# Patient Record
Sex: FEMALE | Race: WHITE | NOT HISPANIC OR LATINO | Employment: OTHER | URBAN - METROPOLITAN AREA
[De-identification: names, ages, dates, MRNs, and addresses within clinical notes are randomized per-mention and may not be internally consistent; named-entity substitution may affect disease eponyms.]

---

## 2017-03-20 ENCOUNTER — TELEPHONE (OUTPATIENT)
Dept: RHEUMATOLOGY | Facility: CLINIC | Age: 72
End: 2017-03-20

## 2017-03-20 NOTE — TELEPHONE ENCOUNTER
Spoke with Mrs. Doss and rescheduled her Rheumatology and lab appointments for 04/18/2017. Reminder letter mailed.

## 2017-03-24 ENCOUNTER — TELEPHONE (OUTPATIENT)
Dept: HEMATOLOGY/ONCOLOGY | Facility: CLINIC | Age: 72
End: 2017-03-24

## 2017-03-24 DIAGNOSIS — D50.0 IRON DEFICIENCY ANEMIA DUE TO CHRONIC BLOOD LOSS: Primary | ICD-10-CM

## 2017-03-24 DIAGNOSIS — D50.9 IRON DEFICIENCY ANEMIA, UNSPECIFIED: Primary | ICD-10-CM

## 2017-03-24 RX ORDER — SODIUM CHLORIDE 0.9 % (FLUSH) 0.9 %
10 SYRINGE (ML) INJECTION
Status: CANCELLED | OUTPATIENT
Start: 2017-05-10

## 2017-03-24 RX ORDER — HEPARIN 100 UNIT/ML
500 SYRINGE INTRAVENOUS
Status: CANCELLED | OUTPATIENT
Start: 2017-05-03

## 2017-03-24 RX ORDER — METHYLPREDNISOLONE SOD SUCC 125 MG
125 VIAL (EA) INJECTION EVERY 6 HOURS
Status: CANCELLED
Start: 2017-05-03

## 2017-03-24 RX ORDER — HEPARIN 100 UNIT/ML
500 SYRINGE INTRAVENOUS
Status: CANCELLED | OUTPATIENT
Start: 2017-05-10

## 2017-03-24 RX ORDER — METHYLPREDNISOLONE SOD SUCC 125 MG
125 VIAL (EA) INJECTION EVERY 6 HOURS
Status: CANCELLED
Start: 2017-05-10

## 2017-03-24 RX ORDER — SODIUM CHLORIDE 0.9 % (FLUSH) 0.9 %
10 SYRINGE (ML) INJECTION
Status: CANCELLED | OUTPATIENT
Start: 2017-05-03

## 2017-03-24 NOTE — TELEPHONE ENCOUNTER
6 days hospital for gastric bleed due to [place]. She thinks she may be anemic at this time and has no energy. She would like a call from Dr Neri about how she is feeling.

## 2017-03-24 NOTE — TELEPHONE ENCOUNTER
----- Message from Adonay Neri MD sent at 3/24/2017  9:28 AM CDT -----  Patient looks like she had a bleed at our [facility] upper orders in for CBC CMP iron status LDH and reticular count can have that done and I can see her immediately afterwards see whether or not she'll need an intravenous iron or if she wishes to have the labs done a day before and I'll have all the results

## 2017-03-24 NOTE — TELEPHONE ENCOUNTER
----- Message from Josefa Avendaño sent at 3/24/2017  8:39 AM CDT -----  Contact: self [phone]  States that she would like to speak to nurse regarding a problem that she is having. Please call back at [phone]//thank you acc

## 2017-03-27 ENCOUNTER — LAB VISIT (OUTPATIENT)
Dept: LAB | Facility: HOSPITAL | Age: 72
End: 2017-03-27
Attending: INTERNAL MEDICINE
Payer: MEDICARE

## 2017-03-27 DIAGNOSIS — D50.9 IRON DEFICIENCY ANEMIA, UNSPECIFIED: ICD-10-CM

## 2017-03-27 LAB
ALBUMIN SERPL BCP-MCNC: 3.9 G/DL
ALP SERPL-CCNC: 72 U/L
ALT SERPL W/O P-5'-P-CCNC: 11 U/L
ANION GAP SERPL CALC-SCNC: 10 MMOL/L
AST SERPL-CCNC: 18 U/L
BASOPHILS # BLD AUTO: 0.03 K/UL
BASOPHILS NFR BLD: 0.5 %
BILIRUB SERPL-MCNC: 0.5 MG/DL
BUN SERPL-MCNC: 19 MG/DL
CALCIUM SERPL-MCNC: 9.7 MG/DL
CHLORIDE SERPL-SCNC: 107 MMOL/L
CO2 SERPL-SCNC: 24 MMOL/L
CREAT SERPL-MCNC: 0.8 MG/DL
DIFFERENTIAL METHOD: ABNORMAL
EOSINOPHIL # BLD AUTO: 0.1 K/UL
EOSINOPHIL NFR BLD: 1.8 %
ERYTHROCYTE [DISTWIDTH] IN BLOOD BY AUTOMATED COUNT: 19.7 %
EST. GFR  (AFRICAN AMERICAN): >60 ML/MIN/1.73 M^2
EST. GFR  (NON AFRICAN AMERICAN): >60 ML/MIN/1.73 M^2
FERRITIN SERPL-MCNC: 35 NG/ML
GLUCOSE SERPL-MCNC: 93 MG/DL
HCT VFR BLD AUTO: 36.4 %
HGB BLD-MCNC: 11.7 G/DL
IRON SERPL-MCNC: 103 UG/DL
LYMPHOCYTES # BLD AUTO: 0.9 K/UL
LYMPHOCYTES NFR BLD: 16 %
MCH RBC QN AUTO: 28.5 PG
MCHC RBC AUTO-ENTMCNC: 32.1 %
MCV RBC AUTO: 89 FL
MONOCYTES # BLD AUTO: 0.4 K/UL
MONOCYTES NFR BLD: 7.1 %
NEUTROPHILS # BLD AUTO: 4.2 K/UL
NEUTROPHILS NFR BLD: 74.6 %
PLATELET # BLD AUTO: 255 K/UL
PMV BLD AUTO: 9.5 FL
POTASSIUM SERPL-SCNC: 4.4 MMOL/L
PROT SERPL-MCNC: 6.3 G/DL
RBC # BLD AUTO: 4.11 M/UL
RETICS/RBC NFR AUTO: 1.3 %
SATURATED IRON: 34 %
SODIUM SERPL-SCNC: 141 MMOL/L
TOTAL IRON BINDING CAPACITY: 303 UG/DL
TRANSFERRIN SERPL-MCNC: 205 MG/DL
WBC # BLD AUTO: 5.64 K/UL

## 2017-03-27 PROCEDURE — 83540 ASSAY OF IRON: CPT

## 2017-03-27 PROCEDURE — 84466 ASSAY OF TRANSFERRIN: CPT

## 2017-03-27 PROCEDURE — 82728 ASSAY OF FERRITIN: CPT

## 2017-03-27 PROCEDURE — 85025 COMPLETE CBC W/AUTO DIFF WBC: CPT | Mod: PO

## 2017-03-27 PROCEDURE — 36415 COLL VENOUS BLD VENIPUNCTURE: CPT | Mod: PO

## 2017-03-27 PROCEDURE — 80053 COMPREHEN METABOLIC PANEL: CPT | Mod: PO

## 2017-03-27 PROCEDURE — 85045 AUTOMATED RETICULOCYTE COUNT: CPT

## 2017-03-28 ENCOUNTER — OFFICE VISIT (OUTPATIENT)
Dept: HEMATOLOGY/ONCOLOGY | Facility: CLINIC | Age: 72
End: 2017-03-28
Payer: MEDICARE

## 2017-03-28 VITALS
TEMPERATURE: 98 F | DIASTOLIC BLOOD PRESSURE: 78 MMHG | BODY MASS INDEX: 24.54 KG/M2 | SYSTOLIC BLOOD PRESSURE: 126 MMHG | HEIGHT: 62 IN | HEART RATE: 78 BPM | OXYGEN SATURATION: 98 % | WEIGHT: 133.38 LBS

## 2017-03-28 DIAGNOSIS — D50.0 IRON DEFICIENCY ANEMIA DUE TO CHRONIC BLOOD LOSS: Primary | ICD-10-CM

## 2017-03-28 DIAGNOSIS — K55.20 ANGIODYSPLASIA OF CECUM: ICD-10-CM

## 2017-03-28 PROCEDURE — 99213 OFFICE O/P EST LOW 20 MIN: CPT | Mod: PBBFAC | Performed by: INTERNAL MEDICINE

## 2017-03-28 PROCEDURE — 99999 PR PBB SHADOW E&M-EST. PATIENT-LVL III: CPT | Mod: PBBFAC,,, | Performed by: INTERNAL MEDICINE

## 2017-03-28 PROCEDURE — 99202 OFFICE O/P NEW SF 15 MIN: CPT | Mod: S$PBB,,, | Performed by: INTERNAL MEDICINE

## 2017-03-28 NOTE — PROGRESS NOTES
Subjective:       Patient ID: Selam Botello is a 71 y.o. female.    Chief Complaint: Anemia and Results    HPI 71-year-old female history of cecal hemorrhage else secondary to meloxicam documentation from note of March 2017 an epic care everywhere at [facility] in [place]    Past Medical History:   Diagnosis Date    Acid reflux     Allergy     Arthritis     Cataract     Deep vein thrombosis     Degenerative disc disease     Osteoporosis     Thyroid disease      Family History   Problem Relation Age of Onset    COPD Mother     COPD Father     Cancer Sister      Social History     Social History    Marital status:      Spouse name: N/A    Number of children: N/A    Years of education: N/A     Occupational History    Not on file.     Social History Main Topics    Smoking status: Never Smoker    Smokeless tobacco: Not on file    Alcohol use Yes      Comment: social    Drug use: No    Sexual activity: Not on file     Other Topics Concern    Not on file     Social History Narrative     Past Surgical History:   Procedure Laterality Date    ADENOIDECTOMY      BREAST SURGERY      EYE SURGERY      JOINT REPLACEMENT      left hip replacement      TONSILLECTOMY         Labs:  Lab Results   Component Value Date    WBC 5.64 03/27/2017    HGB 11.7 (L) 03/27/2017    HCT 36.4 (L) 03/27/2017    MCV 89 03/27/2017     03/27/2017     BMP  Lab Results   Component Value Date     03/27/2017    K 4.4 03/27/2017     03/27/2017    CO2 24 03/27/2017    BUN 19 03/27/2017    CREATININE 0.8 03/27/2017    CALCIUM 9.7 03/27/2017    ANIONGAP 10 03/27/2017    ESTGFRAFRICA >60 03/27/2017    EGFRNONAA >60 03/27/2017     Lab Results   Component Value Date    ALT 11 03/27/2017    AST 18 03/27/2017    ALKPHOS 72 03/27/2017    BILITOT 0.5 03/27/2017       Lab Results   Component Value Date    IRON 103 03/27/2017    TIBC 303 03/27/2017    FERRITIN 35 03/27/2017     No results  found for: [id number]  No results found for: FOLATE  No results found for: TSH      Review of Systems   Constitutional: Positive for fatigue. Negative for activity change, appetite change, chills, diaphoresis, fever and unexpected weight change.   HENT: Negative for congestion, dental problem, drooling, ear discharge, ear pain, facial swelling, hearing loss, mouth sores, nosebleeds, postnasal drip, rhinorrhea, sinus pressure, sneezing, sore throat, tinnitus, trouble swallowing and voice change.    Eyes: Negative for photophobia, pain, discharge, redness, itching and visual disturbance.   Respiratory: Negative for cough, choking, chest tightness, shortness of breath, wheezing and stridor.    Cardiovascular: Negative for chest pain, palpitations and leg swelling.   Gastrointestinal: Negative for abdominal distention, abdominal pain, anal bleeding, blood in stool, constipation, diarrhea, nausea, rectal pain and vomiting.   Endocrine: Negative for cold intolerance, heat intolerance, polydipsia, polyphagia and polyuria.   Genitourinary: Negative for decreased urine volume, difficulty urinating, dyspareunia, dysuria, enuresis, flank pain, frequency, genital sores, hematuria, menstrual problem, pelvic pain, urgency, vaginal bleeding, vaginal discharge and vaginal pain.   Musculoskeletal: Negative for arthralgias, back pain, gait problem, joint swelling, myalgias, neck pain and neck stiffness.   Skin: Negative for color change, pallor and rash.   Allergic/Immunologic: Negative for environmental allergies, food allergies and immunocompromised state.   Neurological: Positive for weakness. Negative for dizziness, tremors, seizures, syncope, facial asymmetry, speech difficulty, light-headedness, numbness and headaches.   Hematological: Negative for adenopathy. Does not bruise/bleed easily.   Psychiatric/Behavioral: Positive for dysphoric mood. Negative for agitation, behavioral problems, confusion, decreased concentration,  hallucinations, self-injury, sleep disturbance and suicidal ideas. The patient is nervous/anxious. The patient is not hyperactive.        Objective:      Physical Exam   Constitutional: She is oriented to person, place, and time. She appears well-developed and well-nourished. No distress.   HENT:   Head: Normocephalic and atraumatic.   Right Ear: External ear normal.   Left Ear: External ear normal.   Nose: Nose normal. Right sinus exhibits no maxillary sinus tenderness and no frontal sinus tenderness. Left sinus exhibits no maxillary sinus tenderness and no frontal sinus tenderness.   Mouth/Throat: Oropharynx is clear and moist. No oropharyngeal exudate.   Eyes: Conjunctivae, EOM and lids are normal. Pupils are equal, round, and reactive to light. Right eye exhibits no discharge. Left eye exhibits no discharge. Right conjunctiva is not injected. Right conjunctiva has no hemorrhage. Left conjunctiva is not injected. Left conjunctiva has no hemorrhage. No scleral icterus.   Neck: Normal range of motion. Neck supple. No JVD present. No tracheal deviation present. No thyromegaly present.   Cardiovascular: Normal rate and regular rhythm.    Pulmonary/Chest: Effort normal. No stridor. No respiratory distress. She exhibits no tenderness.   Abdominal: Soft. She exhibits no distension and no mass. There is no splenomegaly or hepatomegaly. There is no tenderness. There is no rebound.   Musculoskeletal: Normal range of motion. She exhibits no edema or tenderness.   Lymphadenopathy:     She has no cervical adenopathy.     She has no axillary adenopathy.        Right: No supraclavicular adenopathy present.        Left: No supraclavicular adenopathy present.   Neurological: She is alert and oriented to person, place, and time. No cranial nerve deficit. Coordination normal.   Skin: Skin is dry. No rash noted. She is not diaphoretic. No erythema.   Psychiatric: She has a normal mood and affect. Her behavior is normal. Judgment and  thought content normal.   Vitals reviewed.          Assessment:       1. Iron deficiency anemia due to chronic blood loss    2. Angiodysplasia of cecum            Plan:     reviewed results presented with hematocrit of 19.7 at this point hemoglobin is 11.7 iron status repleted will check CBC on a weekly basis communicates through patient electronic portal return in one month with CBC and iron status

## 2017-03-29 ENCOUNTER — TELEPHONE (OUTPATIENT)
Dept: HEMATOLOGY/ONCOLOGY | Facility: CLINIC | Age: 72
End: 2017-03-29

## 2017-03-29 NOTE — TELEPHONE ENCOUNTER
Pt will have monthly CBC at her convinience. The orders are in. Scheduled for Dr Neri in 4 wks/prior lab.

## 2017-04-04 ENCOUNTER — LAB VISIT (OUTPATIENT)
Dept: LAB | Facility: HOSPITAL | Age: 72
End: 2017-04-04
Attending: INTERNAL MEDICINE
Payer: MEDICARE

## 2017-04-04 DIAGNOSIS — D50.0 IRON DEFICIENCY ANEMIA DUE TO CHRONIC BLOOD LOSS: ICD-10-CM

## 2017-04-04 LAB
BASOPHILS # BLD AUTO: 0.03 K/UL
BASOPHILS NFR BLD: 0.6 %
DIFFERENTIAL METHOD: ABNORMAL
EOSINOPHIL # BLD AUTO: 0.1 K/UL
EOSINOPHIL NFR BLD: 2.7 %
ERYTHROCYTE [DISTWIDTH] IN BLOOD BY AUTOMATED COUNT: 19.8 %
HCT VFR BLD AUTO: 38.3 %
HGB BLD-MCNC: 12.2 G/DL
LYMPHOCYTES # BLD AUTO: 1.2 K/UL
LYMPHOCYTES NFR BLD: 24.3 %
MCH RBC QN AUTO: 28.2 PG
MCHC RBC AUTO-ENTMCNC: 31.9 %
MCV RBC AUTO: 89 FL
MONOCYTES # BLD AUTO: 0.4 K/UL
MONOCYTES NFR BLD: 9.2 %
NEUTROPHILS # BLD AUTO: 3 K/UL
NEUTROPHILS NFR BLD: 63.2 %
PLATELET # BLD AUTO: 310 K/UL
PMV BLD AUTO: 9.4 FL
RBC # BLD AUTO: 4.32 M/UL
WBC # BLD AUTO: 4.78 K/UL

## 2017-04-04 PROCEDURE — 85025 COMPLETE CBC W/AUTO DIFF WBC: CPT | Mod: PO

## 2017-04-04 PROCEDURE — 36415 COLL VENOUS BLD VENIPUNCTURE: CPT | Mod: PO

## 2017-04-11 ENCOUNTER — LAB VISIT (OUTPATIENT)
Dept: LAB | Facility: HOSPITAL | Age: 72
End: 2017-04-11
Attending: INTERNAL MEDICINE
Payer: MEDICARE

## 2017-04-11 DIAGNOSIS — D50.0 IRON DEFICIENCY ANEMIA DUE TO CHRONIC BLOOD LOSS: ICD-10-CM

## 2017-04-11 LAB
BASOPHILS # BLD AUTO: 0.02 K/UL
BASOPHILS NFR BLD: 0.4 %
DIFFERENTIAL METHOD: ABNORMAL
EOSINOPHIL # BLD AUTO: 0.1 K/UL
EOSINOPHIL NFR BLD: 2.2 %
ERYTHROCYTE [DISTWIDTH] IN BLOOD BY AUTOMATED COUNT: 19.3 %
HCT VFR BLD AUTO: 37.1 %
HGB BLD-MCNC: 12 G/DL
LYMPHOCYTES # BLD AUTO: 1 K/UL
LYMPHOCYTES NFR BLD: 21.3 %
MCH RBC QN AUTO: 28.3 PG
MCHC RBC AUTO-ENTMCNC: 32.3 %
MCV RBC AUTO: 88 FL
MONOCYTES # BLD AUTO: 0.3 K/UL
MONOCYTES NFR BLD: 7.2 %
NEUTROPHILS # BLD AUTO: 3.2 K/UL
NEUTROPHILS NFR BLD: 68.9 %
PLATELET # BLD AUTO: 289 K/UL
PMV BLD AUTO: 10 FL
RBC # BLD AUTO: 4.24 M/UL
WBC # BLD AUTO: 4.6 K/UL

## 2017-04-11 PROCEDURE — 85025 COMPLETE CBC W/AUTO DIFF WBC: CPT | Mod: PO

## 2017-04-11 PROCEDURE — 36415 COLL VENOUS BLD VENIPUNCTURE: CPT | Mod: PO

## 2017-04-12 ENCOUNTER — PATIENT MESSAGE (OUTPATIENT)
Dept: HEMATOLOGY/ONCOLOGY | Facility: CLINIC | Age: 72
End: 2017-04-12

## 2017-04-18 ENCOUNTER — LAB VISIT (OUTPATIENT)
Dept: LAB | Facility: HOSPITAL | Age: 72
End: 2017-04-18
Attending: INTERNAL MEDICINE
Payer: MEDICARE

## 2017-04-18 ENCOUNTER — OFFICE VISIT (OUTPATIENT)
Dept: RHEUMATOLOGY | Facility: CLINIC | Age: 72
End: 2017-04-18
Payer: MEDICARE

## 2017-04-18 VITALS
HEART RATE: 65 BPM | DIASTOLIC BLOOD PRESSURE: 66 MMHG | SYSTOLIC BLOOD PRESSURE: 110 MMHG | BODY MASS INDEX: 24.27 KG/M2 | WEIGHT: 132.69 LBS

## 2017-04-18 DIAGNOSIS — M15.9 PRIMARY OSTEOARTHRITIS INVOLVING MULTIPLE JOINTS: Primary | ICD-10-CM

## 2017-04-18 DIAGNOSIS — M81.0 AGE-RELATED OSTEOPOROSIS WITHOUT CURRENT PATHOLOGICAL FRACTURE: ICD-10-CM

## 2017-04-18 DIAGNOSIS — E55.9 HYPOVITAMINOSIS D: Chronic | ICD-10-CM

## 2017-04-18 DIAGNOSIS — D50.0 IRON DEFICIENCY ANEMIA DUE TO CHRONIC BLOOD LOSS: ICD-10-CM

## 2017-04-18 DIAGNOSIS — M15.9 PRIMARY OSTEOARTHRITIS INVOLVING MULTIPLE JOINTS: ICD-10-CM

## 2017-04-18 DIAGNOSIS — Z51.81 MEDICATION MONITORING ENCOUNTER: Chronic | ICD-10-CM

## 2017-04-18 LAB
25(OH)D3+25(OH)D2 SERPL-MCNC: 42 NG/ML
ANION GAP SERPL CALC-SCNC: 9 MMOL/L
BASOPHILS # BLD AUTO: 0.02 K/UL
BASOPHILS NFR BLD: 0.4 %
BUN SERPL-MCNC: 23 MG/DL
CALCIUM SERPL-MCNC: 9.6 MG/DL
CHLORIDE SERPL-SCNC: 108 MMOL/L
CO2 SERPL-SCNC: 26 MMOL/L
CREAT SERPL-MCNC: 0.8 MG/DL
DIFFERENTIAL METHOD: ABNORMAL
EOSINOPHIL # BLD AUTO: 0.1 K/UL
EOSINOPHIL NFR BLD: 2 %
ERYTHROCYTE [DISTWIDTH] IN BLOOD BY AUTOMATED COUNT: 19.7 %
EST. GFR  (AFRICAN AMERICAN): >60 ML/MIN/1.73 M^2
EST. GFR  (NON AFRICAN AMERICAN): >60 ML/MIN/1.73 M^2
FERRITIN SERPL-MCNC: 17 NG/ML
GLUCOSE SERPL-MCNC: 94 MG/DL
HCT VFR BLD AUTO: 38.4 %
HGB BLD-MCNC: 12.3 G/DL
IRON SERPL-MCNC: 55 UG/DL
LYMPHOCYTES # BLD AUTO: 0.8 K/UL
LYMPHOCYTES NFR BLD: 15.7 %
MCH RBC QN AUTO: 28.3 PG
MCHC RBC AUTO-ENTMCNC: 32 %
MCV RBC AUTO: 88 FL
MONOCYTES # BLD AUTO: 0.4 K/UL
MONOCYTES NFR BLD: 8.5 %
NEUTROPHILS # BLD AUTO: 3.7 K/UL
NEUTROPHILS NFR BLD: 73.4 %
PLATELET # BLD AUTO: 242 K/UL
PMV BLD AUTO: 9.2 FL
POTASSIUM SERPL-SCNC: 4 MMOL/L
RBC # BLD AUTO: 4.35 M/UL
SATURATED IRON: 17 %
SODIUM SERPL-SCNC: 143 MMOL/L
TOTAL IRON BINDING CAPACITY: 318 UG/DL
TRANSFERRIN SERPL-MCNC: 215 MG/DL
WBC # BLD AUTO: 5.03 K/UL

## 2017-04-18 PROCEDURE — 99214 OFFICE O/P EST MOD 30 MIN: CPT | Mod: S$PBB,,, | Performed by: PHYSICIAN ASSISTANT

## 2017-04-18 PROCEDURE — 99214 OFFICE O/P EST MOD 30 MIN: CPT | Mod: PBBFAC,PO,25 | Performed by: PHYSICIAN ASSISTANT

## 2017-04-18 PROCEDURE — 99999 PR PBB SHADOW E&M-EST. PATIENT-LVL IV: CPT | Mod: PBBFAC,,, | Performed by: PHYSICIAN ASSISTANT

## 2017-04-18 NOTE — PROGRESS NOTES
Subjective:       Patient ID: Selam Botello is a 71 y.o. female.    Chief Complaint: Osteopenia    HPI Comments: Mrs. Botello is here for follow up for osteoporosis/osteopenia and osteoarthritis.  In the past she had been on fosamax and boniva for osteoporosis but had no improvement in her bmd.  She was moved to [facility] and improved to osteopenia and given a prolia holiday. She had a repeat dexa today showing osteoporosis at the radius.  No falls or fractures.  She has a history of vitamin D deficiency and is taking 50,000units twice weekly.  Previous vit D levels have been in the normal range, today's level is pending.     For her osteoarthritis, her pain is mainly in her hands, she has bony enlargement to her pip and dip joints and her rita thumb mcps.  She has had a left TKA and right BROCK.  Her left knee has limited range of motion due to complications after her surgery, she is in physical therapy for this.  She has taken mobic for a long time and this helped, however earlier this year she was admitted to the hospital for severe anemia and found to have a GI bleed.  Mobic was stopped.  She takes tylenol prn but still hurts in her hands.  She uses lidoderm topical but this doesn't really help.      Review of Systems   Constitutional: Negative for chills, fatigue and fever.   HENT: Negative for mouth sores, rhinorrhea and sore throat.    Eyes: Negative for pain and redness.   Respiratory: Negative for cough and shortness of breath.    Cardiovascular: Positive for leg swelling (improved with vasculera). Negative for chest pain.   Gastrointestinal: Negative for abdominal pain, constipation, diarrhea, nausea and vomiting.        Recent GI bleed   Genitourinary: Negative for dysuria and hematuria.   Musculoskeletal: Positive for arthralgias. Negative for joint swelling and myalgias.        Right TKA, left BROCK   Skin: Negative for rash.   Neurological: Negative for weakness, numbness and headaches.    Psychiatric/Behavioral: The patient is not nervous/anxious.          Objective:   /66  Pulse 65  Wt 60.2 kg (132 lb 11.5 oz)  BMI 24.27 kg/m2     Physical Exam   Constitutional: She is oriented to person, place, and time and well-developed, well-nourished, and in no distress.   HENT:   Head: Normocephalic and atraumatic.   Eyes: Pupils are equal, round, and reactive to light. Right eye exhibits no discharge.   Neck: Normal range of motion.   Cardiovascular: Normal rate, regular rhythm and normal heart sounds.  Exam reveals no friction rub.    Pulmonary/Chest: Effort normal and breath sounds normal. No respiratory distress.   Abdominal: Soft. She exhibits no distension. There is no tenderness.   Lymphadenopathy:     She has no cervical adenopathy.   Neurological: She is alert and oriented to person, place, and time.   Skin: No rash noted. No erythema.     Psychiatric: Mood normal.   Musculoskeletal: Normal range of motion. She exhibits no edema or deformity.   rita hands with oa changes to pip and dips, rita 1 mcps enlarged    Right TKA with decreased rom, flexion about 50-60 degress    Left steve    No swollen or tender joints           Recent Results (from the past 168 hour(s))   CBC auto differential    Collection Time: 04/11/17 10:30 AM   Result Value Ref Range    WBC 4.60 3.90 - 12.70 K/uL    RBC 4.24 4.00 - 5.40 M/uL    Hemoglobin 12.0 12.0 - 16.0 g/dL    Hematocrit 37.1 37.0 - 48.5 %    MCV 88 82 - 98 fL    MCH 28.3 27.0 - 31.0 pg    MCHC 32.3 32.0 - 36.0 %    RDW 19.3 (H) 11.5 - 14.5 %    Platelets 289 150 - 350 K/uL    MPV 10.0 9.2 - 12.9 fL    Gran # 3.2 1.8 - 7.7 K/uL    Lymph # 1.0 1.0 - 4.8 K/uL    Mono # 0.3 0.3 - 1.0 K/uL    Eos # 0.1 0.0 - 0.5 K/uL    Baso # 0.02 0.00 - 0.20 K/uL    Gran% 68.9 38.0 - 73.0 %    Lymph% 21.3 18.0 - 48.0 %    Mono% 7.2 4.0 - 15.0 %    Eosinophil% 2.2 0.0 - 8.0 %    Basophil% 0.4 0.0 - 1.9 %    Differential Method Automated    Basic metabolic panel    Collection  Time: 04/18/17  8:19 AM   Result Value Ref Range    Sodium 143 136 - 145 mmol/L    Potassium 4.0 3.5 - 5.1 mmol/L    Chloride 108 95 - 110 mmol/L    CO2 26 23 - 29 mmol/L    Glucose 94 70 - 110 mg/dL    BUN, Bld 23 8 - 23 mg/dL    Creatinine 0.8 0.5 - 1.4 mg/dL    Calcium 9.6 8.7 - 10.5 mg/dL    Anion Gap 9 8 - 16 mmol/L    eGFR if African American >60 >60 mL/min/1.73 m^2    eGFR if non African American >60 >60 mL/min/1.73 m^2   CBC auto differential    Collection Time: 04/18/17  8:19 AM   Result Value Ref Range    WBC 5.03 3.90 - 12.70 K/uL    RBC 4.35 4.00 - 5.40 M/uL    Hemoglobin 12.3 12.0 - 16.0 g/dL    Hematocrit 38.4 37.0 - 48.5 %    MCV 88 82 - 98 fL    MCH 28.3 27.0 - 31.0 pg    MCHC 32.0 32.0 - 36.0 %    RDW 19.7 (H) 11.5 - 14.5 %    Platelets 242 150 - 350 K/uL    MPV 9.2 9.2 - 12.9 fL    Gran # 3.7 1.8 - 7.7 K/uL    Lymph # 0.8 (L) 1.0 - 4.8 K/uL    Mono # 0.4 0.3 - 1.0 K/uL    Eos # 0.1 0.0 - 0.5 K/uL    Baso # 0.02 0.00 - 0.20 K/uL    Gran% 73.4 (H) 38.0 - 73.0 %    Lymph% 15.7 (L) 18.0 - 48.0 %    Mono% 8.5 4.0 - 15.0 %    Eosinophil% 2.0 0.0 - 8.0 %    Basophil% 0.4 0.0 - 1.9 %    Differential Method Automated      Dexa 4/18/17 spine -1.4, radius UD -2.7, radius 33% -2.1, bmd increasing at Radius 33%, decreasing at spine  Assessment:       1. Primary osteoarthritis involving multiple joints    2. Age-related osteoporosis without current pathological fracture    3. Medication monitoring encounter        1. OA bilateral hands: mobic in the past but stopped recently due to serious GI bleed this year, tylenol occasionally   2. Osteoporosis: previously on fosamax and boniva without improvement, prolia improved to osteopoenia, on holiday now; repeat dexa 4/18/17 spine -1.4, radius UD -2.7, radius 33% -2.1,   3.  Med monitoring: ok for prolia  4. H/o vit D deficiency: taking vit D 50.000Units twice weekly, level today is pending  Plan:       Will start Limbrel twice daily for OA pain, [facility]   No nsaids   Will resume prolia, get auth for this and schedule for when approved, use today's labs   Cont vit D twice weekly for now, will check today's level, ca in diet  Continue physical therapy       Return when approved for prolia and then q 6 mos with bmp

## 2017-04-18 NOTE — PATIENT INSTRUCTIONS
Limbrel one pill twice a day, [facility], can take a few weeks to start working     prolia will resume every 6 months      Cont vit D twice weekly

## 2017-04-18 NOTE — MR AVS SNAPSHOT
[facility]  [address]  [place] [address]  Phone: [phone]  Fax: [phone]                  Selam Botello   2017 9:00 AM   Office Visit    Description:  Female : 1945   Provider:  Cassandra Mejia PA-C   Department:  [facility] - Rhe[facility]ology           Reason for Visit     Osteopenia           Diagnoses this Visit        Comments    Primary osteoarthritis involving multiple joints    -  Primary     Age-related osteoporosis without current pathological fracture         Medication monitoring encounter                To Do List           Future Appointments        Provider Department Dept Phone    2017 2:20 PM LABORATORY, [facility] [phone]    2017 3:00 PM Adonay Neri MD [facility]ot[facility] Oncology [phone]    2017 9:00 AM RHEUMATOLOGY NURSE, [facility] [phone]      Goals (5 Years of Data)     None      Follow-Up and Disposition     Return in about 6 months (around 10/18/2017) for prolia when approved, 6 mos after prolia with aquilino maria.       These Medications        Disp Refills Start End    baicalin-catechin 500 mg Cap 180 each 1 2017     Take 500 mg by mouth 2 (two) times daily. - Oral    Pharmacy: [facility]/pharmacy #[id number] - [place], [place] - [address].  #: [phone]         [facility]     [facility] Nurse Care Line -  Assistance  Unless otherwise directed by your provider, please contact [facility], our nurse care line that is available for  assistance.     Registered nurses in the [facility] provide: appointment scheduling, clinical advisement, health education, and other advisory services.  Call: [phone] (toll free)               Medications           Message regarding Medications     Verify the changes and/or additions to your medication regime listed below are the same as discussed with your clinician today.  If any of these changes  or additions are incorrect, please notify your healthcare provider.        START taking these NEW medications        Refills    baicalin-catechin 500 mg Cap 1    Sig: Take 500 mg by mouth 2 (two) times daily.    Class: No Print    Route: Oral      These medications were administered today        Dose Freq    denosumab (PROLIA) injection 60 mg 60 mg Clinic/HOD 1 time    Sig: Inject 1 mL (60 mg total) into the skin one time.    Class: Normal    Route: Subcutaneous           Verify that the below list of medications is an accurate representation of the medications you are currently taking.  If none reported, the list may be blank. If incorrect, please contact your healthcare provider. Carry this list with you in case of emergency.           Current Medications     b complex vitamins tablet Take 1 tablet by mouth once daily.    baicalin-catechin 500 mg Cap Take 500 mg by mouth 2 (two) times daily.    cetirizine (ZYRTEC) 10 MG tablet Take 1 tablet by mouth Use as needed.    CRANBERRY FRUIT EXTRACT (CRANBERRY CONCENTRATE ORAL) Take 650 mg by mouth.    diosmin complex no.1 (VASCULERA) 630 mg Tab Take by mouth.    ergocalciferol (VITAMIN D2) 50,000 unit Cap Take 1 capsule (50,000 Units total) by mouth every 7 days.    levothyroxine (SYNTHROID) 100 MCG tablet Take 1 tablet by mouth Daily.    lidocaine-prilocaine (EMLA) cream Apply topically as needed.    LIDODERM 5 % PLACE 1 PATCH ONTO THE SKIN ONCE DAILY.    mometasone (NASONEX) 50 mcg/actuation nasal spray 1 spray by Nasal route PRN.    omeprazole (PRILOSEC OTC) 20 MG tablet Take 20 mg by mouth once daily.    PLAVIX 75 mg tablet Take 75 mg by mouth. 4 times a week    pyridoxine (VITAMIN B-6) 100 MG Tab Take 50 mg by mouth 2 (two) times daily.    VITAMIN B COMPLEX NO.12-NIACIN ORAL Take 1,000 Int'l Units by mouth.    vitamin E 400 UNIT capsule Take 400 Units by mouth once daily.           Clinical Reference Information           Your Vitals Were     BP Pulse Weight BMI        110/66 65 60.2 kg (132 lb 11.5 oz) 24.27 kg/m2       Blood Pressure          Most Recent Value    BP  110/66      Allergies as of 4/18/2017     Epinephrine    Latex    Levofloxacin    Propofol Analogues    Sulfa (Sulfonamide Antibiotics)    Tramadol      Immunizations Administered on Date of Encounter - 4/18/2017     None      Orders Placed During Today's Visit      Normal Orders This Visit    Prior Authorization Order     Recurring Lab Work Interval Expires    Basic metabolic panel   6/17/2018      Instructions    Limbrel one pill twice a day, [facility], can take a few weeks to start working     prolia will resume every 6 months      Cont vit D twice weekly        Language Assistance Services     ATTENTION: Language assistance services are available, free of charge. Please call [phone].      ATENCIÓN: Si emelia blackwood, tiene a garcia disposición servicios gratuitos de asistencia lingüística. Llame al [phone].     CHÚ Ý: N?u b?n nói Ti?ng Vi?t, có các d?ch v? h? tr? ngôn ng? mi?n phí dành cho b?n. G?i s? [phone].         [facility] complies with applicable Federal civil rights laws and does not discriminate on the basis of race, color, national origin, age, disability, or sex.

## 2017-04-19 ENCOUNTER — TELEPHONE (OUTPATIENT)
Dept: RHEUMATOLOGY | Facility: CLINIC | Age: 72
End: 2017-04-19

## 2017-04-24 ENCOUNTER — TELEPHONE (OUTPATIENT)
Dept: HEMATOLOGY/ONCOLOGY | Facility: CLINIC | Age: 72
End: 2017-04-24

## 2017-04-24 NOTE — TELEPHONE ENCOUNTER
Returning your call to reschedule appt that you requested, if you can't reach me you can reschedule with appt desk at [phone], thank you

## 2017-04-24 NOTE — TELEPHONE ENCOUNTER
----- Message from Gisell eZpeda sent at 2017  9:35 AM CDT -----  Contact: pt  Pt is calling Nurse staff regarding pt appt need to be reschedule due to a . Pt need the appt sooner than appt allows. Pt call back [phone] thanks

## 2017-04-25 ENCOUNTER — LAB VISIT (OUTPATIENT)
Dept: LAB | Facility: HOSPITAL | Age: 72
End: 2017-04-25
Attending: INTERNAL MEDICINE
Payer: MEDICARE

## 2017-04-25 DIAGNOSIS — D50.0 IRON DEFICIENCY ANEMIA DUE TO CHRONIC BLOOD LOSS: ICD-10-CM

## 2017-04-25 LAB
BASOPHILS # BLD AUTO: 0.02 K/UL
BASOPHILS NFR BLD: 0.4 %
DIFFERENTIAL METHOD: ABNORMAL
EOSINOPHIL # BLD AUTO: 0.1 K/UL
EOSINOPHIL NFR BLD: 1.5 %
ERYTHROCYTE [DISTWIDTH] IN BLOOD BY AUTOMATED COUNT: 19.3 %
HCT VFR BLD AUTO: 38.7 %
HGB BLD-MCNC: 12.5 G/DL
LYMPHOCYTES # BLD AUTO: 1.2 K/UL
LYMPHOCYTES NFR BLD: 22.7 %
MCH RBC QN AUTO: 28.2 PG
MCHC RBC AUTO-ENTMCNC: 32.3 %
MCV RBC AUTO: 87 FL
MONOCYTES # BLD AUTO: 0.4 K/UL
MONOCYTES NFR BLD: 7.3 %
NEUTROPHILS # BLD AUTO: 3.7 K/UL
NEUTROPHILS NFR BLD: 68.1 %
PLATELET # BLD AUTO: 267 K/UL
PMV BLD AUTO: 9.3 FL
RBC # BLD AUTO: 4.43 M/UL
WBC # BLD AUTO: 5.46 K/UL

## 2017-04-25 PROCEDURE — 85025 COMPLETE CBC W/AUTO DIFF WBC: CPT | Mod: PO

## 2017-04-25 PROCEDURE — 36415 COLL VENOUS BLD VENIPUNCTURE: CPT | Mod: PO

## 2017-05-02 ENCOUNTER — CLINICAL SUPPORT (OUTPATIENT)
Dept: RHEUMATOLOGY | Facility: CLINIC | Age: 72
End: 2017-05-02
Payer: MEDICARE

## 2017-05-02 PROCEDURE — 96372 THER/PROPH/DIAG INJ SC/IM: CPT | Mod: PBBFAC,PO

## 2017-05-02 PROCEDURE — 99999 PR PBB SHADOW E&M-EST. PATIENT-LVL II: CPT | Mod: PBBFAC,,,

## 2017-05-02 PROCEDURE — 99212 OFFICE O/P EST SF 10 MIN: CPT | Mod: PBBFAC,PO,25

## 2017-05-02 RX ADMIN — DENOSUMAB 60 MG: 60 INJECTION SUBCUTANEOUS at 09:05

## 2017-05-02 NOTE — PROGRESS NOTES
Prolia 60mg/cc to left lower abdomen. Labs checked: Calcium 9.6, Creatinine 0.8. Pt tolerated well. No acute reaction noted to site. Pt instructed on signs and symptoms of reaction to report. Pt verbalized understanding.     Lot:  [id number]  Exp:  6/2019

## 2017-05-03 ENCOUNTER — OFFICE VISIT (OUTPATIENT)
Dept: HEMATOLOGY/ONCOLOGY | Facility: CLINIC | Age: 72
End: 2017-05-03
Payer: MEDICARE

## 2017-05-03 VITALS
WEIGHT: 133.81 LBS | SYSTOLIC BLOOD PRESSURE: 120 MMHG | HEART RATE: 60 BPM | BODY MASS INDEX: 24.63 KG/M2 | TEMPERATURE: 97 F | OXYGEN SATURATION: 99 % | HEIGHT: 62 IN | DIASTOLIC BLOOD PRESSURE: 62 MMHG

## 2017-05-03 DIAGNOSIS — K55.20 ANGIODYSPLASIA OF CECUM: ICD-10-CM

## 2017-05-03 DIAGNOSIS — D50.0 IRON DEFICIENCY ANEMIA DUE TO CHRONIC BLOOD LOSS: Primary | ICD-10-CM

## 2017-05-03 PROCEDURE — 99999 PR PBB SHADOW E&M-EST. PATIENT-LVL III: CPT | Mod: PBBFAC,,, | Performed by: INTERNAL MEDICINE

## 2017-05-03 PROCEDURE — 99214 OFFICE O/P EST MOD 30 MIN: CPT | Mod: S$PBB,,, | Performed by: INTERNAL MEDICINE

## 2017-05-03 PROCEDURE — 99213 OFFICE O/P EST LOW 20 MIN: CPT | Mod: PBBFAC | Performed by: INTERNAL MEDICINE

## 2017-05-03 NOTE — PROGRESS NOTES
Subjective:       Patient ID: Selam Botello is a 71 y.o. female.    Chief Complaint: Results and Anemia    HPI 71-year-old female recently discharged later like hospital with gastrointestinal bleeding secondary to angiodysplasias of her cecum patient returns continues to be iron deficient despite oral iron supplementation.    Past Medical History:   Diagnosis Date    Acid reflux     Allergy     Arthritis     Cataract     Deep vein thrombosis     Degenerative disc disease     Osteoporosis     Thyroid disease      Family History   Problem Relation Age of Onset    COPD Mother     COPD Father     Cancer Sister      Social History     Social History    Marital status:      Spouse name: N/A    Number of children: N/A    Years of education: N/A     Occupational History    Not on file.     Social History Main Topics    Smoking status: Never Smoker    Smokeless tobacco: Not on file    Alcohol use Yes      Comment: social    Drug use: No    Sexual activity: Not on file     Other Topics Concern    Not on file     Social History Narrative     Past Surgical History:   Procedure Laterality Date    ADENOIDECTOMY      BREAST SURGERY      EYE SURGERY      JOINT REPLACEMENT      left hip replacement      TONSILLECTOMY         Labs:  Lab Results   Component Value Date    WBC 5.46 04/25/2017    HGB 12.5 04/25/2017    HCT 38.7 04/25/2017    MCV 87 04/25/2017     04/25/2017     BMP  Lab Results   Component Value Date     04/18/2017    K 4.0 04/18/2017     04/18/2017    CO2 26 04/18/2017    BUN 23 04/18/2017    CREATININE 0.8 04/18/2017    CALCIUM 9.6 04/18/2017    ANIONGAP 9 04/18/2017    ESTGFRAFRICA >60 04/18/2017    EGFRNONAA >60 04/18/2017     Lab Results   Component Value Date    ALT 11 03/27/2017    AST 18 03/27/2017    ALKPHOS 72 03/27/2017    BILITOT 0.5 03/27/2017       Lab Results   Component Value Date    IRON 55 04/18/2017    TIBC 318 04/18/2017    FERRITIN 17 (L)  04/18/2017     No results found for: CTMMFQMP89  No results found for: FOLATE  No results found for: TSH      Review of Systems   Constitutional: Negative for activity change, appetite change, chills, diaphoresis, fatigue, fever and unexpected weight change.   HENT: Negative for congestion, dental problem, drooling, ear discharge, ear pain, facial swelling, hearing loss, mouth sores, nosebleeds, postnasal drip, rhinorrhea, sinus pressure, sneezing, sore throat, tinnitus, trouble swallowing and voice change.    Eyes: Negative for photophobia, pain, discharge, redness, itching and visual disturbance.   Respiratory: Negative for cough, choking, chest tightness, shortness of breath, wheezing and stridor.    Cardiovascular: Negative for chest pain, palpitations and leg swelling.   Gastrointestinal: Negative for abdominal distention, abdominal pain, anal bleeding, blood in stool, constipation, diarrhea, nausea, rectal pain and vomiting.   Endocrine: Negative for cold intolerance, heat intolerance, polydipsia, polyphagia and polyuria.   Genitourinary: Negative for decreased urine volume, difficulty urinating, dyspareunia, dysuria, enuresis, flank pain, frequency, genital sores, hematuria, menstrual problem, pelvic pain, urgency, vaginal bleeding, vaginal discharge and vaginal pain.   Musculoskeletal: Positive for back pain. Negative for arthralgias, gait problem, joint swelling, myalgias, neck pain and neck stiffness.   Skin: Negative for color change, pallor and rash.   Allergic/Immunologic: Negative for environmental allergies, food allergies and immunocompromised state.   Neurological: Negative for dizziness, tremors, seizures, syncope, facial asymmetry, speech difficulty, weakness, light-headedness, numbness and headaches.   Hematological: Negative for adenopathy. Does not bruise/bleed easily.   Psychiatric/Behavioral: Negative for agitation, behavioral problems, confusion, decreased concentration, dysphoric mood,  hallucinations, self-injury, sleep disturbance and suicidal ideas. The patient is nervous/anxious. The patient is not hyperactive.        Objective:      Physical Exam   Constitutional: She is oriented to person, place, and time. She appears well-developed. She appears distressed.   HENT:   Head: Normocephalic and atraumatic.   Right Ear: External ear normal.   Left Ear: External ear normal.   Nose: Nose normal. Right sinus exhibits no maxillary sinus tenderness and no frontal sinus tenderness. Left sinus exhibits no maxillary sinus tenderness and no frontal sinus tenderness.   Mouth/Throat: Oropharynx is clear and moist. No oropharyngeal exudate.   Eyes: Conjunctivae, EOM and lids are normal. Pupils are equal, round, and reactive to light. Right eye exhibits no discharge. Left eye exhibits no discharge. Right conjunctiva is not injected. Right conjunctiva has no hemorrhage. Left conjunctiva is not injected. Left conjunctiva has no hemorrhage. No scleral icterus.   Neck: Normal range of motion. Neck supple. No JVD present. No tracheal deviation present. No thyromegaly present.   Cardiovascular: Normal rate and regular rhythm.    Pulmonary/Chest: Effort normal. No stridor. No respiratory distress. She exhibits no tenderness.   Abdominal: Soft. She exhibits no distension and no mass. There is no splenomegaly or hepatomegaly. There is no tenderness. There is no rebound.   Musculoskeletal: Normal range of motion. She exhibits no edema or tenderness.   Lymphadenopathy:     She has no cervical adenopathy.     She has no axillary adenopathy.        Right: No supraclavicular adenopathy present.        Left: No supraclavicular adenopathy present.   Neurological: She is alert and oriented to person, place, and time. No cranial nerve deficit. Coordination normal.   Skin: Skin is dry. No rash noted. She is not diaphoretic. No erythema.   Psychiatric: Her behavior is normal. Judgment and thought content normal. Her mood appears  anxious.   Vitals reviewed.          Assessment:       1. Iron deficiency anemia due to chronic blood loss    2. Angiodysplasia of cecum            Plan:         documented iron deficiency anemia at this point patient retreated with intravenous iron was palpitations explained to obtain records from her [facility] of check the care everywhere section of Epic no evidence of gastrointestinal evaluation with upper lower endoscopies she did have a new medicine study that did did demonstrate some gastrointestinal bleeding.  Obtain discharge summaries and upper and lower endoscopy reports proceed with intravenous iron supplementation as detailed above

## 2017-05-04 ENCOUNTER — TELEPHONE (OUTPATIENT)
Dept: HEMATOLOGY/ONCOLOGY | Facility: CLINIC | Age: 72
End: 2017-05-04

## 2017-05-04 ENCOUNTER — PATIENT MESSAGE (OUTPATIENT)
Dept: HEMATOLOGY/ONCOLOGY | Facility: CLINIC | Age: 72
End: 2017-05-04

## 2017-05-04 NOTE — TELEPHONE ENCOUNTER
----- Message from Marcie Barrera sent at 5/4/2017 12:18 PM CDT -----  Please call patient in regards to where her infusion is, [phone]

## 2017-05-05 ENCOUNTER — TELEPHONE (OUTPATIENT)
Dept: HEMATOLOGY/ONCOLOGY | Facility: CLINIC | Age: 72
End: 2017-05-05

## 2017-05-05 DIAGNOSIS — D50.0 IRON DEFICIENCY ANEMIA DUE TO CHRONIC BLOOD LOSS: Primary | ICD-10-CM

## 2017-05-05 NOTE — TELEPHONE ENCOUNTER
----- Message from Adonay Neri MD sent at 5/5/2017  4:29 PM CDT -----  Please have her get a CBC and iron studies done the day prior to see me in about a month and I will also need to get the colonoscopy reports

## 2017-05-05 NOTE — TELEPHONE ENCOUNTER
Patient is scheduled for additional labs. Patient states she already signed a release of information and Jeanine requested records.

## 2017-05-06 DIAGNOSIS — D50.0 IRON DEFICIENCY ANEMIA DUE TO CHRONIC BLOOD LOSS: Primary | ICD-10-CM

## 2017-05-08 ENCOUNTER — TELEPHONE (OUTPATIENT)
Dept: HEMATOLOGY/ONCOLOGY | Facility: CLINIC | Age: 72
End: 2017-05-08

## 2017-05-08 ENCOUNTER — LAB VISIT (OUTPATIENT)
Dept: LAB | Facility: HOSPITAL | Age: 72
End: 2017-05-08
Attending: INTERNAL MEDICINE
Payer: MEDICARE

## 2017-05-08 DIAGNOSIS — K55.20 ANGIODYSPLASIA OF CECUM: ICD-10-CM

## 2017-05-08 DIAGNOSIS — D50.0 IRON DEFICIENCY ANEMIA DUE TO CHRONIC BLOOD LOSS: ICD-10-CM

## 2017-05-08 LAB
BASOPHILS # BLD AUTO: 0.02 K/UL
BASOPHILS NFR BLD: 0.3 %
DIFFERENTIAL METHOD: ABNORMAL
EOSINOPHIL # BLD AUTO: 0 K/UL
EOSINOPHIL NFR BLD: 0.7 %
ERYTHROCYTE [DISTWIDTH] IN BLOOD BY AUTOMATED COUNT: 20 %
FERRITIN SERPL-MCNC: 22 NG/ML
HCT VFR BLD AUTO: 37.8 %
HGB BLD-MCNC: 12.3 G/DL
IRON SERPL-MCNC: 96 UG/DL
LYMPHOCYTES # BLD AUTO: 1.2 K/UL
LYMPHOCYTES NFR BLD: 21.2 %
MCH RBC QN AUTO: 28.7 PG
MCHC RBC AUTO-ENTMCNC: 32.5 %
MCV RBC AUTO: 88 FL
MONOCYTES # BLD AUTO: 0.4 K/UL
MONOCYTES NFR BLD: 6.6 %
NEUTROPHILS # BLD AUTO: 4.1 K/UL
NEUTROPHILS NFR BLD: 71.2 %
PLATELET # BLD AUTO: 250 K/UL
PMV BLD AUTO: 9.4 FL
RBC # BLD AUTO: 4.29 M/UL
SATURATED IRON: 31 %
TOTAL IRON BINDING CAPACITY: 306 UG/DL
TRANSFERRIN SERPL-MCNC: 207 MG/DL
WBC # BLD AUTO: 5.79 K/UL

## 2017-05-08 PROCEDURE — 82728 ASSAY OF FERRITIN: CPT

## 2017-05-08 PROCEDURE — 36415 COLL VENOUS BLD VENIPUNCTURE: CPT | Mod: PO

## 2017-05-08 PROCEDURE — 83540 ASSAY OF IRON: CPT

## 2017-05-08 PROCEDURE — 85025 COMPLETE CBC W/AUTO DIFF WBC: CPT | Mod: PO

## 2017-05-08 NOTE — TELEPHONE ENCOUNTER
Labs scheduled/I faxed second request for upper/lower to [facility] this morning and she is going by there to . She would also like infusion nurses to call her to discuss Feraheme and jose maria.

## 2017-05-09 ENCOUNTER — INFUSION (OUTPATIENT)
Dept: INFUSION THERAPY | Facility: HOSPITAL | Age: 72
End: 2017-05-09
Attending: INTERNAL MEDICINE
Payer: MEDICARE

## 2017-05-09 VITALS
TEMPERATURE: 97 F | HEART RATE: 66 BPM | DIASTOLIC BLOOD PRESSURE: 77 MMHG | RESPIRATION RATE: 18 BRPM | SYSTOLIC BLOOD PRESSURE: 124 MMHG

## 2017-05-09 DIAGNOSIS — D50.0 IRON DEFICIENCY ANEMIA DUE TO CHRONIC BLOOD LOSS: Primary | ICD-10-CM

## 2017-05-09 PROCEDURE — 96375 TX/PRO/DX INJ NEW DRUG ADDON: CPT | Mod: PO

## 2017-05-09 PROCEDURE — 96365 THER/PROPH/DIAG IV INF INIT: CPT | Mod: PO

## 2017-05-09 RX ORDER — METHYLPREDNISOLONE SOD SUCC 125 MG
125 VIAL (EA) INJECTION EVERY 6 HOURS
Status: DISCONTINUED | OUTPATIENT
Start: 2017-05-09 | End: 2017-05-09 | Stop reason: HOSPADM

## 2017-05-09 NOTE — NURSING
After reviewing pt's labs from 5/8/17, I noticed that iron/ferritin levels were within normal range. Notified Dr. Baker who looked over pt's labs as well as chart and instructed that I hold off on iron infusion today and for next week as well. Pt will keep lab appts as scheduled to monitor blood levels as well as keep appt with Dr. Neri for fu. Will send info to Dr. Neri as well for him to be notified.

## 2017-05-16 ENCOUNTER — LAB VISIT (OUTPATIENT)
Dept: LAB | Facility: HOSPITAL | Age: 72
End: 2017-05-16
Attending: INTERNAL MEDICINE
Payer: MEDICARE

## 2017-05-16 ENCOUNTER — TELEPHONE (OUTPATIENT)
Dept: HEMATOLOGY/ONCOLOGY | Facility: CLINIC | Age: 72
End: 2017-05-16

## 2017-05-16 ENCOUNTER — PATIENT MESSAGE (OUTPATIENT)
Dept: HEMATOLOGY/ONCOLOGY | Facility: CLINIC | Age: 72
End: 2017-05-16

## 2017-05-16 DIAGNOSIS — K55.20 ANGIODYSPLASIA OF CECUM: ICD-10-CM

## 2017-05-16 DIAGNOSIS — D50.0 IRON DEFICIENCY ANEMIA DUE TO CHRONIC BLOOD LOSS: ICD-10-CM

## 2017-05-16 LAB
BASOPHILS # BLD AUTO: 0.04 K/UL
BASOPHILS NFR BLD: 0.8 %
DIFFERENTIAL METHOD: ABNORMAL
EOSINOPHIL # BLD AUTO: 0.2 K/UL
EOSINOPHIL NFR BLD: 4.1 %
ERYTHROCYTE [DISTWIDTH] IN BLOOD BY AUTOMATED COUNT: 19.6 %
FERRITIN SERPL-MCNC: 20 NG/ML
HCT VFR BLD AUTO: 37.3 %
HGB BLD-MCNC: 11.9 G/DL
IRON SERPL-MCNC: 28 UG/DL
LYMPHOCYTES # BLD AUTO: 1.2 K/UL
LYMPHOCYTES NFR BLD: 23.5 %
MCH RBC QN AUTO: 28.2 PG
MCHC RBC AUTO-ENTMCNC: 31.9 %
MCV RBC AUTO: 88 FL
MONOCYTES # BLD AUTO: 0.4 K/UL
MONOCYTES NFR BLD: 7.8 %
NEUTROPHILS # BLD AUTO: 3.3 K/UL
NEUTROPHILS NFR BLD: 63.8 %
PLATELET # BLD AUTO: 275 K/UL
PMV BLD AUTO: 9.9 FL
RBC # BLD AUTO: 4.22 M/UL
SATURATED IRON: 9 %
TOTAL IRON BINDING CAPACITY: 302 UG/DL
TRANSFERRIN SERPL-MCNC: 204 MG/DL
WBC # BLD AUTO: 5.14 K/UL

## 2017-05-16 PROCEDURE — 83540 ASSAY OF IRON: CPT

## 2017-05-16 PROCEDURE — 82728 ASSAY OF FERRITIN: CPT

## 2017-05-16 PROCEDURE — 85025 COMPLETE CBC W/AUTO DIFF WBC: CPT | Mod: PO

## 2017-05-16 PROCEDURE — 36415 COLL VENOUS BLD VENIPUNCTURE: CPT | Mod: PO

## 2017-05-16 NOTE — TELEPHONE ENCOUNTER
Pt is concerned that her labs did not show iron/they are still in process/pt voiced understainding. She wanted Dr Neri to know she did not have her infusion because her counts were normal.

## 2017-05-16 NOTE — TELEPHONE ENCOUNTER
----- Message from Shilpi Tony sent at 5/16/2017  1:42 PM CDT -----  Contact: Pt  States she's calling rg having some questions and can be reached at [phone] or [phone]//[email or web address]

## 2017-05-17 ENCOUNTER — TELEPHONE (OUTPATIENT)
Dept: HEMATOLOGY/ONCOLOGY | Facility: CLINIC | Age: 72
End: 2017-05-17

## 2017-05-17 ENCOUNTER — INFUSION (OUTPATIENT)
Dept: INFUSION THERAPY | Facility: HOSPITAL | Age: 72
End: 2017-05-17
Attending: INTERNAL MEDICINE
Payer: MEDICARE

## 2017-05-17 ENCOUNTER — PATIENT MESSAGE (OUTPATIENT)
Dept: HEMATOLOGY/ONCOLOGY | Facility: CLINIC | Age: 72
End: 2017-05-17

## 2017-05-17 VITALS
HEART RATE: 75 BPM | SYSTOLIC BLOOD PRESSURE: 142 MMHG | RESPIRATION RATE: 18 BRPM | DIASTOLIC BLOOD PRESSURE: 85 MMHG | TEMPERATURE: 99 F

## 2017-05-17 DIAGNOSIS — D50.0 IRON DEFICIENCY ANEMIA DUE TO CHRONIC BLOOD LOSS: Primary | ICD-10-CM

## 2017-05-17 PROCEDURE — 96365 THER/PROPH/DIAG IV INF INIT: CPT | Mod: PO

## 2017-05-17 PROCEDURE — 63600175 PHARM REV CODE 636 W HCPCS: Mod: PO | Performed by: INTERNAL MEDICINE

## 2017-05-17 PROCEDURE — 25000003 PHARM REV CODE 250: Mod: PO | Performed by: INTERNAL MEDICINE

## 2017-05-17 PROCEDURE — 96375 TX/PRO/DX INJ NEW DRUG ADDON: CPT | Mod: PO

## 2017-05-17 RX ORDER — METHYLPREDNISOLONE SOD SUCC 125 MG
125 VIAL (EA) INJECTION EVERY 6 HOURS
Status: DISCONTINUED | OUTPATIENT
Start: 2017-05-17 | End: 2017-05-17 | Stop reason: HOSPADM

## 2017-05-17 RX ADMIN — METHYLPREDNISOLONE SODIUM SUCCINATE 125 MG: 125 INJECTION, POWDER, FOR SOLUTION INTRAMUSCULAR; INTRAVENOUS at 11:05

## 2017-05-17 RX ADMIN — FERUMOXYTOL 510 MG: 510 INJECTION INTRAVENOUS at 12:05

## 2017-05-17 NOTE — PLAN OF CARE
Problem: Patient Care Overview  Goal: Plan of Care Review  Outcome: Ongoing (interventions implemented as appropriate)  i just don't know what's causing this , how long does this take, how will we know if it is working

## 2017-05-17 NOTE — TELEPHONE ENCOUNTER
----- Message from Adonay Neri MD sent at 5/17/2017  7:14 AM CDT -----  Patient needs to get started on intravenous iron I believe she is going out of town tomorrow so she really would like to have the first dose given today

## 2017-05-17 NOTE — PATIENT INSTRUCTIONS
[facility]  [address]  [address]  [phone] phone     [phone] fax  Hours of Operation: Monday- Friday 8:00am- 5:00pm  After hours phone  [phone]  Hematology / Oncology Physicians on call      Dr. Daron Baker    Please call with any concerns regarding your appointment today.  Ferumoxytol injection  What is this medicine?  FERUMOXYTOL is an iron complex. Iron is used to make healthy red blood cells, which carry oxygen and nutrients throughout the body. This medicine is used to treat iron deficiency anemia in people with chronic kidney disease.  How should I use this medicine?  This medicine is for injection into a vein. It is given by a health care professional in a hospital or clinic setting.  Talk to your pediatrician regarding the use of this medicine in children. Special care may be needed.  What side effects may I notice from receiving this medicine?  Side effects that you should report to your doctor or health care professional as soon as possible:  · allergic reactions like skin rash, itching or hives, swelling of the face, lips, or tongue  · breathing problems  · changes in blood pressure  · feeling faint or lightheaded, falls  · fever or chills  · flushing, sweating, or hot feelings  · swelling of the ankles or feet  Side effects that usually do not require medical attention (Report these to your doctor or health care professional if they continue or are bothersome.):  · diarrhea  · headache  · nausea, vomiting  · stomach pain  What may interact with this medicine?  This medicine may interact with the following medications:  · other iron products  What if I miss a dose?  It is important not to miss your dose. Call your doctor or health care professional if you are unable to keep an appointment.  Where should I keep my medicine?  This drug is given in a hospital or clinic and will not be stored at  home.  What should I tell my health care provider before I take this medicine?  They need to know if you have any of these conditions:  · anemia not caused by low iron levels  · high levels of iron in the blood  · magnetic resonance imaging (MRI) test scheduled  · an unusual or allergic reaction to iron, other medicines, foods, dyes, or preservatives  · pregnant or trying to get pregnant  · breast-feeding  What should I watch for while using this medicine?  Visit your doctor or healthcare professional regularly. Tell your doctor or healthcare professional if your symptoms do not start to get better or if they get worse. You may need blood work done while you are taking this medicine.  You may need to follow a special diet. Talk to your doctor. Foods that contain iron include: whole grains/cereals, dried fruits, beans, or peas, leafy green vegetables, and organ meats (liver, kidney).  Date Last Reviewed:   NOTE:This sheet is a summary. It may not cover all possible information. If you have questions about this medicine, talk to your doctor, pharmacist, or health care provider. Copyright© 2016 [employer]

## 2017-05-17 NOTE — MR AVS SNAPSHOT
Patient Information     Patient Name Sex Selam Suarez Female 1945      Visit Information        Provider Department Dept Phone Center    2017 11:00 AM [facility] [phone] [facility]      Patient Instructions      [facility]  [address] [facility] [address]  [address] [address]  [phone] phone     [phone] fax  Hours of Operation: Monday- Friday 8:00am- 5:00pm  After hours phone  [phone]  Hematology / Oncology Physicians on call      Dr. Daron Baker    Please call with any concerns regarding your appointment today.  Ferumoxytol injection  What is this medicine?  FERUMOXYTOL is an iron complex. Iron is used to make healthy red blood cells, which carry oxygen and nutrients throughout the body. This medicine is used to treat iron deficiency anemia in people with chronic kidney disease.  How should I use this medicine?  This medicine is for injection into a vein. It is given by a health care professional in a hospital or clinic setting.  Talk to your pediatrician regarding the use of this medicine in children. Special care may be needed.  What side effects may I notice from receiving this medicine?  Side effects that you should report to your doctor or health care professional as soon as possible:  · allergic reactions like skin rash, itching or hives, swelling of the face, lips, or tongue  · breathing problems  · changes in blood pressure  · feeling faint or lightheaded, falls  · fever or chills  · flushing, sweating, or hot feelings  · swelling of the ankles or feet  Side effects that usually do not require medical attention (Report these to your doctor or health care professional if they continue or are bothersome.):  · diarrhea  · headache  · nausea, vomiting  · stomach pain  What may interact with this medicine?  This medicine may interact with the following medications:  · other iron  products  What if I miss a dose?  It is important not to miss your dose. Call your doctor or health care professional if you are unable to keep an appointment.  Where should I keep my medicine?  This drug is given in a hospital or clinic and will not be stored at home.  What should I tell my health care provider before I take this medicine?  They need to know if you have any of these conditions:  · anemia not caused by low iron levels  · high levels of iron in the blood  · magnetic resonance imaging (MRI) test scheduled  · an unusual or allergic reaction to iron, other medicines, foods, dyes, or preservatives  · pregnant or trying to get pregnant  · breast-feeding  What should I watch for while using this medicine?  Visit your doctor or healthcare professional regularly. Tell your doctor or healthcare professional if your symptoms do not start to get better or if they get worse. You may need blood work done while you are taking this medicine.  You may need to follow a special diet. Talk to your doctor. Foods that contain iron include: whole grains/cereals, dried fruits, beans, or peas, leafy green vegetables, and organ meats (liver, kidney).  Date Last Reviewed:   NOTE:This sheet is a summary. It may not cover all possible information. If you have questions about this medicine, talk to your doctor, pharmacist, or health care provider. Copyright© 2016 [employer]             Your Current Medications Are     b complex vitamins tablet    baicalin-catechin (LIMBREL) 500 mg Cap    CRANBERRY FRUIT EXTRACT (CRANBERRY CONCENTRATE ORAL)    diosmin complex no.1 (VASCULERA) 630 mg Tab    ergocalciferol (VITAMIN D2) 50,000 unit Cap    levothyroxine (SYNTHROID) 100 MCG tablet    lidocaine-prilocaine (EMLA) cream    LIDODERM 5 %    mometasone (NASONEX) 50 mcg/actuation nasal spray    omeprazole (PRILOSEC OTC) 20 MG tablet    pyridoxine (VITAMIN B-6) 100 MG Tab      Facility-Administered Medications     ferumoxytol (FERAHEME)  510 mg in sodium chloride 0.9% 100 mL IVPB    methylPREDNISolone sodium succinate injection 125 mg      Appointments for Next Year     5/24/2017  1:00 PM INFUSION 060 MIN (60 min.) [facility]    Arrive at check-in approximately 15 minutes before your scheduled appointment time. Bring all outside medical records and imaging, along with a list of your current medications and insurance card.    5/30/2017 10:25 AM NON FASTING LAB (5 min.) [facility]    Arrive at check-in approximately 15 minutes before your scheduled appointment time. Bring all outside medical records and imaging, along with a list of your current medications and insurance card.    (off [address]) 2nd floor    7/3/2017 10:20 AM NON FASTING LAB (5 min.) [facility]    Arrive at check-in approximately 15 minutes before your scheduled appointment time. Bring all outside medical records and imaging, along with a list of your current medications and insurance card.    (off [address]) 2nd floor    7/6/2017 11:00 AM ESTABLISHED PATIENT (20 min.) [facility] Hemotology Oncology Adonay Neri MD    Arrive at check-in approximately 15 minutes before your scheduled appointment time. Bring all outside medical records and imaging, along with a list of your current medications and insurance card.    (off [address]) 3rd Floor         Default Flowsheet Data (last 24 hours)      Amb Complex Vitals Nick        05/17/17 1253 05/17/17 1112 05/17/17 1107          Measurements    BP (!)  142/85 134/70       Temp  98.6 °F (37 °C)       Pulse 75 73       Resp  18       Pain Assessment    Pain Score   Zero              Allergies     Diphenhydramine-acetaminophen Nausea Only    Epinephrine     Other reaction(s): Unknown    Latex     Other reaction(s): mouth ulcers    Latex, Natural Rubber     Levofloxacin     Other reaction(s): Joint pain    Propofol  Analogues Other (See Comments)    memory    Sulfa (Sulfonamide Antibiotics)     Other reaction(s): Unknown    Tramadol Hives, Itching      Medications You Received from 05/16/2017 1254 to 05/17/2017 1254        Date/Time Order Dose Route Action     05/17/2017 1202 ferumoxytol (FERAHEME) 510 mg in sodium chloride 0.9% 100 mL IVPB 510 mg Intravenous New Bag     05/17/2017 1130 methylPREDNISolone sodium succinate injection 125 mg 125 mg Intravenous Given      Current Discharge Medication List     Cannot display discharge medications since this is not an admission.

## 2017-05-19 DIAGNOSIS — D50.0 IRON DEFICIENCY ANEMIA DUE TO CHRONIC BLOOD LOSS: Primary | ICD-10-CM

## 2017-05-19 RX ORDER — SODIUM CHLORIDE 0.9 % (FLUSH) 0.9 %
10 SYRINGE (ML) INJECTION
Status: CANCELLED | OUTPATIENT
Start: 2017-05-19

## 2017-05-19 RX ORDER — METHYLPREDNISOLONE SOD SUCC 125 MG
125 VIAL (EA) INJECTION EVERY 6 HOURS
Status: CANCELLED
Start: 2017-05-19

## 2017-05-19 RX ORDER — METHYLPREDNISOLONE SOD SUCC 125 MG
125 VIAL (EA) INJECTION EVERY 6 HOURS
Status: CANCELLED
Start: 2017-05-25

## 2017-05-19 RX ORDER — HEPARIN 100 UNIT/ML
500 SYRINGE INTRAVENOUS
Status: CANCELLED | OUTPATIENT
Start: 2017-05-19

## 2017-05-19 RX ORDER — SODIUM CHLORIDE 0.9 % (FLUSH) 0.9 %
10 SYRINGE (ML) INJECTION
Status: CANCELLED | OUTPATIENT
Start: 2017-05-25

## 2017-05-19 RX ORDER — HEPARIN 100 UNIT/ML
500 SYRINGE INTRAVENOUS
Status: CANCELLED | OUTPATIENT
Start: 2017-05-25

## 2017-05-23 ENCOUNTER — INFUSION (OUTPATIENT)
Dept: INFUSION THERAPY | Facility: HOSPITAL | Age: 72
End: 2017-05-23
Attending: INTERNAL MEDICINE
Payer: MEDICARE

## 2017-05-23 VITALS — DIASTOLIC BLOOD PRESSURE: 78 MMHG | HEART RATE: 73 BPM | SYSTOLIC BLOOD PRESSURE: 128 MMHG

## 2017-05-23 DIAGNOSIS — D50.0 IRON DEFICIENCY ANEMIA DUE TO CHRONIC BLOOD LOSS: Primary | ICD-10-CM

## 2017-05-23 PROCEDURE — 96375 TX/PRO/DX INJ NEW DRUG ADDON: CPT | Mod: PO

## 2017-05-23 PROCEDURE — 96365 THER/PROPH/DIAG IV INF INIT: CPT | Mod: PO

## 2017-05-23 PROCEDURE — 63600175 PHARM REV CODE 636 W HCPCS: Mod: PO | Performed by: INTERNAL MEDICINE

## 2017-05-23 PROCEDURE — 25000003 PHARM REV CODE 250: Mod: PO | Performed by: INTERNAL MEDICINE

## 2017-05-23 RX ORDER — SODIUM CHLORIDE 0.9 % (FLUSH) 0.9 %
10 SYRINGE (ML) INJECTION
Status: DISCONTINUED | OUTPATIENT
Start: 2017-05-23 | End: 2017-05-23 | Stop reason: HOSPADM

## 2017-05-23 RX ORDER — METHYLPREDNISOLONE SOD SUCC 125 MG
125 VIAL (EA) INJECTION EVERY 6 HOURS
Status: DISCONTINUED | OUTPATIENT
Start: 2017-05-23 | End: 2017-05-23 | Stop reason: HOSPADM

## 2017-05-23 RX ADMIN — FERUMOXYTOL 510 MG: 510 INJECTION INTRAVENOUS at 09:05

## 2017-05-23 RX ADMIN — METHYLPREDNISOLONE SODIUM SUCCINATE 125 MG: 125 INJECTION, POWDER, FOR SOLUTION INTRAMUSCULAR; INTRAVENOUS at 08:05

## 2017-05-23 NOTE — PLAN OF CARE
Problem: Patient Care Overview  Goal: Plan of Care Review  Outcome: Ongoing (interventions implemented as appropriate)  Pt states she is much better today.

## 2017-05-29 ENCOUNTER — PATIENT MESSAGE (OUTPATIENT)
Dept: HEMATOLOGY/ONCOLOGY | Facility: CLINIC | Age: 72
End: 2017-05-29

## 2017-05-30 ENCOUNTER — LAB VISIT (OUTPATIENT)
Dept: LAB | Facility: HOSPITAL | Age: 72
End: 2017-05-30
Attending: INTERNAL MEDICINE
Payer: MEDICARE

## 2017-05-30 DIAGNOSIS — D50.0 IRON DEFICIENCY ANEMIA DUE TO CHRONIC BLOOD LOSS: ICD-10-CM

## 2017-05-30 LAB
BASOPHILS # BLD AUTO: 0.02 K/UL
BASOPHILS NFR BLD: 0.4 %
DIFFERENTIAL METHOD: ABNORMAL
EOSINOPHIL # BLD AUTO: 0.1 K/UL
EOSINOPHIL NFR BLD: 2.1 %
ERYTHROCYTE [DISTWIDTH] IN BLOOD BY AUTOMATED COUNT: 18.5 %
FERRITIN SERPL-MCNC: 477 NG/ML
HCT VFR BLD AUTO: 40.6 %
HGB BLD-MCNC: 13.3 G/DL
IRON SERPL-MCNC: 214 UG/DL
LYMPHOCYTES # BLD AUTO: 1.1 K/UL
LYMPHOCYTES NFR BLD: 23.6 %
MCH RBC QN AUTO: 29.4 PG
MCHC RBC AUTO-ENTMCNC: 32.8 %
MCV RBC AUTO: 90 FL
MONOCYTES # BLD AUTO: 0.4 K/UL
MONOCYTES NFR BLD: 8.3 %
NEUTROPHILS # BLD AUTO: 3.1 K/UL
NEUTROPHILS NFR BLD: 65.6 %
PLATELET # BLD AUTO: 259 K/UL
PMV BLD AUTO: 9.7 FL
RBC # BLD AUTO: 4.52 M/UL
SATURATED IRON: 96 %
TOTAL IRON BINDING CAPACITY: 222 UG/DL
TRANSFERRIN SERPL-MCNC: 150 MG/DL
WBC # BLD AUTO: 4.71 K/UL

## 2017-05-30 PROCEDURE — 85025 COMPLETE CBC W/AUTO DIFF WBC: CPT | Mod: PO

## 2017-05-30 PROCEDURE — 36415 COLL VENOUS BLD VENIPUNCTURE: CPT | Mod: PO

## 2017-05-30 PROCEDURE — 82728 ASSAY OF FERRITIN: CPT

## 2017-05-30 PROCEDURE — 83540 ASSAY OF IRON: CPT

## 2017-06-12 ENCOUNTER — PATIENT MESSAGE (OUTPATIENT)
Dept: HEMATOLOGY/ONCOLOGY | Facility: CLINIC | Age: 72
End: 2017-06-12

## 2017-06-13 ENCOUNTER — LAB VISIT (OUTPATIENT)
Dept: LAB | Facility: HOSPITAL | Age: 72
End: 2017-06-13
Attending: INTERNAL MEDICINE
Payer: MEDICARE

## 2017-06-13 ENCOUNTER — PATIENT MESSAGE (OUTPATIENT)
Dept: HEMATOLOGY/ONCOLOGY | Facility: CLINIC | Age: 72
End: 2017-06-13

## 2017-06-13 DIAGNOSIS — D50.0 IRON DEFICIENCY ANEMIA DUE TO CHRONIC BLOOD LOSS: ICD-10-CM

## 2017-06-13 DIAGNOSIS — E55.9 HYPOVITAMINOSIS D: Chronic | ICD-10-CM

## 2017-06-13 DIAGNOSIS — M85.80 OSTEOPENIA: Chronic | ICD-10-CM

## 2017-06-13 LAB
BASOPHILS # BLD AUTO: 0.02 K/UL
BASOPHILS NFR BLD: 0.5 %
DIFFERENTIAL METHOD: ABNORMAL
EOSINOPHIL # BLD AUTO: 0.1 K/UL
EOSINOPHIL NFR BLD: 3.2 %
ERYTHROCYTE [DISTWIDTH] IN BLOOD BY AUTOMATED COUNT: 17.3 %
FERRITIN SERPL-MCNC: 226 NG/ML
HCT VFR BLD AUTO: 38.9 %
HGB BLD-MCNC: 12.4 G/DL
IRON SERPL-MCNC: 183 UG/DL
LYMPHOCYTES # BLD AUTO: 1.1 K/UL
LYMPHOCYTES NFR BLD: 25.7 %
MCH RBC QN AUTO: 29.2 PG
MCHC RBC AUTO-ENTMCNC: 31.9 %
MCV RBC AUTO: 92 FL
MONOCYTES # BLD AUTO: 0.3 K/UL
MONOCYTES NFR BLD: 7.3 %
NEUTROPHILS # BLD AUTO: 2.6 K/UL
NEUTROPHILS NFR BLD: 63.3 %
PLATELET # BLD AUTO: 244 K/UL
PMV BLD AUTO: 9.7 FL
RBC # BLD AUTO: 4.24 M/UL
SATURATED IRON: 81 %
TOTAL IRON BINDING CAPACITY: 226 UG/DL
TRANSFERRIN SERPL-MCNC: 153 MG/DL
WBC # BLD AUTO: 4.09 K/UL

## 2017-06-13 PROCEDURE — 85025 COMPLETE CBC W/AUTO DIFF WBC: CPT | Mod: PO

## 2017-06-13 PROCEDURE — 83540 ASSAY OF IRON: CPT

## 2017-06-13 PROCEDURE — 82728 ASSAY OF FERRITIN: CPT

## 2017-06-13 PROCEDURE — 36415 COLL VENOUS BLD VENIPUNCTURE: CPT | Mod: PO

## 2017-06-13 RX ORDER — ERGOCALCIFEROL 1.25 MG/1
CAPSULE ORAL
Qty: 8 CAPSULE | Refills: 5 | Status: SHIPPED | OUTPATIENT
Start: 2017-06-13 | End: 2017-11-09 | Stop reason: SDUPTHER

## 2017-06-30 ENCOUNTER — PATIENT MESSAGE (OUTPATIENT)
Dept: HEMATOLOGY/ONCOLOGY | Facility: CLINIC | Age: 72
End: 2017-06-30

## 2017-07-12 ENCOUNTER — OFFICE VISIT (OUTPATIENT)
Dept: HEMATOLOGY/ONCOLOGY | Facility: CLINIC | Age: 72
End: 2017-07-12
Payer: MEDICARE

## 2017-07-12 VITALS
HEART RATE: 62 BPM | RESPIRATION RATE: 18 BRPM | SYSTOLIC BLOOD PRESSURE: 140 MMHG | DIASTOLIC BLOOD PRESSURE: 80 MMHG | OXYGEN SATURATION: 98 % | HEIGHT: 62 IN | WEIGHT: 133.63 LBS | BODY MASS INDEX: 24.59 KG/M2 | TEMPERATURE: 98 F

## 2017-07-12 DIAGNOSIS — D50.0 IRON DEFICIENCY ANEMIA DUE TO CHRONIC BLOOD LOSS: Primary | ICD-10-CM

## 2017-07-12 PROCEDURE — 99214 OFFICE O/P EST MOD 30 MIN: CPT | Mod: S$PBB,,, | Performed by: INTERNAL MEDICINE

## 2017-07-12 PROCEDURE — 1159F MED LIST DOCD IN RCRD: CPT | Mod: ,,, | Performed by: INTERNAL MEDICINE

## 2017-07-12 PROCEDURE — 1126F AMNT PAIN NOTED NONE PRSNT: CPT | Mod: ,,, | Performed by: INTERNAL MEDICINE

## 2017-07-12 PROCEDURE — 99999 PR PBB SHADOW E&M-EST. PATIENT-LVL III: CPT | Mod: PBBFAC,,, | Performed by: INTERNAL MEDICINE

## 2017-07-12 PROCEDURE — 99213 OFFICE O/P EST LOW 20 MIN: CPT | Mod: PBBFAC,PO | Performed by: INTERNAL MEDICINE

## 2017-07-12 RX ORDER — LANOLIN ALCOHOL/MO/W.PET/CERES
1000 CREAM (GRAM) TOPICAL DAILY
COMMUNITY
End: 2018-05-09

## 2017-07-12 NOTE — PROGRESS NOTES
Subjective:       Patient ID: Selam Botello is a 71 y.o. female.    Chief Complaint: Follow-up; Results; and Anemia    HPI 71-year-old female history of recurrent iron deficiency anemia evaluation through [facility] failed to reveal etiology of recurrent iron deficiency anemia patient appeared to have a AVM in her cecal area during hospital stay at our [facility].    Past Medical History:   Diagnosis Date    Acid reflux     Allergy     Arthritis     Cataract     Deep vein thrombosis     Degenerative disc disease     Osteoporosis     Thyroid disease      Family History   Problem Relation Age of Onset    COPD Mother     COPD Father     Cancer Sister      Social History     Social History    Marital status:      Spouse name: N/A    Number of children: N/A    Years of education: N/A     Occupational History    Not on file.     Social History Main Topics    Smoking status: Never Smoker    Smokeless tobacco: Never Used    Alcohol use Yes      Comment: social    Drug use: No    Sexual activity: Not on file     Other Topics Concern    Not on file     Social History Narrative    No narrative on file     Past Surgical History:   Procedure Laterality Date    ADENOIDECTOMY      BREAST SURGERY      EYE SURGERY      JOINT REPLACEMENT      left hip replacement      TONSILLECTOMY         Labs:  Lab Results   Component Value Date    WBC 4.47 06/30/2017    HGB 13.9 06/30/2017    HCT 41.3 06/30/2017    MCV 92 06/30/2017     06/30/2017     BMP  Lab Results   Component Value Date     04/18/2017    K 4.0 04/18/2017     04/18/2017    CO2 26 04/18/2017    BUN 23 04/18/2017    CREATININE 0.8 04/18/2017    CALCIUM 9.6 04/18/2017    ANIONGAP 9 04/18/2017    ESTGFRAFRICA >60 04/18/2017    EGFRNONAA >60 04/18/2017     Lab Results   Component Value Date    ALT 11 03/27/2017    AST 18 03/27/2017    ALKPHOS 72 03/27/2017    BILITOT 0.5 03/27/2017       Lab Results   Component Value  Date    IRON 163 (H) 06/30/2017    TIBC 252 06/30/2017    FERRITIN 126 06/30/2017     No results found for: [id number]  No results found for: FOLATE  No results found for: TSH      Review of Systems   Constitutional: Positive for activity change and fatigue. Negative for appetite change, chills, diaphoresis, fever and unexpected weight change.   HENT: Negative for congestion, dental problem, drooling, ear discharge, ear pain, facial swelling, hearing loss, mouth sores, nosebleeds, postnasal drip, rhinorrhea, sinus pressure, sneezing, sore throat, tinnitus, trouble swallowing and voice change.    Eyes: Negative for photophobia, pain, discharge, redness, itching and visual disturbance.   Respiratory: Negative for cough, choking, chest tightness, shortness of breath, wheezing and stridor.    Cardiovascular: Negative for chest pain, palpitations and leg swelling.   Gastrointestinal: Negative for abdominal distention, abdominal pain, anal bleeding, blood in stool, constipation, diarrhea, nausea, rectal pain and vomiting.   Endocrine: Negative for cold intolerance, heat intolerance, polydipsia, polyphagia and polyuria.   Genitourinary: Negative for decreased urine volume, difficulty urinating, dyspareunia, dysuria, enuresis, flank pain, frequency, genital sores, hematuria, menstrual problem, pelvic pain, urgency, vaginal bleeding, vaginal discharge and vaginal pain.   Musculoskeletal: Negative for arthralgias, back pain, gait problem, joint swelling, myalgias, neck pain and neck stiffness.   Skin: Negative for color change, pallor and rash.   Allergic/Immunologic: Negative for environmental allergies, food allergies and immunocompromised state.   Neurological: Negative for dizziness, tremors, seizures, syncope, facial asymmetry, speech difficulty, weakness, light-headedness, numbness and headaches.   Hematological: Negative for adenopathy. Does not bruise/bleed easily.   Psychiatric/Behavioral: Negative for agitation,  behavioral problems, confusion, decreased concentration, dysphoric mood, hallucinations, self-injury, sleep disturbance and suicidal ideas. The patient is nervous/anxious. The patient is not hyperactive.        Objective:      Physical Exam   Constitutional: She is oriented to person, place, and time. She appears well-developed and well-nourished. She appears distressed.   HENT:   Head: Normocephalic and atraumatic.   Right Ear: External ear normal.   Left Ear: External ear normal.   Nose: Nose normal. Right sinus exhibits no maxillary sinus tenderness and no frontal sinus tenderness. Left sinus exhibits no maxillary sinus tenderness and no frontal sinus tenderness.   Mouth/Throat: Oropharynx is clear and moist. No oropharyngeal exudate.   Eyes: Conjunctivae, EOM and lids are normal. Pupils are equal, round, and reactive to light. Right eye exhibits no discharge. Left eye exhibits no discharge. Right conjunctiva is not injected. Right conjunctiva has no hemorrhage. Left conjunctiva is not injected. Left conjunctiva has no hemorrhage. No scleral icterus.   Neck: Normal range of motion. Neck supple. No JVD present. No tracheal deviation present. No thyromegaly present.   Cardiovascular: Normal rate and regular rhythm.    Pulmonary/Chest: Effort normal. No stridor. No respiratory distress. She exhibits no tenderness.   Abdominal: Soft. She exhibits no distension and no mass. There is no splenomegaly or hepatomegaly. There is no tenderness. There is no rebound.   Musculoskeletal: Normal range of motion. She exhibits no edema or tenderness.   Lymphadenopathy:     She has no cervical adenopathy.     She has no axillary adenopathy.        Right: No supraclavicular adenopathy present.        Left: No supraclavicular adenopathy present.   Neurological: She is alert and oriented to person, place, and time. No cranial nerve deficit. Coordination normal.   Skin: Skin is dry. No rash noted. She is not diaphoretic. No erythema.    Psychiatric: Her behavior is normal. Judgment and thought content normal. Her mood appears anxious.   Vitals reviewed.          Assessment:      1. Iron deficiency anemia due to chronic blood loss           Plan:   Patient is concerned about recurrent iron deficiency anemia will be happy to check CBC and iron status every 2-3 weeks I will communicate results through patient portal and see back in 4 months with repeat CBC and iron studies

## 2017-07-14 ENCOUNTER — LAB VISIT (OUTPATIENT)
Dept: LAB | Facility: HOSPITAL | Age: 72
End: 2017-07-14
Attending: INTERNAL MEDICINE
Payer: MEDICARE

## 2017-07-14 DIAGNOSIS — D50.0 IRON DEFICIENCY ANEMIA DUE TO CHRONIC BLOOD LOSS: ICD-10-CM

## 2017-07-14 LAB
BASOPHILS # BLD AUTO: 0.03 K/UL
BASOPHILS # BLD AUTO: 0.03 K/UL
BASOPHILS NFR BLD: 0.4 %
BASOPHILS NFR BLD: 0.4 %
DIFFERENTIAL METHOD: ABNORMAL
DIFFERENTIAL METHOD: ABNORMAL
EOSINOPHIL # BLD AUTO: 0.2 K/UL
EOSINOPHIL # BLD AUTO: 0.2 K/UL
EOSINOPHIL NFR BLD: 3.1 %
EOSINOPHIL NFR BLD: 3.1 %
ERYTHROCYTE [DISTWIDTH] IN BLOOD BY AUTOMATED COUNT: 16.1 %
ERYTHROCYTE [DISTWIDTH] IN BLOOD BY AUTOMATED COUNT: 16.1 %
FERRITIN SERPL-MCNC: 101 NG/ML
FERRITIN SERPL-MCNC: 101 NG/ML
HCT VFR BLD AUTO: 40.5 %
HCT VFR BLD AUTO: 40.5 %
HGB BLD-MCNC: 13.4 G/DL
HGB BLD-MCNC: 13.4 G/DL
IRON SERPL-MCNC: 112 UG/DL
IRON SERPL-MCNC: 112 UG/DL
LYMPHOCYTES # BLD AUTO: 1.6 K/UL
LYMPHOCYTES # BLD AUTO: 1.6 K/UL
LYMPHOCYTES NFR BLD: 22.8 %
LYMPHOCYTES NFR BLD: 22.8 %
MCH RBC QN AUTO: 30.7 PG
MCH RBC QN AUTO: 30.7 PG
MCHC RBC AUTO-ENTMCNC: 33.1 %
MCHC RBC AUTO-ENTMCNC: 33.1 %
MCV RBC AUTO: 93 FL
MCV RBC AUTO: 93 FL
MONOCYTES # BLD AUTO: 0.5 K/UL
MONOCYTES # BLD AUTO: 0.5 K/UL
MONOCYTES NFR BLD: 6.7 %
MONOCYTES NFR BLD: 6.7 %
NEUTROPHILS # BLD AUTO: 4.6 K/UL
NEUTROPHILS # BLD AUTO: 4.6 K/UL
NEUTROPHILS NFR BLD: 67 %
NEUTROPHILS NFR BLD: 67 %
PLATELET # BLD AUTO: 256 K/UL
PLATELET # BLD AUTO: 256 K/UL
PMV BLD AUTO: 9.7 FL
PMV BLD AUTO: 9.7 FL
RBC # BLD AUTO: 4.36 M/UL
RBC # BLD AUTO: 4.36 M/UL
SATURATED IRON: 47 %
SATURATED IRON: 47 %
TOTAL IRON BINDING CAPACITY: 238 UG/DL
TOTAL IRON BINDING CAPACITY: 238 UG/DL
TRANSFERRIN SERPL-MCNC: 161 MG/DL
TRANSFERRIN SERPL-MCNC: 161 MG/DL
WBC # BLD AUTO: 6.88 K/UL
WBC # BLD AUTO: 6.88 K/UL

## 2017-07-14 PROCEDURE — 36415 COLL VENOUS BLD VENIPUNCTURE: CPT | Mod: PO

## 2017-07-14 PROCEDURE — 85025 COMPLETE CBC W/AUTO DIFF WBC: CPT | Mod: PO

## 2017-07-14 PROCEDURE — 83540 ASSAY OF IRON: CPT

## 2017-07-14 PROCEDURE — 82728 ASSAY OF FERRITIN: CPT

## 2017-07-15 ENCOUNTER — PATIENT MESSAGE (OUTPATIENT)
Dept: HEMATOLOGY/ONCOLOGY | Facility: CLINIC | Age: 72
End: 2017-07-15

## 2017-07-19 ENCOUNTER — PATIENT MESSAGE (OUTPATIENT)
Dept: RHEUMATOLOGY | Facility: CLINIC | Age: 72
End: 2017-07-19

## 2017-07-20 ENCOUNTER — PATIENT MESSAGE (OUTPATIENT)
Dept: RHEUMATOLOGY | Facility: CLINIC | Age: 72
End: 2017-07-20

## 2017-07-27 ENCOUNTER — PATIENT MESSAGE (OUTPATIENT)
Dept: HEMATOLOGY/ONCOLOGY | Facility: CLINIC | Age: 72
End: 2017-07-27

## 2017-08-08 ENCOUNTER — PATIENT MESSAGE (OUTPATIENT)
Dept: HEMATOLOGY/ONCOLOGY | Facility: CLINIC | Age: 72
End: 2017-08-08

## 2017-08-10 ENCOUNTER — LAB VISIT (OUTPATIENT)
Dept: LAB | Facility: HOSPITAL | Age: 72
End: 2017-08-10
Attending: INTERNAL MEDICINE
Payer: MEDICARE

## 2017-08-10 ENCOUNTER — PATIENT MESSAGE (OUTPATIENT)
Dept: HEMATOLOGY/ONCOLOGY | Facility: CLINIC | Age: 72
End: 2017-08-10

## 2017-08-10 DIAGNOSIS — M15.9 PRIMARY OSTEOARTHRITIS INVOLVING MULTIPLE JOINTS: ICD-10-CM

## 2017-08-10 DIAGNOSIS — M19.041 PRIMARY OSTEOARTHRITIS OF BOTH HANDS: Chronic | ICD-10-CM

## 2017-08-10 DIAGNOSIS — D50.0 IRON DEFICIENCY ANEMIA DUE TO CHRONIC BLOOD LOSS: ICD-10-CM

## 2017-08-10 DIAGNOSIS — M19.042 PRIMARY OSTEOARTHRITIS OF BOTH HANDS: Chronic | ICD-10-CM

## 2017-08-10 LAB
ANION GAP SERPL CALC-SCNC: 7 MMOL/L
BUN SERPL-MCNC: 19 MG/DL
CALCIUM SERPL-MCNC: 9.7 MG/DL
CHLORIDE SERPL-SCNC: 106 MMOL/L
CO2 SERPL-SCNC: 26 MMOL/L
CREAT SERPL-MCNC: 0.7 MG/DL
EST. GFR  (AFRICAN AMERICAN): >60 ML/MIN/1.73 M^2
EST. GFR  (NON AFRICAN AMERICAN): >60 ML/MIN/1.73 M^2
GLUCOSE SERPL-MCNC: 92 MG/DL
POTASSIUM SERPL-SCNC: 4.1 MMOL/L
SODIUM SERPL-SCNC: 139 MMOL/L

## 2017-08-10 PROCEDURE — 36415 COLL VENOUS BLD VENIPUNCTURE: CPT | Mod: PO

## 2017-08-10 PROCEDURE — 80048 BASIC METABOLIC PNL TOTAL CA: CPT | Mod: PO

## 2017-08-10 RX ORDER — LIDOCAINE AND PRILOCAINE 25; 25 MG/G; MG/G
CREAM TOPICAL
Qty: 30 G | Refills: 1 | Status: ON HOLD | OUTPATIENT
Start: 2017-08-10 | End: 2017-10-16 | Stop reason: HOSPADM

## 2017-09-06 ENCOUNTER — TELEPHONE (OUTPATIENT)
Dept: HEMATOLOGY/ONCOLOGY | Facility: CLINIC | Age: 72
End: 2017-09-06

## 2017-09-06 NOTE — TELEPHONE ENCOUNTER
----- Message from Gisell Zepeda sent at 9/6/2017  3:53 PM CDT -----  Contact: Pt   Pt is calling Nurse staff regarding a requesting orders for lab to be done tomorrow if possible. Pt call back [phone] to be advised thanks

## 2017-09-07 ENCOUNTER — LAB VISIT (OUTPATIENT)
Dept: LAB | Facility: HOSPITAL | Age: 72
End: 2017-09-07
Attending: INTERNAL MEDICINE
Payer: MEDICARE

## 2017-09-07 DIAGNOSIS — D50.0 IRON DEFICIENCY ANEMIA DUE TO CHRONIC BLOOD LOSS: ICD-10-CM

## 2017-09-07 LAB
BASOPHILS # BLD AUTO: 0.03 K/UL
BASOPHILS NFR BLD: 0.4 %
DIFFERENTIAL METHOD: ABNORMAL
EOSINOPHIL # BLD AUTO: 0.2 K/UL
EOSINOPHIL NFR BLD: 2.4 %
ERYTHROCYTE [DISTWIDTH] IN BLOOD BY AUTOMATED COUNT: 13.8 %
FERRITIN SERPL-MCNC: 76 NG/ML
HCT VFR BLD AUTO: 41.8 %
HGB BLD-MCNC: 14.3 G/DL
IRON SERPL-MCNC: 115 UG/DL
LYMPHOCYTES # BLD AUTO: 1.6 K/UL
LYMPHOCYTES NFR BLD: 20.2 %
MCH RBC QN AUTO: 32.8 PG
MCHC RBC AUTO-ENTMCNC: 34.2 G/DL
MCV RBC AUTO: 96 FL
MONOCYTES # BLD AUTO: 0.5 K/UL
MONOCYTES NFR BLD: 5.8 %
NEUTROPHILS # BLD AUTO: 5.6 K/UL
NEUTROPHILS NFR BLD: 71.2 %
PLATELET # BLD AUTO: 243 K/UL
PMV BLD AUTO: 9.5 FL
RBC # BLD AUTO: 4.36 M/UL
SATURATED IRON: 46 %
TOTAL IRON BINDING CAPACITY: 252 UG/DL
TRANSFERRIN SERPL-MCNC: 170 MG/DL
WBC # BLD AUTO: 7.79 K/UL

## 2017-09-07 PROCEDURE — 36415 COLL VENOUS BLD VENIPUNCTURE: CPT | Mod: PO

## 2017-09-07 PROCEDURE — 85025 COMPLETE CBC W/AUTO DIFF WBC: CPT | Mod: PO

## 2017-09-07 PROCEDURE — 82728 ASSAY OF FERRITIN: CPT

## 2017-09-07 PROCEDURE — 83540 ASSAY OF IRON: CPT

## 2017-09-08 ENCOUNTER — PATIENT MESSAGE (OUTPATIENT)
Dept: HEMATOLOGY/ONCOLOGY | Facility: CLINIC | Age: 72
End: 2017-09-08

## 2017-09-19 ENCOUNTER — PATIENT MESSAGE (OUTPATIENT)
Dept: RHEUMATOLOGY | Facility: CLINIC | Age: 72
End: 2017-09-19

## 2017-09-20 ENCOUNTER — PATIENT MESSAGE (OUTPATIENT)
Dept: HEMATOLOGY/ONCOLOGY | Facility: CLINIC | Age: 72
End: 2017-09-20

## 2017-09-20 RX ORDER — DIOSMIN COMPLEX NO.1 630 MG
630 TABLET ORAL DAILY
Qty: 90 TABLET | Refills: 4 | Status: SHIPPED | OUTPATIENT
Start: 2017-09-20 | End: 2017-11-09 | Stop reason: ALTCHOICE

## 2017-09-22 ENCOUNTER — TELEPHONE (OUTPATIENT)
Dept: HEMATOLOGY/ONCOLOGY | Facility: CLINIC | Age: 72
End: 2017-09-22

## 2017-09-22 NOTE — TELEPHONE ENCOUNTER
----- Message from Fay Fonseca sent at 9/22/2017  8:07 AM CDT -----  Contact: patient  Calling concerning the report from the urologist. Please call patient ASAP @ [phone]. Thanks, cory

## 2017-09-26 ENCOUNTER — PATIENT MESSAGE (OUTPATIENT)
Dept: HEMATOLOGY/ONCOLOGY | Facility: CLINIC | Age: 72
End: 2017-09-26

## 2017-09-28 ENCOUNTER — PATIENT MESSAGE (OUTPATIENT)
Dept: HEMATOLOGY/ONCOLOGY | Facility: CLINIC | Age: 72
End: 2017-09-28

## 2017-09-28 ENCOUNTER — TELEPHONE (OUTPATIENT)
Dept: UROLOGY | Facility: CLINIC | Age: 72
End: 2017-09-28

## 2017-09-28 NOTE — TELEPHONE ENCOUNTER
----- Message from Jus Marroquin MD sent at 9/28/2017  6:27 AM CDT -----  Elton    I would be glad to see her. I will ask Nichol to call and set up an appointment .    Mitul Weavere  ----- Message -----  From: Adonay Neri MD  Sent: 9/27/2017   3:50 PM  To: Jus Marroquin MD    Anish barry is a long-standing patient of mine I took care of her sister almost 20 years ago with breast cancer she was recently diagnosed with multifocal bladder cancer by  aPdilla Chavis on cystoscopy biopsy with be scheduled next week she wishes to have another physician take over her care I would like to see if you would be willing to see her meg VIK Neri  ----- Message -----  From: Jeanine Bose LPN  Sent: 9/27/2017   9:06 AM  To: Adonay Neri MD    Would like a call to discuss a medical condition.

## 2017-10-03 ENCOUNTER — OFFICE VISIT (OUTPATIENT)
Dept: UROLOGY | Facility: CLINIC | Age: 72
End: 2017-10-03
Payer: MEDICARE

## 2017-10-03 ENCOUNTER — TELEPHONE (OUTPATIENT)
Dept: UROLOGY | Facility: CLINIC | Age: 72
End: 2017-10-03

## 2017-10-03 VITALS
SYSTOLIC BLOOD PRESSURE: 140 MMHG | HEART RATE: 66 BPM | WEIGHT: 127.88 LBS | RESPIRATION RATE: 16 BRPM | BODY MASS INDEX: 23.53 KG/M2 | HEIGHT: 62 IN | DIASTOLIC BLOOD PRESSURE: 65 MMHG

## 2017-10-03 DIAGNOSIS — C67.2 MALIGNANT NEOPLASM OF LATERAL WALL OF URINARY BLADDER: Primary | ICD-10-CM

## 2017-10-03 DIAGNOSIS — C67.2 MALIGNANT NEOPLASM OF LATERAL WALL OF URINARY BLADDER: ICD-10-CM

## 2017-10-03 PROCEDURE — 99213 OFFICE O/P EST LOW 20 MIN: CPT | Mod: PBBFAC | Performed by: UROLOGY

## 2017-10-03 PROCEDURE — 99999 PR PBB SHADOW E&M-EST. PATIENT-LVL III: CPT | Mod: PBBFAC,,, | Performed by: UROLOGY

## 2017-10-03 PROCEDURE — 99204 OFFICE O/P NEW MOD 45 MIN: CPT | Mod: S$PBB,,, | Performed by: UROLOGY

## 2017-10-03 RX ORDER — IRON,FM,PS/FOLIC/B,C18/L.CASEI 130-1.25MG
CAPSULE ORAL
Refills: 3 | COMMUNITY
Start: 2017-08-09 | End: 2017-11-09

## 2017-10-03 RX ORDER — TOBRAMYCIN AND DEXAMETHASONE 3; 1 MG/ML; MG/ML
SUSPENSION/ DROPS OPHTHALMIC
Status: ON HOLD | COMMUNITY
Start: 2017-06-27 | End: 2017-10-16 | Stop reason: HOSPADM

## 2017-10-03 RX ORDER — LIDOCAINE 50 MG/G
1 PATCH TOPICAL DAILY
COMMUNITY
End: 2018-11-27

## 2017-10-03 RX ORDER — NITROFURANTOIN 25; 75 MG/1; MG/1
100 CAPSULE ORAL 2 TIMES DAILY WITH MEALS
Refills: 0 | Status: ON HOLD | COMMUNITY
Start: 2017-09-13 | End: 2017-10-16 | Stop reason: HOSPADM

## 2017-10-03 NOTE — LETTER
October 3, 2017      DEEDEE Neri MD  [address] [place] [address] Rd  [address]  [facility]  [place] [address]           [facility] [place] [id number]  [facility] [address]  Phone: [phone]          Patient: Selam Botello   MR Number: [id number]   YOB: 1945   Date of Visit: 10/3/2017       Dear Dr. DEEDEE Neri:    Thank you for referring Selam Botello to me for evaluation. Attached you will find relevant portions of my assessment and plan of care.    If you have questions, please do not hesitate to call me. I look forward to following Selam Botello along with you.    Sincerely,    Jus Marroquin MD    Enclosure  CC:  No Recipients    If you would like to receive this communication electronically, please contact [email or web address] or [phone] to request more information on [facility] access.    For providers and/or their staff who would like to refer a patient to [facility], please contact us through our one-stop-shop provider referral line, [facility], at [phone].    If you feel you have received this communication in error or would no longer like to receive these types of communications, please e-mail [email or web address]

## 2017-10-03 NOTE — PROGRESS NOTES
Clinic Note  10/3/2017      Subjective:         Chief Complaint:   BARBER Botello is a 72 y.o. female who developed gross hematuria in September. Cystoscopy by Dr. Chavis on 9/21/2017 revealed multiple superficial appearing bladder tumors.  Consult from Dr. Neri. Friends of Elton and Roxann Neri. 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 Here with her  Constantino. Retired .  Had normal cytology. Reviewed CT scan, normal upper tracts. Could not clear distal 2 inches of each ureter due to artifact from bilateral hip replacements.    Past Medical History:   Diagnosis Date    Acid reflux     Allergy     Arthritis     Cataract     Deep vein thrombosis     Degenerative disc disease     Osteoporosis     Thyroid disease      Family History   Problem Relation Age of Onset    COPD Mother     COPD Father     Cancer Sister      Social History     Social History    Marital status:      Spouse name: N/A    Number of children: N/A    Years of education: N/A     Occupational History    Not on file.     Social History Main Topics    Smoking status: Never Smoker    Smokeless tobacco: Never Used    Alcohol use Yes      Comment: social    Drug use: No    Sexual activity: Not on file     Other Topics Concern    Not on file     Social History Narrative    No narrative on file     Past Surgical History:   Procedure Laterality Date    ADENOIDECTOMY      BREAST SURGERY      EYE SURGERY      JOINT REPLACEMENT      left hip replacement      TONSILLECTOMY       Patient Active Problem List   Diagnosis    OA (osteoarthritis)    Age-related osteoporosis without current pathological fracture    Unspecified vitamin D deficiency    Primary localized osteoarthrosis, lower leg    Osteoarthritis of hip    Osteopenia    Osteoarthritis of hand    Hypovitaminosis D    Iron deficiency anemia due to chronic blood loss    Angiodysplasia of cecum    Medication monitoring encounter     "Malignant neoplasm of lateral wall of urinary bladder     Review of Systems   Constitutional: Negative for activity change, appetite change, fever and unexpected weight change.   HENT: Negative for nosebleeds.    Eyes: Negative.    Respiratory: Negative for shortness of breath and wheezing.    Cardiovascular: Negative for chest pain, palpitations and leg swelling.   Gastrointestinal: Negative for abdominal distention, abdominal pain, constipation, diarrhea, nausea and vomiting.   Genitourinary: Positive for hematuria. Negative for dysuria.   Musculoskeletal: Positive for arthralgias.        Right knee pain   Neurological: Negative for dizziness, seizures and syncope.   Hematological: Negative for adenopathy.   Psychiatric/Behavioral: Negative for dysphoric mood.         Objective:      There were no vitals taken for this visit.  Estimated body mass index is 24.44 kg/m² as calculated from the following:    Height as of 7/12/17: 5' 2" (1.575 m).    Weight as of 7/12/17: 60.6 kg (133 lb 9.6 oz).  Physical Exam   Constitutional: She is oriented to person, place, and time. She appears well-developed and well-nourished. No distress.   HENT:   Head: Atraumatic.   Neck: No tracheal deviation present.   Cardiovascular: Normal rate.    Pulmonary/Chest: Effort normal. No respiratory distress. She has no wheezes.   Abdominal: Soft. Bowel sounds are normal. She exhibits no distension and no mass. There is no tenderness. There is no rebound and no guarding.   Neurological: She is alert and oriented to person, place, and time.   Skin: Skin is warm and dry. She is not diaphoretic.     Psychiatric: She has a normal mood and affect. Her behavior is normal. Judgment and thought content normal.         Assessment and Plan:           Problem List Items Addressed This Visit     Malignant neoplasm of lateral wall of urinary bladder      Other Visit Diagnoses    None.         Follow up:   Discussed work up of bladder cancer. Will need " cystoscopy, rita RPGs, transurethral resection of bladder tumor. Will have Anahy meet with her today to schedule.  Letter to Dr. Neri.    Jus Marroquin

## 2017-10-05 ENCOUNTER — PATIENT MESSAGE (OUTPATIENT)
Dept: HEMATOLOGY/ONCOLOGY | Facility: CLINIC | Age: 72
End: 2017-10-05

## 2017-10-05 ENCOUNTER — ANESTHESIA EVENT (OUTPATIENT)
Dept: SURGERY | Facility: HOSPITAL | Age: 72
End: 2017-10-05
Payer: MEDICARE

## 2017-10-05 RX ORDER — MULTIVITAMIN
1 TABLET ORAL DAILY
COMMUNITY
End: 2018-05-09

## 2017-10-05 RX ORDER — CETIRIZINE HYDROCHLORIDE 5 MG/1
5 TABLET ORAL DAILY PRN
COMMUNITY
End: 2018-04-17

## 2017-10-05 NOTE — ANESTHESIA PREPROCEDURE EVALUATION
Anesthesia Assessment: Preoperative EQUATION     Planned Procedure: Procedure(s) (LRB):  CYSTOSCOPY WITH RETROGRADE PYELOGRAM (Bilateral)  EXCISION-BLADDER TUMOR-TRANSURETHRAL (TURBT) (N/A)  Requested Anesthesia Type:General  Surgeon: Jus Marroquin MD  Service: Urology  Known or anticipated Date of Surgery:10/16/2017     Surgeon notes: reviewed     Electronic QUestionnaire Assessment completed via nurse interview with patient.      NO AQ     Triage considerations:      The patient has no apparent active cardiac condition (No unstable coronary Syndrome such as severe unstable angina or recent [<1 month] myocardial infarction, decompensated CHF, severe valvular   disease or significant arrhythmia)     Previous anesthesia records:Not available     Last PCP note: outside [facility]   Subspecialty notes: Hematology/Oncology, Rheumatology     Other important co-morbidities: PONV, Hypothyroidism, IBD,GERD, Osteoporosis, DDD, HX DVT-Patient denied. Stated she has stent behind R knee due to a puncture wound. S/P Colon Bleed-2/2017     Tests already available:  Available tests,  within 3 months , within [facility] .  9/2017 Iron & TIBC, Ferritin, CBC       8/2017 BMP                               Instructions given. (See in Nurse's note)     Optimization:  Anesthesia Preop Clinic Assessment  Indicated-Not required with this surgery                Plan:    Testing:  EKG                           Patient  has previously scheduled Medical Appointment:  none     Navigation: Tests Scheduled. EKG  (from outside clinic)                         Results will be tracked by Preop Clinic.  10/13 Outside cardiology medical records including EKG, 2-D Echo, Stress test received and scanned into Media on 10/13    MARY Hogue                        Electronically signed by Lennie Ruiz RN at 10/5/2017 10:36 AM                                                                                                                    10/05/2017  Selam Botello is a 72 y.o., female.    Anesthesia Evaluation    I have reviewed the Patient Summary Reports.     I have reviewed the Medications.     Review of Systems  Anesthesia Hx:  Hx of Anesthetic complications Pt states she has trouble with memory and sedation for weeks after receiving propofol.   History of prior surgery of interest to airway management or planning:  Denies Personal Hx of Anesthesia complications.   Social:  Non-Smoker    Cardiovascular:   Exercise tolerance: good    Pulmonary:  Pulmonary Normal    Hepatic/GI:   GERD Liver Disease,  Esophageal / Stomach Disorders Gerd  Bowel Conditions:  Inflammatory Bowel Disease    Musculoskeletal:  Bone Disorders: Osteoporosis  Spine Disorders: Degenerative Joint Disease    Neurological:  Neurology Normal    Endocrine:  Endocrine Normal  Thyroid Disease Hypothyroidism        Physical Exam  General:  Well nourished    Airway/Jaw/Neck:  Airway Findings: Mouth Opening: Normal Tongue: Normal  General Airway Assessment: Adult  Mallampati: II  TM Distance: Normal, at least 6 cm       Chest/Lungs:  Chest/Lungs Clear    Heart/Vascular:  Heart Findings: Normal            Anesthesia Plan  Type of Anesthesia, risks & benefits discussed:  Anesthesia Type:  general  Patient's Preference:   Intra-op Monitoring Plan: standard ASA monitors  Intra-op Monitoring Plan Comments:   Post Op Pain Control Plan: multimodal analgesia  Post Op Pain Control Plan Comments:   Induction:   IV  Beta Blocker:  Patient is not currently on a Beta-Blocker (No further documentation required).       Informed Consent: Patient understands risks and agrees with Anesthesia plan.  Questions answered. Anesthesia consent signed with patient.  ASA Score: 2     Day of Surgery Review of History & Physical:    H&P update referred to the surgeon.         Ready For Surgery From Anesthesia Perspective.

## 2017-10-05 NOTE — PRE ADMISSION SCREENING
Anesthesia Assessment: Preoperative EQUATION    Planned Procedure: Procedure(s) (LRB):  CYSTOSCOPY WITH RETROGRADE PYELOGRAM (Bilateral)  EXCISION-BLADDER TUMOR-TRANSURETHRAL (TURBT) (N/A)  Requested Anesthesia Type:General  Surgeon: Jus Marroquin MD  Service: Urology  Known or anticipated Date of Surgery:10/16/2017    Surgeon notes: reviewed    Electronic QUestionnaire Assessment completed via nurse interview with patient.      NO AQ    Triage considerations:     The patient has no apparent active cardiac condition (No unstable coronary Syndrome such as severe unstable angina or recent [<1 month] myocardial infarction, decompensated CHF, severe valvular   disease or significant arrhythmia)    Previous anesthesia records:Not available    Last PCP note: outside [facility]   Subspecialty notes: Hematology/Oncology, Rheumatology    Other important co-morbidities: PONV, Hypothyroidism, IBD,GERD, Osteoporosis, DDD, HX DVT-Patient denied. Stated she has stent behind R knee due to a puncture wound. S/P Colon Bleed-2/2017     Tests already available:  Available tests,  within 3 months , within [facility] .  9/2017 Iron & TIBC, Ferritin, CBC       8/2017 BMP               Instructions given. (See in Nurse's note)    Optimization:  Anesthesia Preop Clinic Assessment  Indicated-Not required with this surgery     Plan:    Testing:  EKG     Patient  has previously scheduled Medical Appointment:  none    Navigation: Tests Scheduled. EKG  (from outside clinic)              Results will be tracked by Preop Clinic  .10/13 Outside cardiology medical records including EKG, 2-D Echo, Stress test received and scanned into Media on 10/13    MARY Hogue

## 2017-10-15 ENCOUNTER — PATIENT MESSAGE (OUTPATIENT)
Dept: HEMATOLOGY/ONCOLOGY | Facility: CLINIC | Age: 72
End: 2017-10-15

## 2017-10-16 ENCOUNTER — HOSPITAL ENCOUNTER (OUTPATIENT)
Facility: HOSPITAL | Age: 72
Discharge: HOME | End: 2017-10-16
Attending: UROLOGY | Admitting: UROLOGY
Payer: MEDICARE

## 2017-10-16 ENCOUNTER — PATIENT MESSAGE (OUTPATIENT)
Dept: HEMATOLOGY/ONCOLOGY | Facility: CLINIC | Age: 72
End: 2017-10-16

## 2017-10-16 ENCOUNTER — SURGERY (OUTPATIENT)
Age: 72
End: 2017-10-16

## 2017-10-16 ENCOUNTER — ANESTHESIA (OUTPATIENT)
Dept: SURGERY | Facility: HOSPITAL | Age: 72
End: 2017-10-16
Payer: MEDICARE

## 2017-10-16 VITALS
HEIGHT: 62 IN | DIASTOLIC BLOOD PRESSURE: 84 MMHG | SYSTOLIC BLOOD PRESSURE: 131 MMHG | HEART RATE: 72 BPM | RESPIRATION RATE: 18 BRPM | TEMPERATURE: 98 F | BODY MASS INDEX: 23.55 KG/M2 | WEIGHT: 128 LBS | OXYGEN SATURATION: 100 %

## 2017-10-16 DIAGNOSIS — C67.2 MALIGNANT NEOPLASM OF LATERAL WALL OF URINARY BLADDER: Primary | ICD-10-CM

## 2017-10-16 DIAGNOSIS — D49.4 BLADDER TUMOR: Primary | ICD-10-CM

## 2017-10-16 PROCEDURE — 36000706: Performed by: UROLOGY

## 2017-10-16 PROCEDURE — D9220A PRA ANESTHESIA: Mod: ANES,,, | Performed by: ANESTHESIOLOGY

## 2017-10-16 PROCEDURE — 63600175 PHARM REV CODE 636 W HCPCS: Performed by: NURSE ANESTHETIST, CERTIFIED REGISTERED

## 2017-10-16 PROCEDURE — 25000003 PHARM REV CODE 250: Performed by: NURSE ANESTHETIST, CERTIFIED REGISTERED

## 2017-10-16 PROCEDURE — 71000044 HC DOSC ROUTINE RECOVERY FIRST HOUR: Performed by: UROLOGY

## 2017-10-16 PROCEDURE — 36000707: Performed by: UROLOGY

## 2017-10-16 PROCEDURE — 37000008 HC ANESTHESIA 1ST 15 MINUTES: Performed by: UROLOGY

## 2017-10-16 PROCEDURE — 25000003 PHARM REV CODE 250: Performed by: STUDENT IN AN ORGANIZED HEALTH CARE EDUCATION/TRAINING PROGRAM

## 2017-10-16 PROCEDURE — 88305 TISSUE EXAM BY PATHOLOGIST: CPT | Performed by: PATHOLOGY

## 2017-10-16 PROCEDURE — 71000016 HC POSTOP RECOV ADDL HR: Performed by: UROLOGY

## 2017-10-16 PROCEDURE — 52235 CYSTOSCOPY AND TREATMENT: CPT | Mod: GC,,, | Performed by: UROLOGY

## 2017-10-16 PROCEDURE — 25500020 PHARM REV CODE 255: Performed by: UROLOGY

## 2017-10-16 PROCEDURE — 71000015 HC POSTOP RECOV 1ST HR: Performed by: UROLOGY

## 2017-10-16 PROCEDURE — 25000003 PHARM REV CODE 250

## 2017-10-16 PROCEDURE — 74420 UROGRAPHY RTRGR +-KUB: CPT | Mod: 26,,, | Performed by: UROLOGY

## 2017-10-16 PROCEDURE — 88305 TISSUE EXAM BY PATHOLOGIST: CPT | Mod: 26,,, | Performed by: PATHOLOGY

## 2017-10-16 PROCEDURE — 52005 CYSTO W/URTRL CATHJ: CPT | Mod: 59,GC,, | Performed by: UROLOGY

## 2017-10-16 PROCEDURE — 25000003 PHARM REV CODE 250: Performed by: UROLOGY

## 2017-10-16 PROCEDURE — 71000045 HC DOSC ROUTINE RECOVERY EA ADD'L HR: Performed by: UROLOGY

## 2017-10-16 PROCEDURE — 63600175 PHARM REV CODE 636 W HCPCS: Performed by: STUDENT IN AN ORGANIZED HEALTH CARE EDUCATION/TRAINING PROGRAM

## 2017-10-16 PROCEDURE — 37000009 HC ANESTHESIA EA ADD 15 MINS: Performed by: UROLOGY

## 2017-10-16 PROCEDURE — D9220A PRA ANESTHESIA: Mod: CRNA,,, | Performed by: NURSE ANESTHETIST, CERTIFIED REGISTERED

## 2017-10-16 PROCEDURE — 27201423 OPTIME MED/SURG SUP & DEVICES STERILE SUPPLY: Performed by: UROLOGY

## 2017-10-16 PROCEDURE — C1758 CATHETER, URETERAL: HCPCS | Performed by: UROLOGY

## 2017-10-16 RX ORDER — SODIUM CHLORIDE 9 MG/ML
INJECTION, SOLUTION INTRAVENOUS CONTINUOUS
Status: DISCONTINUED | OUTPATIENT
Start: 2017-10-16 | End: 2017-10-16 | Stop reason: HOSPADM

## 2017-10-16 RX ORDER — ROCURONIUM BROMIDE 10 MG/ML
INJECTION, SOLUTION INTRAVENOUS
Status: DISCONTINUED | OUTPATIENT
Start: 2017-10-16 | End: 2017-10-16

## 2017-10-16 RX ORDER — HYOSCYAMINE SULFATE 0.12 MG/1
0.12 TABLET SUBLINGUAL EVERY 4 HOURS PRN
Status: DISCONTINUED | OUTPATIENT
Start: 2017-10-16 | End: 2017-10-16 | Stop reason: HOSPADM

## 2017-10-16 RX ORDER — PHENAZOPYRIDINE HYDROCHLORIDE 100 MG/1
100 TABLET, FILM COATED ORAL 3 TIMES DAILY PRN
Qty: 21 TABLET | Refills: 0 | Status: SHIPPED | OUTPATIENT
Start: 2017-10-16 | End: 2017-10-26

## 2017-10-16 RX ORDER — ONDANSETRON 2 MG/ML
INJECTION INTRAMUSCULAR; INTRAVENOUS
Status: DISCONTINUED | OUTPATIENT
Start: 2017-10-16 | End: 2017-10-16

## 2017-10-16 RX ORDER — CEFAZOLIN SODIUM 2 G/50ML
2 SOLUTION INTRAVENOUS
Status: COMPLETED | OUTPATIENT
Start: 2017-10-16 | End: 2017-10-16

## 2017-10-16 RX ORDER — HYDROCODONE BITARTRATE AND ACETAMINOPHEN 5; 325 MG/1; MG/1
1-2 TABLET ORAL
Qty: 15 TABLET | Refills: 0 | Status: SHIPPED | OUTPATIENT
Start: 2017-10-16 | End: 2017-10-26

## 2017-10-16 RX ORDER — MIDAZOLAM HYDROCHLORIDE 1 MG/ML
INJECTION, SOLUTION INTRAMUSCULAR; INTRAVENOUS
Status: DISCONTINUED | OUTPATIENT
Start: 2017-10-16 | End: 2017-10-16

## 2017-10-16 RX ORDER — LIDOCAINE HCL/PF 100 MG/5ML
SYRINGE (ML) INTRAVENOUS
Status: DISCONTINUED | OUTPATIENT
Start: 2017-10-16 | End: 2017-10-16

## 2017-10-16 RX ORDER — PHENYLEPHRINE HYDROCHLORIDE 10 MG/ML
INJECTION INTRAVENOUS
Status: DISCONTINUED | OUTPATIENT
Start: 2017-10-16 | End: 2017-10-16

## 2017-10-16 RX ORDER — GLYCOPYRROLATE 0.2 MG/ML
INJECTION INTRAMUSCULAR; INTRAVENOUS
Status: DISCONTINUED | OUTPATIENT
Start: 2017-10-16 | End: 2017-10-16

## 2017-10-16 RX ORDER — DEXAMETHASONE SODIUM PHOSPHATE 4 MG/ML
INJECTION, SOLUTION INTRA-ARTICULAR; INTRALESIONAL; INTRAMUSCULAR; INTRAVENOUS; SOFT TISSUE
Status: DISCONTINUED | OUTPATIENT
Start: 2017-10-16 | End: 2017-10-16

## 2017-10-16 RX ORDER — LIDOCAINE HYDROCHLORIDE 10 MG/ML
1 INJECTION, SOLUTION EPIDURAL; INFILTRATION; INTRACAUDAL; PERINEURAL ONCE
Status: COMPLETED | OUTPATIENT
Start: 2017-10-16 | End: 2017-10-16

## 2017-10-16 RX ORDER — PHENAZOPYRIDINE HYDROCHLORIDE 200 MG/1
TABLET, FILM COATED ORAL
Status: DISCONTINUED
Start: 2017-10-16 | End: 2017-10-16 | Stop reason: HOSPADM

## 2017-10-16 RX ORDER — NEOSTIGMINE METHYLSULFATE 1 MG/ML
INJECTION, SOLUTION INTRAVENOUS
Status: DISCONTINUED | OUTPATIENT
Start: 2017-10-16 | End: 2017-10-16

## 2017-10-16 RX ORDER — ETOMIDATE 2 MG/ML
INJECTION INTRAVENOUS
Status: DISCONTINUED | OUTPATIENT
Start: 2017-10-16 | End: 2017-10-16

## 2017-10-16 RX ORDER — HYDROCODONE BITARTRATE AND ACETAMINOPHEN 5; 325 MG/1; MG/1
1 TABLET ORAL EVERY 4 HOURS PRN
Status: DISCONTINUED | OUTPATIENT
Start: 2017-10-16 | End: 2017-10-16 | Stop reason: HOSPADM

## 2017-10-16 RX ORDER — PHENAZOPYRIDINE HYDROCHLORIDE 100 MG/1
100 TABLET, FILM COATED ORAL
Status: DISCONTINUED | OUTPATIENT
Start: 2017-10-16 | End: 2017-10-16 | Stop reason: HOSPADM

## 2017-10-16 RX ORDER — ACETAMINOPHEN 325 MG/1
650 TABLET ORAL EVERY 6 HOURS PRN
Status: DISCONTINUED | OUTPATIENT
Start: 2017-10-16 | End: 2017-10-16

## 2017-10-16 RX ORDER — ACETAMINOPHEN 500 MG
1000 TABLET ORAL EVERY 6 HOURS PRN
Status: DISCONTINUED | OUTPATIENT
Start: 2017-10-16 | End: 2017-10-16 | Stop reason: HOSPADM

## 2017-10-16 RX ORDER — POLYETHYLENE GLYCOL 3350 17 G/17G
17 POWDER, FOR SOLUTION ORAL DAILY
Qty: 30 PACKET | Refills: 0 | Status: SHIPPED | OUTPATIENT
Start: 2017-10-16 | End: 2017-11-09 | Stop reason: ALTCHOICE

## 2017-10-16 RX ORDER — FENTANYL CITRATE 50 UG/ML
INJECTION, SOLUTION INTRAMUSCULAR; INTRAVENOUS
Status: DISCONTINUED | OUTPATIENT
Start: 2017-10-16 | End: 2017-10-16

## 2017-10-16 RX ORDER — HYOSCYAMINE SULFATE 0.12 MG/1
0.12 TABLET SUBLINGUAL EVERY 4 HOURS PRN
Qty: 20 TABLET | Refills: 1 | Status: SHIPPED | OUTPATIENT
Start: 2017-10-16 | End: 2017-10-19 | Stop reason: SDUPTHER

## 2017-10-16 RX ORDER — ACETAMINOPHEN AND CODEINE PHOSPHATE 300; 30 MG/1; MG/1
1 TABLET ORAL EVERY 4 HOURS PRN
Status: DISCONTINUED | OUTPATIENT
Start: 2017-10-16 | End: 2017-10-16

## 2017-10-16 RX ADMIN — SODIUM CHLORIDE: 0.9 INJECTION, SOLUTION INTRAVENOUS at 11:10

## 2017-10-16 RX ADMIN — IOHEXOL 50 ML: 300 INJECTION, SOLUTION INTRAVENOUS at 12:10

## 2017-10-16 RX ADMIN — NEOSTIGMINE METHYLSULFATE 4 MG: 1 INJECTION INTRAVENOUS at 12:10

## 2017-10-16 RX ADMIN — ONDANSETRON 4 MG: 2 INJECTION INTRAMUSCULAR; INTRAVENOUS at 12:10

## 2017-10-16 RX ADMIN — HYOSCYAMINE SULFATE 0.12 MG: 0.12 TABLET ORAL; SUBLINGUAL at 03:10

## 2017-10-16 RX ADMIN — MIDAZOLAM HYDROCHLORIDE 1 MG: 1 INJECTION, SOLUTION INTRAMUSCULAR; INTRAVENOUS at 11:10

## 2017-10-16 RX ADMIN — GLYCOPYRROLATE 0.4 MG: 0.2 INJECTION, SOLUTION INTRAMUSCULAR; INTRAVENOUS at 12:10

## 2017-10-16 RX ADMIN — PHENYLEPHRINE HYDROCHLORIDE 100 MCG: 10 INJECTION INTRAVENOUS at 12:10

## 2017-10-16 RX ADMIN — FENTANYL CITRATE 100 MCG: 50 INJECTION, SOLUTION INTRAMUSCULAR; INTRAVENOUS at 11:10

## 2017-10-16 RX ADMIN — CEFAZOLIN SODIUM 2 G: 2 SOLUTION INTRAVENOUS at 11:10

## 2017-10-16 RX ADMIN — LIDOCAINE HYDROCHLORIDE 1 MG: 10 INJECTION, SOLUTION EPIDURAL; INFILTRATION; INTRACAUDAL; PERINEURAL at 11:10

## 2017-10-16 RX ADMIN — HYOSCYAMINE SULFATE 0.12 MG: 0.12 TABLET ORAL; SUBLINGUAL at 01:10

## 2017-10-16 RX ADMIN — ETOMIDATE 78 MG: 2 INJECTION, SOLUTION INTRAVENOUS at 11:10

## 2017-10-16 RX ADMIN — ACETAMINOPHEN 1000 MG: 500 TABLET ORAL at 01:10

## 2017-10-16 RX ADMIN — ROCURONIUM BROMIDE 30 MG: 10 INJECTION, SOLUTION INTRAVENOUS at 11:10

## 2017-10-16 RX ADMIN — LIDOCAINE HYDROCHLORIDE 100 MG: 20 INJECTION, SOLUTION INTRAVENOUS at 11:10

## 2017-10-16 RX ADMIN — DEXAMETHASONE SODIUM PHOSPHATE 8 MG: 4 INJECTION, SOLUTION INTRAMUSCULAR; INTRAVENOUS at 12:10

## 2017-10-16 NOTE — PROGRESS NOTES
Plan of care reviewed pt & spouse, both verbalized understanding, pt progressing with plan of care, denies nausea, pain well tolerable, tolerating PO, reviewed all DC instructions, home meds, scripts, when to call MD, when to follow-up, answered questions.

## 2017-10-16 NOTE — TRANSFER OF CARE
"Anesthesia Transfer of Care Note    Patient: Selam Botello    Procedure(s) Performed: Procedure(s) (LRB):  CYSTOSCOPY WITH RETROGRADE PYELOGRAM (Bilateral)  EXCISION-BLADDER TUMOR-TRANSURETHRAL (TURBT) (N/A)    Patient location: [facility]    Anesthesia Type: general    Transport from OR: Transported from OR on 6-10 L/min O2 by face mask with adequate spontaneous ventilation    Post pain: adequate analgesia    Post assessment: no apparent anesthetic complications and tolerated procedure well    Post vital signs: stable    Level of consciousness: awake, alert and oriented    Nausea/Vomiting: no nausea/vomiting    Complications: none    Transfer of care protocol was followed      Last vitals:   Visit Vitals  /70 (BP Location: Left arm, Patient Position: Lying)   Pulse 68   Temp 36.7 °C (98.1 °F) (Temporal)   Resp 18   Ht 5' 2" (1.575 m)   Wt 58.1 kg (128 lb)   SpO2 98%   Breastfeeding? No   BMI 23.41 kg/m²     "

## 2017-10-16 NOTE — OP NOTE
[facility]  Operative Note    Date: 10/16/2017    Pre-Op Diagnosis: bladder tumor    Patient Active Problem List    Diagnosis Date Noted    Bladder tumor 10/16/2017    Malignant neoplasm of lateral wall of urinary bladder 10/03/2017    Medication monitoring encounter 04/18/2017    Angiodysplasia of cecum 03/28/2017    Iron deficiency anemia due to chronic blood loss 03/24/2017    Osteopenia 03/09/2016    Osteoarthritis of hand 03/09/2016    Hypovitaminosis D 03/09/2016    Primary localized osteoarthrosis, lower leg 02/18/2014    Osteoarthritis of hip 02/18/2014    OA (osteoarthritis) 08/19/2013    Age-related osteoporosis without current pathological fracture 08/19/2013    Unspecified vitamin D deficiency 08/19/2013         Post-Op Diagnosis: same    Procedure(s) Performed:   1.  TURBT of medium sized bladder tumor  2.  Bilateral retrograde pyelograms  3.  EUA  4.  Fluoro < 1 hour     Specimen(s): bladder tumor     Staff Surgeon: Jus Marroquin MD    Assistant Surgeon: Darshan Guerrero MD; Kofi Milton MD     Anesthesia: General endotracheal anesthesia    Indications: Selam Botello is a 72 y.o. female with a bladder tumor.  She presents today for surgical resection.      Findings:   - Two bladder tumors  - larger of the two tumors was involving the left UO, requiring resection of the left UO  - smaller bladder tumor on right posterior wall/ dome, resected with one swipe, edges fulgurated  - Bimanual examination after resection revealed no invasion of vagina, no fixed mass    Estimated Blood Loss: min    Drains: none     Procedure in detail:  After the risks, benefits and possible complications of the procedure were explained, consents were obtained. The patient was taken to the operating room and placed under anesthesia. Pre-operative antibiotics were administered 30 minutes prior to expected start time. The patient was placed in the dorsal lithotomy position and prepped and  draped in the normal and sterile fashion. Time out was performed.      A rigid cystoscope in a 22 Fr sheath was introduced into the bladder per urethra. This passed easily.  The entire urethra was visualized which showed no masses or strictures.  The right and left ureteral orifices were identified in the normal anatomic position and were seen effluxing clear urine.  Formal cystoscopy was performed which revealed no bladder stones and no bladder diverticuli.  There were mild trabeculations.  Two bladder tumors were noted. The larger of these was approximately 2-3 cm and involved the left ureteral orifice.  The smaller tumor was on the posterior wall near the dome on the right side.      The right UO was identified and cannulated with a straight tip ureteral catheter.  A RGP was performed which showed no filling defects and a delicate collecting system with an extrarenal pelvis.  This was then repeated on the left side, revealing delicate collecting system with no filling defects. The distal ureters were inspected during drainage, and were both noted to be absent of filling defects.      The resectoscope was then assembled with the visual obturator. This was placed into the bladder via the urethra and the visual obturator was exchanged for the resecting mechanism.  The tumor was then resected, superficially until the base was identified.  Specimens were then removed and passed off the field for pathologic analysis.  The left UO was resected during this process.  After fulgurating the resection bed surrounding this area and taking care to avoid electrocautery to the left UO, there was clear yellow efflux noted from the left UO.  The right sided bladder tumor was then resected in a single swipe, and the edges and base were fulgurated.      The bladder was drained and hemostasis was achieved.  The resectoscope was removed.    Post-resection bimanual exam revealed no fixation, no masses invading the vagina.      The  patient tolerated the procedure well and was transferred to recovery in stable condition.    Disposition:  The patient will follow up with Dr. Marroquin in 2 weeks.  Prescriptions were given for norco, miralax, and pyridium.      Darshan Guerrero MD

## 2017-10-16 NOTE — DISCHARGE INSTRUCTIONS
Discharge Instructions for Transurethral Resection of Bladder Tumor (TURBT)  You had a procedure called a resection of bladder tumor (surgery to remove a bladder tumor). During the surgery, a surgeon inserted a thin, lighted tube (cystoscope) into the bladder through the urethra (the part of your body that carries urine from the bladder to the outside of the body). The surgeon used a tool to either remove the cancer or burn it away with high-energy electricity.  Home care  · Dont be alarmed by brownish or reddish blood or clots in your urine. This is a result of the procedure. However, call your doctor if the blood does not start to go away within 72 hours after you go home.  · Drink plenty of fluids during the day (enough to keep your urine very light colored). This will help keep a healthy flow of urine.  · Dont drive until the doctor says its OK.   · Dont return to work until the doctor says its OK.  · Dont do any heavy lifting for 3 weeks after the procedure.  ¨ Dont lift anything heavier than 8 pounds.  ¨ Dont lift weights.  ¨ Dont  infants or children.  · Dont mow the lawn or use a vacuum .  · Avoid constipation.  ¨ Use a laxative or stool softener as directed by your doctor.  ¨ Eat more high-fiber foods.  · Be sure to finish the antibiotics that your doctor prescribed.  · Once your catheter is removed, expect some blood in your urine and some burning when you urinate.    Follow-up care  Make a follow-up appointment, or as directed by your doctor.    When to call your healthcare provider  Call your healthcare provider right away if you have any of the following:  · Heavy bleeding or large blood clots in the urine  · Blood in the urine after 3 days  · Catheter falls out or stops draining  · Fever above 100.4°F (38°C) or shaking chills  · Trouble urinating  · Pain or cramping in the abdomen that wont go away     Date Last Reviewed: 1/1/2017  © 2565-0958 [employer] [address]  [place] [address]. All rights reserved. This information is not intended as a substitute for professional medical care. Always follow your healthcare professional's instructions.

## 2017-10-16 NOTE — INTERVAL H&P NOTE
The patient has been examined and the H&P has been reviewed:    I concur with the findings and no changes have occurred since H&P was written.    Anesthesia/Surgery risks, benefits and alternative options discussed and understood by patient/family.          Active Hospital Problems    Diagnosis  POA    Bladder tumor [D49.4]  Yes      Resolved Hospital Problems    Diagnosis Date Resolved POA   No resolved problems to display.

## 2017-10-16 NOTE — DISCHARGE SUMMARY
[facility]  Discharge Note  Short Stay    Admit Date: 10/16/2017    Discharge Date and Time: 10/16/2017       Attending Physician: Jus Marroquin MD     Discharge Provider: Darshan Guerrero MD    Diagnoses:  Active Hospital Problems    Diagnosis  POA    *Bladder tumor [D49.4]  Yes    Malignant neoplasm of lateral wall of urinary bladder [C67.2]  Yes    Iron deficiency anemia due to chronic blood loss [D50.0]  Yes    Osteopenia [M85.80]  Yes     Chronic    Osteoarthritis of hand [M19.049]  Yes     Chronic    Hypovitaminosis D [E55.9]  Yes     Chronic    Primary localized osteoarthrosis, lower leg [M17.10]  Yes    Osteoarthritis of hip [M16.9]  Yes     Chronic     L- THR-11/19/13      OA (osteoarthritis) [M19.90]  Yes    Age-related osteoporosis without current pathological fracture [M81.0]  Yes     DEXA  3/2/15 S -0.8, Forearm 33% -2.4 Got Prolia 3/3  Will go on holiday        Resolved Hospital Problems    Diagnosis Date Resolved POA   No resolved problems to display.       Discharged Condition: good    Hospital Course: Patient was admitted for a TURBT and tolerated the procedure well with no complications.    Final Diagnoses: Same as principal problem.    Disposition: Home or Self Care    Follow up/Patient Instructions:    Medications:  Reconciled Home Medications:   Current Discharge Medication List      START taking these medications    Details   hydrocodone-acetaminophen 5-325mg (NORCO) 5-325 mg per tablet Take 1-2 tablets by mouth every 4 to 6 hours as needed for Pain.  Qty: 15 tablet, Refills: 0      phenazopyridine (PYRIDIUM) 100 MG tablet Take 1 tablet (100 mg total) by mouth 3 (three) times daily as needed for Pain.  Qty: 21 tablet, Refills: 0      polyethylene glycol (GLYCOLAX) 17 gram PwPk Take 17 g by mouth once daily.  Qty: 30 packet, Refills: 0         CONTINUE these medications which have NOT CHANGED    Details   b complex vitamins tablet Take 1 tablet by mouth once daily.       CARBOXYMETHYLCELLULOSE SODIUM (REFRESH TEARS OPHT) Apply to eye daily as needed.      cetirizine (ZYRTEC) 5 MG tablet Take 5 mg by mouth daily as needed for Allergies.      CRANBERRY FRUIT EXTRACT (CRANBERRY CONCENTRATE ORAL) Take 650 mg by mouth.      cyanocobalamin (VITAMIN B-12) 1000 MCG tablet Take 1,000 mcg by mouth once daily.      !! ergocalciferol (VITAMIN D2) 50,000 unit Cap Take 1 capsule (50,000 Units total) by mouth every 7 days.  Qty: 8 capsule, Refills: 5    Associated Diagnoses: Hypovitaminosis D; Osteopenia      FUSION PLUS 130 mg iron -1,250 mcg Cap TAKE 1 TO 2 CAPSULES BY MOUTH ONCE DAILY  Refills: 3      levothyroxine (SYNTHROID) 100 MCG tablet Take 1 tablet by mouth Daily.      !! lidocaine (LIDODERM) 5 % Place 1 patch onto the skin once daily. Remove and discard patch within 12 hours or as directed by MD      !! LIDODERM 5 % PLACE 1 PATCH ONTO THE SKIN ONCE DAILY.  Qty: 30 patch, Refills: 5    Comments: PATIENT REQUESTING REFILLS ASAP PLEASE      multivitamin (ONE DAILY MULTIVITAMIN) per tablet Take 1 tablet by mouth once daily.      omeprazole (PRILOSEC OTC) 20 MG tablet Take 20 mg by mouth once daily.      pyridoxine (VITAMIN B-6) 100 MG Tab Take 50 mg by mouth 2 (two) times daily.      VASCULERA 630 mg Tab Take 630 mg by mouth once daily.  Qty: 90 tablet, Refills: 4      !! VITAMIN D2 50,000 unit capsule TAKE ONE CAPSULE BY MOUTH TWICE WEEKLY  Qty: 8 capsule, Refills: 5    Associated Diagnoses: Hypovitaminosis D; Osteopenia       !! - Potential duplicate medications found. Please discuss with provider.      STOP taking these medications       mometasone (NASONEX) 50 mcg/actuation nasal spray Comments:   Reason for Stopping:         nitrofurantoin, macrocrystal-monohydrate, (MACROBID) 100 MG capsule Comments:   Reason for Stopping:         baicalin-catechin (LIMBREL) 500 mg Cap Comments:   Reason for Stopping:         lidocaine-prilocaine (EMLA) cream Comments:   Reason for Stopping:          pentazocine-acetaminophen  MG (TALACEN)  mg Tab Comments:   Reason for Stopping:         tobramycin-dexamethasone 0.3-0.1% (TOBRADEX) 0.3-0.1 % DrpS Comments:   Reason for Stopping:               Discharge Procedure Orders  Diet general     Activity as tolerated     Call MD for:  persistent nausea and vomiting or diarrhea     Call MD for:  severe uncontrolled pain     Call MD for:  redness, tenderness, or signs of infection (pain, swelling, redness, odor or green/yellow discharge around incision site)     Call MD for:  difficulty breathing or increased cough     Call MD for:  severe persistent headache     Call MD for:  worsening rash     Call MD for:  persistent dizziness, light-headedness, or visual disturbances     Call MD for:  increased confusion or weakness     No dressing needed       Follow-up Information     Jus Marroquin MD In 2 weeks.    Specialty:  Urology  Why:  post op TURBT, bilateral retrograde pyelograms   Contact information:  [id number] [facility] [address]  [phone]                   Discharge Procedure Orders (must include Diet, Follow-up, Activity):    Discharge Procedure Orders (must include Diet, Follow-up, Activity)  Diet general     Activity as tolerated     Call MD for:  persistent nausea and vomiting or diarrhea     Call MD for:  severe uncontrolled pain     Call MD for:  redness, tenderness, or signs of infection (pain, swelling, redness, odor or green/yellow discharge around incision site)     Call MD for:  difficulty breathing or increased cough     Call MD for:  severe persistent headache     Call MD for:  worsening rash     Call MD for:  persistent dizziness, light-headedness, or visual disturbances     Call MD for:  increased confusion or weakness     No dressing needed

## 2017-10-17 ENCOUNTER — TELEPHONE (OUTPATIENT)
Dept: UROLOGY | Facility: CLINIC | Age: 72
End: 2017-10-17

## 2017-10-17 DIAGNOSIS — C67.9 MALIGNANT NEOPLASM OF URINARY BLADDER, UNSPECIFIED SITE: Primary | ICD-10-CM

## 2017-10-17 NOTE — ANESTHESIA RELEASE NOTE
Anesthesia Release from PACU Note    Patient: Selam Botello    Procedure(s) Performed: Procedure(s) (LRB):  CYSTOSCOPY WITH RETROGRADE PYELOGRAM (Bilateral)  EXCISION-BLADDER TUMOR-TRANSURETHRAL (TURBT) (N/A)    Anesthesia type: General     Post pain: Adequate analgesia    Post assessment: no apparent anesthetic complications, tolerated procedure well and no evidence of recall    Last Vitals:   Vitals:    10/16/17 1630   BP: 131/84   Pulse: 72   Resp: 18   Temp: 36.6 °C (97.8 °F)   SpO2: 100%       Post vital signs: stable    Level of consciousness: awake, alert  and oriented    Nausea/Vomiting: no nausea/no vomiting    Complications: none    Airway Patency: patent    Respiratory: unassisted    Cardiovascular: stable and blood pressure at baseline    Hydration: euvolemic

## 2017-10-17 NOTE — TELEPHONE ENCOUNTER
I spoke with patient, who stated she is having a lot of pain since her surgery and has been taking only a half tablet of percocet every 2 hours and using hyoscyamine under her tongue as prescribed and stated she is having a lot of bright red blood , patient advised to drink a lot of water , increase her dose of percocet to 1-2 tablets every 4-6 hrs for the pain. Patient stated she has been drinking 16 oz of water every hout since her surgery. I advised her that I will send  a message to call her back patient agreed.

## 2017-10-17 NOTE — ANESTHESIA POSTPROCEDURE EVALUATION
"Anesthesia Post Evaluation    Patient: Selam Botello    Procedure(s) Performed: Procedure(s) (LRB):  CYSTOSCOPY WITH RETROGRADE PYELOGRAM (Bilateral)  EXCISION-BLADDER TUMOR-TRANSURETHRAL (TURBT) (N/A)    Final Anesthesia Type: general  Patient location during evaluation: PACU  Patient participation: Yes- Able to Participate  Level of consciousness: awake and alert and oriented  Post-procedure vital signs: reviewed and stable  Pain management: adequate  Airway patency: patent  PONV status at discharge: No PONV  Anesthetic complications: no      Cardiovascular status: blood pressure returned to baseline  Respiratory status: unassisted, spontaneous ventilation and room air  Hydration status: euvolemic  Follow-up not needed.        Visit Vitals  /84   Pulse 72   Temp 36.6 °C (97.8 °F) (Temporal)   Resp 18   Ht 5' 2" (1.575 m)   Wt 58.1 kg (128 lb)   SpO2 100%   Breastfeeding? No   BMI 23.41 kg/m²       Pain/Ortiz Score: Pain Assessment Performed: Yes (10/16/2017  4:20 PM)  Presence of Pain: complains of pain/discomfort (10/16/2017  4:20 PM)  Pain Rating Prior to Med Admin: 5 (10/16/2017  1:17 PM)  Ortiz Score: 10 (10/16/2017  4:20 PM)      "

## 2017-10-18 ENCOUNTER — PATIENT MESSAGE (OUTPATIENT)
Dept: UROLOGY | Facility: CLINIC | Age: 72
End: 2017-10-18

## 2017-10-19 RX ORDER — HYOSCYAMINE SULFATE 0.12 MG/1
0.12 TABLET SUBLINGUAL EVERY 4 HOURS PRN
Qty: 20 TABLET | Refills: 1 | Status: SHIPPED | OUTPATIENT
Start: 2017-10-19 | End: 2017-11-09 | Stop reason: ALTCHOICE

## 2017-10-19 RX ORDER — HYOSCYAMINE SULFATE 0.12 MG/1
0.12 TABLET SUBLINGUAL EVERY 4 HOURS PRN
Qty: 20 TABLET | Refills: 1 | Status: SHIPPED | OUTPATIENT
Start: 2017-10-19 | End: 2017-10-19 | Stop reason: SDUPTHER

## 2017-10-20 ENCOUNTER — PATIENT MESSAGE (OUTPATIENT)
Dept: UROLOGY | Facility: CLINIC | Age: 72
End: 2017-10-20

## 2017-10-25 ENCOUNTER — PATIENT MESSAGE (OUTPATIENT)
Dept: HEMATOLOGY/ONCOLOGY | Facility: CLINIC | Age: 72
End: 2017-10-25

## 2017-10-25 ENCOUNTER — TELEPHONE (OUTPATIENT)
Dept: UROLOGY | Facility: CLINIC | Age: 72
End: 2017-10-25

## 2017-10-25 ENCOUNTER — LAB VISIT (OUTPATIENT)
Dept: LAB | Facility: HOSPITAL | Age: 72
End: 2017-10-25
Attending: UROLOGY
Payer: MEDICARE

## 2017-10-25 DIAGNOSIS — R30.0 DYSURIA: ICD-10-CM

## 2017-10-25 DIAGNOSIS — R30.0 DYSURIA: Primary | ICD-10-CM

## 2017-10-25 LAB
BILIRUB UR QL STRIP: NEGATIVE
CLARITY UR: CLEAR
COLOR UR: ABNORMAL
GLUCOSE UR QL STRIP: NEGATIVE
HGB UR QL STRIP: ABNORMAL
KETONES UR QL STRIP: NEGATIVE
LEUKOCYTE ESTERASE UR QL STRIP: ABNORMAL
MICROSCOPIC COMMENT: ABNORMAL
NITRITE UR QL STRIP: NEGATIVE
PH UR STRIP: 6 [PH] (ref 5–8)
PROT UR QL STRIP: NEGATIVE
RBC #/AREA URNS HPF: 0 /HPF (ref 0–4)
SP GR UR STRIP: <=1.005 (ref 1–1.03)
URN SPEC COLLECT METH UR: ABNORMAL
WBC #/AREA URNS HPF: 30 /HPF (ref 0–5)
WBC CLUMPS URNS QL MICRO: ABNORMAL

## 2017-10-25 PROCEDURE — 81000 URINALYSIS NONAUTO W/SCOPE: CPT | Mod: PO

## 2017-10-25 PROCEDURE — 87086 URINE CULTURE/COLONY COUNT: CPT

## 2017-10-25 NOTE — TELEPHONE ENCOUNTER
I spoke with patient, and advised patient that her urinalysis showed positive for microscopic blood, leukocytes,white blood cells. Urine culture results will not be ready for 72hrs, but I will call her on Monday with that result, in the meantime, after discussing with Lillie Gates NP who advised patient to restart the azo standard over the counter as directed and to only drink water for her beverages. Patient agreed.

## 2017-10-26 LAB
BACTERIA UR CULT: NORMAL
BACTERIA UR CULT: NORMAL

## 2017-10-30 ENCOUNTER — TELEPHONE (OUTPATIENT)
Dept: UROLOGY | Facility: CLINIC | Age: 72
End: 2017-10-30

## 2017-10-30 NOTE — TELEPHONE ENCOUNTER
I spoke with patient, who stated she had back pain and gross blood with clots and debris over the week end and this morning as well., I advised her that her urine culture came back multiple organisms. Advised patient to keep drinking plenty of water and keep appt tomorrow. Patient agreed.

## 2017-10-31 ENCOUNTER — OFFICE VISIT (OUTPATIENT)
Dept: UROLOGY | Facility: CLINIC | Age: 72
End: 2017-10-31
Payer: MEDICARE

## 2017-10-31 VITALS
RESPIRATION RATE: 16 BRPM | WEIGHT: 127.88 LBS | HEIGHT: 62 IN | DIASTOLIC BLOOD PRESSURE: 61 MMHG | HEART RATE: 84 BPM | SYSTOLIC BLOOD PRESSURE: 140 MMHG | BODY MASS INDEX: 23.53 KG/M2

## 2017-10-31 DIAGNOSIS — C67.2 MALIGNANT NEOPLASM OF LATERAL WALL OF URINARY BLADDER: Primary | ICD-10-CM

## 2017-10-31 PROCEDURE — 99999 PR PBB SHADOW E&M-EST. PATIENT-LVL IV: CPT | Mod: PBBFAC,,, | Performed by: UROLOGY

## 2017-10-31 PROCEDURE — 99214 OFFICE O/P EST MOD 30 MIN: CPT | Mod: PBBFAC | Performed by: UROLOGY

## 2017-10-31 PROCEDURE — 99214 OFFICE O/P EST MOD 30 MIN: CPT | Mod: S$PBB,,, | Performed by: UROLOGY

## 2017-10-31 NOTE — PROGRESS NOTES
Clinic Note  10/31/2017      Subjective:         Chief Complaint:   BARBER Botello is a 72 y.o. female who developed gross hematuria in September. Cystoscopy by Dr. Chavis on 9/21/2017 revealed multiple superficial appearing bladder tumors.  Consult from Dr. Neri. Friends of [employer] and Roxann Neri. 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 Here with her  Constantino. Retired .  Had normal cytology. Reviewed CT scan, normal upper tracts. Could not clear distal 2 inches of each ureter due to artifact from bilateral hip replacements.Retrogrades normal.  Still having some dysuria and hematuria.    transurethral resection of bladder tumor on 10/16/2017- Low grade Ta    Past Medical History:   Diagnosis Date    Acid reflux     Allergy     Arthritis     Bladder cancer     Breast cyst     Cataract     Colon polyp     Deep vein thrombosis     Degenerative disc disease     GI bleed     Hematuria, gross     Inflammatory bowel disease     Liver disease     cyst    Osteoporosis     Retina disorder, left     Skin cancer     Thyroid disease     Urinary tract infection      Family History   Problem Relation Age of Onset    COPD Mother     Cancer Mother     COPD Father     Cancer Father     Cancer Sister      Social History     Social History    Marital status:      Spouse name: N/A    Number of children: N/A    Years of education: N/A     Occupational History    Not on file.     Social History Main Topics    Smoking status: Never Smoker    Smokeless tobacco: Never Used    Alcohol use Yes      Comment: social    Drug use: No    Sexual activity: Yes     Partners: Male     Other Topics Concern    Not on file     Social History Narrative    No narrative on file     Past Surgical History:   Procedure Laterality Date    ADENOIDECTOMY      BREAST SURGERY      COLON SURGERY      CYSTOSCOPY      EYE SURGERY      JOINT REPLACEMENT      left hip replacement       "TONSILLECTOMY      TYMPANOSTOMY TUBE PLACEMENT      WISDOM TOOTH EXTRACTION       Patient Active Problem List   Diagnosis    OA (osteoarthritis)    Age-related osteoporosis without current pathological fracture    Unspecified vitamin D deficiency    Primary localized osteoarthrosis, lower leg    Osteoarthritis of hip    Osteopenia    Osteoarthritis of hand    Hypovitaminosis D    Iron deficiency anemia due to chronic blood loss    Angiodysplasia of cecum    Medication monitoring encounter    Malignant neoplasm of lateral wall of urinary bladder     Review of Systems   Constitutional: Negative for activity change, appetite change, fever and unexpected weight change.   HENT: Negative for nosebleeds.    Eyes: Negative.    Respiratory: Negative for shortness of breath and wheezing.    Cardiovascular: Negative for chest pain, palpitations and leg swelling.   Gastrointestinal: Negative for abdominal distention, abdominal pain, constipation, diarrhea, nausea and vomiting.   Genitourinary: Negative for dysuria and hematuria.   Musculoskeletal: Negative for arthralgias.   Neurological: Negative for dizziness, seizures and syncope.   Hematological: Negative for adenopathy.   Psychiatric/Behavioral: Negative for dysphoric mood.         Objective:      BP (!) 140/61   Pulse 84   Resp 16   Ht 5' 2" (1.575 m)   Wt 58 kg (127 lb 13.9 oz)   BMI 23.39 kg/m²   Estimated body mass index is 23.39 kg/m² as calculated from the following:    Height as of this encounter: 5' 2" (1.575 m).    Weight as of this encounter: 58 kg (127 lb 13.9 oz).  Physical Exam   Constitutional: She is oriented to person, place, and time. She appears well-developed and well-nourished. No distress.   HENT:   Head: Atraumatic.   Neck: No tracheal deviation present.   Cardiovascular: Normal rate.    Pulmonary/Chest: Effort normal. No respiratory distress. She has no wheezes.   Abdominal: Soft. Bowel sounds are normal. She exhibits no distension " and no mass. There is no tenderness. There is no rebound and no guarding.   Neurological: She is alert and oriented to person, place, and time.   Skin: Skin is warm and dry. She is not diaphoretic.     Psychiatric: She has a normal mood and affect. Her behavior is normal. Judgment and thought content normal.         Assessment and Plan:           Problem List Items Addressed This Visit     Malignant neoplasm of lateral wall of urinary bladder - Primary          Follow up:   Low risk tumor. Will need surveillance cysto in 3,6 and 12 months.  Letter to dr. Neri.    Jus Marroquin

## 2017-11-09 ENCOUNTER — OFFICE VISIT (OUTPATIENT)
Dept: HEMATOLOGY/ONCOLOGY | Facility: CLINIC | Age: 72
End: 2017-11-09
Payer: MEDICARE

## 2017-11-09 ENCOUNTER — LAB VISIT (OUTPATIENT)
Dept: LAB | Facility: HOSPITAL | Age: 72
End: 2017-11-09
Attending: INTERNAL MEDICINE
Payer: MEDICARE

## 2017-11-09 VITALS
HEART RATE: 81 BPM | TEMPERATURE: 99 F | DIASTOLIC BLOOD PRESSURE: 74 MMHG | WEIGHT: 129.88 LBS | OXYGEN SATURATION: 99 % | BODY MASS INDEX: 23.75 KG/M2 | SYSTOLIC BLOOD PRESSURE: 125 MMHG

## 2017-11-09 DIAGNOSIS — C67.2 MALIGNANT NEOPLASM OF LATERAL WALL OF URINARY BLADDER: ICD-10-CM

## 2017-11-09 DIAGNOSIS — K55.20 ANGIODYSPLASIA OF CECUM: ICD-10-CM

## 2017-11-09 DIAGNOSIS — D50.0 IRON DEFICIENCY ANEMIA DUE TO CHRONIC BLOOD LOSS: Primary | ICD-10-CM

## 2017-11-09 DIAGNOSIS — D50.0 IRON DEFICIENCY ANEMIA DUE TO CHRONIC BLOOD LOSS: ICD-10-CM

## 2017-11-09 LAB
BASOPHILS # BLD AUTO: 0.02 K/UL
BASOPHILS NFR BLD: 0.4 %
DIFFERENTIAL METHOD: ABNORMAL
EOSINOPHIL # BLD AUTO: 0.1 K/UL
EOSINOPHIL NFR BLD: 2 %
ERYTHROCYTE [DISTWIDTH] IN BLOOD BY AUTOMATED COUNT: 13.3 %
FERRITIN SERPL-MCNC: 135 NG/ML
HCT VFR BLD AUTO: 38.9 %
HGB BLD-MCNC: 12.7 G/DL
IRON SERPL-MCNC: 176 UG/DL
LYMPHOCYTES # BLD AUTO: 1.1 K/UL
LYMPHOCYTES NFR BLD: 23.6 %
MCH RBC QN AUTO: 32.2 PG
MCHC RBC AUTO-ENTMCNC: 32.6 G/DL
MCV RBC AUTO: 99 FL
MONOCYTES # BLD AUTO: 0.3 K/UL
MONOCYTES NFR BLD: 5.9 %
NEUTROPHILS # BLD AUTO: 3.1 K/UL
NEUTROPHILS NFR BLD: 68.1 %
PLATELET # BLD AUTO: 257 K/UL
PMV BLD AUTO: 9.1 FL
RBC # BLD AUTO: 3.94 M/UL
SATURATED IRON: 75 %
TOTAL IRON BINDING CAPACITY: 234 UG/DL
TRANSFERRIN SERPL-MCNC: 158 MG/DL
WBC # BLD AUTO: 4.57 K/UL

## 2017-11-09 PROCEDURE — 99214 OFFICE O/P EST MOD 30 MIN: CPT | Mod: 25,S$PBB,, | Performed by: INTERNAL MEDICINE

## 2017-11-09 PROCEDURE — G0009 ADMIN PNEUMOCOCCAL VACCINE: HCPCS | Mod: PBBFAC,PO

## 2017-11-09 PROCEDURE — 85025 COMPLETE CBC W/AUTO DIFF WBC: CPT | Mod: PO

## 2017-11-09 PROCEDURE — 36415 COLL VENOUS BLD VENIPUNCTURE: CPT | Mod: PO

## 2017-11-09 PROCEDURE — 83540 ASSAY OF IRON: CPT

## 2017-11-09 PROCEDURE — 82728 ASSAY OF FERRITIN: CPT

## 2017-11-09 PROCEDURE — 99213 OFFICE O/P EST LOW 20 MIN: CPT | Mod: PBBFAC,PO | Performed by: INTERNAL MEDICINE

## 2017-11-09 PROCEDURE — 99999 PR PBB SHADOW E&M-EST. PATIENT-LVL III: CPT | Mod: PBBFAC,,, | Performed by: INTERNAL MEDICINE

## 2017-11-09 PROCEDURE — 90670 PCV13 VACCINE IM: CPT | Mod: PBBFAC,PO

## 2017-11-09 NOTE — PROGRESS NOTES
Subjective:       Patient ID: Selam Botello is a 72 y.o. female.    Chief Complaint: Results and Anemia    HPI 72-year-old female history of recurrent iron deficiency anemia patient recently had low-grade bladder cancer underwent resection by Dr. Issac Marroquin [facility] urologic oncology    Past Medical History:   Diagnosis Date    Acid reflux     Allergy     Arthritis     Bladder cancer     Breast cyst     Cataract     Colon polyp     Deep vein thrombosis     Degenerative disc disease     GI bleed     Hematuria, gross     Inflammatory bowel disease     Liver disease     cyst    Osteoporosis     Retina disorder, left     Skin cancer     Thyroid disease     Urinary tract infection      Family History   Problem Relation Age of Onset    COPD Mother     Cancer Mother     COPD Father     Cancer Father     Cancer Sister      Social History     Social History    Marital status:      Spouse name: N/A    Number of children: N/A    Years of education: N/A     Occupational History    Not on file.     Social History Main Topics    Smoking status: Never Smoker    Smokeless tobacco: Never Used    Alcohol use Yes      Comment: social    Drug use: No    Sexual activity: Yes     Partners: Male     Other Topics Concern    Not on file     Social History Narrative    No narrative on file     Past Surgical History:   Procedure Laterality Date    ADENOIDECTOMY      BREAST SURGERY      COLON SURGERY      CYSTOSCOPY      EYE SURGERY      JOINT REPLACEMENT      left hip replacement      TONSILLECTOMY      TYMPANOSTOMY TUBE PLACEMENT      WISDOM TOOTH EXTRACTION         Labs:  Lab Results   Component Value Date    WBC 4.57 11/09/2017    HGB 12.7 11/09/2017    HCT 38.9 11/09/2017    MCV 99 (H) 11/09/2017     11/09/2017     BMP  Lab Results   Component Value Date     08/10/2017    K 4.1 08/10/2017     08/10/2017    CO2 26 08/10/2017    BUN 19  08/10/2017    CREATININE 0.7 08/10/2017    CALCIUM 9.7 08/10/2017    ANIONGAP 7 (L) 08/10/2017    ESTGFRAFRICA >60 08/10/2017    EGFRNONAA >60 08/10/2017     Lab Results   Component Value Date    ALT 11 03/27/2017    AST 18 03/27/2017    ALKPHOS 72 03/27/2017    BILITOT 0.5 03/27/2017       Lab Results   Component Value Date    IRON 132 09/22/2017    TIBC 234 (L) 09/22/2017    FERRITIN 104 09/22/2017     No results found for: [id number]  No results found for: FOLATE  No results found for: TSH      Review of Systems   Constitutional: Negative for activity change, appetite change, chills, diaphoresis, fatigue, fever and unexpected weight change.   HENT: Negative for congestion, dental problem, drooling, ear discharge, ear pain, facial swelling, hearing loss, mouth sores, nosebleeds, postnasal drip, rhinorrhea, sinus pressure, sneezing, sore throat, tinnitus, trouble swallowing and voice change.    Eyes: Negative for photophobia, pain, discharge, redness, itching and visual disturbance.   Respiratory: Negative for cough, choking, chest tightness, shortness of breath, wheezing and stridor.    Cardiovascular: Negative for chest pain, palpitations and leg swelling.   Gastrointestinal: Negative for abdominal distention, abdominal pain, anal bleeding, blood in stool, constipation, diarrhea, nausea, rectal pain and vomiting.   Endocrine: Negative for cold intolerance, heat intolerance, polydipsia, polyphagia and polyuria.   Genitourinary: Positive for frequency and hematuria. Negative for decreased urine volume, difficulty urinating, dyspareunia, dysuria, enuresis, flank pain, genital sores, menstrual problem, pelvic pain, urgency, vaginal bleeding, vaginal discharge and vaginal pain.   Musculoskeletal: Negative for arthralgias, back pain, gait problem, joint swelling, myalgias, neck pain and neck stiffness.   Skin: Negative for color change, pallor and rash.   Allergic/Immunologic: Negative for environmental allergies, food  allergies and immunocompromised state.   Neurological: Negative for dizziness, tremors, seizures, syncope, facial asymmetry, speech difficulty, weakness, light-headedness, numbness and headaches.   Hematological: Negative for adenopathy. Does not bruise/bleed easily.   Psychiatric/Behavioral: Negative for agitation, behavioral problems, confusion, decreased concentration, dysphoric mood, hallucinations, self-injury, sleep disturbance and suicidal ideas. The patient is not nervous/anxious and is not hyperactive.        Objective:      Physical Exam   Constitutional: She is oriented to person, place, and time. She appears well-developed and well-nourished. No distress.   HENT:   Head: Normocephalic and atraumatic.   Right Ear: External ear normal.   Left Ear: External ear normal.   Nose: Nose normal. Right sinus exhibits no maxillary sinus tenderness and no frontal sinus tenderness. Left sinus exhibits no maxillary sinus tenderness and no frontal sinus tenderness.   Mouth/Throat: Oropharynx is clear and moist. No oropharyngeal exudate.   Eyes: Conjunctivae, EOM and lids are normal. Pupils are equal, round, and reactive to light. Right eye exhibits no discharge. Left eye exhibits no discharge. Right conjunctiva is not injected. Right conjunctiva has no hemorrhage. Left conjunctiva is not injected. Left conjunctiva has no hemorrhage. No scleral icterus.   Neck: Normal range of motion. Neck supple. No JVD present. No tracheal deviation present. No thyromegaly present.   Cardiovascular: Normal rate and regular rhythm.    Pulmonary/Chest: Effort normal. No stridor. No respiratory distress. She exhibits no tenderness.   Abdominal: Soft. She exhibits no distension and no mass. There is no splenomegaly or hepatomegaly. There is no tenderness. There is no rebound.   Musculoskeletal: Normal range of motion. She exhibits no edema or tenderness.   Lymphadenopathy:     She has no cervical adenopathy.     She has no axillary  adenopathy.        Right: No supraclavicular adenopathy present.        Left: No supraclavicular adenopathy present.   Neurological: She is alert and oriented to person, place, and time. No cranial nerve deficit. Coordination normal.   Skin: Skin is dry. No rash noted. She is not diaphoretic. No erythema.   Psychiatric: She has a normal mood and affect. Her behavior is normal. Judgment and thought content normal.   Vitals reviewed.          Assessment:      1. Iron deficiency anemia due to chronic blood loss    2. Angiodysplasia of cecum    3. Malignant neoplasm of lateral wall of urinary bladder           Plan:   Slight fall in hemoglobin will check iron status but recent surgery may have caused this patient's frequency and hematuria that she reported after surgery has resolved patient will return in 4 months with CBC will check CBC iron status in 2 weeks and again monthly thereafter indicate results through patient portal Prevnar 13 vaccine given

## 2017-11-18 ENCOUNTER — PATIENT MESSAGE (OUTPATIENT)
Dept: HEMATOLOGY/ONCOLOGY | Facility: CLINIC | Age: 72
End: 2017-11-18

## 2017-11-21 ENCOUNTER — PATIENT MESSAGE (OUTPATIENT)
Dept: HEMATOLOGY/ONCOLOGY | Facility: CLINIC | Age: 72
End: 2017-11-21

## 2017-11-21 DIAGNOSIS — E55.9 HYPOVITAMINOSIS D: Chronic | ICD-10-CM

## 2017-11-21 DIAGNOSIS — M85.80 OSTEOPENIA: Chronic | ICD-10-CM

## 2017-11-21 RX ORDER — ERGOCALCIFEROL 1.25 MG/1
CAPSULE ORAL
Qty: 8 CAPSULE | Refills: 5 | Status: SHIPPED | OUTPATIENT
Start: 2017-11-21 | End: 2018-05-09 | Stop reason: SDUPTHER

## 2017-12-13 ENCOUNTER — PATIENT MESSAGE (OUTPATIENT)
Dept: HEMATOLOGY/ONCOLOGY | Facility: CLINIC | Age: 72
End: 2017-12-13

## 2017-12-13 ENCOUNTER — TELEPHONE (OUTPATIENT)
Dept: HEMATOLOGY/ONCOLOGY | Facility: CLINIC | Age: 72
End: 2017-12-13

## 2017-12-13 ENCOUNTER — LAB VISIT (OUTPATIENT)
Dept: LAB | Facility: HOSPITAL | Age: 72
End: 2017-12-13
Attending: INTERNAL MEDICINE
Payer: MEDICARE

## 2017-12-13 DIAGNOSIS — D50.0 IRON DEFICIENCY ANEMIA DUE TO CHRONIC BLOOD LOSS: Primary | ICD-10-CM

## 2017-12-13 DIAGNOSIS — C67.2 MALIGNANT NEOPLASM OF LATERAL WALL OF URINARY BLADDER: ICD-10-CM

## 2017-12-13 DIAGNOSIS — R30.0 DYSURIA: ICD-10-CM

## 2017-12-13 DIAGNOSIS — D50.0 IRON DEFICIENCY ANEMIA DUE TO CHRONIC BLOOD LOSS: ICD-10-CM

## 2017-12-13 DIAGNOSIS — K55.20 ANGIODYSPLASIA OF CECUM: ICD-10-CM

## 2017-12-13 DIAGNOSIS — R30.0 DYSURIA: Primary | ICD-10-CM

## 2017-12-13 LAB
BASOPHILS # BLD AUTO: 0.02 K/UL
BASOPHILS NFR BLD: 0.4 %
DIFFERENTIAL METHOD: ABNORMAL
EOSINOPHIL # BLD AUTO: 0.1 K/UL
EOSINOPHIL NFR BLD: 1.5 %
ERYTHROCYTE [DISTWIDTH] IN BLOOD BY AUTOMATED COUNT: 13 %
FERRITIN SERPL-MCNC: 74 NG/ML
HCT VFR BLD AUTO: 40.6 %
HGB BLD-MCNC: 13.5 G/DL
IRON SERPL-MCNC: 145 UG/DL
LYMPHOCYTES # BLD AUTO: 0.8 K/UL
LYMPHOCYTES NFR BLD: 17.1 %
MCH RBC QN AUTO: 32.8 PG
MCHC RBC AUTO-ENTMCNC: 33.3 G/DL
MCV RBC AUTO: 99 FL
MONOCYTES # BLD AUTO: 0.1 K/UL
MONOCYTES NFR BLD: 3 %
NEUTROPHILS # BLD AUTO: 3.6 K/UL
NEUTROPHILS NFR BLD: 78 %
PLATELET # BLD AUTO: 253 K/UL
PMV BLD AUTO: 9.6 FL
RBC # BLD AUTO: 4.11 M/UL
SATURATED IRON: 56 %
TOTAL IRON BINDING CAPACITY: 258 UG/DL
TRANSFERRIN SERPL-MCNC: 174 MG/DL
WBC # BLD AUTO: 4.67 K/UL

## 2017-12-13 PROCEDURE — 82728 ASSAY OF FERRITIN: CPT

## 2017-12-13 PROCEDURE — 83540 ASSAY OF IRON: CPT

## 2017-12-13 PROCEDURE — 36415 COLL VENOUS BLD VENIPUNCTURE: CPT | Mod: PO

## 2017-12-13 PROCEDURE — 85025 COMPLETE CBC W/AUTO DIFF WBC: CPT | Mod: PO

## 2017-12-13 PROCEDURE — 87086 URINE CULTURE/COLONY COUNT: CPT

## 2017-12-13 NOTE — TELEPHONE ENCOUNTER
Pt states she is having heart palpitations and that is how it feels when her iron is low. Requested labs/arley'd.

## 2017-12-13 NOTE — TELEPHONE ENCOUNTER
----- Message from Shantel Solomon sent at 12/13/2017  8:04 AM CST -----  Contact: [phone]   Pt would like to consult with the nurse about changes.  Please call back at [phone].  Thx-Ah

## 2017-12-14 LAB
BACTERIA UR CULT: NORMAL
BACTERIA UR CULT: NORMAL

## 2018-01-25 ENCOUNTER — LAB VISIT (OUTPATIENT)
Dept: LAB | Facility: HOSPITAL | Age: 73
End: 2018-01-25
Attending: INTERNAL MEDICINE
Payer: MEDICARE

## 2018-01-25 DIAGNOSIS — K55.20 ANGIODYSPLASIA OF CECUM: ICD-10-CM

## 2018-01-25 DIAGNOSIS — D50.0 IRON DEFICIENCY ANEMIA DUE TO CHRONIC BLOOD LOSS: ICD-10-CM

## 2018-01-25 DIAGNOSIS — C67.2 MALIGNANT NEOPLASM OF LATERAL WALL OF URINARY BLADDER: ICD-10-CM

## 2018-01-25 LAB
BASOPHILS # BLD AUTO: 0.03 K/UL
BASOPHILS NFR BLD: 0.6 %
DIFFERENTIAL METHOD: ABNORMAL
EOSINOPHIL # BLD AUTO: 0.1 K/UL
EOSINOPHIL NFR BLD: 1.9 %
ERYTHROCYTE [DISTWIDTH] IN BLOOD BY AUTOMATED COUNT: 12.6 %
FERRITIN SERPL-MCNC: 61 NG/ML
HCT VFR BLD AUTO: 42.2 %
HGB BLD-MCNC: 14 G/DL
IRON SERPL-MCNC: 200 UG/DL
LYMPHOCYTES # BLD AUTO: 1.4 K/UL
LYMPHOCYTES NFR BLD: 26.2 %
MCH RBC QN AUTO: 32.9 PG
MCHC RBC AUTO-ENTMCNC: 33.2 G/DL
MCV RBC AUTO: 99 FL
MONOCYTES # BLD AUTO: 0.3 K/UL
MONOCYTES NFR BLD: 6.5 %
NEUTROPHILS # BLD AUTO: 3.4 K/UL
NEUTROPHILS NFR BLD: 64.8 %
PLATELET # BLD AUTO: 243 K/UL
PMV BLD AUTO: 9.7 FL
RBC # BLD AUTO: 4.25 M/UL
SATURATED IRON: 74 %
TOTAL IRON BINDING CAPACITY: 271 UG/DL
TRANSFERRIN SERPL-MCNC: 183 MG/DL
WBC # BLD AUTO: 5.22 K/UL

## 2018-01-25 PROCEDURE — 36415 COLL VENOUS BLD VENIPUNCTURE: CPT | Mod: PO

## 2018-01-25 PROCEDURE — 85025 COMPLETE CBC W/AUTO DIFF WBC: CPT | Mod: PO

## 2018-01-25 PROCEDURE — 82728 ASSAY OF FERRITIN: CPT

## 2018-01-25 PROCEDURE — 83540 ASSAY OF IRON: CPT

## 2018-01-30 ENCOUNTER — PROCEDURE VISIT (OUTPATIENT)
Dept: UROLOGY | Facility: CLINIC | Age: 73
End: 2018-01-30
Payer: MEDICARE

## 2018-01-30 VITALS
HEIGHT: 63 IN | TEMPERATURE: 98 F | RESPIRATION RATE: 18 BRPM | WEIGHT: 133.81 LBS | BODY MASS INDEX: 23.71 KG/M2 | DIASTOLIC BLOOD PRESSURE: 73 MMHG | HEART RATE: 63 BPM | SYSTOLIC BLOOD PRESSURE: 143 MMHG

## 2018-01-30 DIAGNOSIS — C67.2 MALIGNANT NEOPLASM OF LATERAL WALL OF URINARY BLADDER: Primary | ICD-10-CM

## 2018-01-30 DIAGNOSIS — C67.9 MALIGNANT NEOPLASM OF URINARY BLADDER, UNSPECIFIED SITE: ICD-10-CM

## 2018-01-30 PROCEDURE — 52000 CYSTOURETHROSCOPY: CPT | Mod: PBBFAC | Performed by: UROLOGY

## 2018-01-30 RX ORDER — LEVOTHYROXINE SODIUM 75 UG/1
50 TABLET ORAL DAILY
COMMUNITY
End: 2019-10-22 | Stop reason: CLARIF

## 2018-01-30 NOTE — PROCEDURES
Cystoscopy  Date/Time: 1/30/2018 1:26 PM  Performed by: ROLF POPE  Authorized by: ROLF POPE     Consent Done?:  Yes (Written)  Indications: history bladder cancer    Position:  Dorsal lithotomy  Patient sedated?: No    Preparation: Patient was prepped and draped in usual sterile fashion      Scope type:  Flexible cystoscope  Stent inserted: No    Stent removed: No    External exam normal: Yes    Digital exam performed: No    Urethra normal: Yes  Bladder neck normal: Bladder neck normal   Bladder normal: Yes      Patient tolerance:  Patient tolerated the procedure well with no immediate complications     3 months with cysto.

## 2018-01-30 NOTE — PATIENT INSTRUCTIONS
What to Expect After aWhat to Expect After a Cystoscopy  For the next 24-48 hours, you may feel a mild burning when you urinate. This burning is normal and expected. Drink plenty of water to dilute the urine to help relieve the burning sensation. You may also see a small amount of blood in your urine after the procedure.    Unless you are already taking antibiotics, you may be given an antibiotic after the test to prevent infection.    Signs and Symptoms to Report  Call the [facility] at [phone] if you develop any of the following:  · Fever of 101 degrees or higher  · Chills or persistent bleeding  Inability to urinate Cystoscopy  For the next 24-48 hours, you may feel a mild burning when you urinate. This burning is normal and expected. Drink plenty of water to dilute the urine to help relieve the burning sensation. You may also see a small amount of blood in your urine after the procedure.    Unless you are already taking antibiotics, you may be given an antibiotic after the test to prevent infection.    Signs and Symptoms to Report  Call the [facility] at [phone] if you develop any of the following:  · Fever of 101 degrees or higher  · Chills or persistent bleeding  · Inability to urinate

## 2018-01-31 ENCOUNTER — PATIENT MESSAGE (OUTPATIENT)
Dept: HEMATOLOGY/ONCOLOGY | Facility: CLINIC | Age: 73
End: 2018-01-31

## 2018-02-12 ENCOUNTER — TELEPHONE (OUTPATIENT)
Dept: UROLOGY | Facility: CLINIC | Age: 73
End: 2018-02-12

## 2018-02-12 NOTE — TELEPHONE ENCOUNTER
I spoke with patient, who stated she has a littl pain on each side of her pelvic and went to the gyn, who did ultrasound of her ovaries and said everything was fine, patient stated she gets up 2 x night and feels a burning, I advised patient to take AZO Standard and see if that works and call me back on Thursday. Patient agreed.

## 2018-02-14 ENCOUNTER — TELEPHONE (OUTPATIENT)
Dept: UROLOGY | Facility: CLINIC | Age: 73
End: 2018-02-14

## 2018-02-14 ENCOUNTER — LAB VISIT (OUTPATIENT)
Dept: LAB | Facility: HOSPITAL | Age: 73
End: 2018-02-14
Attending: UROLOGY
Payer: MEDICARE

## 2018-02-14 DIAGNOSIS — R30.0 DYSURIA: Primary | ICD-10-CM

## 2018-02-14 DIAGNOSIS — R30.0 DYSURIA: ICD-10-CM

## 2018-02-14 PROCEDURE — 87086 URINE CULTURE/COLONY COUNT: CPT

## 2018-02-14 NOTE — TELEPHONE ENCOUNTER
I spoke with patient, who stated when she took 2 azo pyridium pills and  started to have instant relief stated the burning went away, wants to know how long can she take the azo standard taking it every eight hours, I advised patient to drop off urine to make sure she doesn't have an infection. Patient agreed. notified.

## 2018-02-15 LAB
BACTERIA UR CULT: NORMAL
BACTERIA UR CULT: NORMAL

## 2018-02-20 DIAGNOSIS — E03.9 ACQUIRED HYPOTHYROIDISM: Primary | ICD-10-CM

## 2018-02-21 ENCOUNTER — TELEPHONE (OUTPATIENT)
Dept: UROLOGY | Facility: CLINIC | Age: 73
End: 2018-02-21

## 2018-02-23 ENCOUNTER — LAB VISIT (OUTPATIENT)
Dept: LAB | Facility: HOSPITAL | Age: 73
End: 2018-02-23
Attending: INTERNAL MEDICINE
Payer: MEDICARE

## 2018-02-23 DIAGNOSIS — E03.9 ACQUIRED HYPOTHYROIDISM: ICD-10-CM

## 2018-02-23 DIAGNOSIS — K55.20 ANGIODYSPLASIA OF CECUM: ICD-10-CM

## 2018-02-23 DIAGNOSIS — C67.2 MALIGNANT NEOPLASM OF LATERAL WALL OF URINARY BLADDER: ICD-10-CM

## 2018-02-23 DIAGNOSIS — D50.0 IRON DEFICIENCY ANEMIA DUE TO CHRONIC BLOOD LOSS: ICD-10-CM

## 2018-02-23 LAB
BASOPHILS # BLD AUTO: 0.02 K/UL
BASOPHILS NFR BLD: 0.4 %
DIFFERENTIAL METHOD: ABNORMAL
EOSINOPHIL # BLD AUTO: 0.1 K/UL
EOSINOPHIL NFR BLD: 2.2 %
ERYTHROCYTE [DISTWIDTH] IN BLOOD BY AUTOMATED COUNT: 12.8 %
HCT VFR BLD AUTO: 41.3 %
HGB BLD-MCNC: 13.7 G/DL
LYMPHOCYTES # BLD AUTO: 1.3 K/UL
LYMPHOCYTES NFR BLD: 26.6 %
MCH RBC QN AUTO: 32.5 PG
MCHC RBC AUTO-ENTMCNC: 33.2 G/DL
MCV RBC AUTO: 98 FL
MONOCYTES # BLD AUTO: 0.3 K/UL
MONOCYTES NFR BLD: 6.4 %
NEUTROPHILS # BLD AUTO: 3.2 K/UL
NEUTROPHILS NFR BLD: 64.4 %
PLATELET # BLD AUTO: 239 K/UL
PMV BLD AUTO: 10.3 FL
RBC # BLD AUTO: 4.22 M/UL
WBC # BLD AUTO: 4.97 K/UL

## 2018-02-23 PROCEDURE — 36415 COLL VENOUS BLD VENIPUNCTURE: CPT | Mod: PO

## 2018-02-23 PROCEDURE — 85025 COMPLETE CBC W/AUTO DIFF WBC: CPT | Mod: PO

## 2018-02-26 ENCOUNTER — PATIENT MESSAGE (OUTPATIENT)
Dept: HEMATOLOGY/ONCOLOGY | Facility: CLINIC | Age: 73
End: 2018-02-26

## 2018-02-27 ENCOUNTER — TELEPHONE (OUTPATIENT)
Dept: HEMATOLOGY/ONCOLOGY | Facility: CLINIC | Age: 73
End: 2018-02-27

## 2018-02-27 ENCOUNTER — PATIENT MESSAGE (OUTPATIENT)
Dept: HEMATOLOGY/ONCOLOGY | Facility: CLINIC | Age: 73
End: 2018-02-27

## 2018-02-27 DIAGNOSIS — E03.9 ACQUIRED HYPOTHYROIDISM: Primary | ICD-10-CM

## 2018-02-27 NOTE — TELEPHONE ENCOUNTER
----- Message from Frankrina Paredes sent at 2/27/2018  9:13 AM CST -----  Regarding: Lab Client Services  Contact: [phone]  Hi my name is Frank I work in the lab Client Services. We had a problem with some lab work on this patient. If someone from your office could call us at [phone] or ext [phone] that would be great. Anyone in my department can help. Thank You.

## 2018-02-27 NOTE — TELEPHONE ENCOUNTER
Attempted to contact patient about rescheduling labs. Left message with call back number and also sent a message on [facility].

## 2018-02-28 ENCOUNTER — TELEPHONE (OUTPATIENT)
Dept: HEMATOLOGY/ONCOLOGY | Facility: CLINIC | Age: 73
End: 2018-02-28

## 2018-02-28 NOTE — TELEPHONE ENCOUNTER
----- Message from Fazal Causey sent at 2/28/2018  8:14 AM CST -----  Pt is requesting a call from nurse to discuss personal health concerns.          Please call pt back at [phone]

## 2018-03-21 ENCOUNTER — LAB VISIT (OUTPATIENT)
Dept: LAB | Facility: HOSPITAL | Age: 73
End: 2018-03-21
Attending: INTERNAL MEDICINE
Payer: MEDICARE

## 2018-03-21 DIAGNOSIS — K55.20 ANGIODYSPLASIA OF CECUM: ICD-10-CM

## 2018-03-21 DIAGNOSIS — C67.2 MALIGNANT NEOPLASM OF LATERAL WALL OF URINARY BLADDER: ICD-10-CM

## 2018-03-21 DIAGNOSIS — D50.0 IRON DEFICIENCY ANEMIA DUE TO CHRONIC BLOOD LOSS: ICD-10-CM

## 2018-03-21 LAB
BASOPHILS # BLD AUTO: 0.02 K/UL
BASOPHILS NFR BLD: 0.3 %
DIFFERENTIAL METHOD: ABNORMAL
EOSINOPHIL # BLD AUTO: 0.1 K/UL
EOSINOPHIL NFR BLD: 1.5 %
ERYTHROCYTE [DISTWIDTH] IN BLOOD BY AUTOMATED COUNT: 13 %
FERRITIN SERPL-MCNC: 69 NG/ML
HCT VFR BLD AUTO: 42 %
HGB BLD-MCNC: 14 G/DL
IRON SERPL-MCNC: 108 UG/DL
LYMPHOCYTES # BLD AUTO: 1.1 K/UL
LYMPHOCYTES NFR BLD: 18.7 %
MCH RBC QN AUTO: 32.6 PG
MCHC RBC AUTO-ENTMCNC: 33.3 G/DL
MCV RBC AUTO: 98 FL
MONOCYTES # BLD AUTO: 0.3 K/UL
MONOCYTES NFR BLD: 4.7 %
NEUTROPHILS # BLD AUTO: 4.4 K/UL
NEUTROPHILS NFR BLD: 74.8 %
PLATELET # BLD AUTO: 240 K/UL
PMV BLD AUTO: 9.8 FL
RBC # BLD AUTO: 4.3 M/UL
SATURATED IRON: 39 %
TOTAL IRON BINDING CAPACITY: 277 UG/DL
TRANSFERRIN SERPL-MCNC: 187 MG/DL
WBC # BLD AUTO: 5.93 K/UL

## 2018-03-21 PROCEDURE — 85025 COMPLETE CBC W/AUTO DIFF WBC: CPT | Mod: PO

## 2018-03-21 PROCEDURE — 83540 ASSAY OF IRON: CPT

## 2018-03-21 PROCEDURE — 82728 ASSAY OF FERRITIN: CPT

## 2018-03-21 PROCEDURE — 36415 COLL VENOUS BLD VENIPUNCTURE: CPT | Mod: PO

## 2018-04-11 ENCOUNTER — LAB VISIT (OUTPATIENT)
Dept: LAB | Facility: HOSPITAL | Age: 73
End: 2018-04-11
Attending: INTERNAL MEDICINE
Payer: MEDICARE

## 2018-04-11 DIAGNOSIS — D50.0 IRON DEFICIENCY ANEMIA DUE TO CHRONIC BLOOD LOSS: ICD-10-CM

## 2018-04-11 LAB
BASOPHILS # BLD AUTO: 0.02 K/UL
BASOPHILS NFR BLD: 0.4 %
DIFFERENTIAL METHOD: ABNORMAL
EOSINOPHIL # BLD AUTO: 0.1 K/UL
EOSINOPHIL NFR BLD: 1.2 %
ERYTHROCYTE [DISTWIDTH] IN BLOOD BY AUTOMATED COUNT: 13.3 %
FERRITIN SERPL-MCNC: 68 NG/ML
HCT VFR BLD AUTO: 41.7 %
HGB BLD-MCNC: 13.9 G/DL
IRON SERPL-MCNC: 179 UG/DL
LYMPHOCYTES # BLD AUTO: 1.2 K/UL
LYMPHOCYTES NFR BLD: 23.1 %
MCH RBC QN AUTO: 32.5 PG
MCHC RBC AUTO-ENTMCNC: 33.3 G/DL
MCV RBC AUTO: 97 FL
MONOCYTES # BLD AUTO: 0.3 K/UL
MONOCYTES NFR BLD: 6.5 %
NEUTROPHILS # BLD AUTO: 3.5 K/UL
NEUTROPHILS NFR BLD: 68.8 %
PLATELET # BLD AUTO: 248 K/UL
PMV BLD AUTO: 9.8 FL
RBC # BLD AUTO: 4.28 M/UL
SATURATED IRON: 66 %
TOTAL IRON BINDING CAPACITY: 272 UG/DL
TRANSFERRIN SERPL-MCNC: 184 MG/DL
WBC # BLD AUTO: 5.07 K/UL

## 2018-04-11 PROCEDURE — 36415 COLL VENOUS BLD VENIPUNCTURE: CPT | Mod: PO

## 2018-04-11 PROCEDURE — 82728 ASSAY OF FERRITIN: CPT

## 2018-04-11 PROCEDURE — 83540 ASSAY OF IRON: CPT

## 2018-04-11 PROCEDURE — 85025 COMPLETE CBC W/AUTO DIFF WBC: CPT | Mod: PO

## 2018-04-16 ENCOUNTER — PATIENT MESSAGE (OUTPATIENT)
Dept: RHEUMATOLOGY | Facility: CLINIC | Age: 73
End: 2018-04-16

## 2018-04-17 ENCOUNTER — TELEPHONE (OUTPATIENT)
Dept: RHEUMATOLOGY | Facility: CLINIC | Age: 73
End: 2018-04-17

## 2018-04-17 ENCOUNTER — OFFICE VISIT (OUTPATIENT)
Dept: HEMATOLOGY/ONCOLOGY | Facility: CLINIC | Age: 73
End: 2018-04-17
Payer: MEDICARE

## 2018-04-17 VITALS
HEIGHT: 62 IN | BODY MASS INDEX: 24.83 KG/M2 | DIASTOLIC BLOOD PRESSURE: 73 MMHG | HEART RATE: 69 BPM | WEIGHT: 134.94 LBS | OXYGEN SATURATION: 98 % | TEMPERATURE: 98 F | SYSTOLIC BLOOD PRESSURE: 123 MMHG

## 2018-04-17 DIAGNOSIS — M81.0 AGE-RELATED OSTEOPOROSIS WITHOUT CURRENT PATHOLOGICAL FRACTURE: Primary | ICD-10-CM

## 2018-04-17 DIAGNOSIS — D50.0 IRON DEFICIENCY ANEMIA DUE TO CHRONIC BLOOD LOSS: ICD-10-CM

## 2018-04-17 DIAGNOSIS — K55.20 ANGIODYSPLASIA OF CECUM: ICD-10-CM

## 2018-04-17 DIAGNOSIS — E03.9 ACQUIRED HYPOTHYROIDISM: Primary | ICD-10-CM

## 2018-04-17 PROCEDURE — 99999 PR PBB SHADOW E&M-EST. PATIENT-LVL III: CPT | Mod: PBBFAC,,, | Performed by: INTERNAL MEDICINE

## 2018-04-17 PROCEDURE — 99214 OFFICE O/P EST MOD 30 MIN: CPT | Mod: S$PBB,,, | Performed by: INTERNAL MEDICINE

## 2018-04-17 PROCEDURE — 99213 OFFICE O/P EST LOW 20 MIN: CPT | Mod: PBBFAC | Performed by: INTERNAL MEDICINE

## 2018-04-17 NOTE — PROGRESS NOTES
Subjective:       Patient ID: Selam Botello is a 72 y.o. female.    Chief Complaint: Results and Anemia    HPI 72-year-old female history of recurrent iron deficiency anemia secondary to angiodysplasias of the GI tract patient's been iron repleted she states a number of different vitamins she's not sure whether or not iron is present in the    Past Medical History:   Diagnosis Date    Acid reflux     Allergy     Arthritis     Bladder cancer     Breast cyst     Cataract     Colon polyp     Deep vein thrombosis     Degenerative disc disease     GI bleed     Hematuria, gross     Inflammatory bowel disease     Liver disease     cyst    Osteoporosis     Retina disorder, left     Skin cancer     Thyroid disease     Urinary tract infection      Family History   Problem Relation Age of Onset    COPD Mother     Cancer Mother     COPD Father     Cancer Father     Cancer Sister      Social History     Social History    Marital status:      Spouse name: N/A    Number of children: N/A    Years of education: N/A     Occupational History    Not on file.     Social History Main Topics    Smoking status: Never Smoker    Smokeless tobacco: Never Used    Alcohol use Yes      Comment: social    Drug use: No    Sexual activity: Yes     Partners: Male     Other Topics Concern    Not on file     Social History Narrative    No narrative on file     Past Surgical History:   Procedure Laterality Date    ADENOIDECTOMY      BREAST SURGERY      COLON SURGERY      CYSTOSCOPY      EYE SURGERY      JOINT REPLACEMENT      left hip replacement      TONSILLECTOMY      TYMPANOSTOMY TUBE PLACEMENT      WISDOM TOOTH EXTRACTION         Labs:  Lab Results   Component Value Date    WBC 5.07 04/11/2018    HGB 13.9 04/11/2018    HCT 41.7 04/11/2018    MCV 97 04/11/2018     04/11/2018     BMP  Lab Results   Component Value Date     08/10/2017    K 4.1 08/10/2017     08/10/2017    CO2 26  08/10/2017    BUN 19 08/10/2017    CREATININE 0.7 08/10/2017    CALCIUM 9.7 08/10/2017    ANIONGAP 7 (L) 08/10/2017    ESTGFRAFRICA >60 08/10/2017    EGFRNONAA >60 08/10/2017     Lab Results   Component Value Date    ALT 11 03/27/2017    AST 18 03/27/2017    ALKPHOS 72 03/27/2017    BILITOT 0.5 03/27/2017       Lab Results   Component Value Date    IRON 179 (H) 04/11/2018    TIBC 272 04/11/2018    FERRITIN 68 04/11/2018     No results found for: [id number]  No results found for: FOLATE  Lab Results   Component Value Date    TSH 0.897 02/27/2018         Review of Systems   Constitutional: Negative for activity change, appetite change, chills, diaphoresis, fatigue, fever and unexpected weight change.   HENT: Negative for congestion, dental problem, drooling, ear discharge, ear pain, facial swelling, hearing loss, mouth sores, nosebleeds, postnasal drip, rhinorrhea, sinus pressure, sneezing, sore throat, tinnitus, trouble swallowing and voice change.    Eyes: Negative for photophobia, pain, discharge, redness, itching and visual disturbance.   Respiratory: Negative for cough, choking, chest tightness, shortness of breath, wheezing and stridor.    Cardiovascular: Negative for chest pain, palpitations and leg swelling.   Gastrointestinal: Negative for abdominal distention, abdominal pain, anal bleeding, blood in stool, constipation, diarrhea, nausea, rectal pain and vomiting.   Endocrine: Negative for cold intolerance, heat intolerance, polydipsia, polyphagia and polyuria.   Genitourinary: Positive for dysuria and frequency. Negative for decreased urine volume, difficulty urinating, dyspareunia, enuresis, flank pain, genital sores, hematuria, menstrual problem, pelvic pain, urgency, vaginal bleeding, vaginal discharge and vaginal pain.   Musculoskeletal: Negative for arthralgias, back pain, gait problem, joint swelling, myalgias, neck pain and neck stiffness.   Skin: Negative for color change, pallor and rash.    Allergic/Immunologic: Negative for environmental allergies, food allergies and immunocompromised state.   Neurological: Negative for dizziness, tremors, seizures, syncope, facial asymmetry, speech difficulty, weakness, light-headedness, numbness and headaches.   Hematological: Negative for adenopathy. Does not bruise/bleed easily.   Psychiatric/Behavioral: Negative for agitation, behavioral problems, confusion, decreased concentration, dysphoric mood, hallucinations, self-injury, sleep disturbance and suicidal ideas. The patient is nervous/anxious. The patient is not hyperactive.        Objective:      Physical Exam   Constitutional: She is oriented to person, place, and time. She appears well-developed and well-nourished. She appears distressed.   HENT:   Head: Normocephalic and atraumatic.   Right Ear: External ear normal.   Left Ear: External ear normal.   Nose: Nose normal. Right sinus exhibits no maxillary sinus tenderness and no frontal sinus tenderness. Left sinus exhibits no maxillary sinus tenderness and no frontal sinus tenderness.   Mouth/Throat: Oropharynx is clear and moist. No oropharyngeal exudate.   Eyes: Conjunctivae, EOM and lids are normal. Pupils are equal, round, and reactive to light. Right eye exhibits no discharge. Left eye exhibits no discharge. Right conjunctiva is not injected. Right conjunctiva has no hemorrhage. Left conjunctiva is not injected. Left conjunctiva has no hemorrhage. No scleral icterus.   Neck: Normal range of motion. Neck supple. No JVD present. No tracheal deviation present. No thyromegaly present.   Cardiovascular: Normal rate and regular rhythm.    Pulmonary/Chest: Effort normal. No stridor. No respiratory distress. She exhibits no tenderness.   Abdominal: Soft. She exhibits no distension and no mass. There is no splenomegaly or hepatomegaly. There is no tenderness. There is no rebound.   Musculoskeletal: Normal range of motion. She exhibits no edema or tenderness.    Lymphadenopathy:     She has no cervical adenopathy.     She has no axillary adenopathy.        Right: No supraclavicular adenopathy present.        Left: No supraclavicular adenopathy present.   Neurological: She is alert and oriented to person, place, and time. No cranial nerve deficit. Coordination normal.   Skin: Skin is dry. No rash noted. She is not diaphoretic. No erythema.   Psychiatric: Her behavior is normal. Judgment and thought content normal. Her mood appears anxious.   Vitals reviewed.          Assessment:      1. Acquired hypothyroidism    2. Iron deficiency anemia due to chronic blood loss    3. Angiodysplasia of cecum           Plan:   At her request repeat TSH and T4 today.  Continue with treatment recommendation with CBC every 3 months told to discontinue excess vitamins because of possibility use of extra iron.  Reassurance given scheduled to see urology next week for evaluation of urgency

## 2018-04-26 ENCOUNTER — PROCEDURE VISIT (OUTPATIENT)
Dept: UROLOGY | Facility: CLINIC | Age: 73
End: 2018-04-26
Payer: MEDICARE

## 2018-04-26 VITALS
BODY MASS INDEX: 24.59 KG/M2 | HEART RATE: 66 BPM | SYSTOLIC BLOOD PRESSURE: 157 MMHG | RESPIRATION RATE: 18 BRPM | TEMPERATURE: 98 F | WEIGHT: 133.63 LBS | DIASTOLIC BLOOD PRESSURE: 75 MMHG | HEIGHT: 62 IN

## 2018-04-26 DIAGNOSIS — C67.2 MALIGNANT NEOPLASM OF LATERAL WALL OF URINARY BLADDER: ICD-10-CM

## 2018-04-26 PROCEDURE — 52000 CYSTOURETHROSCOPY: CPT | Mod: PBBFAC | Performed by: UROLOGY

## 2018-04-26 RX ORDER — LIDOCAINE HYDROCHLORIDE 20 MG/ML
JELLY TOPICAL ONCE
Status: COMPLETED | OUTPATIENT
Start: 2018-04-26 | End: 2018-04-26

## 2018-04-26 RX ORDER — CEPHALEXIN 500 MG/1
500 CAPSULE ORAL ONCE
Qty: 1 CAPSULE | Refills: 0 | Status: SHIPPED | OUTPATIENT
Start: 2018-04-26 | End: 2018-04-26

## 2018-04-26 RX ORDER — CIPROFLOXACIN 250 MG/1
500 TABLET, FILM COATED ORAL ONCE
Status: DISCONTINUED | OUTPATIENT
Start: 2018-04-26 | End: 2018-04-26

## 2018-04-26 RX ADMIN — LIDOCAINE HYDROCHLORIDE: 20 JELLY TOPICAL at 02:04

## 2018-04-26 NOTE — PATIENT INSTRUCTIONS
What to Expect After a Cystoscopy  For the next 24-48 hours, you may feel a mild burning when you urinate. This burning is normal and expected. Drink plenty of water to dilute the urine to help relieve the burning sensation. You may also see a small amount of blood in your urine after the procedure.    Unless you are already taking antibiotics, you may be given an antibiotic after the test to prevent infection.    Signs and Symptoms to Report  Call the [facility] at [phone] if you develop any of the following:  · Fever of 101 degrees or higher  · Chills or persistent bleeding  · Inability to urinate

## 2018-04-26 NOTE — PROCEDURES
Cystoscopy  Date/Time: 4/26/2018 2:08 PM  Performed by: ROLF POPE  Authorized by: ROLF POPE     Consent Done?:  Yes (Written)  Indications: history bladder cancer    Position:  Dorsal lithotomy  Anesthesia:  Intraurethral instillation  Patient sedated?: No    Preparation: Patient was prepped and draped in usual sterile fashion      Scope type:  Flexible cystoscope  Stent inserted: No    Stent removed: No    External exam normal: Yes    Digital exam performed: No    Urethra normal: Yes  Bladder neck normal: Bladder neck normal   Bladder normal: Yes      Patient tolerance:  Patient tolerated the procedure well with no immediate complications     Atrophic vaginitis  3 months with PSA.

## 2018-05-03 ENCOUNTER — TELEPHONE (OUTPATIENT)
Dept: HEMATOLOGY/ONCOLOGY | Facility: CLINIC | Age: 73
End: 2018-05-03

## 2018-05-03 ENCOUNTER — PATIENT MESSAGE (OUTPATIENT)
Dept: HEMATOLOGY/ONCOLOGY | Facility: CLINIC | Age: 73
End: 2018-05-03

## 2018-05-03 NOTE — TELEPHONE ENCOUNTER
----- Message from Shantel Solomon sent at 5/3/2018  8:25 AM CDT -----  Contact: Self- [phone]   Pt would like to consult with the nurse about a recomendation about another doctor.  Please call back at [phone].  [facility]

## 2018-05-04 ENCOUNTER — PATIENT MESSAGE (OUTPATIENT)
Dept: HEMATOLOGY/ONCOLOGY | Facility: CLINIC | Age: 73
End: 2018-05-04

## 2018-05-04 ENCOUNTER — TELEPHONE (OUTPATIENT)
Dept: ENDOCRINOLOGY | Facility: CLINIC | Age: 73
End: 2018-05-04

## 2018-05-04 NOTE — TELEPHONE ENCOUNTER
Phoned patient back and explained to patient that Dr. Greene is not in on Friday's but I will send the nurse a message to get her an appointment sooner. The patient thanked me for calling her back and the call ended well.

## 2018-05-04 NOTE — TELEPHONE ENCOUNTER
----- Message from Wesley Baker sent at 5/4/2018  4:21 PM CDT -----  Contact: [phone]  Would like to be worked in for an appointment earlier than 11/07.  Please call back at [phone]. md Moise

## 2018-05-07 DIAGNOSIS — E55.9 HYPOVITAMINOSIS D: Primary | Chronic | ICD-10-CM

## 2018-05-07 DIAGNOSIS — M81.0 AGE-RELATED OSTEOPOROSIS WITHOUT CURRENT PATHOLOGICAL FRACTURE: ICD-10-CM

## 2018-05-07 NOTE — PROGRESS NOTES
Subjective:       Patient ID: Selam Botello is a 72 y.o. female.    Chief Complaint: Osteoarthritis and Osteoporosis    Mrs. Botello is here for follow up for osteoporosis and osteoarthritis. We saw her last about a year ago. Since her last visit she was diagnosed with low-grade bladder cancer underwent resection by Dr. Issac Marroquin [facility] urologic oncology. Is now s/p 2 negative cystoscopes doing well.     Previously, she had been on fosamax and boniva for osteoporosis but had no improvement in her bmd.  She was moved to prolia and improved to osteopenia and given a prolia holiday. She had a repeat dexa 4.2017 showing osteoporosis at the radius. We resumed prolia in may but she missed her fall dose due to the bladder ca diagnosis.  Ready to get back on schedule.   No falls or fractures.  She has a history of vitamin D deficiency and is taking 50,000units twice weekly, last level 42    For her osteoarthritis, her pain is mainly in her hands, she has bony enlargement to her pip and dip joints and her rita thumb mcps.  She has had a left TKA and right BROCK.  Her left knee has limited range of motion due to complications after her surgery. Unable to use mobic due to prev GI bleed.  She takes tylenol prn, lidoderm topical       Review of Systems   Constitutional: Negative for chills, fatigue and fever.   HENT: Negative for mouth sores, rhinorrhea and sore throat.    Eyes: Negative for pain and redness.   Respiratory: Negative for cough and shortness of breath.    Cardiovascular: Positive for palpitations and leg swelling (improved with vasculera). Negative for chest pain.   Gastrointestinal: Negative for abdominal pain, constipation, diarrhea, nausea and vomiting.         GI bleed   Endocrine:        Hypothyroid   Genitourinary: Negative for dysuria and hematuria.   Musculoskeletal: Positive for arthralgias. Negative for joint swelling and myalgias.        Right TKA, left BROCK   Skin: Negative  "for rash.   Neurological: Negative for weakness, numbness and headaches.   Psychiatric/Behavioral: The patient is not nervous/anxious.          Objective:   BP 98/63   Pulse 60   Ht 5' 2" (1.575 m)   Wt 60.9 kg (134 lb 4.2 oz)   BMI 24.56 kg/m²      Physical Exam   Constitutional: She is oriented to person, place, and time and well-developed, well-nourished, and in no distress.   HENT:   Head: Normocephalic and atraumatic.   Eyes: Pupils are equal, round, and reactive to light. Right eye exhibits no discharge.   Neck: Normal range of motion.   Cardiovascular: Normal rate, regular rhythm and normal heart sounds.  Exam reveals no friction rub.    Pulmonary/Chest: Effort normal and breath sounds normal. No respiratory distress.   Abdominal: Soft. She exhibits no distension. There is no tenderness.   Lymphadenopathy:     She has no cervical adenopathy.   Neurological: She is alert and oriented to person, place, and time.   Skin: No rash noted. No erythema.     Psychiatric: Mood normal.   Musculoskeletal: Normal range of motion. She exhibits no edema or deformity.   rita hands with oa changes to pip and dips, rita 1 mcps enlarged    Right TKA with decreased rom, flexion about 50-60 degress    Left steve    No swollen or tender joints           Recent Results (from the past 168 hour(s))   Comprehensive metabolic panel    Collection Time: 05/09/18 12:50 PM   Result Value Ref Range    Sodium 140 136 - 145 mmol/L    Potassium 4.2 3.5 - 5.1 mmol/L    Chloride 105 95 - 110 mmol/L    CO2 26 23 - 29 mmol/L    Glucose 111 (H) 70 - 110 mg/dL    BUN, Bld 15 8 - 23 mg/dL    Creatinine 0.7 0.5 - 1.4 mg/dL    Calcium 10.0 8.7 - 10.5 mg/dL    Total Protein 6.6 6.0 - 8.4 g/dL    Albumin 4.2 3.5 - 5.2 g/dL    Total Bilirubin 0.9 0.1 - 1.0 mg/dL    Alkaline Phosphatase 86 55 - 135 U/L    AST 20 10 - 40 U/L    ALT 20 10 - 44 U/L    Anion Gap 9 8 - 16 mmol/L    eGFR if African American >60 >60 mL/min/1.73 m^2    eGFR if non African American " >60 >60 mL/min/1.73 m^2     Dexa 4/18/17 spine -1.4, radius UD -2.7, radius 33% -2.1, bmd increasing at Radius 33%, decreasing at spine  Assessment:       1. Age-related osteoporosis without current pathological fracture    2. Primary osteoarthritis involving multiple joints    3. Medication monitoring encounter    4. Hypovitaminosis D        Impression:    OA bilateral hands: no nsaids due to prev GI bleeds, rita thumbs cmc joints mainly    Osteoporosis: previously on fosamax and boniva without improvement, prolia with improvement, given holiday but resumed 5/2017, missed last dose due to dx of ca    H/o bladder cancer: s/p resection see hpi, 2 negative cystoscopes, stable     Med monitoring: ok for prolia, no issues    H/o vit D deficiency: taking vit D 50.000Units twice weekly, level normal now  Plan:         Labs reviewed and done within past 14 days  Ca 10, Creat 0.7, eGFR 60  No contraindication for Prolia today, ok for nurse to administer today  Re-evaluate patient again in 6 months to determine if Prolia still medically indicated and appropriate to administer.    KDIGO lab monitoring will be followed according to KDIGO 2017 Clinical Practice Guideline Update for the Diagnosis, Evaluation, Prevention, and Treatment of Chronic Kidney Disease-Mineral and Bone Disorder (CKD-MBD)  Chapter 3.1: Diagnosis of CKD-MBD: biochemical abnormalities    Lower vit D 50K to once weekly   rec tumeric for OA hands, avoids nsaids due to prev GI bleed    rtc in 6 months for cmp, prolia

## 2018-05-08 ENCOUNTER — PATIENT MESSAGE (OUTPATIENT)
Dept: RHEUMATOLOGY | Facility: CLINIC | Age: 73
End: 2018-05-08

## 2018-05-08 ENCOUNTER — TELEPHONE (OUTPATIENT)
Dept: ENDOCRINOLOGY | Facility: CLINIC | Age: 73
End: 2018-05-08

## 2018-05-08 DIAGNOSIS — R79.89 ELEVATED TSH: Primary | ICD-10-CM

## 2018-05-08 NOTE — TELEPHONE ENCOUNTER
----- Message from Salena Ferrara sent at 5/8/2018  9:43 AM CDT -----  pls work pt in before appt, was told if nothing available to contact [facility] (sup)..[phone] or [phone]

## 2018-05-09 ENCOUNTER — PATIENT MESSAGE (OUTPATIENT)
Dept: UROLOGY | Facility: CLINIC | Age: 73
End: 2018-05-09

## 2018-05-09 ENCOUNTER — OFFICE VISIT (OUTPATIENT)
Dept: RHEUMATOLOGY | Facility: CLINIC | Age: 73
End: 2018-05-09
Payer: MEDICARE

## 2018-05-09 ENCOUNTER — PATIENT MESSAGE (OUTPATIENT)
Dept: RHEUMATOLOGY | Facility: CLINIC | Age: 73
End: 2018-05-09

## 2018-05-09 ENCOUNTER — LAB VISIT (OUTPATIENT)
Dept: LAB | Facility: HOSPITAL | Age: 73
End: 2018-05-09
Attending: INTERNAL MEDICINE
Payer: MEDICARE

## 2018-05-09 VITALS
SYSTOLIC BLOOD PRESSURE: 98 MMHG | WEIGHT: 134.25 LBS | DIASTOLIC BLOOD PRESSURE: 63 MMHG | HEART RATE: 60 BPM | BODY MASS INDEX: 24.7 KG/M2 | HEIGHT: 62 IN

## 2018-05-09 DIAGNOSIS — R79.89 ELEVATED TSH: ICD-10-CM

## 2018-05-09 DIAGNOSIS — Z51.81 MEDICATION MONITORING ENCOUNTER: ICD-10-CM

## 2018-05-09 DIAGNOSIS — M81.0 AGE-RELATED OSTEOPOROSIS WITHOUT CURRENT PATHOLOGICAL FRACTURE: Primary | ICD-10-CM

## 2018-05-09 DIAGNOSIS — M15.9 PRIMARY OSTEOARTHRITIS INVOLVING MULTIPLE JOINTS: ICD-10-CM

## 2018-05-09 DIAGNOSIS — E55.9 HYPOVITAMINOSIS D: ICD-10-CM

## 2018-05-09 DIAGNOSIS — M81.0 AGE-RELATED OSTEOPOROSIS WITHOUT CURRENT PATHOLOGICAL FRACTURE: ICD-10-CM

## 2018-05-09 LAB
ALBUMIN SERPL BCP-MCNC: 4.2 G/DL
ALP SERPL-CCNC: 86 U/L
ALT SERPL W/O P-5'-P-CCNC: 20 U/L
ANION GAP SERPL CALC-SCNC: 9 MMOL/L
AST SERPL-CCNC: 20 U/L
BILIRUB SERPL-MCNC: 0.9 MG/DL
BUN SERPL-MCNC: 15 MG/DL
CALCIUM SERPL-MCNC: 10 MG/DL
CHLORIDE SERPL-SCNC: 105 MMOL/L
CO2 SERPL-SCNC: 26 MMOL/L
CREAT SERPL-MCNC: 0.7 MG/DL
EST. GFR  (AFRICAN AMERICAN): >60 ML/MIN/1.73 M^2
EST. GFR  (NON AFRICAN AMERICAN): >60 ML/MIN/1.73 M^2
GLUCOSE SERPL-MCNC: 111 MG/DL
POTASSIUM SERPL-SCNC: 4.2 MMOL/L
PROT SERPL-MCNC: 6.6 G/DL
SODIUM SERPL-SCNC: 140 MMOL/L
TSH SERPL DL<=0.005 MIU/L-ACNC: 3.23 UIU/ML

## 2018-05-09 PROCEDURE — 99214 OFFICE O/P EST MOD 30 MIN: CPT | Mod: S$PBB,,, | Performed by: PHYSICIAN ASSISTANT

## 2018-05-09 PROCEDURE — 99213 OFFICE O/P EST LOW 20 MIN: CPT | Mod: PBBFAC,PO,25 | Performed by: PHYSICIAN ASSISTANT

## 2018-05-09 PROCEDURE — 99999 PR PBB SHADOW E&M-EST. PATIENT-LVL III: CPT | Mod: PBBFAC,,, | Performed by: PHYSICIAN ASSISTANT

## 2018-05-09 PROCEDURE — 96372 THER/PROPH/DIAG INJ SC/IM: CPT | Mod: PBBFAC,PO

## 2018-05-09 PROCEDURE — 36415 COLL VENOUS BLD VENIPUNCTURE: CPT | Mod: PO

## 2018-05-09 PROCEDURE — 80053 COMPREHEN METABOLIC PANEL: CPT | Mod: PO

## 2018-05-09 PROCEDURE — 84443 ASSAY THYROID STIM HORMONE: CPT

## 2018-05-09 RX ORDER — ERGOCALCIFEROL 1.25 MG/1
50000 CAPSULE ORAL
Qty: 12 CAPSULE | Refills: 3
Start: 2018-05-09 | End: 2018-05-11 | Stop reason: SDUPTHER

## 2018-05-09 RX ORDER — PROPAFENONE HYDROCHLORIDE 150 MG/1
150 TABLET, COATED ORAL NIGHTLY
COMMUNITY
End: 2019-10-22 | Stop reason: CLARIF

## 2018-05-09 RX ADMIN — DENOSUMAB 60 MG: 60 INJECTION SUBCUTANEOUS at 02:05

## 2018-05-09 NOTE — PROGRESS NOTES
Administered 1 cc Prolia 60mg/cc  to LLQ of abdomen. Pt tolerated well. No acute reaction noted to site. Pt instructed on S/S to report. Advised patient to wait in lobby 15 minutes after receiving injection to monitor for any reactions. Pt verbalized understanding.       Calcium: 10.0  Creatinine: 0.7  Lot: [id number]  Exp: 08/20

## 2018-05-09 NOTE — PATIENT INSTRUCTIONS
Lower vit D to once weekly 50K     Tumeric 1000mg/day (curcumin) in vitamin section for joints, ok to take    prolia shot today and will continue every 6 months

## 2018-05-11 ENCOUNTER — PATIENT MESSAGE (OUTPATIENT)
Dept: RHEUMATOLOGY | Facility: CLINIC | Age: 73
End: 2018-05-11

## 2018-05-11 DIAGNOSIS — E55.9 HYPOVITAMINOSIS D: ICD-10-CM

## 2018-05-11 RX ORDER — ERGOCALCIFEROL 1.25 MG/1
50000 CAPSULE ORAL
Qty: 12 CAPSULE | Refills: 3
Start: 2018-05-11 | End: 2018-05-14 | Stop reason: SDUPTHER

## 2018-05-14 DIAGNOSIS — E55.9 HYPOVITAMINOSIS D: ICD-10-CM

## 2018-05-14 RX ORDER — ERGOCALCIFEROL 1.25 MG/1
50000 CAPSULE ORAL
Qty: 12 CAPSULE | Refills: 3 | Status: SHIPPED | OUTPATIENT
Start: 2018-05-14 | End: 2019-04-02 | Stop reason: SDUPTHER

## 2018-06-08 ENCOUNTER — PATIENT MESSAGE (OUTPATIENT)
Dept: UROLOGY | Facility: CLINIC | Age: 73
End: 2018-06-08

## 2018-07-20 ENCOUNTER — LAB VISIT (OUTPATIENT)
Dept: LAB | Facility: HOSPITAL | Age: 73
End: 2018-07-20
Attending: INTERNAL MEDICINE
Payer: MEDICARE

## 2018-07-20 DIAGNOSIS — E03.9 ACQUIRED HYPOTHYROIDISM: ICD-10-CM

## 2018-07-20 DIAGNOSIS — K55.20 ANGIODYSPLASIA OF CECUM: ICD-10-CM

## 2018-07-20 DIAGNOSIS — D50.0 IRON DEFICIENCY ANEMIA DUE TO CHRONIC BLOOD LOSS: ICD-10-CM

## 2018-07-20 LAB
BASOPHILS # BLD AUTO: 0.02 K/UL
BASOPHILS NFR BLD: 0.4 %
DIFFERENTIAL METHOD: ABNORMAL
EOSINOPHIL # BLD AUTO: 0.1 K/UL
EOSINOPHIL NFR BLD: 2 %
ERYTHROCYTE [DISTWIDTH] IN BLOOD BY AUTOMATED COUNT: 13.5 %
HCT VFR BLD AUTO: 41.8 %
HGB BLD-MCNC: 13.9 G/DL
LYMPHOCYTES # BLD AUTO: 1.1 K/UL
LYMPHOCYTES NFR BLD: 24.8 %
MCH RBC QN AUTO: 32.8 PG
MCHC RBC AUTO-ENTMCNC: 33.3 G/DL
MCV RBC AUTO: 99 FL
MONOCYTES # BLD AUTO: 0.3 K/UL
MONOCYTES NFR BLD: 6.3 %
NEUTROPHILS # BLD AUTO: 3 K/UL
NEUTROPHILS NFR BLD: 66.5 %
PLATELET # BLD AUTO: 221 K/UL
PMV BLD AUTO: 9.4 FL
RBC # BLD AUTO: 4.24 M/UL
WBC # BLD AUTO: 4.47 K/UL

## 2018-07-20 PROCEDURE — 36415 COLL VENOUS BLD VENIPUNCTURE: CPT | Mod: PO

## 2018-07-20 PROCEDURE — 85025 COMPLETE CBC W/AUTO DIFF WBC: CPT | Mod: PO

## 2018-07-21 ENCOUNTER — PATIENT MESSAGE (OUTPATIENT)
Dept: HEMATOLOGY/ONCOLOGY | Facility: CLINIC | Age: 73
End: 2018-07-21

## 2018-07-23 DIAGNOSIS — D50.0 IRON DEFICIENCY ANEMIA DUE TO CHRONIC BLOOD LOSS: Primary | ICD-10-CM

## 2018-08-07 ENCOUNTER — PROCEDURE VISIT (OUTPATIENT)
Dept: UROLOGY | Facility: CLINIC | Age: 73
End: 2018-08-07
Payer: MEDICARE

## 2018-08-07 ENCOUNTER — TELEPHONE (OUTPATIENT)
Dept: UROLOGY | Facility: CLINIC | Age: 73
End: 2018-08-07

## 2018-08-07 VITALS
BODY MASS INDEX: 25.44 KG/M2 | DIASTOLIC BLOOD PRESSURE: 85 MMHG | RESPIRATION RATE: 18 BRPM | SYSTOLIC BLOOD PRESSURE: 141 MMHG | HEIGHT: 62 IN | HEART RATE: 80 BPM | WEIGHT: 138.25 LBS | TEMPERATURE: 98 F

## 2018-08-07 DIAGNOSIS — C67.2 MALIGNANT NEOPLASM OF LATERAL WALL OF URINARY BLADDER: Primary | ICD-10-CM

## 2018-08-07 DIAGNOSIS — C67.2 MALIGNANT NEOPLASM OF LATERAL WALL OF URINARY BLADDER: ICD-10-CM

## 2018-08-07 PROCEDURE — 52000 CYSTOURETHROSCOPY: CPT | Mod: PBBFAC | Performed by: UROLOGY

## 2018-08-07 RX ORDER — METOPROLOL TARTRATE 25 MG/1
25 TABLET, FILM COATED ORAL 2 TIMES DAILY
COMMUNITY
End: 2018-11-27

## 2018-08-07 RX ORDER — LIDOCAINE HYDROCHLORIDE 20 MG/ML
JELLY TOPICAL ONCE
Status: COMPLETED | OUTPATIENT
Start: 2018-08-07 | End: 2018-08-07

## 2018-08-07 RX ADMIN — LIDOCAINE HYDROCHLORIDE: 20 JELLY TOPICAL at 02:08

## 2018-08-07 NOTE — LETTER
August 7, 2018        Jus Marroquin MD  [address]  [place] [address]             [facility]  [address]  [place] [address]  Phone: [phone]   Patient: Selam Botello   MR Number: [id number]   YOB: 1945   Date of Visit: 8/7/2018       Dear Dr. Marroquin:    Thank you for referring Selam Botello to me for evaluation. Attached you will find relevant portions of my assessment and plan of care.    If you have questions, please do not hesitate to call me. I look forward to following Selam Botello along with you.    Sincerely,      Jus Marroquin MD            CC  No Recipients    Enclosure

## 2018-08-07 NOTE — PROCEDURES
Cystoscopy  Date/Time: 8/7/2018 2:14 PM  Performed by: ROLF POPE  Authorized by: ROLF POPE     Consent Done?:  Yes (Written)  Indications: history bladder cancer    Position:  Supine  Anesthesia:  Intraurethral instillation  Preparation: Patient was prepped and draped in usual sterile fashion      Scope type:  Flexible cystoscope  Stent inserted: No    Stent removed: No    External exam normal: Yes (atrophic vaginitis)    Digital exam performed: No    Urethra normal: Yes  Bladder neck normal: Bladder neck normal   Bladder normal: No         Number of tumors:  2       Tumor 1:          Anatomy:  Pedunculated         Location:  Dome       Tumor 2:          Anatomy:  Pedunculated         Location:  L Lateral Wall    Patient tolerance:  Patient tolerated the procedure well with no immediate complications     TURBT

## 2018-08-07 NOTE — PATIENT INSTRUCTIONS
What to Expect After a Cystoscopy  For the next 24-48 hours, you may feel a mild burning when you urinate. This burning is normal and expected. Drink plenty of water to dilute the urine to help relieve the burning sensation. You may also see a small amount of blood in your urine after the procedure.    Unless you are already taking antibiotics, you may be given an antibiotic after the test to prevent infection.    Signs and Symptoms to Report  Call the [facility] at [phone] if you develop any of the following:  · Fever of 101 degrees or higher  · Chills or persistent bleeding  · Inability to urinate      Cystoscopy    Cystoscopy is a procedure that lets your doctor look directly inside your urethra and bladder. It can be used to:  · Help diagnose a problem with your urethra, bladder, or kidneys.  · Take a sample (biopsy) of bladder or urethral tissue.  · Treat certain problems (such as removing kidney stones).  · Place a stent to bypass an obstruction.  · Take special X-rays of the kidneys.  Based on the findings, your doctor may recommend other tests or treatments.  What is a cystoscope?  A cystoscope is a telescope-like instrument that contains lenses and fiberoptics (small glass wires that make bright light). The cystoscope may be straight and rigid, or flexible to bend around curves in the urethra. The doctor may look directly into the cystoscope, or project the image onto a monitor.  Getting ready  · Ask your doctor if you should stop taking any medicines before the procedure.  · Ask whether you should avoid eating or drinking anything after midnight before the procedure.  · Follow any other instructions your doctor gives you.  Tell your doctor before the exam if you:  · Take any medicines, such as aspirin or blood thinners  · Have allergies to any medicines  · Are pregnant   The procedure  Cystoscopy is done in the doctors office, surgery center, or hospital. The doctor and a nurse are  present during the procedure. It takes only a few minutes, longer if a biopsy, X-ray, or treatment needs to be done.  During the procedure:  · You lie on an exam table on your back, knees bent and legs apart. You are covered with a drape.  · Your urethra and the area around it are washed. Anesthetic jelly may be applied to numb the urethra. Other pain medicine is usually not needed. In some cases, you may be offered a mild sedative to help you relax. If a more extensive procedure is to be done, such as a biopsy or kidney stone removal, general anesthesia may be needed.  · The cystoscope is inserted. A sterile fluid is put into the bladder to expand it. You may feel pressure from this fluid.  · When the procedure is done, the cystoscope is removed.  After the procedure  If you had a sedative, general anesthesia, or spinal anesthesia, you must have someone drive you home. Once youre home:  · Drink plenty of fluids.  · You may have burning or light bleeding when you urinate--this is normal.  · Medicines may be prescribed to ease any discomfort or prevent infection. Take these as directed.  · Call your doctor if you have heavy bleeding or blood clots, burning that lasts more than a day, a fever over 100°F  (38° C), or trouble urinating.  Date Last Reviewed: 1/1/2017 © 2000-2017 [employer]. [address] [place] [address], [place] [address]. All rights reserved. This information is not intended as a substitute for professional medical care. Always follow your healthcare professional's instructions.      ·

## 2018-08-09 ENCOUNTER — TELEPHONE (OUTPATIENT)
Dept: UROLOGY | Facility: CLINIC | Age: 73
End: 2018-08-09

## 2018-08-09 ENCOUNTER — TELEPHONE (OUTPATIENT)
Dept: PREADMISSION TESTING | Facility: HOSPITAL | Age: 73
End: 2018-08-09

## 2018-08-09 NOTE — TELEPHONE ENCOUNTER
I spoke with patient, and advised her that  said there should be a problem go though her surgery

## 2018-08-09 NOTE — TELEPHONE ENCOUNTER
"Patient contacted the Preop center with concerns regarding potential surgery cancellation.    Pt reports have a complicated "staph infection in the nose" following epistaxis requiring cautery.  The patient is being managed by ENT [facility] in hospitals.  Pt report being on antibiotics for greater than 1 month.    Pt questioned whether Dr. Marroquin would proceed with surgery given the current infection.  I shared with patient that the ultimate decision will come from the surgeon on surgery plans.  I informed her that I would contact Dr. Marroquin for his awareness and instructions.      Patient encouraged to continue to follow-up with ENT regarding infection while awaiting next steps.    Shanon    [id number]  "

## 2018-08-12 ENCOUNTER — PATIENT MESSAGE (OUTPATIENT)
Dept: HEMATOLOGY/ONCOLOGY | Facility: CLINIC | Age: 73
End: 2018-08-12

## 2018-08-14 ENCOUNTER — OFFICE VISIT (OUTPATIENT)
Dept: HEMATOLOGY/ONCOLOGY | Facility: CLINIC | Age: 73
End: 2018-08-14
Payer: MEDICARE

## 2018-08-14 VITALS
HEART RATE: 92 BPM | RESPIRATION RATE: 18 BRPM | BODY MASS INDEX: 24.96 KG/M2 | OXYGEN SATURATION: 95 % | DIASTOLIC BLOOD PRESSURE: 66 MMHG | TEMPERATURE: 98 F | WEIGHT: 135.63 LBS | SYSTOLIC BLOOD PRESSURE: 100 MMHG | HEIGHT: 62 IN

## 2018-08-14 DIAGNOSIS — D50.0 IRON DEFICIENCY ANEMIA DUE TO CHRONIC BLOOD LOSS: ICD-10-CM

## 2018-08-14 DIAGNOSIS — C67.2 MALIGNANT NEOPLASM OF LATERAL WALL OF URINARY BLADDER: Primary | ICD-10-CM

## 2018-08-14 DIAGNOSIS — K55.20 ANGIODYSPLASIA OF CECUM: ICD-10-CM

## 2018-08-14 PROCEDURE — 99214 OFFICE O/P EST MOD 30 MIN: CPT | Mod: S$PBB,,, | Performed by: INTERNAL MEDICINE

## 2018-08-14 PROCEDURE — 99999 PR PBB SHADOW E&M-EST. PATIENT-LVL IV: CPT | Mod: PBBFAC,,, | Performed by: INTERNAL MEDICINE

## 2018-08-14 PROCEDURE — 99214 OFFICE O/P EST MOD 30 MIN: CPT | Mod: PBBFAC,PO | Performed by: INTERNAL MEDICINE

## 2018-08-14 NOTE — PROGRESS NOTES
Subjective:       Patient ID: Selam Botello is a 73 y.o. female.    Chief Complaint: Follow-up; Results; and Cancer (Bladder cancer)    HPI 73-year-old female with recurrent gastrointestinal bleeding returns with repeat gastrointestinal bleeding and for iron status.  Also discussed recent diagnosis of recurrent bladder cancer    Past Medical History:   Diagnosis Date    Acid reflux     Allergy     Arthritis     Bladder cancer     Breast cyst     Cataract     Colon polyp     Deep vein thrombosis     Degenerative disc disease     GI bleed     Hematuria, gross     Inflammatory bowel disease     Liver disease     cyst    Osteoporosis     Retina disorder, left     Skin cancer     Thyroid disease     Urinary tract infection      Family History   Problem Relation Age of Onset    COPD Mother     Cancer Mother     COPD Father     Cancer Father     Cancer Sister      Social History     Socioeconomic History    Marital status:      Spouse name: Not on file    Number of children: Not on file    Years of education: Not on file    Highest education level: Not on file   Social Needs    Financial resource strain: Not on file    Food insecurity - worry: Not on file    Food insecurity - inability: Not on file    Transportation needs - medical: Not on file    Transportation needs - non-medical: Not on file   Occupational History    Not on file   Tobacco Use    Smoking status: Never Smoker    Smokeless tobacco: Never Used   Substance and Sexual Activity    Alcohol use: Yes     Comment: social    Drug use: No    Sexual activity: Yes     Partners: Male   Other Topics Concern    Not on file   Social History Narrative    Not on file     Past Surgical History:   Procedure Laterality Date    ADENOIDECTOMY      BREAST SURGERY      COLON SURGERY      CYSTOSCOPY      EYE SURGERY      JOINT REPLACEMENT      left hip replacement      TONSILLECTOMY      TYMPANOSTOMY TUBE PLACEMENT       WISDOM TOOTH EXTRACTION         Labs:  Lab Results   Component Value Date    WBC 5.16 07/31/2018    HGB 14.1 07/31/2018    HCT 42.5 07/31/2018    MCV 99 (H) 07/31/2018     07/31/2018     BMP  Lab Results   Component Value Date     05/09/2018    K 4.2 05/09/2018     05/09/2018    CO2 26 05/09/2018    BUN 15 05/09/2018    CREATININE 0.7 05/09/2018    CALCIUM 10.0 05/09/2018    ANIONGAP 9 05/09/2018    ESTGFRAFRICA >60 05/09/2018    EGFRNONAA >60 05/09/2018     Lab Results   Component Value Date    ALT 20 05/09/2018    AST 20 05/09/2018    ALKPHOS 86 05/09/2018    BILITOT 0.9 05/09/2018       Lab Results   Component Value Date    IRON 175 (H) 07/31/2018    TIBC 250 07/31/2018    FERRITIN 71 07/31/2018     No results found for: [id number]  No results found for: FOLATE  Lab Results   Component Value Date    TSH 3.227 05/09/2018         Review of Systems   Constitutional: Negative for activity change, appetite change, chills, diaphoresis, fatigue, fever and unexpected weight change.   HENT: Negative for congestion, dental problem, drooling, ear discharge, ear pain, facial swelling, hearing loss, mouth sores, nosebleeds, postnasal drip, rhinorrhea, sinus pressure, sneezing, sore throat, tinnitus, trouble swallowing and voice change.    Eyes: Negative for photophobia, pain, discharge, redness, itching and visual disturbance.   Respiratory: Negative for cough, choking, chest tightness, shortness of breath, wheezing and stridor.    Cardiovascular: Negative for chest pain, palpitations and leg swelling.   Gastrointestinal: Negative for abdominal distention, abdominal pain, anal bleeding, blood in stool, constipation, diarrhea, nausea, rectal pain and vomiting.   Endocrine: Negative for cold intolerance, heat intolerance, polydipsia, polyphagia and polyuria.   Genitourinary: Negative for decreased urine volume, difficulty urinating, dyspareunia, dysuria, enuresis, flank pain, frequency, genital sores,  hematuria, menstrual problem, pelvic pain, urgency, vaginal bleeding, vaginal discharge and vaginal pain.   Musculoskeletal: Negative for arthralgias, back pain, gait problem, joint swelling, myalgias, neck pain and neck stiffness.   Skin: Negative for color change, pallor and rash.   Allergic/Immunologic: Negative for environmental allergies, food allergies and immunocompromised state.   Neurological: Negative for dizziness, tremors, seizures, syncope, facial asymmetry, speech difficulty, weakness, light-headedness, numbness and headaches.   Hematological: Negative for adenopathy. Does not bruise/bleed easily.   Psychiatric/Behavioral: Positive for dysphoric mood. Negative for agitation, behavioral problems, confusion, decreased concentration, hallucinations, self-injury, sleep disturbance and suicidal ideas. The patient is nervous/anxious. The patient is not hyperactive.        Objective:      Physical Exam   Constitutional: She is oriented to person, place, and time. She appears well-developed and well-nourished. No distress.   HENT:   Head: Normocephalic and atraumatic.   Right Ear: External ear normal.   Left Ear: External ear normal.   Nose: Nose normal. Right sinus exhibits no maxillary sinus tenderness and no frontal sinus tenderness. Left sinus exhibits no maxillary sinus tenderness and no frontal sinus tenderness.   Mouth/Throat: Oropharynx is clear and moist. No oropharyngeal exudate.   Eyes: Conjunctivae, EOM and lids are normal. Pupils are equal, round, and reactive to light. Right eye exhibits no discharge. Left eye exhibits no discharge. Right conjunctiva is not injected. Right conjunctiva has no hemorrhage. Left conjunctiva is not injected. Left conjunctiva has no hemorrhage. No scleral icterus.   Neck: Normal range of motion. Neck supple. No JVD present. No tracheal deviation present. No thyromegaly present.   Cardiovascular: Normal rate and regular rhythm.   Pulmonary/Chest: Effort normal. No  stridor. No respiratory distress. She exhibits no tenderness.   Abdominal: Soft. She exhibits no distension and no mass. There is no splenomegaly or hepatomegaly. There is no tenderness. There is no rebound.   Musculoskeletal: Normal range of motion. She exhibits no edema or tenderness.   Lymphadenopathy:     She has no cervical adenopathy.     She has no axillary adenopathy.        Right: No supraclavicular adenopathy present.        Left: No supraclavicular adenopathy present.   Neurological: She is alert and oriented to person, place, and time. No cranial nerve deficit. Coordination normal.   Skin: Skin is dry. No rash noted. She is not diaphoretic. No erythema.   Psychiatric: Her behavior is normal. Judgment and thought content normal. Her mood appears anxious.   Vitals reviewed.          Assessment:      1. 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 Angiodysplasia of cecum    3. Iron deficiency anemia due to chronic blood loss           Plan:     Most recent CBC and iron status are stable.  Extensive conversation with the patient in terms of recurrence bladder cancer she is asked to be seen to see Dr. Son our next visit here in the [place] area to discuss treatment recommendations and goals in terms of 2nd opinion for her treatment recommendations        Adonay Neri Jr, MD FACP

## 2018-08-16 ENCOUNTER — TELEPHONE (OUTPATIENT)
Dept: UROLOGY | Facility: CLINIC | Age: 73
End: 2018-08-16

## 2018-08-20 ENCOUNTER — ANESTHESIA EVENT (OUTPATIENT)
Dept: SURGERY | Facility: HOSPITAL | Age: 73
End: 2018-08-20

## 2018-08-20 DIAGNOSIS — Z01.818 PREOP TESTING: Primary | ICD-10-CM

## 2018-08-21 ENCOUNTER — PATIENT MESSAGE (OUTPATIENT)
Dept: HEMATOLOGY/ONCOLOGY | Facility: CLINIC | Age: 73
End: 2018-08-21

## 2018-08-21 ENCOUNTER — TELEPHONE (OUTPATIENT)
Dept: HEMATOLOGY/ONCOLOGY | Facility: CLINIC | Age: 73
End: 2018-08-21

## 2018-08-21 NOTE — TELEPHONE ENCOUNTER
Pt has questions for Dr Neri/recommended asking him through pt portal to get direct response. She agreed and will send the message.

## 2018-08-21 NOTE — TELEPHONE ENCOUNTER
----- Message from Shilpi Tony sent at 8/21/2018  9:24 AM CDT -----  Contact: Pt   States she's calling rg having some questions and can be reached at [phone]//thanks/dbw

## 2018-08-21 NOTE — TELEPHONE ENCOUNTER
----- Message from Shilpi Tony sent at 8/21/2018  9:24 AM CDT -----  Contact: Pt   States she's calling rg having some questions and can be reached at [phone]//thanks/dbw

## 2018-08-22 ENCOUNTER — INITIAL CONSULT (OUTPATIENT)
Dept: UROLOGY | Facility: CLINIC | Age: 73
End: 2018-08-22
Payer: MEDICARE

## 2018-08-22 ENCOUNTER — TELEPHONE (OUTPATIENT)
Dept: HEMATOLOGY/ONCOLOGY | Facility: CLINIC | Age: 73
End: 2018-08-22

## 2018-08-22 VITALS
OXYGEN SATURATION: 98 % | TEMPERATURE: 97 F | SYSTOLIC BLOOD PRESSURE: 140 MMHG | DIASTOLIC BLOOD PRESSURE: 87 MMHG | HEIGHT: 63 IN | WEIGHT: 136 LBS | BODY MASS INDEX: 24.1 KG/M2 | HEART RATE: 79 BPM

## 2018-08-22 DIAGNOSIS — C67.8 MALIGNANT NEOPLASM OF OVERLAPPING SITES OF BLADDER: Primary | ICD-10-CM

## 2018-08-22 PROCEDURE — 99215 OFFICE O/P EST HI 40 MIN: CPT | Mod: S$PBB,,, | Performed by: UROLOGY

## 2018-08-22 PROCEDURE — 99999 PR PBB SHADOW E&M-EST. PATIENT-LVL III: CPT | Mod: PBBFAC,,, | Performed by: UROLOGY

## 2018-08-22 PROCEDURE — 99213 OFFICE O/P EST LOW 20 MIN: CPT | Mod: PBBFAC | Performed by: UROLOGY

## 2018-08-22 NOTE — PROGRESS NOTES
Subjective:       Patient ID: Selam Botello is a 73 y.o. female.    Chief Complaint: No chief complaint on file.      HPI: Selam Botello is a 73 y.o. White female who presents today for evaluation and management of recurrent low-grade non-muscle invasive bladder cancer.    The patient initially presented in October 2017 with gross hematuria and was found to have multiple bladder tumors. A TURBT was performed which showed low-grade Ta bladder cancer. It does not appear that she received any mitomycin C.    Bladdder Cancer Chronology:    10/2017: Multifocal low-grade Ta disease  8/2018: Recurrent tumors on local cystoscopy    The patient had a recent surveillance cystoscopy which showed two recurrent small tumors. They have not resected yet.    The patient has a remote, minimal history of smoking. She does not have a history of occupational/environmental exposures.    She presents today for a second opinion regarding her recurrent, low-grade non-muscle invasive bladder cancer. She is not having any symptoms or hematuria presently.     Review of patient's allergies indicates:   Allergen Reactions    Nsaids (non-steroidal anti-inflammatory drug) Other (See Comments)     Bleeding of the colon-hospitalized    Diphenhydramine-acetaminophen Nausea Only    Epinephrine      Other reaction(s): Unknown    Latex      Other reaction(s): mouth ulcers    Latex, natural rubber     Levofloxacin      Other reaction(s): Joint pain    Propofol analogues Other (See Comments)     memory    Sulfa (sulfonamide antibiotics)      Other reaction(s): Unknown    Tramadol Hives and Itching       Current Outpatient Medications   Medication Sig Dispense Refill    CARBOXYMETHYLCELLULOSE SODIUM (REFRESH TEARS OPHT) Apply to eye daily as needed.      clindamycin in sterile water injection 600 mg 2 (two) times daily.      ergocalciferol (VITAMIN D2) 50,000 unit Cap Take 1 capsule (50,000 Units total) by mouth every 7 days. 12 capsule 3     levothyroxine (SYNTHROID) 75 MCG tablet Take 50 mcg by mouth once daily.       metoprolol tartrate (LOPRESSOR) 25 MG tablet Take 25 mg by mouth 2 (two) times daily.      propafenone (RHTHYMOL) 150 MG Tab Take 150 mg by mouth 2 (two) times daily.      clindamycin phosphate (CLEOCIN) 600 mg/4 mL Soln Inject 600 mg into the muscle.      lidocaine (LIDODERM) 5 % Place 1 patch onto the skin once daily. Remove and discard patch within 12 hours or as directed by MD       Current Facility-Administered Medications   Medication Dose Route Frequency Provider Last Rate Last Dose    denosumab (PROLIA) injection 60 mg  60 mg Subcutaneous Q6 Months Cassandra Mejia PA-C   60 mg at 05/09/18 1409       Past Medical History:   Diagnosis Date    Acid reflux     Allergy     Arthritis     Bladder cancer     Breast cyst     Cataract     Colon polyp     Deep vein thrombosis     Degenerative disc disease     GI bleed     Hematuria, gross     Inflammatory bowel disease     Liver disease     cyst    Osteoporosis     Retina disorder, left     Skin cancer     Thyroid disease     Urinary tract infection        Past Surgical History:   Procedure Laterality Date    ADENOIDECTOMY      BREAST SURGERY      COLON SURGERY      CYSTOSCOPY      EYE SURGERY      JOINT REPLACEMENT      left hip replacement      TONSILLECTOMY      TYMPANOSTOMY TUBE PLACEMENT      WISDOM TOOTH EXTRACTION         Family History   Problem Relation Age of Onset    COPD Mother     Cancer Mother     COPD Father     Cancer Father     Cancer Sister        Review of Systems    Review of Systems   Constitutional: Negative for fever, chills, activity change, appetite change and unexpected weight change.   HENT: Negative for congestion, nosebleeds, sneezing, sore throat and trouble swallowing.    Eyes: Negative for pain, discharge, redness and visual disturbance.   Respiratory: Negative for cough, choking, chest tightness and shortness of  breath.    Cardiovascular: Negative for chest pain, palpitations and leg swelling.   Gastrointestinal: Negative for nausea, vomiting, abdominal pain, diarrhea, blood in stool, abdominal distention and anal bleeding.  Genitourinary: As documented per HPI   Endocrine: Negative for cold intolerance, heat intolerance, polydipsia, polyphagia and polyuria.   Musculoskeletal: Negative for myalgias, gait problem, neck pain and neck stiffness.   Skin: Negative for color change, pallor, rash and wound.   Allergic/Immunologic: Negative for immunocompromised state.   Neurological: Negative for seizures, facial asymmetry, speech difficulty, weakness and light-headedness.   Hematological: Negative for adenopathy. Does not bruise/bleed easily.   Psychiatric/Behavioral: Negative for hallucinations, behavioral problems, self-injury and agitation. The patient is not hyperactive.      All other systems were reviewed and were negative.    Objective:     Vitals:    08/22/18 1005   BP: (!) 140/87   Pulse: 79   Temp: 96.9 °F (36.1 °C)     Physical Exam   Vitals reviewed.  Constitutional: She is oriented to person, place, and time. She appears well-developed and well-nourished. No distress.   HENT:   Head: Normocephalic and atraumatic.   Right Ear: External ear normal.   Left Ear: External ear normal.   Nose: Nose normal.   Eyes: EOM are normal. Pupils are equal, round, and reactive to light. Right eye exhibits no discharge. Left eye exhibits no discharge.   Neck: Normal range of motion. Neck supple. No tracheal deviation present. No thyroid enlargement or tenderness.  Cardiovascular: Normal heart sounds and intact distal pulses. No signs of peripheral edema.   Pulmonary/Chest: Effort normal and breath sounds normal. No respiratory distress. She has no wheezes.   Abdominal: Soft. Bowel sounds are normal. She exhibits no distension. There is no tenderness. There is no rebound. No hepatic or splenic enlargement or  tenderness.  Musculoskeletal: Normal range of motion. She exhibits no edema.   Neurological: She is alert and oriented to person, place, and time. Coordination normal.   Skin: Skin is warm and dry. She is not diaphoretic.   Lymphatic: No palpable lymph nodes.  Psychiatric: She has a normal mood and affect. Her behavior is normal. Judgment and thought content normal.     Lab Results   Component Value Date    CREATININE 0.7 05/09/2018     Lab Results   Component Value Date    EGFRNONAA >60 05/09/2018     Lab Results   Component Value Date    ESTGFRAFRICA >60 05/09/2018     I have personally reviewed all the patient's relevant  imaging.    CT urogram (2017): No upper tract abnormality.    Assessment:       1. Malignant neoplasm of overlapping sites of bladder        Plan:     Diagnoses and all orders for this visit:    Malignant neoplasm of overlapping sites of bladder    The patient has a history of recurrent low-grade Ta bladder cancer.    I had a lengthy discussion with the patient regarding implications of a diagnosis of urothelial carcinoma of the bladder, i.e, bladder cancer.    I explained that individuals with non-muscle invasive bladder cancer account for 75-80% of those with bladder cancer.  Nearly all of such patients exhibit urothelial carcinoma histology (previously known as TCC).  Management of patients with this condition focuses on prevention of disease progression, since effectiveness of salvage therapies for patients with systemic urothelial carcinoma is currently extremely limited.  Indeed, we discussed that approximately 15-25% of all-comers with non-muscle invasive disease will progress to muscle-invasive pathology.  I explained that muscle-invasion is considered the gateway to metastatic progression; however, a small but real percentage of patients can progress to systemic disease without ever demonstrating evidence of muscle-invasive pathology.       Furthermore we discussed that the majority  of patients with urothelial carcinoma will exhibit tumor recurrence (up to 75%).  All surfaces of the patients  tract (collecting system of kidney, ureters, bladder, portion of urethera) are susceptible to recurrences and must be monitored closely for the patients life-time.      We discussed that tumor grade and tumor stage in the setting of a properly-performed transurethral resection are the variables that largely guide prognostication and treatment.  Some of the other risk factors include presence of CIS, multifocality, tumor size >3 cm, lymphovascular invasion, and rapid tumor recurrence.   I emphasized that endoscopic management along with upper tract surveillance are the mainstay treatments of non-muscle invasive bladder cancer.  In addition, as necessary, intravesical chemo/immunotherapy is utilized.  For instance, following endoscopic resection, Level 1 evidence exists to show that a single, prophylactic instillation of intravesical Mitomycin C results in a significant reduction (up to 39% in a recent metaanalysis of 7 randomized trials) of tumor recurrence.  For patients with high risk disease, induction and at times maintenance intravesical therapy with Baccillus Calmette-Farhat (BCG) is indicated.  Furthermore, we briefly discussed the concept of early cystectomy for patients with high risk non-muscle invasive bladder cancer.    For this patient, she will need a TURBT to remove the new tumors which will also give us a pathologic diagnosis. I will give her mitomycin C at the time of her TURBT. We will then base further treatment on her pathologic results.    I answered all her questions.    At the conclusion of our discussion, the patient was amenable to proceeding as outlined above.     I encouraged him or any of his family members to call or email me with questions and/or concerns.    I spent 45 minutes with the patient of which more than half was spent in coordinating the patient's care as well as  in direct consultation with the patient in regards to our treatment and plan.

## 2018-08-22 NOTE — PATIENT INSTRUCTIONS
Understanding Bladder Cancer    The urinary system includes the kidneys, ureters, bladder, and urethra. It makes, stores, and gets rid of liquid waste called urine. The two kidneys filter blood to collect waste and make urine. The ureters are small tubes that carry urine from each kidney to the bladder. The bladder is where urine is stored before it leaves the body. The urethra is the tube urine goes through to leave the body.   Bladder cancer means that certain cells in the bladder have changed in ways that aren't normal.  When bladder cancer forms  Cancer is a disease that causes cells to change and multiply out of control. The multiplying cells may form a lump of tissue (tumor). With time, the cancer cells destroy healthy tissue. They may spread to other parts of the body. Why some cells become cancerous is not always clear. But bladder cancer is strongly linked to cigarette smoking. The longer a person smokes and the more a person smokes, the greater that persons chances of developing bladder cancer.  Types of cancer that may form  Bladder cancer may grow in different ways:  · Papillary tumors stick out from the bladder lining on a stalk. They tend to grow into the bladder cavity, away from the bladder wall, instead of deeper into the layers of the bladder wall.  · Flat tumors do not stick out from the bladder lining. These tumors are much more likely than papillary tumors to grow deeper into the layers of the bladder wall.  · Carcinoma in situ (CIS) is a cancerous patch of cells that is only in the inner layer of the bladder lining and has not spread to deeper tissue. The patch may look almost normal or may look inflamed.    Each type of tumor can be present in one or more areas of the bladder, and more than one type can be present at the same time.  Date Last Reviewed: 2/17/2016  © 5738-7547 [employer]. [address] [place] [address], [place] [address]. All rights reserved. This information is not  intended as a substitute for professional medical care. Always follow your healthcare professional's instructions.

## 2018-08-22 NOTE — LETTER
August 23, 2018      Adonay Neri MD  [address]  [place] [id number]           [facility]  [id number] [place] [address] [address] [place] [address]  Phone: [phone]  Fax: [phone]          Patient: Selam Botello   MR Number: [id number]   YOB: 1945   Date of Visit: 8/22/2018       Dear Dr. Adonay eNri:    Thank you for referring Selam Botello to me for evaluation. Attached you will find relevant portions of my assessment and plan of care.    If you have questions, please do not hesitate to call me. I look forward to following Selam Botello along with you.    Sincerely,    Isaias Shah MD    Enclosure  CC:  No Recipients    If you would like to receive this communication electronically, please contact [email or web address] or [phone] to request more information on [employer] access.    For providers and/or their staff who would like to refer a patient to [facility], please contact us through our one-stop-shop provider referral line, [facility], at [phone].    If you feel you have received this communication in error or would no longer like to receive these types of communications, please e-mail [email or web address]

## 2018-08-22 NOTE — TELEPHONE ENCOUNTER
Spoke with patient who had questions about her lab now.  She states Dr. Shah was going to push her surgery back, and she wanted to know if her Lab should be pushed back also.  Dr. Shah has already left clinic today, but reminded patient she could send him a message through the patient portal. Patient acknowledged. Call ended well.

## 2018-08-23 ENCOUNTER — TELEPHONE (OUTPATIENT)
Dept: UROLOGY | Facility: CLINIC | Age: 73
End: 2018-08-23

## 2018-08-23 NOTE — TELEPHONE ENCOUNTER
----- Message from Cat Mijares sent at 8/23/2018  9:01 AM CDT -----  Contact: pt  States she needs to speak to the nurse regarding her surgery. Please call pt at [phone]. Thank you

## 2018-08-23 NOTE — PRE-PROCEDURE INSTRUCTIONS
Received outside records from Dr Vance Garcia, Cardiology, with cardiology clearance. Records were scanned into [employer] on 8/23/18.  Received outside ENT clearance from Dr YAYA Silva. Records were scanned into [employer] on 8/23/18

## 2018-08-27 ENCOUNTER — ANESTHESIA (OUTPATIENT)
Dept: SURGERY | Facility: HOSPITAL | Age: 73
End: 2018-08-27

## 2018-08-28 DIAGNOSIS — Z01.818 PRE-OP TESTING: Primary | ICD-10-CM

## 2018-08-28 RX ORDER — AMOXICILLIN 500 MG
1 CAPSULE ORAL DAILY
COMMUNITY
End: 2019-10-22 | Stop reason: CLARIF

## 2018-08-28 RX ORDER — PYRIDOXINE HCL (VITAMIN B6) 100 MG
100 TABLET ORAL DAILY
COMMUNITY
End: 2021-04-12

## 2018-08-28 RX ORDER — CETIRIZINE HYDROCHLORIDE 10 MG/1
10 TABLET ORAL DAILY
COMMUNITY

## 2018-08-28 NOTE — PRE ADMISSION SCREENING
Pre op instructions reviewed with patient per phone:    To confirm, Your surgeon has instructed you:  Surgery is scheduled 09/12/18 at 1200.  Pre admit office to call afternoon prior to surgery with final arrival time.  If surgery is on Monday, Pre admit office to call Friday afternoon with with final arrival time      Please report to [facility] 1st floor main lobby by 1030.      INSTRUCTIONS IMPORTANT!!!  ¨ No smoking after 12 midnight, the night before surgery.  ¨ No solid food after 12 midnight, but you may have clear liquids up until 3 hours prior to surgery.  This includes: grape, cranberry, and apple juice (not orange, and no coffee.)   ¨ OK to brush teeth, but no gum, candy or mints!    ¨ Take only these medicines with a small swallow of water-morning of surgery.  Synthroid, use Refresh opth gtts        ____  Do not wear makeup, including mascara.  ____  No powder, lotions or creams to surgical area.  ____  Please remove all jewelry, including piercings and leave at home.  ____  No money or valuables needed. Please leave at home.  ____  Please bring identification and insurance information to hospital.  ____  If going home the same day, arrange for a ride home. You will not be able to   drive if Anesthesia was used.  ____  Children, under 12 years old, must remain in the waiting room with an adult.  They are not allowed in patient areas.  ____  Wear loose fitting clothing. Allow for dressings, bandages.  ____  Stop Aspirin, Ibuprofen, Motrin and Aleve at least 5-7 days before surgery, unless otherwise instructed by your doctor, or the nurse.   You MAY use Tylenol/acetaminophen until day of surgery.  ____  If you take diabetic medication, do not take am of surgery unless instructed by   Doctor.  ____ Stop taking any Fish Oil supplement or any Vitamins that contain Vitamin E at least 5 days prior to surgery.          Bathing Instructions-- The night before surgery and the morning prior  to coming to the hospital:   -Do not shave the surgical area.   -Shower and wash your hair and body as usual with your regular soap and shampoo.   -Rinse your hair and body completely.   -Use one packet of hibiclens to wash the surgical site (using your hand) gently for 5 minutes.  Do not scrub you skin too hard.   -Do not use hibiclens on your head, face, or genitals.   -Do not wash with regular soap after you use the hibiclens.   -Rinse your body thoroughly.   -Dry with clean, soft towel.  Do not use lotion, cream, deodorant, or powders on   the surgical site.    Use antibacterial soap in place of hibiclens if your surgery is on the head, face or genitals.         Surgical Site Infection    Prevention of surgical site infections:     -Keep incisions clean and dry.   -Do not soak/submerge incisions in water until completely healed.   -Do not apply lotions, powders, creams, or deodorants to site.   -Always make sure hands are cleaned with antibacterial soap/ alcohol-based   prior to touching the surgical site.  (This includes doctors, nurses, staff, and yourself.)    Signs and symptoms:   -Redness and pain around the area where you had surgery   -Drainage of cloudy fluid from your surgical wound   -Fever over 100.4  I have read or had read and explained to me, and understand the above information.

## 2018-09-04 ENCOUNTER — LAB VISIT (OUTPATIENT)
Dept: LAB | Facility: HOSPITAL | Age: 73
End: 2018-09-04
Attending: INTERNAL MEDICINE
Payer: MEDICARE

## 2018-09-04 DIAGNOSIS — Z01.818 PRE-OP TESTING: ICD-10-CM

## 2018-09-04 LAB
BASOPHILS # BLD AUTO: 0.03 K/UL
BASOPHILS NFR BLD: 0.6 %
DIFFERENTIAL METHOD: ABNORMAL
EOSINOPHIL # BLD AUTO: 0.1 K/UL
EOSINOPHIL NFR BLD: 1 %
ERYTHROCYTE [DISTWIDTH] IN BLOOD BY AUTOMATED COUNT: 12.9 %
HCT VFR BLD AUTO: 41.4 %
HGB BLD-MCNC: 13.5 G/DL
IMM GRANULOCYTES # BLD AUTO: 0.01 K/UL
IMM GRANULOCYTES NFR BLD AUTO: 0.2 %
LYMPHOCYTES # BLD AUTO: 1 K/UL
LYMPHOCYTES NFR BLD: 20.2 %
MCH RBC QN AUTO: 33.1 PG
MCHC RBC AUTO-ENTMCNC: 32.6 G/DL
MCV RBC AUTO: 102 FL
MONOCYTES # BLD AUTO: 0.4 K/UL
MONOCYTES NFR BLD: 7.3 %
NEUTROPHILS # BLD AUTO: 3.6 K/UL
NEUTROPHILS NFR BLD: 70.7 %
NRBC BLD-RTO: 0 /100 WBC
PLATELET # BLD AUTO: 270 K/UL
PMV BLD AUTO: 10.7 FL
RBC # BLD AUTO: 4.08 M/UL
WBC # BLD AUTO: 5.04 K/UL

## 2018-09-04 PROCEDURE — 85025 COMPLETE CBC W/AUTO DIFF WBC: CPT

## 2018-09-12 ENCOUNTER — PATIENT MESSAGE (OUTPATIENT)
Dept: HEMATOLOGY/ONCOLOGY | Facility: CLINIC | Age: 73
End: 2018-09-12

## 2018-09-12 ENCOUNTER — HOSPITAL ENCOUNTER (OUTPATIENT)
Facility: HOSPITAL | Age: 73
Discharge: HOME | End: 2018-09-12
Attending: INTERNAL MEDICINE | Admitting: UROLOGY
Payer: MEDICARE

## 2018-09-12 ENCOUNTER — ANESTHESIA (OUTPATIENT)
Dept: SURGERY | Facility: HOSPITAL | Age: 73
End: 2018-09-12
Payer: MEDICARE

## 2018-09-12 ENCOUNTER — ANESTHESIA EVENT (OUTPATIENT)
Dept: SURGERY | Facility: HOSPITAL | Age: 73
End: 2018-09-12
Payer: MEDICARE

## 2018-09-12 DIAGNOSIS — C67.2 MALIGNANT NEOPLASM OF LATERAL WALL OF URINARY BLADDER: Primary | ICD-10-CM

## 2018-09-12 DIAGNOSIS — C67.8 MALIGNANT NEOPLASM OF OVERLAPPING SITES OF BLADDER: ICD-10-CM

## 2018-09-12 PROCEDURE — 36000706: Performed by: UROLOGY

## 2018-09-12 PROCEDURE — 63600175 PHARM REV CODE 636 W HCPCS: Performed by: NURSE ANESTHETIST, CERTIFIED REGISTERED

## 2018-09-12 PROCEDURE — 25500020 PHARM REV CODE 255: Performed by: UROLOGY

## 2018-09-12 PROCEDURE — 25000003 PHARM REV CODE 250: Performed by: NURSE ANESTHETIST, CERTIFIED REGISTERED

## 2018-09-12 PROCEDURE — 88112 CYTOPATH CELL ENHANCE TECH: CPT | Performed by: PATHOLOGY

## 2018-09-12 PROCEDURE — 27201423 OPTIME MED/SURG SUP & DEVICES STERILE SUPPLY: Performed by: UROLOGY

## 2018-09-12 PROCEDURE — 71000016 HC POSTOP RECOV ADDL HR: Performed by: UROLOGY

## 2018-09-12 PROCEDURE — 37000008 HC ANESTHESIA 1ST 15 MINUTES: Performed by: UROLOGY

## 2018-09-12 PROCEDURE — 37000009 HC ANESTHESIA EA ADD 15 MINS: Performed by: UROLOGY

## 2018-09-12 PROCEDURE — 36000707: Performed by: UROLOGY

## 2018-09-12 PROCEDURE — 51720 TREATMENT OF BLADDER LESION: CPT | Mod: 59,,, | Performed by: UROLOGY

## 2018-09-12 PROCEDURE — 63600175 PHARM REV CODE 636 W HCPCS: Mod: JG | Performed by: UROLOGY

## 2018-09-12 PROCEDURE — C2617 STENT, NON-COR, TEM W/O DEL: HCPCS | Performed by: UROLOGY

## 2018-09-12 PROCEDURE — 63600175 PHARM REV CODE 636 W HCPCS: Performed by: UROLOGY

## 2018-09-12 PROCEDURE — 71000033 HC RECOVERY, INTIAL HOUR: Performed by: UROLOGY

## 2018-09-12 PROCEDURE — 71000015 HC POSTOP RECOV 1ST HR: Performed by: UROLOGY

## 2018-09-12 PROCEDURE — C1758 CATHETER, URETERAL: HCPCS | Performed by: UROLOGY

## 2018-09-12 PROCEDURE — 25000003 PHARM REV CODE 250: Performed by: UROLOGY

## 2018-09-12 PROCEDURE — 52234 CYSTOSCOPY AND TREATMENT: CPT | Mod: ,,, | Performed by: UROLOGY

## 2018-09-12 PROCEDURE — 25000003 PHARM REV CODE 250: Performed by: ANESTHESIOLOGY

## 2018-09-12 PROCEDURE — 76000 FLUOROSCOPY <1 HR PHYS/QHP: CPT | Mod: 26,59,, | Performed by: UROLOGY

## 2018-09-12 PROCEDURE — 88307 TISSUE EXAM BY PATHOLOGIST: CPT | Performed by: PATHOLOGY

## 2018-09-12 PROCEDURE — 63600175 PHARM REV CODE 636 W HCPCS: Performed by: ANESTHESIOLOGY

## 2018-09-12 PROCEDURE — 52332 CYSTOSCOPY AND TREATMENT: CPT | Mod: 59,LT,, | Performed by: UROLOGY

## 2018-09-12 PROCEDURE — C1769 GUIDE WIRE: HCPCS | Performed by: UROLOGY

## 2018-09-12 PROCEDURE — 88112 CYTOPATH CELL ENHANCE TECH: CPT | Mod: 26,,, | Performed by: PATHOLOGY

## 2018-09-12 PROCEDURE — 71000039 HC RECOVERY, EACH ADD'L HOUR: Performed by: UROLOGY

## 2018-09-12 PROCEDURE — 88307 TISSUE EXAM BY PATHOLOGIST: CPT | Mod: 26,,, | Performed by: PATHOLOGY

## 2018-09-12 DEVICE — STENT URETERAL UNIV 6FR 22CM: Type: IMPLANTABLE DEVICE | Site: URETER | Status: FUNCTIONAL

## 2018-09-12 RX ORDER — ONDANSETRON 2 MG/ML
INJECTION INTRAMUSCULAR; INTRAVENOUS
Status: DISCONTINUED | OUTPATIENT
Start: 2018-09-12 | End: 2018-09-12

## 2018-09-12 RX ORDER — OXYCODONE HYDROCHLORIDE 5 MG/1
5 TABLET ORAL
Status: DISCONTINUED | OUTPATIENT
Start: 2018-09-12 | End: 2018-09-12 | Stop reason: HOSPADM

## 2018-09-12 RX ORDER — SCOLOPAMINE TRANSDERMAL SYSTEM 1 MG/1
1 PATCH, EXTENDED RELEASE TRANSDERMAL
Status: COMPLETED | OUTPATIENT
Start: 2018-09-12 | End: 2018-09-12

## 2018-09-12 RX ORDER — MORPHINE SULFATE 4 MG/ML
2 INJECTION, SOLUTION INTRAMUSCULAR; INTRAVENOUS EVERY 5 MIN PRN
Status: DISCONTINUED | OUTPATIENT
Start: 2018-09-12 | End: 2018-09-12 | Stop reason: HOSPADM

## 2018-09-12 RX ORDER — MITOMYCIN 40 MG/80ML
INJECTION, POWDER, LYOPHILIZED, FOR SOLUTION INTRAVENOUS
Status: DISCONTINUED | OUTPATIENT
Start: 2018-09-12 | End: 2018-09-12 | Stop reason: HOSPADM

## 2018-09-12 RX ORDER — ONDANSETRON 2 MG/ML
4 INJECTION INTRAMUSCULAR; INTRAVENOUS DAILY PRN
Status: DISCONTINUED | OUTPATIENT
Start: 2018-09-12 | End: 2018-09-12 | Stop reason: HOSPADM

## 2018-09-12 RX ORDER — FUROSEMIDE 10 MG/ML
INJECTION INTRAMUSCULAR; INTRAVENOUS
Status: DISCONTINUED | OUTPATIENT
Start: 2018-09-12 | End: 2018-09-12

## 2018-09-12 RX ORDER — SODIUM CHLORIDE, SODIUM LACTATE, POTASSIUM CHLORIDE, CALCIUM CHLORIDE 600; 310; 30; 20 MG/100ML; MG/100ML; MG/100ML; MG/100ML
INJECTION, SOLUTION INTRAVENOUS CONTINUOUS PRN
Status: DISCONTINUED | OUTPATIENT
Start: 2018-09-12 | End: 2018-09-12

## 2018-09-12 RX ORDER — ROCURONIUM BROMIDE 10 MG/ML
INJECTION, SOLUTION INTRAVENOUS
Status: DISCONTINUED | OUTPATIENT
Start: 2018-09-12 | End: 2018-09-12

## 2018-09-12 RX ORDER — NEOSTIGMINE METHYLSULFATE 1 MG/ML
INJECTION, SOLUTION INTRAVENOUS
Status: DISCONTINUED | OUTPATIENT
Start: 2018-09-12 | End: 2018-09-12

## 2018-09-12 RX ORDER — LIDOCAINE HYDROCHLORIDE 10 MG/ML
INJECTION INFILTRATION; PERINEURAL
Status: DISCONTINUED | OUTPATIENT
Start: 2018-09-12 | End: 2018-09-12

## 2018-09-12 RX ORDER — SODIUM CHLORIDE 0.9 % (FLUSH) 0.9 %
3 SYRINGE (ML) INJECTION
Status: DISCONTINUED | OUTPATIENT
Start: 2018-09-12 | End: 2018-09-12 | Stop reason: HOSPADM

## 2018-09-12 RX ORDER — ETOMIDATE 2 MG/ML
INJECTION INTRAVENOUS
Status: DISCONTINUED | OUTPATIENT
Start: 2018-09-12 | End: 2018-09-12

## 2018-09-12 RX ORDER — GLYCOPYRROLATE 0.2 MG/ML
INJECTION INTRAMUSCULAR; INTRAVENOUS
Status: DISCONTINUED | OUTPATIENT
Start: 2018-09-12 | End: 2018-09-12

## 2018-09-12 RX ORDER — ATROPA BELLADONNA AND OPIUM 16.2; 3 MG/1; MG/1
30 SUPPOSITORY RECTAL ONCE
Status: COMPLETED | OUTPATIENT
Start: 2018-09-12 | End: 2018-09-12

## 2018-09-12 RX ORDER — CEFAZOLIN SODIUM 1 G/50ML
1 SOLUTION INTRAVENOUS ONCE
Status: COMPLETED | OUTPATIENT
Start: 2018-09-12 | End: 2018-09-12

## 2018-09-12 RX ORDER — FENTANYL CITRATE 50 UG/ML
INJECTION, SOLUTION INTRAMUSCULAR; INTRAVENOUS
Status: DISCONTINUED | OUTPATIENT
Start: 2018-09-12 | End: 2018-09-12

## 2018-09-12 RX ORDER — TOLTERODINE 2 MG/1
2 CAPSULE, EXTENDED RELEASE ORAL NIGHTLY
Qty: 10 CAPSULE | Refills: 0 | Status: SHIPPED | OUTPATIENT
Start: 2018-09-12 | End: 2018-09-20 | Stop reason: SDUPTHER

## 2018-09-12 RX ADMIN — ETOMIDATE 10 MG: 2 INJECTION, SOLUTION INTRAVENOUS at 12:09

## 2018-09-12 RX ADMIN — SODIUM CHLORIDE, SODIUM LACTATE, POTASSIUM CHLORIDE, AND CALCIUM CHLORIDE: 600; 310; 30; 20 INJECTION, SOLUTION INTRAVENOUS at 12:09

## 2018-09-12 RX ADMIN — NEOSTIGMINE METHYLSULFATE 3 MG: 1 INJECTION INTRAVENOUS at 12:09

## 2018-09-12 RX ADMIN — ONDANSETRON 4 MG: 2 INJECTION, SOLUTION INTRAMUSCULAR; INTRAVENOUS at 12:09

## 2018-09-12 RX ADMIN — ROCURONIUM BROMIDE 30 MG: 10 INJECTION, SOLUTION INTRAVENOUS at 12:09

## 2018-09-12 RX ADMIN — FENTANYL CITRATE 100 MCG: 50 INJECTION, SOLUTION INTRAMUSCULAR; INTRAVENOUS at 12:09

## 2018-09-12 RX ADMIN — LIDOCAINE HYDROCHLORIDE 25 MG: 10 INJECTION, SOLUTION INFILTRATION; PERINEURAL at 12:09

## 2018-09-12 RX ADMIN — SCOPOLAMINE 1 PATCH: 1 PATCH, EXTENDED RELEASE TRANSDERMAL at 12:09

## 2018-09-12 RX ADMIN — ROBINUL 0.4 MG: 0.2 INJECTION INTRAMUSCULAR; INTRAVENOUS at 12:09

## 2018-09-12 RX ADMIN — FUROSEMIDE 20 MG: 10 INJECTION, SOLUTION INTRAMUSCULAR; INTRAVENOUS at 12:09

## 2018-09-12 RX ADMIN — PROMETHAZINE HYDROCHLORIDE 6.25 MG: 25 INJECTION INTRAMUSCULAR; INTRAVENOUS at 01:09

## 2018-09-12 RX ADMIN — ATROPA BELLADONNA AND OPIUM 30 MG: 16.2; 3 SUPPOSITORY RECTAL at 12:09

## 2018-09-12 RX ADMIN — CEFAZOLIN SODIUM 2 G: 1 SOLUTION INTRAVENOUS at 12:09

## 2018-09-12 NOTE — TRANSFER OF CARE
"Anesthesia Transfer of Care Note    Patient: Selam Botello    Procedure(s) Performed: Procedure(s) (LRB):  RESECTION,NEOPLASM,BLADDER,TRANSURETHRAL (N/A)  CYSTOSCOPY, WITH RETROGRADE PYELOGRAM AND URETERAL STENT INSERTION (Left)  INSTILLATION, URINARY BLADDER (N/A)    Patient location: PACU    Anesthesia Type: general    Transport from OR: Transported from OR on room air with adequate spontaneous ventilation    Post pain: adequate analgesia    Post assessment: no apparent anesthetic complications    Post vital signs: stable    Level of consciousness: awake, alert and oriented    Nausea/Vomiting: no nausea/vomiting    Complications: none    Transfer of care protocol was followed      Last vitals:   Visit Vitals  BP (!) 146/75 (Patient Position: Sitting)   Pulse 70   Temp 36.7 °C (98.1 °F) (Skin)   Resp 18   Ht 5' 2.25" (1.581 m)   Wt 60.2 kg (132 lb 11.5 oz)   SpO2 98%   Breastfeeding? No   BMI 24.08 kg/m²     "

## 2018-09-12 NOTE — BRIEF OP NOTE
[facility]  Brief Operative Note     SUMMARY     Surgery Date: 9/12/2018     Surgeon(s) and Role:     * Isaias Shah MD - Primary    Assisting Surgeon: None    Pre-op Diagnosis:  Malignant neoplasm of overlapping sites of bladder [C67.8]    Post-op Diagnosis:  Post-Op Diagnosis Codes:     * Malignant neoplasm of overlapping sites of bladder [C67.8]    Procedure(s) (LRB):  RESECTION,NEOPLASM,BLADDER,TRANSURETHRAL (N/A)  CYSTOSCOPY, WITH RETROGRADE PYELOGRAM AND URETERAL STENT INSERTION (Left)  INSTILLATION, URINARY BLADDER (N/A)    Anesthesia: General    Description of the findings of the procedure: Small papillary tumor by left ureteral orifice    Findings/Key Components: Left JJ stent placed since tumor close to ureteral orifice    Estimated Blood Loss: * No values recorded between 9/12/2018 12:13 PM and 9/12/2018  1:01 PM *         Specimens:   Specimen (12h ago, onward)    Start     Ordered    09/12/18 1246  Specimen to Pathology - Surgery  Once     Comments:  1.) Bladder tumor by left ureteral orifice (PERM).DX: Bladder tumor.     Start Status   09/12/18 1246 Collected (09/12/18 1230)       09/12/18 1247          Discharge Note    SUMMARY     Admit Date: 9/12/2018    Discharge Date and Time:  09/12/2018 1:12 PM    Hospital Course (synopsis of major diagnoses, care, treatment, and services provided during the course of the hospital stay): Routine     Final Diagnosis: Post-Op Diagnosis Codes:     * Malignant neoplasm of overlapping sites of bladder [C67.8]    Disposition: Home or Self Care    Follow Up/Patient Instructions:     Medications:  Reconciled Home Medications:      Medication List      START taking these medications    tolterodine 2 MG Cp24  Commonly known as:  DETROL LA  Take 1 capsule (2 mg total) by mouth every evening. for 10 days        CONTINUE taking these medications    brompheniramine-pseudoephedrine 1-15 mg/5 mL Liqd  Commonly known as:  DIMETAPP  Take by mouth every 6  (six) hours as needed.     cetirizine 10 MG tablet  Commonly known as:  ZYRTEC  Take 10 mg by mouth once daily.     clindamycin in sterile water injection  600 mg 2 (two) times daily.     ergocalciferol 50,000 unit Cap  Commonly known as:  VITAMIN D2  Take 1 capsule (50,000 Units total) by mouth every 7 days.     fish oil-omega-3 fatty acids 300-1,000 mg capsule  Take 1 capsule by mouth once daily.     levothyroxine 75 MCG tablet  Commonly known as:  SYNTHROID  Take 50 mcg by mouth once daily.     lidocaine 5 %  Commonly known as:  LIDODERM  Place 1 patch onto the skin once daily. Remove and discard patch within 12 hours or as directed by MD     metoprolol tartrate 25 MG tablet  Commonly known as:  LOPRESSOR  Take 25 mg by mouth 2 (two) times daily.     propafenone 150 MG Tab  Commonly known as:  RHTHYMOL  Take 150 mg by mouth nightly.     REFRESH TEARS OPHT  Apply to eye 2 (two) times daily.     VITAMIN B-12 ORAL  Take 500 mcg by mouth daily as needed.     VITAMIN B-6 100 MG Tab  Generic drug:  pyridoxine (vitamin B6)  Take 50 mg by mouth once daily.          Discharge Procedure Orders   Diet Adult Regular     Notify your health care provider if you experience any of the following:  temperature >100.4     No dressing needed     Activity as tolerated     Follow-up Information     Call Isaias Shah MD.    Why:  Will call with pathology

## 2018-09-12 NOTE — ANESTHESIA PREPROCEDURE EVALUATION
09/12/2018  Selam Botello is a 73 y.o., female.    Pre-op Assessment    I have reviewed the Patient Summary Reports.         Review of Systems  Anesthesia Hx:  Hx of Anesthetic complications  Family Hx of Anesthesia complications:  Personal Hx of Anesthesia complications   Cardiovascular:   Denies Hypertension.  Denies MI.  Denies CAD.    Denies CABG/stent.     Pulmonary:   Denies COPD.    Hepatic/GI:   GERD Liver Disease,    Musculoskeletal:   Arthritis     Neurological:   Denies CVA. Denies Seizures.    Endocrine:   Denies Diabetes.        Physical Exam  General:  Well nourished    Airway/Jaw/Neck:  Airway Findings: Mouth Opening: Normal General Airway Assessment: Adult  Mallampati: II       Chest/Lungs:  Chest/Lungs Findings: Clear to auscultation, Normal Respiratory Rate     Heart/Vascular:  Heart Findings: Rate: Normal  Rhythm: Regular Rhythm  Sounds: Normal             Anesthesia Plan  Type of Anesthesia, risks & benefits discussed:  Anesthesia Type:  general  Patient's Preference:   Intra-op Monitoring Plan:   Intra-op Monitoring Plan Comments:   Post Op Pain Control Plan:   Post Op Pain Control Plan Comments:   Induction:    Beta Blocker:         Informed Consent: Patient understands risks and agrees with Anesthesia plan.  Questions answered.   ASA Score: 2     Day of Surgery Review of History & Physical:        Anesthesia Plan Notes: Use etomidate

## 2018-09-12 NOTE — DISCHARGE INSTRUCTIONS
General Information:    1. Do not drink alcoholic beverages including beer for 24 hours or as long as you are on pain medication..  2. Do not drive a motor vehicle, operate machinery or power tools, or signs legal papers for 24 hours or as long as you are on pain medication.   3. You may experience light-headedness, dizziness, and sleepiness following surgery. Please do not stay alone. A responsible adult should be with you for this 24 hour period.  4. Go home and rest.  5. Progress slowly to a normal diet unless instructed.  Otherwise, begin with liquids such as soft drinks, then soup and crackers working up to solid foods. Drink plenty of nonalcoholic fluids.  6. Certain anesthetics and pain medications produce nausea and vomiting in certain individuals. If nausea becomes a problem at home, call you doctor.  7. A nurse will be calling you sometime after surgery. Do not be alarmed. This is our way of finding out how you are doing.  8. Several times every hour while you are awake, take 2-3 deep breaths and cough. If you had stomach surgery hold a pillow or rolled towel firmly against your stomach before you cough. This will help with any pain the cough might cause.  9. Several times every hour while you are awake, pump and flex your feet 5-6 times and do foot circles. This will help prevent blood clots.  10. Call your doctor for severe pain, bleeding, fever, or signs or symptoms of infection (pain, swelling, redness, foul odor, drainage).  11. You can contact your doctor anytime by [phone] for the [facility] (at [facility]) or [phone] for the [facility] on [facility].   [email or web address] is another way to contact your doctor if you are an active participant online with [facility].  12. Continue Nozin provided at discharge twice daily for 7 days or until the incision is healed.  See pamphlet or box for instructions.

## 2018-09-12 NOTE — OP NOTE
[facility]  Operative Note    Date: 09/12/2018    Pre-Op Diagnosis: bladder tumor    Post-Op Diagnosis: same    Procedure(s) Performed:   1.  TURBT of small sized bladder tumor, < 2cm at left ureteral orifice  2.  Fulguration of separate and distinct 1 cm lesion on right posterior wall  3.  Insertion of left JJ stent (6 x 22)  4.  Instillation of intravesical mitomycin C    Specimen(s): Bladder tumor by left ureteral orifice    Staff Surgeon: Isaias Shah MD    Anesthesia: General endotracheal anesthesia    Indications: Selam Botello is a 73 y.o. female with a history of low-grade bladder cancer with a recurrent bladder tumor.  She presents today for surgical resection.      Findings:   1.  Small, fine papillary tumor by left ureteral orifice    Estimated Blood Loss: min    Drains: 20 Fr villalobos catheter   6 x 22 JJ stent    Procedure in detail:  After the risks, benefits and possible complications of the procedure were explained, consents were obtained. The patient was taken to the operating room and placed under anesthesia. Pre-operative antibiotics were administered 30 minutes prior to expected start time. The patient was placed in the dorsal lithotomy position and prepped and draped in the normal and sterile fashion. Time out was performed.      A rigid cystoscope in a 22 Fr sheath was introduced into the bladder per urethra. This passed easily.  The entire urethra was visualized which showed no masses or strictures.  The right and left ureteral orifices were identified in the normal anatomic position and were seen effluxing clear urine.  Formal cystoscopy was performed which revealed a small, fine papillary tumor suspicious for malignancy close to the left ureteral orifice. There were no trabeculations, bladder stones and/or bladder diverticuli. The tumor was then resected, superficially until the base was identified.  The specimen was then removed and passed off the field for pathologic  analysis.    A second separate and distinct 1 cm lesion was identified on the right posterior wall. This lesion was copiously fulgurated.    Due to the tumor location, a JJ stent was determined to be needed to prevent scarring of the orifice. The left UO was identified and cannulated with a Glide wire through an open-ended catheter.  A RGP was performed which outlined the left collecting system. A JJ stent was then placed in the usual fashion under cystoscopic and fluoroscopic guidance. Once placed, the tumor bed was then copiously    The resectoscope was removed.  A 20 Fr villalobos catheter was placed with 10 cc in the balloon.  The bladder was then irrigated. 40 mg of mitomycin C in 20 ml of sterile saline were then placed in the bladder, and the Villalobos catheter was then clamped.    The patient tolerated the procedure well and was transferred to recovery in stable condition.    Disposition:  The patient will follow up with Dr. Shah in approximately 2 weeks.  Prescriptions were given for detrol.      Isaias Shah MD

## 2018-09-12 NOTE — ANESTHESIA POSTPROCEDURE EVALUATION
"Anesthesia Post Evaluation    Patient: Selam Botello    Procedure(s) Performed: Procedure(s) (LRB):  RESECTION,NEOPLASM,BLADDER,TRANSURETHRAL (N/A)  CYSTOSCOPY, WITH RETROGRADE PYELOGRAM AND URETERAL STENT INSERTION (Left)  INSTILLATION, URINARY BLADDER (N/A)    Final Anesthesia Type: general  Patient location during evaluation: PACU  Patient participation: Yes- Able to Participate  Level of consciousness: awake and alert  Post-procedure vital signs: reviewed and stable  Pain management: adequate  Airway patency: patent  PONV status at discharge: No PONV  Anesthetic complications: no      Cardiovascular status: blood pressure returned to baseline  Respiratory status: unassisted  Hydration status: euvolemic  Follow-up not needed.        Visit Vitals  BP (!) 141/79   Pulse 69   Temp 36.7 °C (98 °F) (Oral)   Resp 19   Ht 5' 2.25" (1.581 m)   Wt 60.2 kg (132 lb 11.5 oz)   SpO2 98%   Breastfeeding? No   BMI 24.08 kg/m²       Pain/Ortiz Score: Pain Assessment Performed: Yes (9/12/2018  2:00 PM)  Presence of Pain: complains of pain/discomfort (9/12/2018  2:00 PM)  Ortiz Score: 10 (9/12/2018  2:00 PM)        "

## 2018-09-14 VITALS
TEMPERATURE: 98 F | DIASTOLIC BLOOD PRESSURE: 79 MMHG | OXYGEN SATURATION: 98 % | SYSTOLIC BLOOD PRESSURE: 141 MMHG | WEIGHT: 132.69 LBS | HEART RATE: 69 BPM | RESPIRATION RATE: 19 BRPM | HEIGHT: 62 IN | BODY MASS INDEX: 24.42 KG/M2

## 2018-09-17 ENCOUNTER — PATIENT MESSAGE (OUTPATIENT)
Dept: HEMATOLOGY/ONCOLOGY | Facility: CLINIC | Age: 73
End: 2018-09-17

## 2018-09-17 DIAGNOSIS — D50.0 IRON DEFICIENCY ANEMIA DUE TO CHRONIC BLOOD LOSS: Primary | ICD-10-CM

## 2018-09-20 RX ORDER — TOLTERODINE 2 MG/1
CAPSULE, EXTENDED RELEASE ORAL
Qty: 10 CAPSULE | Refills: 0 | Status: SHIPPED | OUTPATIENT
Start: 2018-09-20 | End: 2018-11-27

## 2018-09-26 ENCOUNTER — OFFICE VISIT (OUTPATIENT)
Dept: UROLOGY | Facility: CLINIC | Age: 73
End: 2018-09-26
Payer: MEDICARE

## 2018-09-26 VITALS — HEIGHT: 62 IN | WEIGHT: 132.69 LBS | BODY MASS INDEX: 24.42 KG/M2

## 2018-09-26 DIAGNOSIS — C67.2 MALIGNANT NEOPLASM OF LATERAL WALL OF URINARY BLADDER: ICD-10-CM

## 2018-09-26 DIAGNOSIS — R82.90 ABNORMAL URINE FINDING: Primary | ICD-10-CM

## 2018-09-26 LAB
BILIRUB SERPL-MCNC: NORMAL MG/DL
BLOOD URINE, POC: 50
COLOR, POC UA: NORMAL
GLUCOSE UR QL STRIP: NORMAL
KETONES UR QL STRIP: NORMAL
LEUKOCYTE ESTERASE URINE, POC: NORMAL
NITRITE, POC UA: NORMAL
PH, POC UA: 7
PROTEIN, POC: NORMAL
SPECIFIC GRAVITY, POC UA: 10.15
UROBILINOGEN, POC UA: NORMAL

## 2018-09-26 PROCEDURE — 81002 URINALYSIS NONAUTO W/O SCOPE: CPT | Mod: PBBFAC | Performed by: UROLOGY

## 2018-09-26 PROCEDURE — 52310 CYSTOSCOPY AND TREATMENT: CPT | Mod: S$PBB,,, | Performed by: UROLOGY

## 2018-09-26 PROCEDURE — 99024 POSTOP FOLLOW-UP VISIT: CPT | Mod: POP,,, | Performed by: UROLOGY

## 2018-09-26 PROCEDURE — 99999 PR PBB SHADOW E&M-EST. PATIENT-LVL III: CPT | Mod: PBBFAC,,, | Performed by: UROLOGY

## 2018-09-26 PROCEDURE — 99213 OFFICE O/P EST LOW 20 MIN: CPT | Mod: PBBFAC,25 | Performed by: UROLOGY

## 2018-09-26 PROCEDURE — 52310 CYSTOSCOPY AND TREATMENT: CPT | Mod: PBBFAC | Performed by: UROLOGY

## 2018-09-26 NOTE — PATIENT INSTRUCTIONS
Understanding Bladder Cancer    The urinary system includes the kidneys, ureters, bladder, and urethra. It makes, stores, and gets rid of liquid waste called urine. The two kidneys filter blood to collect waste and make urine. The ureters are small tubes that carry urine from each kidney to the bladder. The bladder is where urine is stored before it leaves the body. The urethra is the tube urine goes through to leave the body.   Bladder cancer means that certain cells in the bladder have changed in ways that aren't normal.  When bladder cancer forms  Cancer is a disease that causes cells to change and multiply out of control. The multiplying cells may form a lump of tissue (tumor). With time, the cancer cells destroy healthy tissue. They may spread to other parts of the body. Why some cells become cancerous is not always clear. But bladder cancer is strongly linked to cigarette smoking. The longer a person smokes and the more a person smokes, the greater that persons chances of developing bladder cancer.  Types of cancer that may form  Bladder cancer may grow in different ways:  · Papillary tumors stick out from the bladder lining on a stalk. They tend to grow into the bladder cavity, away from the bladder wall, instead of deeper into the layers of the bladder wall.  · Flat tumors do not stick out from the bladder lining. These tumors are much more likely than papillary tumors to grow deeper into the layers of the bladder wall.  · Carcinoma in situ (CIS) is a cancerous patch of cells that is only in the inner layer of the bladder lining and has not spread to deeper tissue. The patch may look almost normal or may look inflamed.    Each type of tumor can be present in one or more areas of the bladder, and more than one type can be present at the same time.  Date Last Reviewed: 2/17/2016  © 3952-5675 [employer]. [address] [place] [address], [place] [address]. All rights reserved. This information is not  intended as a substitute for professional medical care. Always follow your healthcare professional's instructions.

## 2018-09-28 ENCOUNTER — TELEPHONE (OUTPATIENT)
Dept: HEMATOLOGY/ONCOLOGY | Facility: CLINIC | Age: 73
End: 2018-09-28

## 2018-09-28 NOTE — TELEPHONE ENCOUNTER
SW called and left message asking for pt to call back at her convenience (to check on her and follow up about her distress score). SW left her contact info on vm. SW will f/u again in next few weeks.     Pt returned call. SW made introductions and indicated she wanted to check on her given her distress score last month. She reported she is feeling much better since seeing Dr. Shah and having her stint removed this past week. She talked fondly of Dr. Neri and how he has been a great doctor to her whole family. Pt feels reassured when she can trust her providers. She also thanked SW for calling as other depts do not always call her to check on her. CRUZITO will f/u as needed/requested.

## 2018-10-04 ENCOUNTER — PATIENT MESSAGE (OUTPATIENT)
Dept: HEMATOLOGY/ONCOLOGY | Facility: CLINIC | Age: 73
End: 2018-10-04

## 2018-10-09 ENCOUNTER — TELEPHONE (OUTPATIENT)
Dept: UROLOGY | Facility: CLINIC | Age: 73
End: 2018-10-09

## 2018-10-09 DIAGNOSIS — R10.9 LEFT FLANK PAIN: Primary | ICD-10-CM

## 2018-10-09 NOTE — TELEPHONE ENCOUNTER
----- Message from Isaias Shah MD sent at 10/9/2018  8:26 AM CDT -----  Please arrange for a renal ultrasound for this patient.  Order is in.  Thanks    Douglas

## 2018-10-17 ENCOUNTER — TELEPHONE (OUTPATIENT)
Dept: RADIOLOGY | Facility: HOSPITAL | Age: 73
End: 2018-10-17

## 2018-10-18 ENCOUNTER — HOSPITAL ENCOUNTER (OUTPATIENT)
Dept: RADIOLOGY | Facility: HOSPITAL | Age: 73
Discharge: HOME | End: 2018-10-18
Attending: UROLOGY
Payer: MEDICARE

## 2018-10-18 ENCOUNTER — LAB VISIT (OUTPATIENT)
Dept: LAB | Facility: HOSPITAL | Age: 73
End: 2018-10-18
Attending: INTERNAL MEDICINE
Payer: MEDICARE

## 2018-10-18 DIAGNOSIS — R10.9 LEFT FLANK PAIN: ICD-10-CM

## 2018-10-18 DIAGNOSIS — D50.0 IRON DEFICIENCY ANEMIA DUE TO CHRONIC BLOOD LOSS: ICD-10-CM

## 2018-10-18 LAB
BASOPHILS # BLD AUTO: 0.02 K/UL
BASOPHILS # BLD AUTO: 0.02 K/UL
BASOPHILS NFR BLD: 0.4 %
BASOPHILS NFR BLD: 0.4 %
DIFFERENTIAL METHOD: ABNORMAL
DIFFERENTIAL METHOD: ABNORMAL
EOSINOPHIL # BLD AUTO: 0.1 K/UL
EOSINOPHIL # BLD AUTO: 0.1 K/UL
EOSINOPHIL NFR BLD: 1.7 %
EOSINOPHIL NFR BLD: 1.7 %
ERYTHROCYTE [DISTWIDTH] IN BLOOD BY AUTOMATED COUNT: 12.9 %
ERYTHROCYTE [DISTWIDTH] IN BLOOD BY AUTOMATED COUNT: 12.9 %
FERRITIN SERPL-MCNC: 56 NG/ML
HCT VFR BLD AUTO: 41 %
HCT VFR BLD AUTO: 41 %
HGB BLD-MCNC: 13.8 G/DL
HGB BLD-MCNC: 13.8 G/DL
IRON SERPL-MCNC: 184 UG/DL
LYMPHOCYTES # BLD AUTO: 1.1 K/UL
LYMPHOCYTES # BLD AUTO: 1.1 K/UL
LYMPHOCYTES NFR BLD: 23.5 %
LYMPHOCYTES NFR BLD: 23.5 %
MCH RBC QN AUTO: 33.3 PG
MCH RBC QN AUTO: 33.3 PG
MCHC RBC AUTO-ENTMCNC: 33.7 G/DL
MCHC RBC AUTO-ENTMCNC: 33.7 G/DL
MCV RBC AUTO: 99 FL
MCV RBC AUTO: 99 FL
MONOCYTES # BLD AUTO: 0.5 K/UL
MONOCYTES # BLD AUTO: 0.5 K/UL
MONOCYTES NFR BLD: 10 %
MONOCYTES NFR BLD: 10 %
NEUTROPHILS # BLD AUTO: 3 K/UL
NEUTROPHILS # BLD AUTO: 3 K/UL
NEUTROPHILS NFR BLD: 64.4 %
NEUTROPHILS NFR BLD: 64.4 %
PLATELET # BLD AUTO: 257 K/UL
PLATELET # BLD AUTO: 257 K/UL
PMV BLD AUTO: 9.8 FL
PMV BLD AUTO: 9.8 FL
RBC # BLD AUTO: 4.15 M/UL
RBC # BLD AUTO: 4.15 M/UL
SATURATED IRON: 74 %
TOTAL IRON BINDING CAPACITY: 247 UG/DL
TRANSFERRIN SERPL-MCNC: 167 MG/DL
WBC # BLD AUTO: 4.69 K/UL
WBC # BLD AUTO: 4.69 K/UL

## 2018-10-18 PROCEDURE — 76770 US EXAM ABDO BACK WALL COMP: CPT | Mod: 26,,, | Performed by: RADIOLOGY

## 2018-10-18 PROCEDURE — 36415 COLL VENOUS BLD VENIPUNCTURE: CPT | Mod: PO

## 2018-10-18 PROCEDURE — 82728 ASSAY OF FERRITIN: CPT

## 2018-10-18 PROCEDURE — 76770 US EXAM ABDO BACK WALL COMP: CPT | Mod: TC,PO

## 2018-10-18 PROCEDURE — 83540 ASSAY OF IRON: CPT

## 2018-10-18 PROCEDURE — 85025 COMPLETE CBC W/AUTO DIFF WBC: CPT | Mod: PO

## 2018-10-22 ENCOUNTER — TELEPHONE (OUTPATIENT)
Dept: RHEUMATOLOGY | Facility: CLINIC | Age: 73
End: 2018-10-22

## 2018-10-22 NOTE — TELEPHONE ENCOUNTER
----- Message from John Duque sent at 10/22/2018  8:55 AM CDT -----  Contact: Selam [phone]  Pt is needing to change her appt from 11/14 to 11/26 or 11/27. The system only shows 1/19 appt so can you please contact pt to get her fitted into the system.

## 2018-10-22 NOTE — TELEPHONE ENCOUNTER
Spoke with Mrs. Botello per her request appointment rescheduled to 11/27/2018 @ 8:30 am with Cassandra Mejia

## 2018-11-25 NOTE — PROGRESS NOTES
Subjective:       Patient ID: Selam Botello is a 73 y.o. female.    Chief Complaint: Osteoporosis    Mrs. Botello is here for follow up for osteoporosis and osteoarthritis (mainly rita thumbs). She has a h/o low grade bladder cancer, and last year, underwent resection by Dr. Issac Marroquin [facility] urologic oncology. She had a recurrence this year and had resection in September 2018. No other tx necessary, this is stable    Previously, she had been on fosamax and boniva for osteoporosis but had no improvement in her bmd.  She was moved to prolia and improved to osteopenia and given a prolia holiday. She had a repeat dexa 4.2017 showing osteoporosis at the radius. We resumed prolia last year but she missed a few doses due to her ca dx.  She re established care and her last dose was May 2018. No falls or fractures.  She has a history of vitamin D deficiency and is taking 50,000units weekly, last level 42    For her osteoarthritis, her pain is mainly in her hands/thumbs, she has bony enlargement to her pip and dip joints and her rita thumb mcps.  She has had a right TKA and left BROCK.  Her left knee has limited range of motion due to complications after her surgery. Unable to use mobic due to prev GI bleed.  She takes tylenol prn, lidoderm topical       Review of Systems   Constitutional: Negative for chills, fatigue and fever.   HENT: Negative for mouth sores, rhinorrhea and sore throat.    Eyes: Negative for pain and redness.   Respiratory: Negative for cough and shortness of breath.    Cardiovascular: Positive for palpitations and leg swelling (improved with vasculera). Negative for chest pain.   Gastrointestinal: Negative for abdominal pain, constipation, diarrhea, nausea and vomiting.         GI bleed   Endocrine:        Hypothyroid   Genitourinary: Negative for dysuria and hematuria.   Musculoskeletal: Positive for arthralgias. Negative for joint swelling and myalgias.        Right TKA,  "left BROCK   Skin: Negative for rash.   Neurological: Negative for weakness, numbness and headaches.   Psychiatric/Behavioral: The patient is not nervous/anxious.          Objective:   /72   Pulse 61   Ht 5' 2.5" (1.588 m)   Wt 63.9 kg (140 lb 14 oz)   BMI 25.36 kg/m²      Physical Exam   Constitutional: She is oriented to person, place, and time and well-developed, well-nourished, and in no distress.   HENT:   Head: Normocephalic and atraumatic.   Eyes: Pupils are equal, round, and reactive to light. Right eye exhibits no discharge.   Neck: Normal range of motion.   Cardiovascular: Normal rate, regular rhythm and normal heart sounds.  Exam reveals no friction rub.    Pulmonary/Chest: Effort normal and breath sounds normal. No respiratory distress.   Abdominal: Soft. She exhibits no distension. There is no tenderness.   Lymphadenopathy:     She has no cervical adenopathy.   Neurological: She is alert and oriented to person, place, and time.   Skin: No rash noted. No erythema.     Psychiatric: Mood normal.   Musculoskeletal: Normal range of motion. She exhibits no edema or deformity.   rita hands with oa changes to pip and dips, rita 1 mcps enlarged    Right TKA with decreased rom, flexion about 50-60 degress    Left brock    No swollen or tender joints           No results found for this or any previous visit (from the past 168 hour(s)).     Dexa 4/18/17 spine -1.4, radius UD -2.7, radius 33% -2.1, bmd increasing at Radius 33%, decreasing at spine  Assessment:       1. Age-related osteoporosis without current pathological fracture    2. Primary osteoarthritis involving multiple joints    3. Medication monitoring encounter        Impression:    OA bilateral hands: no nsaids due to prev GI bleeds, rita thumbs 1 mcp joints mainly,     Osteoporosis: previously on fosamax and boniva without improvement, prolia with improvement, given holiday but resumed 5/2017; due today     H/o bladder cancer: s/p resections x 2 see " hpi,  stable now without need for other tx    Med monitoring: ok for prolia, no issues    H/o vit D deficiency: taking vit D 50.000Units weekly, level normal now  Plan:         Labs reviewed and done within past 14 days  Ca 9.6, Creat 0.7 , eGFR 60  No contraindication for Prolia today, ok for nurse to administer today  Re-evaluate patient again in 6 months to determine if Prolia still medically indicated and appropriate to administer.    KDIGO lab monitoring will be followed according to KDIGO 2017 Clinical Practice Guideline Update for the Diagnosis, Evaluation, Prevention, and Treatment of Chronic Kidney Disease-Mineral and Bone Disorder (CKD-MBD)  Chapter 3.1: Diagnosis of CKD-MBD: biochemical abnormalities    cont vit D 50K  once weekly   Try voltaren gel for thumbs, avoids nsaids due to prev GI bleed, discussed low dose steroid for a few days if needed in future but hopefully can avoid this  dexa repeat in 4/2019    rtc in 6 months for cmp, prolia

## 2018-11-27 ENCOUNTER — LAB VISIT (OUTPATIENT)
Dept: LAB | Facility: HOSPITAL | Age: 73
End: 2018-11-27
Attending: INTERNAL MEDICINE
Payer: MEDICARE

## 2018-11-27 ENCOUNTER — OFFICE VISIT (OUTPATIENT)
Dept: RHEUMATOLOGY | Facility: CLINIC | Age: 73
End: 2018-11-27
Payer: MEDICARE

## 2018-11-27 VITALS
DIASTOLIC BLOOD PRESSURE: 72 MMHG | WEIGHT: 140.88 LBS | BODY MASS INDEX: 24.96 KG/M2 | HEIGHT: 63 IN | SYSTOLIC BLOOD PRESSURE: 114 MMHG | HEART RATE: 61 BPM

## 2018-11-27 DIAGNOSIS — M81.0 AGE-RELATED OSTEOPOROSIS WITHOUT CURRENT PATHOLOGICAL FRACTURE: ICD-10-CM

## 2018-11-27 DIAGNOSIS — M81.0 AGE-RELATED OSTEOPOROSIS WITHOUT CURRENT PATHOLOGICAL FRACTURE: Primary | ICD-10-CM

## 2018-11-27 DIAGNOSIS — Z51.81 MEDICATION MONITORING ENCOUNTER: ICD-10-CM

## 2018-11-27 DIAGNOSIS — M15.9 PRIMARY OSTEOARTHRITIS INVOLVING MULTIPLE JOINTS: ICD-10-CM

## 2018-11-27 LAB
ALBUMIN SERPL BCP-MCNC: 4 G/DL
ALP SERPL-CCNC: 61 U/L
ALT SERPL W/O P-5'-P-CCNC: 19 U/L
ANION GAP SERPL CALC-SCNC: 7 MMOL/L
AST SERPL-CCNC: 25 U/L
BILIRUB SERPL-MCNC: 0.9 MG/DL
BUN SERPL-MCNC: 18 MG/DL
CALCIUM SERPL-MCNC: 9.6 MG/DL
CHLORIDE SERPL-SCNC: 107 MMOL/L
CO2 SERPL-SCNC: 28 MMOL/L
CREAT SERPL-MCNC: 0.7 MG/DL
EST. GFR  (AFRICAN AMERICAN): >60 ML/MIN/1.73 M^2
EST. GFR  (NON AFRICAN AMERICAN): >60 ML/MIN/1.73 M^2
GLUCOSE SERPL-MCNC: 97 MG/DL
POTASSIUM SERPL-SCNC: 4 MMOL/L
PROT SERPL-MCNC: 6.3 G/DL
SODIUM SERPL-SCNC: 142 MMOL/L

## 2018-11-27 PROCEDURE — 99214 OFFICE O/P EST MOD 30 MIN: CPT | Mod: PBBFAC,PO,25 | Performed by: PHYSICIAN ASSISTANT

## 2018-11-27 PROCEDURE — 99214 OFFICE O/P EST MOD 30 MIN: CPT | Mod: S$PBB,,, | Performed by: PHYSICIAN ASSISTANT

## 2018-11-27 PROCEDURE — 96372 THER/PROPH/DIAG INJ SC/IM: CPT | Mod: PBBFAC,PO

## 2018-11-27 PROCEDURE — 80053 COMPREHEN METABOLIC PANEL: CPT | Mod: PO

## 2018-11-27 PROCEDURE — 36415 COLL VENOUS BLD VENIPUNCTURE: CPT | Mod: PO

## 2018-11-27 PROCEDURE — 99999 PR PBB SHADOW E&M-EST. PATIENT-LVL IV: CPT | Mod: PBBFAC,,, | Performed by: PHYSICIAN ASSISTANT

## 2018-11-27 RX ORDER — DICLOFENAC SODIUM 10 MG/G
2 GEL TOPICAL 4 TIMES DAILY
Qty: 100 G | Refills: 3 | Status: SHIPPED | OUTPATIENT
Start: 2018-11-27 | End: 2018-11-27 | Stop reason: SDUPTHER

## 2018-11-27 RX ORDER — DICLOFENAC SODIUM 10 MG/G
2 GEL TOPICAL 4 TIMES DAILY
Qty: 100 G | Refills: 3 | Status: SHIPPED | OUTPATIENT
Start: 2018-11-27 | End: 2019-10-22 | Stop reason: CLARIF

## 2018-11-27 RX ADMIN — DENOSUMAB 60 MG: 60 INJECTION SUBCUTANEOUS at 09:11

## 2018-11-27 NOTE — PATIENT INSTRUCTIONS
Diclofenac gel topical can use 4 x day     prolia shot today and in 6 months     Bone density next year     Vitamin D once weekly

## 2018-11-27 NOTE — PROGRESS NOTES
Administered 1cc Prolia 60mg/cc to LLQ of abdomen. Ca 9.6 Creat 0.7 Pt. Tolerated well. No acute reaction noted to site. Pt. Instructed on S/S of reaction to report. Pt. Verbalized understanding. Pt waited 15 minutes.    Lot:[id number]  Exp:01/21  Manu:[email or web address]

## 2018-11-28 ENCOUNTER — PATIENT MESSAGE (OUTPATIENT)
Dept: HEMATOLOGY/ONCOLOGY | Facility: CLINIC | Age: 73
End: 2018-11-28

## 2018-12-10 RX ORDER — LIDOCAINE 50 MG/G
PATCH CUTANEOUS
Qty: 30 PATCH | Refills: 2 | Status: SHIPPED | OUTPATIENT
Start: 2018-12-10 | End: 2018-12-18 | Stop reason: SDUPTHER

## 2018-12-10 RX ORDER — LIDOCAINE AND PRILOCAINE 25; 25 MG/G; MG/G
CREAM TOPICAL
Qty: 30 G | Refills: 0 | Status: SHIPPED | OUTPATIENT
Start: 2018-12-10 | End: 2022-08-17

## 2018-12-19 ENCOUNTER — TELEPHONE (OUTPATIENT)
Dept: HEMATOLOGY/ONCOLOGY | Facility: CLINIC | Age: 73
End: 2018-12-19

## 2018-12-19 ENCOUNTER — PATIENT MESSAGE (OUTPATIENT)
Dept: HEMATOLOGY/ONCOLOGY | Facility: CLINIC | Age: 73
End: 2018-12-19

## 2018-12-19 DIAGNOSIS — D50.0 IRON DEFICIENCY ANEMIA DUE TO CHRONIC BLOOD LOSS: Primary | ICD-10-CM

## 2018-12-19 RX ORDER — LIDOCAINE 50 MG/G
PATCH CUTANEOUS
Qty: 30 PATCH | Refills: 3 | Status: SHIPPED | OUTPATIENT
Start: 2018-12-19 | End: 2019-04-04 | Stop reason: SDUPTHER

## 2018-12-19 NOTE — TELEPHONE ENCOUNTER
----- Message from Claudia Price sent at 12/19/2018  8:37 AM CST -----  Contact: pt   Pt states that she has a concern and need to discuss it with the nurse.   [phone] (home)

## 2018-12-19 NOTE — TELEPHONE ENCOUNTER
Spoke with patient, she stated she feels like she is anemic and is complaining that she feels tired and her finger nails look white. She would like for Dr. Neri to put in orders for lab work. Advised patient that I would ask Dr. Neri and send her a message through the portal with either a lab appointment or a possible follow-up with Dr. Neri. Patient verbalized understanding.

## 2018-12-19 NOTE — TELEPHONE ENCOUNTER
----- Message from Claudia Price sent at 12/19/2018  8:37 AM CST -----  Contact: pt   Pt states that she has a concern and need to discuss it with the nurse.   [phone] (home)

## 2018-12-20 ENCOUNTER — LAB VISIT (OUTPATIENT)
Dept: LAB | Facility: HOSPITAL | Age: 73
End: 2018-12-20
Attending: INTERNAL MEDICINE
Payer: MEDICARE

## 2018-12-20 DIAGNOSIS — D50.0 IRON DEFICIENCY ANEMIA DUE TO CHRONIC BLOOD LOSS: ICD-10-CM

## 2018-12-20 LAB
BASOPHILS # BLD AUTO: 0.02 K/UL
BASOPHILS NFR BLD: 0.4 %
DIFFERENTIAL METHOD: ABNORMAL
EOSINOPHIL # BLD AUTO: 0.1 K/UL
EOSINOPHIL NFR BLD: 2.6 %
ERYTHROCYTE [DISTWIDTH] IN BLOOD BY AUTOMATED COUNT: 12.9 %
FERRITIN SERPL-MCNC: 54 NG/ML
HCT VFR BLD AUTO: 41.9 %
HGB BLD-MCNC: 13.8 G/DL
IRON SERPL-MCNC: 90 UG/DL
LYMPHOCYTES # BLD AUTO: 1.2 K/UL
LYMPHOCYTES NFR BLD: 25.9 %
MCH RBC QN AUTO: 32.4 PG
MCHC RBC AUTO-ENTMCNC: 32.9 G/DL
MCV RBC AUTO: 98 FL
MONOCYTES # BLD AUTO: 0.5 K/UL
MONOCYTES NFR BLD: 9.9 %
NEUTROPHILS # BLD AUTO: 2.8 K/UL
NEUTROPHILS NFR BLD: 61.2 %
PLATELET # BLD AUTO: 264 K/UL
PMV BLD AUTO: 9.5 FL
RBC # BLD AUTO: 4.26 M/UL
SATURATED IRON: 34 %
TOTAL IRON BINDING CAPACITY: 265 UG/DL
TRANSFERRIN SERPL-MCNC: 179 MG/DL
WBC # BLD AUTO: 4.64 K/UL

## 2018-12-20 PROCEDURE — 85025 COMPLETE CBC W/AUTO DIFF WBC: CPT | Mod: PO

## 2018-12-20 PROCEDURE — 36415 COLL VENOUS BLD VENIPUNCTURE: CPT | Mod: PO

## 2018-12-20 PROCEDURE — 83540 ASSAY OF IRON: CPT

## 2018-12-20 PROCEDURE — 82728 ASSAY OF FERRITIN: CPT

## 2019-01-07 ENCOUNTER — PATIENT MESSAGE (OUTPATIENT)
Dept: HEMATOLOGY/ONCOLOGY | Facility: CLINIC | Age: 74
End: 2019-01-07

## 2019-01-07 ENCOUNTER — PATIENT MESSAGE (OUTPATIENT)
Dept: UROLOGY | Facility: CLINIC | Age: 74
End: 2019-01-07

## 2019-01-08 DIAGNOSIS — N13.1 HYDRONEPHROSIS WITH URETERAL STRICTURE, NOT ELSEWHERE CLASSIFIED: Primary | ICD-10-CM

## 2019-01-16 ENCOUNTER — TELEPHONE (OUTPATIENT)
Dept: RADIOLOGY | Facility: HOSPITAL | Age: 74
End: 2019-01-16

## 2019-01-16 ENCOUNTER — PATIENT MESSAGE (OUTPATIENT)
Dept: UROLOGY | Facility: CLINIC | Age: 74
End: 2019-01-16

## 2019-01-17 ENCOUNTER — PATIENT MESSAGE (OUTPATIENT)
Dept: HEMATOLOGY/ONCOLOGY | Facility: CLINIC | Age: 74
End: 2019-01-17

## 2019-01-17 ENCOUNTER — HOSPITAL ENCOUNTER (OUTPATIENT)
Dept: RADIOLOGY | Facility: HOSPITAL | Age: 74
Discharge: HOME | End: 2019-01-17
Attending: UROLOGY
Payer: MEDICARE

## 2019-01-17 ENCOUNTER — LAB VISIT (OUTPATIENT)
Dept: LAB | Facility: HOSPITAL | Age: 74
End: 2019-01-17
Attending: INTERNAL MEDICINE
Payer: MEDICARE

## 2019-01-17 DIAGNOSIS — N13.1 HYDRONEPHROSIS WITH URETERAL STRICTURE, NOT ELSEWHERE CLASSIFIED: ICD-10-CM

## 2019-01-17 DIAGNOSIS — M81.0 AGE-RELATED OSTEOPOROSIS WITHOUT CURRENT PATHOLOGICAL FRACTURE: ICD-10-CM

## 2019-01-17 LAB
ALBUMIN SERPL BCP-MCNC: 4.1 G/DL
ALP SERPL-CCNC: 56 U/L
ALT SERPL W/O P-5'-P-CCNC: 25 U/L
ANION GAP SERPL CALC-SCNC: 8 MMOL/L
AST SERPL-CCNC: 26 U/L
BILIRUB SERPL-MCNC: 0.9 MG/DL
BUN SERPL-MCNC: 15 MG/DL
CALCIUM SERPL-MCNC: 9.6 MG/DL
CHLORIDE SERPL-SCNC: 107 MMOL/L
CO2 SERPL-SCNC: 28 MMOL/L
CREAT SERPL-MCNC: 0.7 MG/DL
EST. GFR  (AFRICAN AMERICAN): >60 ML/MIN/1.73 M^2
EST. GFR  (NON AFRICAN AMERICAN): >60 ML/MIN/1.73 M^2
GLUCOSE SERPL-MCNC: 90 MG/DL
POTASSIUM SERPL-SCNC: 4.2 MMOL/L
PROT SERPL-MCNC: 6.3 G/DL
SODIUM SERPL-SCNC: 143 MMOL/L

## 2019-01-17 PROCEDURE — 74178 CT UROGRAM ABD PELVIS W WO: ICD-10-PCS | Mod: 26,,, | Performed by: RADIOLOGY

## 2019-01-17 PROCEDURE — 80053 COMPREHEN METABOLIC PANEL: CPT

## 2019-01-17 PROCEDURE — 36415 COLL VENOUS BLD VENIPUNCTURE: CPT

## 2019-01-17 PROCEDURE — 25500020 PHARM REV CODE 255: Performed by: UROLOGY

## 2019-01-17 PROCEDURE — 74178 CT ABD&PLV WO CNTR FLWD CNTR: CPT | Mod: TC

## 2019-01-17 PROCEDURE — 74178 CT ABD&PLV WO CNTR FLWD CNTR: CPT | Mod: 26,,, | Performed by: RADIOLOGY

## 2019-01-17 RX ADMIN — IOHEXOL 150 ML: 350 INJECTION, SOLUTION INTRAVENOUS at 11:01

## 2019-02-04 ENCOUNTER — OFFICE VISIT (OUTPATIENT)
Dept: INTERNAL MEDICINE | Facility: CLINIC | Age: 74
End: 2019-02-04
Payer: MEDICARE

## 2019-02-04 VITALS
SYSTOLIC BLOOD PRESSURE: 104 MMHG | DIASTOLIC BLOOD PRESSURE: 70 MMHG | BODY MASS INDEX: 25.08 KG/M2 | OXYGEN SATURATION: 95 % | HEART RATE: 74 BPM | TEMPERATURE: 97 F | WEIGHT: 139.31 LBS

## 2019-02-04 DIAGNOSIS — E03.9 HYPOTHYROIDISM, UNSPECIFIED TYPE: ICD-10-CM

## 2019-02-04 DIAGNOSIS — Z23 NEED FOR VACCINATION: Primary | ICD-10-CM

## 2019-02-04 DIAGNOSIS — C68.9 UROTHELIAL CARCINOMA: ICD-10-CM

## 2019-02-04 DIAGNOSIS — Z13.6 SCREENING FOR CARDIOVASCULAR CONDITION: ICD-10-CM

## 2019-02-04 DIAGNOSIS — D50.0 IRON DEFICIENCY ANEMIA DUE TO CHRONIC BLOOD LOSS: ICD-10-CM

## 2019-02-04 PROBLEM — R00.2 HEART PALPITATIONS: Status: ACTIVE | Noted: 2018-01-08

## 2019-02-04 PROBLEM — Z90.13 H/O BILATERAL MASTECTOMY: Status: ACTIVE | Noted: 2018-01-08

## 2019-02-04 PROBLEM — I47.19 ATRIAL TACHYCARDIA, PAROXYSMAL: Status: ACTIVE | Noted: 2018-06-18

## 2019-02-04 PROBLEM — D64.9 ANEMIA: Status: ACTIVE | Noted: 2017-12-18

## 2019-02-04 PROBLEM — I36.1 TRICUSPID VALVE INSUFFICIENCY, NON-RHEUMATIC: Status: ACTIVE | Noted: 2018-06-28

## 2019-02-04 PROBLEM — I87.2 VENOUS INSUFFICIENCY: Status: ACTIVE | Noted: 2017-12-18

## 2019-02-04 PROBLEM — R53.82 CHRONIC FATIGUE: Status: ACTIVE | Noted: 2017-12-18

## 2019-02-04 PROCEDURE — 99999 PR PBB SHADOW E&M-EST. PATIENT-LVL III: CPT | Mod: PBBFAC,,, | Performed by: FAMILY MEDICINE

## 2019-02-04 PROCEDURE — 99213 OFFICE O/P EST LOW 20 MIN: CPT | Mod: PBBFAC,PN | Performed by: FAMILY MEDICINE

## 2019-02-04 PROCEDURE — 99204 PR OFFICE/OUTPT VISIT, NEW, LEVL IV, 45-59 MIN: ICD-10-PCS | Mod: S$PBB,,, | Performed by: FAMILY MEDICINE

## 2019-02-04 PROCEDURE — 99204 OFFICE O/P NEW MOD 45 MIN: CPT | Mod: S$PBB,,, | Performed by: FAMILY MEDICINE

## 2019-02-04 PROCEDURE — 99999 PR PBB SHADOW E&M-EST. PATIENT-LVL III: ICD-10-PCS | Mod: PBBFAC,,, | Performed by: FAMILY MEDICINE

## 2019-02-04 PROCEDURE — G0009 ADMIN PNEUMOCOCCAL VACCINE: HCPCS | Mod: PBBFAC,PN

## 2019-02-04 RX ORDER — MOMETASONE FUROATE 50 UG/1
2 SPRAY, METERED NASAL DAILY PRN
Refills: 3 | COMMUNITY
Start: 2018-12-08 | End: 2021-04-12

## 2019-02-04 NOTE — ASSESSMENT & PLAN NOTE
Check thyroid labs on current dose supplementation (25mcd/day)  
Follows with Dr. Neri heme/onc  
Follows with uro onc as well as with Dr. Neri  
No

## 2019-02-04 NOTE — PROGRESS NOTES
Subjective:       Patient ID: Selam Botello is a 73 y.o. female.    Chief Complaint: Establish Care    74 yo female here to establish care.  She has a complicated pmh including RA, OA, hypothyroidism, GI bleed (needing transfusions), Breast cyst and bladder cancer. Pt. C/o gradually worsening fatigue over the last 18 months but especially worse during the past 3 months. She has recently had several synthroid dose decreases by her cardiologist (Dr. Garcia) because of paroxysmal atrial tachycardia. Beta blockers proved inadequate for symptomatic relief so she was started on propafenone. Goal is to attempt wean of the antiarrhythmic medication.     Pneum 13 given 11/2017; has not had pneum 23  Last colonoscopy May 25, 2017 Dr. Dorsey and was told to repeat in 10 years. Last bone density April 18, 2017. She reports she is up-to-date with pneumonia vaccinations. She has previously received shingles vaccination. Last laboratory evaluation 2 months ago. She reports previous prophylactic bilateral mastectomy 1991.      does not have any pertinent problems on file.  Past Medical History:   Diagnosis Date    Acid reflux     Allergy     Arthritis     Bladder cancer     Breast cyst     Cataract     Colon polyp     Deep vein thrombosis     Degenerative disc disease     Encounter for blood transfusion     GI bleed     Hematuria, gross     Inflammatory bowel disease     Liver disease     cyst    Osteoporosis     PONV (postoperative nausea and vomiting)     Retina disorder, left     Skin cancer     Thyroid disease     Urinary tract infection      Past Surgical History:   Procedure Laterality Date    ADENOIDECTOMY      BREAST SURGERY      COLON SURGERY      CYSTOSCOPY      CYSTOSCOPY WITH RETROGRADE PYELOGRAM Bilateral 10/16/2017    [facility]    CYSTOSCOPY, WITH RETROGRADE PYELOGRAM AND URETERAL STENT INSERTION Left 9/12/2018    [facility]  at [facility] OR    EXCISION-BLADDER TUMOR-TRANSURETHRAL (TURBT) N/A 10/16/2017    [facility]    EYE SURGERY      FULGURATION, BLADDER Right 9/12/2018    [facility] at [facility] OR    INSTILLATION, URINARY BLADDER N/A 9/12/2018    [facility] at [facility] OR    JOINT REPLACEMENT Bilateral     hip    JOINT REPLACEMENT Right     knee    MASTECTOMY Bilateral 1991    RESECTION,NEOPLASM,BLADDER,TRANSURETHRAL N/A 9/12/2018    [facility] at [facility] OR    TONSILLECTOMY      TURBT      TYMPANOSTOMY TUBE PLACEMENT      VASCULAR SURGERY Right     popliteal stent    WISDOM TOOTH EXTRACTION       Family History   Problem Relation Age of Onset    COPD Mother     Cancer Mother     COPD Father     Cancer Father     Cancer Sister      Social History     Socioeconomic History    Marital status:      Spouse name: Not on file    Number of children: Not on file    Years of education: Not on file    Highest education level: Not on file   Social Needs    Financial resource strain: Not on file    Food insecurity - worry: Not on file    Food insecurity - inability: Not on file    Transportation needs - medical: Not on file    Transportation needs - non-medical: Not on file   Occupational History    Not on file   Tobacco Use    Smoking status: Never Smoker    Smokeless tobacco: Never Used   Substance and Sexual Activity    Alcohol use: No     Frequency: Never    Drug use: No    Sexual activity: Yes     Partners: Male   Other Topics Concern    Not on file   Social History Narrative    Not on file     Review of Systems   Constitutional: Positive for activity change and unexpected weight change.   HENT: Negative for hearing loss, rhinorrhea and trouble swallowing.    Eyes: Negative for discharge and visual disturbance.   Respiratory: Negative for chest tightness and wheezing.    Cardiovascular: Positive for  palpitations. Negative for chest pain.   Gastrointestinal: Negative for blood in stool, constipation, diarrhea and vomiting.   Endocrine: Negative for polydipsia and polyuria.   Genitourinary: Negative for difficulty urinating, dysuria, hematuria and menstrual problem.   Musculoskeletal: Positive for arthralgias and joint swelling. Negative for neck pain.   Neurological: Negative for weakness and headaches.   Psychiatric/Behavioral: Positive for dysphoric mood. Negative for confusion.       Objective:     Vitals:    02/04/19 1002   BP: 104/70   Pulse: 74   Temp: 97 °F (36.1 °C)        Physical Exam   Constitutional: She is oriented to person, place, and time. She appears well-developed and well-nourished. No distress.   HENT:   Head: Normocephalic and atraumatic.   Eyes: EOM are normal. Pupils are equal, round, and reactive to light.   Neck: Normal range of motion. Neck supple. No thyromegaly present.   Cardiovascular: Normal rate, regular rhythm and normal heart sounds. Exam reveals no gallop and no friction rub.   No murmur heard.  Pulmonary/Chest: Effort normal and breath sounds normal. No respiratory distress. She has no wheezes. She exhibits no tenderness.   Abdominal: Soft. Bowel sounds are normal. There is no tenderness.   Musculoskeletal: Normal range of motion. She exhibits no deformity.   Neurological: She is alert and oriented to person, place, and time.   Skin: Skin is warm and dry. No rash noted. She is not diaphoretic. No erythema.   Psychiatric: She has a normal mood and affect. Her behavior is normal. Judgment and thought content normal.   Nursing note and vitals reviewed.      Assessment:       1. Need for vaccination    2. Hypothyroidism, unspecified type    3. Screening for cardiovascular condition    4. Iron deficiency anemia due to chronic blood loss    5. Urothelial carcinoma        Plan:           Problem List Items Addressed This Visit        Oncology    Iron deficiency anemia due to chronic  blood loss    Current Assessment & Plan     Follows with Dr. Neri heme/onc         Urothelial carcinoma    Overview     Last Assessment & Plan:   · The patient is cleared for surgery         Current Assessment & Plan     Follows with uro onc as well as with Dr. Neri            Endocrine    Hypothyroidism    Overview     Overview:   · Synthroid has been reduced from 100 mcg progressively to 50 mcg between December 2017 and May 2018 in an effort to reduce her symptoms of palpitations/atrial tachycardia  · I suspect that excessive supplementation has provoked her atrial arrhythmias  · It comes to light that the patient may not even be hypothyroid.  She was started on Synthroid during her teenage years for irregular periods.    Last Assessment & Plan:   · Reduce Synthroid to 25 mcg daily  · T4 and TSH with review appointment in 2 months.         Current Assessment & Plan     Check thyroid labs on current dose supplementation (25mcd/day)         Relevant Orders    TSH    T4, free    T3      Other Visit Diagnoses     Need for vaccination    -  Primary    Relevant Orders    Pneumococcal Polysaccharide Vaccine (23 Valent) (SQ/IM) (Completed)    Screening for cardiovascular condition        Relevant Orders    Lipid panel      Reviewed cardiology notes and note from primary care visit 6/2018.

## 2019-02-04 NOTE — PATIENT INSTRUCTIONS
The shingles vaccine is available at your pharmacy; check with your rheumatologist to be sure this is recommended for you.      Mediterranean Food Guide   People who live in the area around the [place] have a lower risk of heart disease. Researchers have recently shown that following a Mediterranean diet decreases heart disease by 30% in people whom are at-risk.   This lifestyle is built upon daily exercise along with a lot of fruit, vegetables, plant-based proteins, whole grains, fish and smaller amounts of poultry and red meat. Fatty fish (salmon), olive oil, and nuts make this diet higher in fat than the classic heart healthy diet. These fats are mostly unsaturated, and when consumed in place of saturated fat, are good for the heart.   Physical Activity- The Mediterranean Diet pyramid is built upon daily physical activity and exercise. Aim for at least 150 minutes of moderate to vigorous exercise every week. Moderate-to-vigorous exercises include walking at a brisk pace, biking, or swimming, among other activities that elevate your heart rate. Always choose activities that you enjoy and that are safe, in order to be active throughout your life.   Achieve and Maintain a Healthy Weight - The high fat content of the Mediterranean is healthy for your heart, but may lead to increased energy intake and weight gain if you dont pay attention to how much you are eating. If you are trying to lose weight, choose the smaller number of servings from each food group, and try to make your serving sizes match those listed. 2   Food Groups and Servings per day  Serving Sizes    Non-starchy Vegetables   4-8 servings per day  One serving is ½ cup of cooked vegetables or 1 cup raw vegetables   Non-starchy vegetables include artichoke, asparagus, beets, broccoli, [place] sprouts, cauliflower, cabbage, celery, carrots, tomatoes, eggplant, cucumber, onion, green and wax beans, zucchini, turnips, peppers, salad greens and  mushrooms. (Potatoes, peas, and corn are starchy vegetables.)    Fruit   2-4 servings per day  One serving is a small fresh fruit or ½ cup juice or ¼ cup dried fruit   Fresh fruits are preferred because of the fiber and other nutrients they contain. Fruits canned in light syrup or their own juice, and frozen fruit with little or no added sugar are also good choices. Use only small amounts of fruit juice (6 oz per day or less), since even unsweetened juices can contain as much sugar as regular soda.    Legumes and Nuts   1-3 servings per day  Legumes: One serving is ½ cup cooked kidney, black, garbanzo, prince, soy (edamame), navy beans, split peas, or lentils, or ¼ cup fat free refried beans or baked beans   Nuts and Seeds: One serving is 2 Tbsp. sunflower or sesame seeds, 1 Tbsp. peanut butter,   7-8 walnuts or pecans, 20 peanuts, or 12-15 almonds   Aim for 1-2 servings of nuts or seeds and 1-2 servings of legumes per day. Legumes are high in fiber, protein, and minerals. Nuts are high in unsaturated fat, and may increase HDL without increasing LDL cholesterol levels.    Low-fat Dairy Products   1-3 servings per day  One serving is 1 cup of skim milk, non-fat yogurt, or 1oz of low-fat (part-skim) cheese   Calcium-fortified soy milk, soy yogurt, and soy cheese can take the place of dairy products. If servings of dairy or fortified soy are less than 2 per day, we advise a calcium and vitamin D supplement.    Fish or shellfish   2-3 times a week  One serving is 3 ounces (about the size of a deck of cards)   Cook fish by baking, sautéing, broiling, roasting, grilling, or poaching. Choose fatty fishes like salmon, herring, sardines, or mackerel often. The fat in fish is high in omega-3 fats, so it has healthy effects on triglycerides and blood cells.    Poultry, if desired   1-3 times a week  One serving is 3 ounces (about the size of a deck of cards)   Bake, sauté, stir miguel, roast, or grill the poultry you eat, and eat  it without the skin.    Whole Grains and Starchy Vegetables   4-6 servings per day  One serving is about 1 ounce of any of these:   1 slice whole wheat bread ½ cup potatoes, sweet potatoes, corn, or peas   ½ large whole grain bun 1 small whole grain roll   6-inch whole wheat ochoa 6 whole grain crackers   ½ cup cooked whole grain cereal (oatmeal, cracked wheat, quinoa)   ½ cup cooked whole wheat pasta, brown rice, or barley   Whole grains are high in fiber and have less effect on blood sugar and triglyceride levels than refined, processed grains like white bread and pasta. Whole grains also keep the stomach full longer, making it easier to control hunger.

## 2019-02-05 ENCOUNTER — LAB VISIT (OUTPATIENT)
Dept: LAB | Facility: HOSPITAL | Age: 74
End: 2019-02-05
Payer: MEDICARE

## 2019-02-05 DIAGNOSIS — Z13.6 SCREENING FOR CARDIOVASCULAR CONDITION: ICD-10-CM

## 2019-02-05 DIAGNOSIS — E03.9 HYPOTHYROIDISM, UNSPECIFIED TYPE: ICD-10-CM

## 2019-02-05 LAB
CHOLEST SERPL-MCNC: 188 MG/DL
CHOLEST/HDLC SERPL: 2.2 {RATIO}
HDLC SERPL-MCNC: 85 MG/DL
HDLC SERPL: 45.2 %
LDLC SERPL CALC-MCNC: 93.2 MG/DL
NONHDLC SERPL-MCNC: 103 MG/DL
T3 SERPL-MCNC: 76 NG/DL
T4 FREE SERPL-MCNC: 0.78 NG/DL
TRIGL SERPL-MCNC: 49 MG/DL
TSH SERPL DL<=0.005 MIU/L-ACNC: 8.61 UIU/ML

## 2019-02-05 PROCEDURE — 36415 COLL VENOUS BLD VENIPUNCTURE: CPT

## 2019-02-05 PROCEDURE — 84443 ASSAY THYROID STIM HORMONE: CPT

## 2019-02-05 PROCEDURE — 80061 LIPID PANEL: CPT

## 2019-02-05 PROCEDURE — 84480 ASSAY TRIIODOTHYRONINE (T3): CPT

## 2019-02-05 PROCEDURE — 84439 ASSAY OF FREE THYROXINE: CPT

## 2019-02-06 ENCOUNTER — PATIENT MESSAGE (OUTPATIENT)
Dept: INTERNAL MEDICINE | Facility: CLINIC | Age: 74
End: 2019-02-06

## 2019-02-07 ENCOUNTER — TELEPHONE (OUTPATIENT)
Dept: RHEUMATOLOGY | Facility: CLINIC | Age: 74
End: 2019-02-07

## 2019-02-07 NOTE — TELEPHONE ENCOUNTER
----- Message from Josefa Avendaño sent at 2/7/2019  2:21 PM CST -----  Contact: Sincere/ [facility] [phone] ext [phone]  States that he has some clinical questions that needs to be answered regarding the rx for lidocaine. Please call back at [phone] ext [phone]//thank you acc

## 2019-02-11 ENCOUNTER — TELEPHONE (OUTPATIENT)
Dept: RHEUMATOLOGY | Facility: CLINIC | Age: 74
End: 2019-02-11

## 2019-02-11 NOTE — TELEPHONE ENCOUNTER
Returned Fran call with [facility] she had questions regarding her med for lidoderm gave answers to questions and Britt verbalized understanding.

## 2019-02-11 NOTE — TELEPHONE ENCOUNTER
----- Message from Yeni Singh sent at 2/11/2019  9:35 AM CST -----  [facility]  Type:  Needs Medical Advice    Who Called: thiago  Symptoms (please be specific): n/a   How long has patient had these symptoms:  n/a  Pharmacy name and phone #:  n/a  Would the patient rather a call back or a response via [facility]? Call back  Best Call Back Number: [phone]  Additional Information: requesting call back regarding pt diagnosis for lidocaine patch    Thanks,  Yeni Singh

## 2019-02-19 ENCOUNTER — TELEPHONE (OUTPATIENT)
Dept: RHEUMATOLOGY | Facility: CLINIC | Age: 74
End: 2019-02-19

## 2019-03-22 ENCOUNTER — TELEPHONE (OUTPATIENT)
Dept: UROLOGY | Facility: CLINIC | Age: 74
End: 2019-03-22

## 2019-04-02 DIAGNOSIS — E55.9 HYPOVITAMINOSIS D: ICD-10-CM

## 2019-04-02 RX ORDER — ERGOCALCIFEROL 1.25 MG/1
50000 CAPSULE ORAL
Qty: 12 CAPSULE | Refills: 3 | Status: SHIPPED | OUTPATIENT
Start: 2019-04-02 | End: 2019-12-17 | Stop reason: SDUPTHER

## 2019-04-04 RX ORDER — LIDOCAINE 50 MG/G
PATCH CUTANEOUS
Qty: 30 PATCH | Refills: 3 | Status: SHIPPED | OUTPATIENT
Start: 2019-04-04 | End: 2019-04-10

## 2019-04-04 RX ORDER — LIDOCAINE 50 MG/G
PATCH CUTANEOUS
Qty: 30 PATCH | Refills: 3 | Status: SHIPPED | OUTPATIENT
Start: 2019-04-04 | End: 2019-04-04 | Stop reason: SDUPTHER

## 2019-04-08 ENCOUNTER — TELEPHONE (OUTPATIENT)
Dept: RHEUMATOLOGY | Facility: CLINIC | Age: 74
End: 2019-04-08

## 2019-04-08 RX ORDER — DIOSMIN COMPLEX NO.1 630 MG
TABLET ORAL
Qty: 90 TABLET | Refills: 4 | Status: SHIPPED | OUTPATIENT
Start: 2019-04-08 | End: 2019-11-25 | Stop reason: SDUPTHER

## 2019-04-09 RX ORDER — LIDOCAINE 50 MG/G
1 PATCH TOPICAL DAILY
Qty: 30 PATCH | Refills: 11 | Status: SHIPPED | OUTPATIENT
Start: 2019-04-09 | End: 2019-04-10

## 2019-04-09 NOTE — TELEPHONE ENCOUNTER
Returned pharmacy call pharmacy states they got the approval for Lidoderm patches but they needed a copy sent over for the generic patches so they can refill this please advise thanks

## 2019-04-09 NOTE — TELEPHONE ENCOUNTER
----- Message from Mark Snell sent at 4/9/2019  9:57 AM CDT -----  Contact: [facility] [address]  Type:  Pharmacy Calling to Clarify an RX    Name of Caller: [facility]  [facility]  Prescription Name:Lididerm patches 5%  What do they need to clarify?:They need to get a override for the brand name patches sent the pt's insurance to issue the Rx,please advise.  Best Call Back Number: #[phone]  fax#[phone]  Additional Information:n/a

## 2019-04-10 ENCOUNTER — OFFICE VISIT (OUTPATIENT)
Dept: UROLOGY | Facility: CLINIC | Age: 74
End: 2019-04-10
Payer: MEDICARE

## 2019-04-10 ENCOUNTER — HOSPITAL ENCOUNTER (OUTPATIENT)
Dept: RADIOLOGY | Facility: HOSPITAL | Age: 74
Discharge: HOME | End: 2019-04-10
Attending: UROLOGY
Payer: MEDICARE

## 2019-04-10 VITALS — HEIGHT: 63 IN | BODY MASS INDEX: 24.63 KG/M2 | WEIGHT: 139 LBS

## 2019-04-10 DIAGNOSIS — C67.9 MALIGNANT NEOPLASM OF URINARY BLADDER, UNSPECIFIED SITE: ICD-10-CM

## 2019-04-10 DIAGNOSIS — C67.2 MALIGNANT NEOPLASM OF LATERAL WALL OF URINARY BLADDER: Primary | ICD-10-CM

## 2019-04-10 PROCEDURE — 99214 OFFICE O/P EST MOD 30 MIN: CPT | Mod: PBBFAC,25 | Performed by: UROLOGY

## 2019-04-10 PROCEDURE — 52000 CYSTOURETHROSCOPY: CPT | Mod: S$PBB,,, | Performed by: UROLOGY

## 2019-04-10 PROCEDURE — 99024 PR POST-OP FOLLOW-UP VISIT: ICD-10-PCS | Mod: POP,,, | Performed by: UROLOGY

## 2019-04-10 PROCEDURE — 71046 XR CHEST PA AND LATERAL: ICD-10-PCS | Mod: 26,,, | Performed by: RADIOLOGY

## 2019-04-10 PROCEDURE — 52000 PR CYSTOURETHROSCOPY: ICD-10-PCS | Mod: S$PBB,,, | Performed by: UROLOGY

## 2019-04-10 PROCEDURE — 71046 X-RAY EXAM CHEST 2 VIEWS: CPT | Mod: TC

## 2019-04-10 PROCEDURE — 99024 POSTOP FOLLOW-UP VISIT: CPT | Mod: POP,,, | Performed by: UROLOGY

## 2019-04-10 PROCEDURE — 99999 PR PBB SHADOW E&M-EST. PATIENT-LVL IV: CPT | Mod: PBBFAC,,, | Performed by: UROLOGY

## 2019-04-10 PROCEDURE — 71046 X-RAY EXAM CHEST 2 VIEWS: CPT | Mod: 26,,, | Performed by: RADIOLOGY

## 2019-04-10 PROCEDURE — 99999 PR PBB SHADOW E&M-EST. PATIENT-LVL IV: ICD-10-PCS | Mod: PBBFAC,,, | Performed by: UROLOGY

## 2019-04-10 PROCEDURE — 52000 CYSTOURETHROSCOPY: CPT | Mod: PBBFAC | Performed by: UROLOGY

## 2019-04-10 RX ORDER — LIDOCAINE 50 MG/G
PATCH CUTANEOUS
Qty: 30 PATCH | Refills: 3 | Status: SHIPPED | OUTPATIENT
Start: 2019-04-10 | End: 2021-04-08 | Stop reason: SDUPTHER

## 2019-04-10 NOTE — H&P (VIEW-ONLY)
4/10/2019     Pre Procedure Diagnosis: History of low-grade bladder cancer    Post Procedure Diagnosis:  1. Same      2. Recurrent, small bladder tumor    Anesthesia: 10 cc 2% lidocaine jelly applied per urethra.    14 FR Flexible Olympus cystoscope used.    FINDINGS: Small, papillary tumor at dome/anterior wall    Specimens: None    The patient was taken to the cystoscopy suite and placed in supine position with legs in frog legged position.  The genitalia was prepped and draped  in the usual sterile fashion.  Two percent lidocaine jelly was inserted in the urethra.  After sufficent time had passed to allow good local anesthesia, the cystoscope was inserted in the urethra.     The dome, anterior, posterior and lateral walls were examined systematically.  The ureteral orifices  in their usual position and configuration.  The cystoscope was turned upon itself 180 degrees to visualize the bladder neck. The cystoscope was then brought to the level of the bladder neck, the water was turned on and the urethra was visualized.  The cystoscope was removed and the patient was instructed to urinate proir to leaving the office.     ASSESSMENT/PLAN: Patient status post flexible cystoscopy.  1. Push fluids for 24 hours.  2. May see blood in the urine, this should gradually improve over the next 2-3 days.  3. The patient was instructed to return to the office or go to the emergency should fever, chills, cloudy urine, or inability to urinate develop.  4. Follow up in OR for TURBT.

## 2019-04-10 NOTE — PATIENT INSTRUCTIONS
Understanding Bladder Cancer    The urinary system includes the kidneys, ureters, bladder, and urethra. It makes, stores, and gets rid of liquid waste called urine. The two kidneys filter blood to collect waste and make urine. The ureters are small tubes that carry urine from each kidney to the bladder. The bladder is where urine is stored before it leaves the body. The urethra is the tube urine goes through to leave the body.   Bladder cancer means that certain cells in the bladder have changed in ways that aren't normal.  When bladder cancer forms  Cancer is a disease that causes cells to change and multiply out of control. The multiplying cells may form a lump of tissue (tumor). With time, the cancer cells destroy healthy tissue. They may spread to other parts of the body. Why some cells become cancerous is not always clear. But bladder cancer is strongly linked to cigarette smoking. The longer a person smokes and the more a person smokes, the greater that persons chances of developing bladder cancer.  Types of cancer that may form  Bladder cancer may grow in different ways:  · Papillary tumors stick out from the bladder lining on a stalk. They tend to grow into the bladder cavity, away from the bladder wall, instead of deeper into the layers of the bladder wall.  · Flat tumors do not stick out from the bladder lining. These tumors are much more likely than papillary tumors to grow deeper into the layers of the bladder wall.  · Carcinoma in situ (CIS) is a cancerous patch of cells that is only in the inner layer of the bladder lining and has not spread to deeper tissue. The patch may look almost normal or may look inflamed.    Each type of tumor can be present in one or more areas of the bladder, and more than one type can be present at the same time.  Date Last Reviewed: 2/17/2016  © 3673-2573 [employer]. [address] [place] [address], [place] [address]. All rights reserved. This information is not  intended as a substitute for professional medical care. Always follow your healthcare professional's instructions.

## 2019-04-11 ENCOUNTER — TELEPHONE (OUTPATIENT)
Dept: UROLOGY | Facility: CLINIC | Age: 74
End: 2019-04-11

## 2019-04-11 NOTE — PRE ADMISSION SCREENING
Pre op instructions reviewed with patient per phone:    To confirm, Your surgeon has instructed you:  Surgery is scheduled 04/24/2019 at 7:00 am.      Please report to [facility] 1st floor main lobby by 5:30 am.   Pre admit office to call afternoon prior to surgery with final arrival time      INSTRUCTIONS IMPORTANT!!!  ¨ Do not eat, drink, or smoke after 12 midnight-including water. OK to brush teeth, no gum, candy or mints!    ¨ Take only these medicines with a small swallow of water-morning of surgery.  Synthroid, refresh tears eye drops    ____  Do not wear makeup, including mascara.  ____  No powder, lotions or creams to surgical area.  ____  Please remove all jewelry, including piercings and leave at home.  ____  No money or valuables needed. Please leave at home.  ____  Please bring identification and insurance information to hospital.  ____  If going home the same day, arrange for a ride home. You will not be able to   drive if Anesthesia was used.  ____  Children, under 12 years old, must remain in the waiting room with an adult.  They are not allowed in patient areas.  ____  Wear loose fitting clothing. Allow for dressings, bandages.  ____  Stop Aspirin, Ibuprofen, Motrin and Aleve at least 5-7 days before surgery, unless otherwise instructed by your doctor, or the nurse.   You MAY use Tylenol/acetaminophen until day of surgery.  ____  If you take diabetic medication, do not take am of surgery unless instructed by   Doctor.  ____ Stop taking any Fish Oil supplement or any Vitamins that contain Vitamin E at least 5 days prior to surgery.          Bathing Instructions-- The night before surgery and the morning prior to coming to the hospital:   -Do not shave the surgical area.   -Shower and wash your hair and body as usual with anti-bacterial  soap and shampoo.   -Rinse your hair and body completely.   -Use one packet of hibiclens to wash the surgical site (using your hand) gently  for 5 minutes.  Do not scrub you skin too hard.   -Do not use hibiclens on your head, face, or genitals.   -Do not wash with anti-bacterial soap after you use the hibiclens.   -Rinse your body thoroughly.   -Dry with clean, soft towel.  Do not use lotion, cream, deodorant, or powders on   the surgical site.    Use antibacterial soap in place of hibiclens if your surgery is on the head, face or genitals.         Surgical Site Infection    Prevention of surgical site infections:     -Keep incisions clean and dry.   -Do not soak/submerge incisions in water until completely healed.   -Do not apply lotions, powders, creams, or deodorants to site.   -Always make sure hands are cleaned with antibacterial soap/ alcohol-based   prior to touching the surgical site.  (This includes doctors, nurses, staff, and yourself.)    Signs and symptoms:   -Redness and pain around the area where you had surgery   -Drainage of cloudy fluid from your surgical wound   -Fever over 100.4  I have read or had read and explained to me, and understand the above information.

## 2019-04-11 NOTE — TELEPHONE ENCOUNTER
----- Message from Shayy GRAHAM Chino RN sent at 4/11/2019  8:33 AM CDT -----   Dr Pablo Kovacs wanted me to send this to you since he saw her yesterday in [place]  ----- Message -----  From: Fazal Causey  Sent: 4/11/2019   8:13 AM  To: [facility]    ..Type:  Patient Returning Call    Who Called:pt   Who Left Message for Patient:Jacinta   Does the patient know what this is regarding?: apt  Would the patient rather a call back or a response via [facility]? Call back   Best Call Back Number: [phone]  Additional Information: Pt is requesting a call from nurse to discuss her apt.

## 2019-04-12 ENCOUNTER — OFFICE VISIT (OUTPATIENT)
Dept: HEMATOLOGY/ONCOLOGY | Facility: CLINIC | Age: 74
End: 2019-04-12
Payer: MEDICARE

## 2019-04-12 ENCOUNTER — LAB VISIT (OUTPATIENT)
Dept: LAB | Facility: HOSPITAL | Age: 74
End: 2019-04-12
Attending: INTERNAL MEDICINE
Payer: MEDICARE

## 2019-04-12 VITALS
HEIGHT: 62 IN | WEIGHT: 135.56 LBS | OXYGEN SATURATION: 96 % | TEMPERATURE: 98 F | SYSTOLIC BLOOD PRESSURE: 115 MMHG | HEART RATE: 69 BPM | BODY MASS INDEX: 24.95 KG/M2 | RESPIRATION RATE: 18 BRPM | DIASTOLIC BLOOD PRESSURE: 75 MMHG

## 2019-04-12 DIAGNOSIS — D50.0 IRON DEFICIENCY ANEMIA DUE TO CHRONIC BLOOD LOSS: ICD-10-CM

## 2019-04-12 DIAGNOSIS — C67.2 MALIGNANT NEOPLASM OF LATERAL WALL OF URINARY BLADDER: ICD-10-CM

## 2019-04-12 DIAGNOSIS — E03.9 ACQUIRED HYPOTHYROIDISM: ICD-10-CM

## 2019-04-12 DIAGNOSIS — K55.20 ANGIODYSPLASIA OF CECUM: ICD-10-CM

## 2019-04-12 DIAGNOSIS — M81.0 AGE-RELATED OSTEOPOROSIS WITHOUT CURRENT PATHOLOGICAL FRACTURE: ICD-10-CM

## 2019-04-12 DIAGNOSIS — D50.0 IRON DEFICIENCY ANEMIA DUE TO CHRONIC BLOOD LOSS: Primary | ICD-10-CM

## 2019-04-12 LAB
ALBUMIN SERPL BCP-MCNC: 4 G/DL (ref 3.5–5.2)
ALP SERPL-CCNC: 62 U/L (ref 55–135)
ALT SERPL W/O P-5'-P-CCNC: 19 U/L (ref 10–44)
ANION GAP SERPL CALC-SCNC: 6 MMOL/L (ref 8–16)
AST SERPL-CCNC: 20 U/L (ref 10–40)
BASOPHILS # BLD AUTO: 0.02 K/UL (ref 0–0.2)
BASOPHILS NFR BLD: 0.4 % (ref 0–1.9)
BILIRUB SERPL-MCNC: 0.6 MG/DL (ref 0.1–1)
BUN SERPL-MCNC: 12 MG/DL (ref 8–23)
CALCIUM SERPL-MCNC: 9.9 MG/DL (ref 8.7–10.5)
CHLORIDE SERPL-SCNC: 104 MMOL/L (ref 95–110)
CO2 SERPL-SCNC: 28 MMOL/L (ref 23–29)
CREAT SERPL-MCNC: 0.8 MG/DL (ref 0.5–1.4)
DIFFERENTIAL METHOD: ABNORMAL
EOSINOPHIL # BLD AUTO: 0.1 K/UL (ref 0–0.5)
EOSINOPHIL NFR BLD: 1.2 % (ref 0–8)
ERYTHROCYTE [DISTWIDTH] IN BLOOD BY AUTOMATED COUNT: 12.9 % (ref 11.5–14.5)
EST. GFR  (AFRICAN AMERICAN): >60 ML/MIN/1.73 M^2
EST. GFR  (NON AFRICAN AMERICAN): >60 ML/MIN/1.73 M^2
FERRITIN SERPL-MCNC: 88 NG/ML (ref 20–300)
GLUCOSE SERPL-MCNC: 94 MG/DL (ref 70–110)
HCT VFR BLD AUTO: 44.1 % (ref 37–48.5)
HGB BLD-MCNC: 14.6 G/DL (ref 12–16)
IMM GRANULOCYTES # BLD AUTO: 0.01 K/UL (ref 0–0.04)
IMM GRANULOCYTES NFR BLD AUTO: 0.2 % (ref 0–0.5)
IRON SERPL-MCNC: 183 UG/DL (ref 30–160)
LYMPHOCYTES # BLD AUTO: 1.4 K/UL (ref 1–4.8)
LYMPHOCYTES NFR BLD: 27 % (ref 18–48)
MCH RBC QN AUTO: 32.4 PG (ref 27–31)
MCHC RBC AUTO-ENTMCNC: 33.1 G/DL (ref 32–36)
MCV RBC AUTO: 98 FL (ref 82–98)
MONOCYTES # BLD AUTO: 0.4 K/UL (ref 0.3–1)
MONOCYTES NFR BLD: 7.9 % (ref 4–15)
NEUTROPHILS # BLD AUTO: 3.3 K/UL (ref 1.8–7.7)
NEUTROPHILS NFR BLD: 63.5 % (ref 38–73)
NRBC BLD-RTO: 0 /100 WBC
PLATELET # BLD AUTO: 298 K/UL (ref 150–350)
PMV BLD AUTO: 9 FL (ref 9.2–12.9)
POTASSIUM SERPL-SCNC: 4.8 MMOL/L (ref 3.5–5.1)
PROT SERPL-MCNC: 6.6 G/DL (ref 6–8.4)
RBC # BLD AUTO: 4.5 M/UL (ref 4–5.4)
SATURATED IRON: 71 % (ref 20–50)
SODIUM SERPL-SCNC: 138 MMOL/L (ref 136–145)
T3 SERPL-MCNC: 79 NG/DL (ref 60–180)
T4 FREE SERPL-MCNC: 0.74 NG/DL (ref 0.71–1.51)
TOTAL IRON BINDING CAPACITY: 258 UG/DL (ref 250–450)
TRANSFERRIN SERPL-MCNC: 174 MG/DL (ref 200–375)
TSH SERPL DL<=0.005 MIU/L-ACNC: 7.45 UIU/ML (ref 0.4–4)
WBC # BLD AUTO: 5.18 K/UL (ref 3.9–12.7)

## 2019-04-12 PROCEDURE — 80053 COMPREHEN METABOLIC PANEL: CPT

## 2019-04-12 PROCEDURE — 83540 ASSAY OF IRON: CPT

## 2019-04-12 PROCEDURE — 85025 COMPLETE CBC W/AUTO DIFF WBC: CPT

## 2019-04-12 PROCEDURE — 99999 PR PBB SHADOW E&M-EST. PATIENT-LVL III: ICD-10-PCS | Mod: PBBFAC,,, | Performed by: INTERNAL MEDICINE

## 2019-04-12 PROCEDURE — 36415 COLL VENOUS BLD VENIPUNCTURE: CPT

## 2019-04-12 PROCEDURE — 82728 ASSAY OF FERRITIN: CPT

## 2019-04-12 PROCEDURE — 99214 OFFICE O/P EST MOD 30 MIN: CPT | Mod: S$PBB,,, | Performed by: INTERNAL MEDICINE

## 2019-04-12 PROCEDURE — 84443 ASSAY THYROID STIM HORMONE: CPT

## 2019-04-12 PROCEDURE — 99214 PR OFFICE/OUTPT VISIT, EST, LEVL IV, 30-39 MIN: ICD-10-PCS | Mod: S$PBB,,, | Performed by: INTERNAL MEDICINE

## 2019-04-12 PROCEDURE — 84439 ASSAY OF FREE THYROXINE: CPT

## 2019-04-12 PROCEDURE — 99999 PR PBB SHADOW E&M-EST. PATIENT-LVL III: CPT | Mod: PBBFAC,,, | Performed by: INTERNAL MEDICINE

## 2019-04-12 PROCEDURE — 84480 ASSAY TRIIODOTHYRONINE (T3): CPT

## 2019-04-12 PROCEDURE — 99213 OFFICE O/P EST LOW 20 MIN: CPT | Mod: PBBFAC,PN | Performed by: INTERNAL MEDICINE

## 2019-04-12 RX ORDER — CLINDAMYCIN HYDROCHLORIDE 300 MG/1
CAPSULE ORAL
COMMUNITY
End: 2019-10-22 | Stop reason: CLARIF

## 2019-04-12 RX ORDER — FLUTICASONE PROPIONATE 50 MCG
SPRAY, SUSPENSION (ML) NASAL
Refills: 3 | COMMUNITY
Start: 2019-04-03 | End: 2021-04-12

## 2019-04-12 RX ORDER — MUPIROCIN 20 MG/G
OINTMENT TOPICAL
Refills: 0 | COMMUNITY
Start: 2019-03-06

## 2019-04-12 RX ORDER — LEVOTHYROXINE SODIUM 25 UG/1
37 TABLET ORAL
COMMUNITY
Start: 2019-04-04 | End: 2019-12-05 | Stop reason: SDUPTHER

## 2019-04-12 NOTE — PROGRESS NOTES
Subjective:       Patient ID: Selam Botello is a 73 y.o. female.    Chief Complaint: Follow-up; Results; and Anemia    HPI 73-year-old female history of recurring iron deficiency anemia secondary to angiodysplasia of the cecum.  Patient also see recurrence of facial bladder carcinomas followed by urologic oncology returns for review    Past Medical History:   Diagnosis Date    Acid reflux     Allergy     Arthritis     Bladder cancer     Breast cyst     Cataract     Colon polyp     Deep vein thrombosis     Degenerative disc disease     Encounter for blood transfusion     GI bleed     Hematuria, gross     Inflammatory bowel disease     Liver disease     cyst    Osteoporosis     PONV (postoperative nausea and vomiting)     Retina disorder, left     Skin cancer     Thyroid disease     Urinary tract infection      Family History   Problem Relation Age of Onset    COPD Mother     Cancer Mother     COPD Father     Cancer Father     Cancer Sister      Social History     Socioeconomic History    Marital status:      Spouse name: Not on file    Number of children: Not on file    Years of education: Not on file    Highest education level: Not on file   Occupational History    Not on file   Social Needs    Financial resource strain: Not on file    Food insecurity:     Worry: Not on file     Inability: Not on file    Transportation needs:     Medical: Not on file     Non-medical: Not on file   Tobacco Use    Smoking status: Never Smoker    Smokeless tobacco: Never Used   Substance and Sexual Activity    Alcohol use: No     Frequency: Never    Drug use: No    Sexual activity: Yes     Partners: Male   Lifestyle    Physical activity:     Days per week: Not on file     Minutes per session: Not on file    Stress: Not on file   Relationships    Social connections:     Talks on phone: Not on file     Gets together: Not on file     Attends Sikhism service: Not on file     Active member  of club or organization: Not on file     Attends meetings of clubs or organizations: Not on file     Relationship status: Not on file   Other Topics Concern    Not on file   Social History Narrative    Not on file     Past Surgical History:   Procedure Laterality Date    ADENOIDECTOMY      BREAST SURGERY Bilateral     CYSTOSCOPY      CYSTOSCOPY WITH RETROGRADE PYELOGRAM Bilateral 10/16/2017    [facility] OR [facility]    CYSTOSCOPY, WITH RETROGRADE PYELOGRAM AND URETERAL STENT INSERTION Left 9/12/2018    [facility]    EXCISION-BLADDER TUMOR-TRANSURETHRAL (TURBT) N/A 10/16/2017    [facility]    EYE SURGERY Left 2017    retina pucker    FULGURATION, BLADDER Right 9/12/2018    [facility] OR    INSTILLATION, URINARY BLADDER N/A 9/12/2018    [facility]    JOINT REPLACEMENT Bilateral     hip    JOINT REPLACEMENT Right     knee    MASTECTOMY Bilateral 1991    RESECTION,NEOPLASM,BLADDER,TRANSURETHRAL N/A 9/12/2018    [facility]    TONSILLECTOMY      TURBT      TYMPANOSTOMY TUBE PLACEMENT      VASCULAR SURGERY Right     popliteal stent    WISDOM TOOTH EXTRACTION         Labs:  Lab Results   Component Value Date    WBC 5.18 04/12/2019    HGB 14.6 04/12/2019    HCT 44.1 04/12/2019    MCV 98 04/12/2019     04/12/2019     BMP  Lab Results   Component Value Date     04/12/2019    K 4.8 04/12/2019     04/12/2019    CO2 28 04/12/2019    BUN 12 04/12/2019    CREATININE 0.8 04/12/2019    CALCIUM 9.9 04/12/2019    ANIONGAP 6 (L) 04/12/2019    ESTGFRAFRICA >60 04/12/2019    EGFRNONAA >60 04/12/2019     Lab Results   Component Value Date    ALT 19 04/12/2019    AST 20 04/12/2019    ALKPHOS 62 04/12/2019    BILITOT 0.6 04/12/2019       Lab Results   Component Value Date    IRON 90 12/20/2018     TIBC 265 12/20/2018    FERRITIN 54 12/20/2018     No results found for: [id number]  No results found for: FOLATE  Lab Results   Component Value Date    TSH 8.611 (H) 02/05/2019         Review of Systems   Constitutional: Negative for activity change, appetite change, chills, diaphoresis, fatigue, fever and unexpected weight change.   HENT: Negative for congestion, dental problem, drooling, ear discharge, ear pain, facial swelling, hearing loss, mouth sores, nosebleeds, postnasal drip, rhinorrhea, sinus pressure, sneezing, sore throat, tinnitus, trouble swallowing and voice change.    Eyes: Negative for photophobia, pain, discharge, redness, itching and visual disturbance.   Respiratory: Negative for cough, choking, chest tightness, shortness of breath, wheezing and stridor.    Cardiovascular: Negative for chest pain, palpitations and leg swelling.   Gastrointestinal: Negative for abdominal distention, abdominal pain, anal bleeding, blood in stool, constipation, diarrhea, nausea, rectal pain and vomiting.   Endocrine: Negative for cold intolerance, heat intolerance, polydipsia, polyphagia and polyuria.   Genitourinary: Negative for decreased urine volume, difficulty urinating, dyspareunia, dysuria, enuresis, flank pain, frequency, genital sores, hematuria, menstrual problem, pelvic pain, urgency, vaginal bleeding, vaginal discharge and vaginal pain.   Musculoskeletal: Negative for arthralgias, back pain, gait problem, joint swelling, myalgias, neck pain and neck stiffness.   Skin: Negative for color change, pallor and rash.   Allergic/Immunologic: Negative for environmental allergies, food allergies and immunocompromised state.   Neurological: Negative for dizziness, tremors, seizures, syncope, facial asymmetry, speech difficulty, weakness, light-headedness, numbness and headaches.   Hematological: Negative for adenopathy. Does not bruise/bleed easily.   Psychiatric/Behavioral: Positive for dysphoric mood. Negative  for agitation, behavioral problems, confusion, decreased concentration, hallucinations, self-injury, sleep disturbance and suicidal ideas. The patient is nervous/anxious. The patient is not hyperactive.        Objective:      Physical Exam   Constitutional: She is oriented to person, place, and time. She appears well-developed and well-nourished. She appears distressed.   HENT:   Head: Normocephalic and atraumatic.   Right Ear: External ear normal.   Left Ear: External ear normal.   Nose: Nose normal. Right sinus exhibits no maxillary sinus tenderness and no frontal sinus tenderness. Left sinus exhibits no maxillary sinus tenderness and no frontal sinus tenderness.   Mouth/Throat: Oropharynx is clear and moist. No oropharyngeal exudate.   Eyes: Pupils are equal, round, and reactive to light. Conjunctivae, EOM and lids are normal. Right eye exhibits no discharge. Left eye exhibits no discharge. Right conjunctiva is not injected. Right conjunctiva has no hemorrhage. Left conjunctiva is not injected. Left conjunctiva has no hemorrhage. No scleral icterus.   Neck: Normal range of motion. Neck supple. No JVD present. No tracheal deviation present. No thyromegaly present.   Cardiovascular: Normal rate and regular rhythm.   Pulmonary/Chest: Effort normal. No stridor. No respiratory distress. She exhibits no tenderness.   Abdominal: Soft. She exhibits no distension and no mass. There is no splenomegaly or hepatomegaly. There is no tenderness. There is no rebound.   Musculoskeletal: Normal range of motion. She exhibits no edema or tenderness.   Lymphadenopathy:     She has no cervical adenopathy.     She has no axillary adenopathy.        Right: No supraclavicular adenopathy present.        Left: No supraclavicular adenopathy present.   Neurological: She is alert and oriented to person, place, and time. No cranial nerve deficit. Coordination normal.   Skin: Skin is dry. No rash noted. She is not diaphoretic. No erythema.    Psychiatric: She has a normal mood and affect. Her behavior is normal. Judgment and thought content normal.   Vitals reviewed.          Assessment:      1. Iron deficiency anemia due to chronic blood loss    2. Malignant neoplasm of lateral wall of urinary bladder    3. Angiodysplasia of cecum           Plan:     Recurrent iron deficiency anemia hemoglobin is 14.8 iron status will be followed tomorrow continue to check q.3 months and if patient has symptomatology of recurrent bleeding otherwise return 1 year discussed diagnosis of recurrent superficial bladder carcinoma in article from up-to-date followed to her for her review        Adonay Neri Jr, MD FACP

## 2019-04-13 ENCOUNTER — PATIENT MESSAGE (OUTPATIENT)
Dept: HEMATOLOGY/ONCOLOGY | Facility: CLINIC | Age: 74
End: 2019-04-13

## 2019-04-15 ENCOUNTER — PATIENT MESSAGE (OUTPATIENT)
Dept: SURGERY | Facility: HOSPITAL | Age: 74
End: 2019-04-15

## 2019-04-15 ENCOUNTER — ANESTHESIA EVENT (OUTPATIENT)
Dept: SURGERY | Facility: HOSPITAL | Age: 74
End: 2019-04-15
Payer: MEDICARE

## 2019-04-15 ENCOUNTER — TELEPHONE (OUTPATIENT)
Dept: HEMATOLOGY/ONCOLOGY | Facility: CLINIC | Age: 74
End: 2019-04-15

## 2019-04-23 ENCOUNTER — TELEPHONE (OUTPATIENT)
Dept: UROLOGY | Facility: CLINIC | Age: 74
End: 2019-04-23

## 2019-04-23 NOTE — TELEPHONE ENCOUNTER
Spoke with patient regarding surgery arrival time of 0530 with Dr. Shah tomorrow. Concerns addressed. Patient will arrive for 0530 tomorrow.

## 2019-04-23 NOTE — TELEPHONE ENCOUNTER
----- Message from Shayy Chino RN sent at 4/23/2019 10:38 AM CDT -----  Contact: [phone]      ----- Message -----  From: Vivienne Go  Sent: 4/23/2019   9:40 AM  To: [facility] Staff    Would like to consult with the nurse, patients has some question that she needs to ask before her surgery please call back at  [phone], thanks sj

## 2019-04-24 ENCOUNTER — ANESTHESIA (OUTPATIENT)
Dept: SURGERY | Facility: HOSPITAL | Age: 74
End: 2019-04-24
Payer: MEDICARE

## 2019-04-24 ENCOUNTER — HOSPITAL ENCOUNTER (OUTPATIENT)
Facility: HOSPITAL | Age: 74
Discharge: HOME | End: 2019-04-24
Attending: INTERNAL MEDICINE | Admitting: UROLOGY
Payer: MEDICARE

## 2019-04-24 VITALS
HEIGHT: 63 IN | OXYGEN SATURATION: 97 % | SYSTOLIC BLOOD PRESSURE: 135 MMHG | TEMPERATURE: 97 F | BODY MASS INDEX: 23.99 KG/M2 | RESPIRATION RATE: 18 BRPM | DIASTOLIC BLOOD PRESSURE: 65 MMHG | WEIGHT: 135.38 LBS | HEART RATE: 60 BPM

## 2019-04-24 DIAGNOSIS — C67.2 MALIGNANT NEOPLASM OF LATERAL WALL OF URINARY BLADDER: Primary | ICD-10-CM

## 2019-04-24 PROCEDURE — 63600175 PHARM REV CODE 636 W HCPCS: Mod: JG | Performed by: UROLOGY

## 2019-04-24 PROCEDURE — 27201423 OPTIME MED/SURG SUP & DEVICES STERILE SUPPLY: Performed by: UROLOGY

## 2019-04-24 PROCEDURE — 51720 PR INSTILL, ANTICANCER AGENT, BLADDER: ICD-10-PCS | Mod: 59,,, | Performed by: UROLOGY

## 2019-04-24 PROCEDURE — 71000039 HC RECOVERY, EACH ADD'L HOUR: Performed by: UROLOGY

## 2019-04-24 PROCEDURE — 51720 TREATMENT OF BLADDER LESION: CPT | Mod: 59,,, | Performed by: UROLOGY

## 2019-04-24 PROCEDURE — 36000707: Performed by: UROLOGY

## 2019-04-24 PROCEDURE — 37000009 HC ANESTHESIA EA ADD 15 MINS: Performed by: UROLOGY

## 2019-04-24 PROCEDURE — 25000003 PHARM REV CODE 250: Performed by: ANESTHESIOLOGY

## 2019-04-24 PROCEDURE — 37000008 HC ANESTHESIA 1ST 15 MINUTES: Performed by: UROLOGY

## 2019-04-24 PROCEDURE — 71000015 HC POSTOP RECOV 1ST HR: Performed by: UROLOGY

## 2019-04-24 PROCEDURE — 63600175 PHARM REV CODE 636 W HCPCS: Performed by: NURSE ANESTHETIST, CERTIFIED REGISTERED

## 2019-04-24 PROCEDURE — 63600175 PHARM REV CODE 636 W HCPCS: Performed by: ANESTHESIOLOGY

## 2019-04-24 PROCEDURE — 71000033 HC RECOVERY, INTIAL HOUR: Performed by: UROLOGY

## 2019-04-24 PROCEDURE — 52234 CYSTOSCOPY AND TREATMENT: CPT | Mod: ,,, | Performed by: UROLOGY

## 2019-04-24 PROCEDURE — 36000706: Performed by: UROLOGY

## 2019-04-24 PROCEDURE — 25000003 PHARM REV CODE 250: Performed by: NURSE ANESTHETIST, CERTIFIED REGISTERED

## 2019-04-24 PROCEDURE — 52234 PR CYSTOURETHROSCOPY,FULGUR 0.5-2 CM LESN: ICD-10-PCS | Mod: ,,, | Performed by: UROLOGY

## 2019-04-24 PROCEDURE — 63600175 PHARM REV CODE 636 W HCPCS: Performed by: UROLOGY

## 2019-04-24 RX ORDER — MITOMYCIN 40 MG/80ML
INJECTION, POWDER, LYOPHILIZED, FOR SOLUTION INTRAVENOUS
Status: DISCONTINUED | OUTPATIENT
Start: 2019-04-24 | End: 2019-04-24 | Stop reason: HOSPADM

## 2019-04-24 RX ORDER — ETOMIDATE 2 MG/ML
INJECTION INTRAVENOUS
Status: DISCONTINUED | OUTPATIENT
Start: 2019-04-24 | End: 2019-04-24

## 2019-04-24 RX ORDER — METOCLOPRAMIDE HYDROCHLORIDE 5 MG/ML
10 INJECTION INTRAMUSCULAR; INTRAVENOUS EVERY 10 MIN PRN
Status: COMPLETED | OUTPATIENT
Start: 2019-04-24 | End: 2019-04-24

## 2019-04-24 RX ORDER — OXYBUTYNIN CHLORIDE 5 MG/1
5 TABLET ORAL 2 TIMES DAILY
Qty: 60 TABLET | Refills: 1 | Status: SHIPPED | OUTPATIENT
Start: 2019-04-24 | End: 2019-10-22 | Stop reason: CLARIF

## 2019-04-24 RX ORDER — MORPHINE SULFATE 4 MG/ML
2 INJECTION, SOLUTION INTRAMUSCULAR; INTRAVENOUS EVERY 5 MIN PRN
Status: DISCONTINUED | OUTPATIENT
Start: 2019-04-24 | End: 2019-04-24

## 2019-04-24 RX ORDER — LIDOCAINE HYDROCHLORIDE 10 MG/ML
INJECTION INFILTRATION; PERINEURAL
Status: DISCONTINUED | OUTPATIENT
Start: 2019-04-24 | End: 2019-04-24

## 2019-04-24 RX ORDER — SODIUM CHLORIDE, SODIUM LACTATE, POTASSIUM CHLORIDE, CALCIUM CHLORIDE 600; 310; 30; 20 MG/100ML; MG/100ML; MG/100ML; MG/100ML
INJECTION, SOLUTION INTRAVENOUS CONTINUOUS
Status: ACTIVE | OUTPATIENT
Start: 2019-04-24

## 2019-04-24 RX ORDER — OXYCODONE HYDROCHLORIDE 5 MG/1
5 TABLET ORAL
Status: DISCONTINUED | OUTPATIENT
Start: 2019-04-24 | End: 2019-04-24

## 2019-04-24 RX ORDER — LIDOCAINE HYDROCHLORIDE 10 MG/ML
1 INJECTION, SOLUTION EPIDURAL; INFILTRATION; INTRACAUDAL; PERINEURAL ONCE
Status: ACTIVE | OUTPATIENT
Start: 2019-04-24

## 2019-04-24 RX ORDER — MEPERIDINE HYDROCHLORIDE 50 MG/ML
12.5 INJECTION INTRAMUSCULAR; INTRAVENOUS; SUBCUTANEOUS ONCE AS NEEDED
Status: DISCONTINUED | OUTPATIENT
Start: 2019-04-24 | End: 2019-04-24 | Stop reason: HOSPADM

## 2019-04-24 RX ORDER — CEFAZOLIN SODIUM 2 G/50ML
2 SOLUTION INTRAVENOUS ONCE
Status: COMPLETED | OUTPATIENT
Start: 2019-04-24 | End: 2019-04-24

## 2019-04-24 RX ORDER — FENTANYL CITRATE 50 UG/ML
INJECTION, SOLUTION INTRAMUSCULAR; INTRAVENOUS
Status: DISCONTINUED | OUTPATIENT
Start: 2019-04-24 | End: 2019-04-24

## 2019-04-24 RX ORDER — SODIUM CHLORIDE 0.9 % (FLUSH) 0.9 %
3 SYRINGE (ML) INJECTION EVERY 8 HOURS
Status: DISCONTINUED | OUTPATIENT
Start: 2019-04-24 | End: 2019-04-24 | Stop reason: HOSPADM

## 2019-04-24 RX ORDER — SODIUM CHLORIDE 0.9 % (FLUSH) 0.9 %
3 SYRINGE (ML) INJECTION
Status: DISCONTINUED | OUTPATIENT
Start: 2019-04-24 | End: 2019-04-24 | Stop reason: HOSPADM

## 2019-04-24 RX ORDER — ONDANSETRON 2 MG/ML
INJECTION INTRAMUSCULAR; INTRAVENOUS
Status: DISCONTINUED | OUTPATIENT
Start: 2019-04-24 | End: 2019-04-24

## 2019-04-24 RX ADMIN — ETOMIDATE 12 MG: 2 INJECTION, SOLUTION INTRAVENOUS at 12:04

## 2019-04-24 RX ADMIN — SODIUM CHLORIDE, SODIUM LACTATE, POTASSIUM CHLORIDE, AND CALCIUM CHLORIDE: 600; 310; 30; 20 INJECTION, SOLUTION INTRAVENOUS at 12:04

## 2019-04-24 RX ADMIN — FENTANYL CITRATE 25 MCG: 50 INJECTION, SOLUTION INTRAMUSCULAR; INTRAVENOUS at 12:04

## 2019-04-24 RX ADMIN — ONDANSETRON 4 MG: 2 INJECTION, SOLUTION INTRAMUSCULAR; INTRAVENOUS at 01:04

## 2019-04-24 RX ADMIN — LIDOCAINE HYDROCHLORIDE 50 MG: 10 INJECTION, SOLUTION INFILTRATION; PERINEURAL at 12:04

## 2019-04-24 RX ADMIN — ETOMIDATE 2 MG: 2 INJECTION, SOLUTION INTRAVENOUS at 12:04

## 2019-04-24 RX ADMIN — CEFAZOLIN SODIUM 2 G: 2 SOLUTION INTRAVENOUS at 12:04

## 2019-04-24 RX ADMIN — METOCLOPRAMIDE 10 MG: 5 INJECTION, SOLUTION INTRAMUSCULAR; INTRAVENOUS at 01:04

## 2019-04-24 NOTE — ANESTHESIA PREPROCEDURE EVALUATION
04/24/2019  Selam Botello is a 73 y.o., female.    Anesthesia Evaluation    I have reviewed the Patient Summary Reports.        Review of Systems  Anesthesia Hx:  Hx of Anesthetic complications Pt reports that propofol does not agree with her and she does not want this drug.  Prolonged emergence and memory problems post op.  Family Hx of Anesthesia complications:  Personal Hx of Anesthesia complications   Social:  Non-Smoker    Cardiovascular:   Denies Hypertension.  Denies MI.  Denies CAD.    Denies CABG/stent.     Pulmonary:   Denies COPD.    Hepatic/GI:   GERD Liver Disease,    Musculoskeletal:   Arthritis     Neurological:   Denies CVA. Denies Seizures.    Endocrine:   Denies Diabetes. Hypothyroidism        Physical Exam  General:  Well nourished    Airway/Jaw/Neck:  Airway Findings: Mouth Opening: Normal Mallampati: II      Dental:  DENTAL FINDINGS: Normal   Chest/Lungs:  Chest/Lungs Findings: Normal Respiratory Rate     Heart/Vascular:  Heart Findings: Normal            Anesthesia Plan  Type of Anesthesia, risks & benefits discussed:  Anesthesia Type:  general  Patient's Preference:   Intra-op Monitoring Plan: standard ASA monitors  Intra-op Monitoring Plan Comments:   Post Op Pain Control Plan: multimodal analgesia  Post Op Pain Control Plan Comments:   Induction:   IV  Beta Blocker:  Patient is not currently on a Beta-Blocker (No further documentation required).       Informed Consent: Patient understands risks and agrees with Anesthesia plan.  Questions answered. Anesthesia consent signed with patient.  ASA Score: 2     Day of Surgery Review of History & Physical: I have interviewed and examined the patient. I have reviewed the patient's H&P dated:  There are no significant changes.      Anesthesia Plan Notes: Use etomidate for induction        Ready For Surgery From Anesthesia Perspective.

## 2019-04-24 NOTE — OP NOTE
[facility]  Operative Note    Date: 04/24/2019    Pre-Op Diagnosis: bladder tumor with a history of low-grade bladder cancer    Post-Op Diagnosis: same    Procedure(s) Performed:   1.  Fulguration of small-sized bladder tumor, 2 cm  2.  Intravesical instillation of mitomycin C    Specimen(s): None    Staff Surgeon: Isaias Shah MD    Anesthesia: General endotracheal anesthesia    Indications: Selam Botello is a 73 y.o. female with a bladder tumor.  Shee presents today for surgical resection.      Findings:   1.  Small tumor at dome    Estimated Blood Loss: min    Drains: 20 Fr villalobos catheter    Procedure in detail:  After the risks, benefits and possible complications of the procedure were explained, consents were obtained. The patient was taken to the operating room and placed under anesthesia. Pre-operative antibiotics were administered 30 minutes prior to expected start time. The patient was placed in the dorsal lithotomy position and prepped and draped in the normal and sterile fashion. Time out was performed.      A resectoscope was introduced into the bladder per urethra. This passed easily.  The entire urethra was visualized which showed no masses or strictures.  The right and left ureteral orifices were identified in the normal anatomic position and were seen effluxing clear urine.  Formal cystoscopy was performed which revealed a small tumor at the dome, no trabeculations, no bladder stones and no bladder diverticuli.        The resectoscope was then assembled with the visual obturator. The tumor was then fulgurated copiously since the patient has a history of low-grade bladder tumors. The bladder was drained and hemostasis was achieved.  The resectoscope was removed.  A 20 Fr villalobos catheter was placed with 10 cc in the balloon.  The bladder was then irrigated. Intravesical mitomycin C was then placed and the Villalobos was clamped.    The patient tolerated the procedure well and was  transferred to recovery in stable condition.    Disposition:  The patient will follow up with Dr. Shah in 6 months for a surveillance cystoscopy.    Isaias Shah MD

## 2019-04-24 NOTE — DISCHARGE SUMMARY
[facility]  Discharge Note  Short Stay    Admit Date: 4/24/2019    Discharge Date and Time: No discharge date for patient encounter.     Attending Physician: Padilla Peraza MD     Discharge Provider: Isaias Shah    Diagnoses:  Active Hospital Problems    Diagnosis  POA    Malignant neoplasm of lateral wall of urinary bladder [C67.2]  Yes      Resolved Hospital Problems   No resolved problems to display.       Discharged Condition: good    Hospital Course: Patient was admitted for an outpatient procedure and tolerated the procedure well with no complications.    Final Diagnoses: Same as principal problem.    Disposition: Home or Self Care    Follow up/Patient Instructions:    Medications:  Reconciled Home Medications:      Medication List      CONTINUE taking these medications    brompheniramine-pseudoephedrine 1-15 mg/5 mL Liqd  Commonly known as:  DIMETAPP  Take by mouth every 6 (six) hours as needed.     cetirizine 10 MG tablet  Commonly known as:  ZYRTEC  Take 10 mg by mouth once daily.     clindamycin 300 MG capsule  Commonly known as:  CLEOCIN  clindamycin HCl 300 mg capsule   Take 1 capsule 3 times a day by oral route for 10 days.     diclofenac sodium 1 % Gel  Commonly known as:  VOLTAREN  Apply 2 g topically 4 (four) times daily. As needed to affected joints.     ergocalciferol 50,000 unit Cap  Commonly known as:  VITAMIN D2  Take 1 capsule (50,000 Units total) by mouth every 7 days.     fish oil-omega-3 fatty acids 300-1,000 mg capsule  Take 1 capsule by mouth once daily.     fluticasone 50 mcg/actuation nasal spray  Commonly known as:  FLONASE  SPRAY 2 SPRAYS INTO EACH NOSTRIL EVERY DAY     * levothyroxine 75 MCG tablet  Commonly known as:  SYNTHROID  Take 50 mcg by mouth once daily.     * SYNTHROID 25 MCG tablet  Generic drug:  levothyroxine  Take 12.5 mcg by mouth.     lidocaine-prilocaine cream  Commonly known as:  EMLA  APPLY TO AFFECTED AREA TOPICALLY AS NEEDED AS DIRECTED      LIDODERM 5 %  Generic drug:  lidocaine  1 patch a daily as needed for pain; Remove & Discard patch within 12 hours     mometasone 50 mcg/actuation nasal spray  Commonly known as:  NASONEX  2 sprays by Nasal route daily as needed.     mupirocin 2 % ointment  Commonly known as:  BACTROBAN  APPLY A SMALL AMOUNT TO THE AFFECTED AREA BY TOPICAL ROUTE 2-3 TIMES PER DAY     propafenone 150 MG Tab  Commonly known as:  RHTHYMOL  Take 150 mg by mouth every evening.     REFRESH TEARS OPHT  Apply to eye 2 (two) times daily.     THERA tablet  Generic drug:  multivitamin  Take 1 tablet by mouth.     VASCULERA 630 mg Tab  Generic drug:  diosmin complex no.1  TAKE ONE TABLET BY MOUTH EVERY DAY     VITAMIN B-12 ORAL  Take 500 mcg by mouth daily as needed.     VITAMIN B-6 100 MG Tab  Generic drug:  pyridoxine (vitamin B6)  Take 100 mg by mouth once daily.         * This list has 2 medication(s) that are the same as other medications prescribed for you. Read the directions carefully, and ask your doctor or other care provider to review them with you.              Discharge Procedure Orders   Notify your health care provider if you experience any of the following:  temperature >100.4     Notify your health care provider if you experience any of the following:  persistent nausea and vomiting or diarrhea     Notify your health care provider if you experience any of the following:  severe uncontrolled pain     No dressing needed     Activity as tolerated     Shower on day dressing removed (No bath)     Follow-up Information     Isaias Shah MD.    Specialty:  Urology  Why:  Follow-up with surveillance cystoscopy in 6 months  Contact information:  [address] [facility] [address]  [phone]                   Discharge Procedure Orders (must include Diet, Follow-up, Activity):   Discharge Procedure Orders (must include Diet, Follow-up, Activity)   Notify your health care provider if you experience any of the following:   temperature >100.4     Notify your health care provider if you experience any of the following:  persistent nausea and vomiting or diarrhea     Notify your health care provider if you experience any of the following:  severe uncontrolled pain     No dressing needed     Activity as tolerated     Shower on day dressing removed (No bath)

## 2019-04-24 NOTE — ANESTHESIA RELEASE NOTE
"Anesthesia Release from PACU Note    Patient: Selam Botello    Procedure(s) Performed: Procedure(s) (LRB):  TURBT (TRANSURETHRAL RESECTION OF BLADDER TUMOR) (N/A)    Anesthesia type: general    Post pain: Adequate analgesia    Post assessment: no apparent anesthetic complications    Last Vitals:   Visit Vitals  /76 (BP Location: Right arm, Patient Position: Lying)   Pulse 82   Temp 36.4 °C (97.5 °F) (Temporal)   Resp 18   Ht 5' 2.5" (1.588 m)   Wt 61.4 kg (135 lb 5.8 oz)   SpO2 98%   Breastfeeding? No   BMI 24.36 kg/m²       Post vital signs: stable    Level of consciousness: awake    Nausea/Vomiting: no nausea/no vomiting    Complications: none    Airway Patency: patent    Respiratory: unassisted, spontaneous ventilation, face mask    Cardiovascular: stable and blood pressure at baseline    Hydration: euvolemic  "

## 2019-04-24 NOTE — PLAN OF CARE
Patient AAOX3. Denies pain/discomfort. Ambulated w/o difficulty. Preop checklist done. Anesthesia side effects and recovery expectations reviewed with patient. Pt verbalized understanding. Will cont to monitor.

## 2019-04-26 NOTE — ANESTHESIA POSTPROCEDURE EVALUATION
Anesthesia Post Evaluation    Patient: Selam Botello    Procedure(s) Performed: Procedure(s) (LRB):  TURBT (TRANSURETHRAL RESECTION OF BLADDER TUMOR) (N/A)  FULGURATION, BLADDER (N/A)  CYSTOSCOPY (N/A)    Final Anesthesia Type: general  Patient location during evaluation: PACU  Patient participation: Yes- Able to Participate  Level of consciousness: awake and alert  Post-procedure vital signs: reviewed and stable  Pain management: adequate  Airway patency: patent  PONV status at discharge: No PONV  Anesthetic complications: no      Cardiovascular status: blood pressure returned to baseline  Respiratory status: unassisted  Hydration status: euvolemic  Follow-up not needed.          Vitals Value Taken Time   /65 4/24/2019  2:30 PM   Temp 36.3 °C (97.4 °F) 4/24/2019  2:30 PM   Pulse 60 4/24/2019  2:30 PM   Resp 18 4/24/2019  2:30 PM   SpO2 97 % 4/24/2019  2:30 PM         Event Time     Out of Recovery 14:14:39          Pain/Ortiz Score: No data recorded

## 2019-05-06 RX ORDER — AMOXICILLIN AND CLAVULANATE POTASSIUM 875; 125 MG/1; MG/1
1 TABLET, FILM COATED ORAL 2 TIMES DAILY
Qty: 14 TABLET | Refills: 0 | Status: SHIPPED | OUTPATIENT
Start: 2019-05-06 | End: 2019-05-13

## 2019-07-10 ENCOUNTER — LAB VISIT (OUTPATIENT)
Dept: LAB | Facility: HOSPITAL | Age: 74
End: 2019-07-10
Attending: INTERNAL MEDICINE
Payer: MEDICARE

## 2019-07-10 ENCOUNTER — PATIENT MESSAGE (OUTPATIENT)
Dept: HEMATOLOGY/ONCOLOGY | Facility: CLINIC | Age: 74
End: 2019-07-10

## 2019-07-10 DIAGNOSIS — D50.0 IRON DEFICIENCY ANEMIA DUE TO CHRONIC BLOOD LOSS: ICD-10-CM

## 2019-07-10 LAB
BASOPHILS # BLD AUTO: 0.02 K/UL (ref 0–0.2)
BASOPHILS NFR BLD: 0.6 % (ref 0–1.9)
DIFFERENTIAL METHOD: ABNORMAL
EOSINOPHIL # BLD AUTO: 0.1 K/UL (ref 0–0.5)
EOSINOPHIL NFR BLD: 2 % (ref 0–8)
ERYTHROCYTE [DISTWIDTH] IN BLOOD BY AUTOMATED COUNT: 12.5 % (ref 11.5–14.5)
FERRITIN SERPL-MCNC: 62 NG/ML (ref 20–300)
HCT VFR BLD AUTO: 40.9 % (ref 37–48.5)
HGB BLD-MCNC: 13.6 G/DL (ref 12–16)
IMM GRANULOCYTES # BLD AUTO: 0 K/UL (ref 0–0.04)
IMM GRANULOCYTES NFR BLD AUTO: 0 % (ref 0–0.5)
IRON SERPL-MCNC: 177 UG/DL (ref 30–160)
LYMPHOCYTES # BLD AUTO: 1 K/UL (ref 1–4.8)
LYMPHOCYTES NFR BLD: 30 % (ref 18–48)
MCH RBC QN AUTO: 32.5 PG (ref 27–31)
MCHC RBC AUTO-ENTMCNC: 33.3 G/DL (ref 32–36)
MCV RBC AUTO: 98 FL (ref 82–98)
MONOCYTES # BLD AUTO: 0.3 K/UL (ref 0.3–1)
MONOCYTES NFR BLD: 9 % (ref 4–15)
NEUTROPHILS # BLD AUTO: 2 K/UL (ref 1.8–7.7)
NEUTROPHILS NFR BLD: 58.4 % (ref 38–73)
NRBC BLD-RTO: 0 /100 WBC
PLATELET # BLD AUTO: 223 K/UL (ref 150–350)
PMV BLD AUTO: 9.3 FL (ref 9.2–12.9)
RBC # BLD AUTO: 4.19 M/UL (ref 4–5.4)
SATURATED IRON: 68 % (ref 20–50)
TOTAL IRON BINDING CAPACITY: 262 UG/DL (ref 250–450)
TRANSFERRIN SERPL-MCNC: 177 MG/DL (ref 200–375)
WBC # BLD AUTO: 3.43 K/UL (ref 3.9–12.7)

## 2019-07-10 PROCEDURE — 83540 ASSAY OF IRON: CPT

## 2019-07-10 PROCEDURE — 36415 COLL VENOUS BLD VENIPUNCTURE: CPT

## 2019-07-10 PROCEDURE — 85025 COMPLETE CBC W/AUTO DIFF WBC: CPT

## 2019-07-10 PROCEDURE — 82728 ASSAY OF FERRITIN: CPT

## 2019-07-23 ENCOUNTER — PATIENT MESSAGE (OUTPATIENT)
Dept: HEMATOLOGY/ONCOLOGY | Facility: CLINIC | Age: 74
End: 2019-07-23

## 2019-07-24 ENCOUNTER — TELEPHONE (OUTPATIENT)
Dept: UROLOGY | Facility: CLINIC | Age: 74
End: 2019-07-24

## 2019-07-24 NOTE — TELEPHONE ENCOUNTER
----- Message from Clara Dawson sent at 7/24/2019 10:54 AM CDT -----  Contact: JORDAN STEINER   Name of Who is Calling: JORDAN STEINER       What is the request in detail: Patient is requesting a call back she was referred by Dr.Daniel Shah she has bladder Cancer and  is leaving the [facility] practice       Can the clinic reply by [facility]: no      What Number to Call Back if not in [facility]: [phone] or [phone]

## 2019-07-25 ENCOUNTER — PATIENT MESSAGE (OUTPATIENT)
Dept: INTERNAL MEDICINE | Facility: CLINIC | Age: 74
End: 2019-07-25

## 2019-07-25 ENCOUNTER — PATIENT MESSAGE (OUTPATIENT)
Dept: RHEUMATOLOGY | Facility: CLINIC | Age: 74
End: 2019-07-25

## 2019-07-25 DIAGNOSIS — D64.9 ANEMIA, UNSPECIFIED TYPE: ICD-10-CM

## 2019-07-25 DIAGNOSIS — R53.82 CHRONIC FATIGUE: ICD-10-CM

## 2019-07-25 DIAGNOSIS — D53.9 NUTRITIONAL ANEMIA: ICD-10-CM

## 2019-07-25 DIAGNOSIS — E03.4 HYPOTHYROIDISM DUE TO ACQUIRED ATROPHY OF THYROID: Primary | ICD-10-CM

## 2019-07-26 NOTE — TELEPHONE ENCOUNTER
Please call pt and schedule lab apt    Orders Placed This Encounter   Procedures    TSH    T4, free    T3    Vitamin B12

## 2019-07-29 ENCOUNTER — PATIENT MESSAGE (OUTPATIENT)
Dept: RHEUMATOLOGY | Facility: CLINIC | Age: 74
End: 2019-07-29

## 2019-07-29 DIAGNOSIS — R53.82 CHRONIC FATIGUE: ICD-10-CM

## 2019-07-29 DIAGNOSIS — E03.4 HYPOTHYROIDISM DUE TO ACQUIRED ATROPHY OF THYROID: Primary | ICD-10-CM

## 2019-09-03 ENCOUNTER — OFFICE VISIT (OUTPATIENT)
Dept: UROLOGY | Facility: CLINIC | Age: 74
End: 2019-09-03
Payer: MEDICARE

## 2019-09-03 VITALS
HEIGHT: 62 IN | SYSTOLIC BLOOD PRESSURE: 125 MMHG | BODY MASS INDEX: 25.52 KG/M2 | RESPIRATION RATE: 18 BRPM | WEIGHT: 138.69 LBS | TEMPERATURE: 97 F | DIASTOLIC BLOOD PRESSURE: 76 MMHG | HEART RATE: 82 BPM

## 2019-09-03 DIAGNOSIS — C67.2 MALIGNANT NEOPLASM OF LATERAL WALL OF URINARY BLADDER: Primary | ICD-10-CM

## 2019-09-03 DIAGNOSIS — N13.1 HYDRONEPHROSIS WITH URETERAL STRICTURE, NOT ELSEWHERE CLASSIFIED: ICD-10-CM

## 2019-09-03 DIAGNOSIS — R82.90 ABNORMAL FINDING IN URINE: ICD-10-CM

## 2019-09-03 DIAGNOSIS — C67.9 MALIGNANT NEOPLASM OF URINARY BLADDER, UNSPECIFIED SITE: ICD-10-CM

## 2019-09-03 PROCEDURE — 88112 CYTOPATH CELL ENHANCE TECH: CPT | Mod: 26,,, | Performed by: PATHOLOGY

## 2019-09-03 PROCEDURE — 88112 CYTOLOGY SPECIMEN-URINE: ICD-10-PCS | Mod: 26,,, | Performed by: PATHOLOGY

## 2019-09-03 PROCEDURE — 88112 CYTOPATH CELL ENHANCE TECH: CPT | Performed by: PATHOLOGY

## 2019-09-03 PROCEDURE — 87086 URINE CULTURE/COLONY COUNT: CPT

## 2019-09-03 PROCEDURE — 99214 OFFICE O/P EST MOD 30 MIN: CPT | Mod: S$GLB,,, | Performed by: UROLOGY

## 2019-09-03 PROCEDURE — 99214 PR OFFICE/OUTPT VISIT, EST, LEVL IV, 30-39 MIN: ICD-10-PCS | Mod: S$GLB,,, | Performed by: UROLOGY

## 2019-09-03 NOTE — PROGRESS NOTES
Urology - [facility]    SUBJECTIVE:     Chief Complaint: Bladder cancer    History of Present Illness:  Selam Botello is a 74 y.o. female with a history of low grade Ta bladder cancer who presents to \[facility]\"" care. Former patient of Dr. Shah. She was diagnosed by TURBT in 10/2017. Most recent TURBT in 4/2019 in which small bladder tumor at domer fulgurated and mitomycin C instilled.    Endorses some weakness and fatigue related to hypothyroidism. Denies unintentional weight loss, fevers, chills, gross hematuria, dysuria, recent UTI's. Denies flank pain, suprapubic pain. Has urinary urgency but no incontinence. Denies stress urinary incontinence.    CT urogram from 1/2019 demonstrated possible R UPJ obstruction. However, there is no significant hydro.    PSH: no prior abdominal surgeries.    Review of patient's allergies indicates:   Allergen Reactions    Latex Rash     Other reaction(s): mouth ulcers    Latex, natural rubber      Mouth ulcers    Nsaids (non-steroidal anti-inflammatory drug) Other (See Comments)     Bleeding of the colon-hospitalized    Propofol analogues Other (See Comments)     Cognitive function declines for approx 2 weeks  Loses memory function/ability to recall  Does not wake up easily after     Levofloxacin      Other reaction(s): Joint pain    Tramadol Hives and Itching    Epinephrine Palpitations     Arms twitch      Sulfa (sulfonamide antibiotics) Rash       Past Medical History:   Diagnosis Date    Acid reflux     Allergy     Arthritis     Bladder cancer     Breast cyst     Cataract     Colon polyp     Deep vein thrombosis     Degenerative disc disease     Encounter for blood transfusion     GI bleed     Hematuria, gross     Inflammatory bowel disease     Liver disease     cyst    Osteoporosis     PONV (postoperative nausea and vomiting)     Retina disorder, left     Skin cancer     Thyroid disease     Urinary tract infection      Past Surgical  History:   Procedure Laterality Date    ADENOIDECTOMY      BREAST SURGERY Bilateral     CYSTOSCOPY      CYSTOSCOPY N/A 4/24/2019    Performed by [facility] at [facility] [place]    CYSTOSCOPY WITH RETROGRADE PYELOGRAM Bilateral 10/16/2017    Performed by [facility]quin [facility] at [facility] OR 1ST FLR    CYSTOSCOPY, WITH RETROGRADE PYELOGRAM AND URETERAL STENT INSERTION Left 9/12/2018    Performed by [facility]as Shah MD at [facility] [place]    EXCISION-BLADDER TUMOR-TRANSURETHRAL (TURBT) N/A 10/16/2017    Performed by [facility] at [facility] OR 1ST FLR    EYE SURGERY Left 2017    retina pucker    FULGURATION, BLADDER N/A 4/24/2019    Performed by [facility]aias Shah MD at [facility] [place]    FULGURATION, BLADDER Right 9/12/2018    Performed by [facility] at [facility] [place]    INSTILLATION, URINARY BLADDER N/A 9/12/2018    Performed by [facility]as Shah MD at [facility] [place]    JOINT REPLACEMENT Bilateral     hip    JOINT REPLACEMENT Right     knee    MASTECTOMY Bilateral 1991    RESECTION,NEOPLASM,BLADDER,TRANSURETHRAL N/A 9/12/2018    Performed by Isaias Shah MD at [facility]    TONSILLECTOMY      TURBT      TURBT (TRANSURETHRAL RESECTION OF BLADDER TUMOR) N/A 4/24/2019    Performed by [facility]aias Shah MD at [facility] [place]    TYMPANOSTOMY TUBE PLACEMENT      VASCULAR SURGERY Right     popliteal stent    WISDOM TOOTH EXTRACTION       Family History   Problem Relation Age of Onset    COPD Mother     Cancer Mother     COPD Father     Cancer Father     Cancer Sister      Social History     Tobacco Use    Smoking status: Never Smoker    Smokeless tobacco: Never Used   Substance Use Topics    Alcohol use: No     Frequency: Never    Drug use: No        Review of Systems   Constitutional: Negative for chills and fever.   HENT: Negative.    Eyes: Negative.    Respiratory: Negative for chest tightness and shortness of breath.    Cardiovascular: Negative for chest pain and palpitations.   Gastrointestinal: Negative for  "abdominal pain, nausea and vomiting.   Genitourinary: Positive for urgency. Negative for decreased urine volume, difficulty urinating, dysuria, flank pain and hematuria.   Musculoskeletal: Negative for arthralgias and gait problem.   Skin: Negative for color change and rash.   Neurological: Negative for dizziness and headaches.   Psychiatric/Behavioral: Negative for agitation and confusion.       OBJECTIVE:     Anticoagulation:  No    Estimated body mass index is 25.36 kg/m² as calculated from the following:    Height as of this encounter: 5' 2" (1.575 m).    Weight as of this encounter: 62.9 kg (138 lb 10.7 oz).    Vital Signs (Most Recent)  Temp: 97 °F (36.1 °C) (09/03/19 1052)  Pulse: 82 (09/03/19 1052)  Resp: 18 (09/03/19 1052)  BP: 125/76 (09/03/19 1052)    Physical Exam   Constitutional: She is oriented to person, place, and time. She appears well-developed and well-nourished. No distress.   HENT:   Head: Normocephalic and atraumatic.   Eyes: Pupils are equal, round, and reactive to light. Right eye exhibits no discharge. Left eye exhibits no discharge.   Pulmonary/Chest: Effort normal. No respiratory distress.   Abdominal: Soft. She exhibits no distension. There is no tenderness.   No CVA tenderness   Neurological: She is alert and oriented to person, place, and time.   Skin: Skin is warm and dry.     Psychiatric: She has a normal mood and affect. Her behavior is normal. Judgment and thought content normal.       BMP  Lab Results   Component Value Date     04/12/2019    K 4.8 04/12/2019     04/12/2019    CO2 28 04/12/2019    BUN 12 04/12/2019    CREATININE 0.8 04/12/2019    CALCIUM 9.9 04/12/2019    ANIONGAP 6 (L) 04/12/2019    ESTGFRAFRICA >60 04/12/2019    EGFRNONAA >60 04/12/2019       Lab Results   Component Value Date    WBC 3.43 (L) 07/10/2019    HGB 13.6 07/10/2019    HCT 40.9 07/10/2019    MCV 98 07/10/2019     07/10/2019       Urine dipstick today positive for 1+ leuks. Negative " for all other tested components.    ASSESSMENT     1. 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 Hydronephrosis with ureteral stricture, not elsewhere classified        PLAN:     1) Malignant neoplasm of lateral wall of urinary bladder   - Will send urine for culture and cytology today and will follow-up results  - Plan for outpatient surveillance cystoscopy in 6 weeks    2) Possible R UPJ obstruction  - Imaging personally reviewed from CT urogram in 1/2019. Possible R UPJ obstruction. However, no significant hydronephrosis. Patient is asymptomatic. No current urologic intervention indicated, will continue to observe.    Dom Ferrer MD

## 2019-09-04 LAB — BACTERIA UR CULT: NO GROWTH

## 2019-09-05 ENCOUNTER — PATIENT MESSAGE (OUTPATIENT)
Dept: HEMATOLOGY/ONCOLOGY | Facility: CLINIC | Age: 74
End: 2019-09-05

## 2019-09-05 NOTE — PROGRESS NOTES
Dr Gates forwarded these results to the patient via [facility].  He will discuss the results with the patient in person at the next appointment.  The patient was instructed to make an appointment if she does not already have one scheduled.

## 2019-09-06 ENCOUNTER — TELEPHONE (OUTPATIENT)
Dept: HEMATOLOGY/ONCOLOGY | Facility: CLINIC | Age: 74
End: 2019-09-06

## 2019-09-06 ENCOUNTER — LAB VISIT (OUTPATIENT)
Dept: LAB | Facility: HOSPITAL | Age: 74
End: 2019-09-06
Attending: INTERNAL MEDICINE
Payer: MEDICARE

## 2019-09-06 DIAGNOSIS — D50.0 IRON DEFICIENCY ANEMIA DUE TO CHRONIC BLOOD LOSS: ICD-10-CM

## 2019-09-06 DIAGNOSIS — E03.9 ACQUIRED HYPOTHYROIDISM: ICD-10-CM

## 2019-09-06 DIAGNOSIS — E03.9 ACQUIRED HYPOTHYROIDISM: Primary | ICD-10-CM

## 2019-09-06 LAB
BASOPHILS # BLD AUTO: 0.03 K/UL (ref 0–0.2)
BASOPHILS NFR BLD: 0.7 % (ref 0–1.9)
DIFFERENTIAL METHOD: ABNORMAL
EOSINOPHIL # BLD AUTO: 0.1 K/UL (ref 0–0.5)
EOSINOPHIL NFR BLD: 1.3 % (ref 0–8)
ERYTHROCYTE [DISTWIDTH] IN BLOOD BY AUTOMATED COUNT: 12.6 % (ref 11.5–14.5)
FERRITIN SERPL-MCNC: 56 NG/ML (ref 20–300)
HCT VFR BLD AUTO: 41.8 % (ref 37–48.5)
HGB BLD-MCNC: 13.9 G/DL (ref 12–16)
IMM GRANULOCYTES # BLD AUTO: 0.01 K/UL (ref 0–0.04)
IMM GRANULOCYTES NFR BLD AUTO: 0.2 % (ref 0–0.5)
IRON SERPL-MCNC: 151 UG/DL (ref 30–160)
LYMPHOCYTES # BLD AUTO: 1 K/UL (ref 1–4.8)
LYMPHOCYTES NFR BLD: 22.1 % (ref 18–48)
MCH RBC QN AUTO: 32.4 PG (ref 27–31)
MCHC RBC AUTO-ENTMCNC: 33.3 G/DL (ref 32–36)
MCV RBC AUTO: 97 FL (ref 82–98)
MONOCYTES # BLD AUTO: 0.4 K/UL (ref 0.3–1)
MONOCYTES NFR BLD: 7.9 % (ref 4–15)
NEUTROPHILS # BLD AUTO: 3.1 K/UL (ref 1.8–7.7)
NEUTROPHILS NFR BLD: 68 % (ref 38–73)
NRBC BLD-RTO: 0 /100 WBC
PLATELET # BLD AUTO: 245 K/UL (ref 150–350)
PMV BLD AUTO: 9.2 FL (ref 9.2–12.9)
RBC # BLD AUTO: 4.29 M/UL (ref 4–5.4)
SATURATED IRON: 57 % (ref 20–50)
TOTAL IRON BINDING CAPACITY: 266 UG/DL (ref 250–450)
TRANSFERRIN SERPL-MCNC: 180 MG/DL (ref 200–375)
TSH SERPL DL<=0.005 MIU/L-ACNC: 2.15 UIU/ML (ref 0.4–4)
WBC # BLD AUTO: 4.56 K/UL (ref 3.9–12.7)

## 2019-09-06 PROCEDURE — 83540 ASSAY OF IRON: CPT

## 2019-09-06 PROCEDURE — 82728 ASSAY OF FERRITIN: CPT

## 2019-09-06 PROCEDURE — 85025 COMPLETE CBC W/AUTO DIFF WBC: CPT

## 2019-09-06 PROCEDURE — 84443 ASSAY THYROID STIM HORMONE: CPT

## 2019-09-06 PROCEDURE — 36415 COLL VENOUS BLD VENIPUNCTURE: CPT

## 2019-09-06 NOTE — TELEPHONE ENCOUNTER
Spoke to the patient this morning in regards to some labs that were put in to be completed per Dr. Neri. Patient was scheduled to go to [facility] on today around 1pm to complete today she verbalized her understanding.

## 2019-09-06 NOTE — TELEPHONE ENCOUNTER
Spoke with patient informed her that TSH was drawn it usually takes a day or so to come back. Patient verbalized understanding

## 2019-09-06 NOTE — TELEPHONE ENCOUNTER
----- Message from Onluis fkaden ContrerasKatelyn sent at 9/6/2019  2:32 PM CDT -----  Contact: pt   Pt is calling regarding the request for the Thyroid Panel.  Patient stated the the  CBC was done and not the Thyroid test. Pt would like to discuss the test with the staff.     Pt call back [phone]  Thanks

## 2019-10-10 ENCOUNTER — LAB VISIT (OUTPATIENT)
Dept: LAB | Facility: HOSPITAL | Age: 74
End: 2019-10-10
Attending: INTERNAL MEDICINE
Payer: MEDICARE

## 2019-10-10 DIAGNOSIS — D50.0 IRON DEFICIENCY ANEMIA DUE TO CHRONIC BLOOD LOSS: ICD-10-CM

## 2019-10-10 LAB
BASOPHILS # BLD AUTO: 0.03 K/UL (ref 0–0.2)
BASOPHILS NFR BLD: 0.6 % (ref 0–1.9)
DIFFERENTIAL METHOD: ABNORMAL
EOSINOPHIL # BLD AUTO: 0.1 K/UL (ref 0–0.5)
EOSINOPHIL NFR BLD: 2.5 % (ref 0–8)
ERYTHROCYTE [DISTWIDTH] IN BLOOD BY AUTOMATED COUNT: 13 % (ref 11.5–14.5)
FERRITIN SERPL-MCNC: 65 NG/ML (ref 20–300)
HCT VFR BLD AUTO: 40.3 % (ref 37–48.5)
HGB BLD-MCNC: 13.4 G/DL (ref 12–16)
IMM GRANULOCYTES # BLD AUTO: 0.01 K/UL (ref 0–0.04)
IMM GRANULOCYTES NFR BLD AUTO: 0.2 % (ref 0–0.5)
IRON SERPL-MCNC: 165 UG/DL (ref 30–160)
LYMPHOCYTES # BLD AUTO: 1.3 K/UL (ref 1–4.8)
LYMPHOCYTES NFR BLD: 24.2 % (ref 18–48)
MCH RBC QN AUTO: 32.3 PG (ref 27–31)
MCHC RBC AUTO-ENTMCNC: 33.3 G/DL (ref 32–36)
MCV RBC AUTO: 97 FL (ref 82–98)
MONOCYTES # BLD AUTO: 0.4 K/UL (ref 0.3–1)
MONOCYTES NFR BLD: 8.1 % (ref 4–15)
NEUTROPHILS # BLD AUTO: 3.4 K/UL (ref 1.8–7.7)
NEUTROPHILS NFR BLD: 64.6 % (ref 38–73)
NRBC BLD-RTO: 0 /100 WBC
PLATELET # BLD AUTO: 250 K/UL (ref 150–350)
PMV BLD AUTO: 9 FL (ref 9.2–12.9)
RBC # BLD AUTO: 4.15 M/UL (ref 4–5.4)
SATURATED IRON: 68 % (ref 20–50)
TOTAL IRON BINDING CAPACITY: 244 UG/DL (ref 250–450)
TRANSFERRIN SERPL-MCNC: 165 MG/DL (ref 200–375)
WBC # BLD AUTO: 5.2 K/UL (ref 3.9–12.7)

## 2019-10-10 PROCEDURE — 83540 ASSAY OF IRON: CPT

## 2019-10-10 PROCEDURE — 36415 COLL VENOUS BLD VENIPUNCTURE: CPT

## 2019-10-10 PROCEDURE — 85025 COMPLETE CBC W/AUTO DIFF WBC: CPT

## 2019-10-10 PROCEDURE — 82728 ASSAY OF FERRITIN: CPT

## 2019-10-14 DIAGNOSIS — E03.9 ACQUIRED HYPOTHYROIDISM: Primary | ICD-10-CM

## 2019-10-15 ENCOUNTER — DOCUMENTATION ONLY (OUTPATIENT)
Dept: HEMATOLOGY/ONCOLOGY | Facility: CLINIC | Age: 74
End: 2019-10-15

## 2019-10-15 NOTE — PROGRESS NOTES
[facility] CHERY Staff   Caller: patient (Today,  4:02 PM)             Calling concerning her lab orders. States she want to change the area and make sure the orders are correct. Want to speak with nurse. Please call patient today ASAP @ [phone]. Thanks, cory      Notified the following pt that her lab appt has been rescheduled for 10-17-19. ap

## 2019-10-22 ENCOUNTER — PATIENT MESSAGE (OUTPATIENT)
Dept: SURGERY | Facility: OTHER | Age: 74
End: 2019-10-22

## 2019-10-22 ENCOUNTER — ANESTHESIA EVENT (OUTPATIENT)
Dept: SURGERY | Facility: OTHER | Age: 74
End: 2019-10-22
Payer: MEDICARE

## 2019-10-22 ENCOUNTER — PATIENT MESSAGE (OUTPATIENT)
Dept: RHEUMATOLOGY | Facility: CLINIC | Age: 74
End: 2019-10-22

## 2019-10-22 ENCOUNTER — PROCEDURE VISIT (OUTPATIENT)
Dept: UROLOGY | Facility: CLINIC | Age: 74
End: 2019-10-22
Payer: MEDICARE

## 2019-10-22 ENCOUNTER — HOSPITAL ENCOUNTER (OUTPATIENT)
Dept: PREADMISSION TESTING | Facility: OTHER | Age: 74
Discharge: HOME | End: 2019-10-22
Attending: UROLOGY
Payer: MEDICARE

## 2019-10-22 VITALS
BODY MASS INDEX: 25.15 KG/M2 | DIASTOLIC BLOOD PRESSURE: 77 MMHG | HEART RATE: 81 BPM | HEIGHT: 62 IN | WEIGHT: 136.69 LBS | SYSTOLIC BLOOD PRESSURE: 165 MMHG

## 2019-10-22 VITALS
DIASTOLIC BLOOD PRESSURE: 65 MMHG | OXYGEN SATURATION: 97 % | TEMPERATURE: 98 F | HEIGHT: 62 IN | BODY MASS INDEX: 25.03 KG/M2 | HEART RATE: 76 BPM | WEIGHT: 136 LBS | SYSTOLIC BLOOD PRESSURE: 145 MMHG

## 2019-10-22 DIAGNOSIS — C67.2 MALIGNANT NEOPLASM OF LATERAL WALL OF URINARY BLADDER: Primary | ICD-10-CM

## 2019-10-22 LAB
BILIRUB SERPL-MCNC: ABNORMAL MG/DL
BLOOD URINE, POC: ABNORMAL
COLOR, POC UA: ABNORMAL
GLUCOSE UR QL STRIP: NORMAL
KETONES UR QL STRIP: ABNORMAL
LEUKOCYTE ESTERASE URINE, POC: ABNORMAL
NITRITE, POC UA: ABNORMAL
PH, POC UA: 6
PROTEIN, POC: ABNORMAL
SPECIFIC GRAVITY, POC UA: 1.01
UROBILINOGEN, POC UA: NORMAL

## 2019-10-22 PROCEDURE — 81002 POCT URINE DIPSTICK WITHOUT MICROSCOPE: ICD-10-PCS | Mod: S$GLB,,, | Performed by: UROLOGY

## 2019-10-22 PROCEDURE — 81002 URINALYSIS NONAUTO W/O SCOPE: CPT | Mod: S$GLB,,, | Performed by: UROLOGY

## 2019-10-22 PROCEDURE — 52000 CYSTOURETHROSCOPY: CPT | Mod: GC,S$GLB,, | Performed by: STUDENT IN AN ORGANIZED HEALTH CARE EDUCATION/TRAINING PROGRAM

## 2019-10-22 PROCEDURE — 52000 CYSTOSCOPY: ICD-10-PCS | Mod: GC,S$GLB,, | Performed by: STUDENT IN AN ORGANIZED HEALTH CARE EDUCATION/TRAINING PROGRAM

## 2019-10-22 RX ORDER — ACETAMINOPHEN 500 MG
1000 TABLET ORAL
Status: CANCELLED | OUTPATIENT
Start: 2019-10-22 | End: 2019-10-22

## 2019-10-22 RX ORDER — CEPHALEXIN 500 MG/1
500 CAPSULE ORAL
Status: COMPLETED | OUTPATIENT
Start: 2019-10-22 | End: 2019-10-22

## 2019-10-22 RX ORDER — SODIUM CHLORIDE, SODIUM LACTATE, POTASSIUM CHLORIDE, CALCIUM CHLORIDE 600; 310; 30; 20 MG/100ML; MG/100ML; MG/100ML; MG/100ML
INJECTION, SOLUTION INTRAVENOUS CONTINUOUS
Status: CANCELLED | OUTPATIENT
Start: 2019-10-22

## 2019-10-22 RX ORDER — LIDOCAINE HYDROCHLORIDE 20 MG/ML
JELLY TOPICAL
Status: COMPLETED | OUTPATIENT
Start: 2019-10-22 | End: 2019-10-22

## 2019-10-22 RX ORDER — LIDOCAINE HYDROCHLORIDE 20 MG/ML
JELLY TOPICAL ONCE
Status: CANCELLED | OUTPATIENT
Start: 2019-10-22 | End: 2019-10-22

## 2019-10-22 RX ORDER — LIDOCAINE HYDROCHLORIDE 10 MG/ML
0.5 INJECTION, SOLUTION EPIDURAL; INFILTRATION; INTRACAUDAL; PERINEURAL ONCE
Status: CANCELLED | OUTPATIENT
Start: 2019-10-22 | End: 2019-10-22

## 2019-10-22 RX ORDER — FAMOTIDINE 20 MG/1
20 TABLET, FILM COATED ORAL
Status: CANCELLED | OUTPATIENT
Start: 2019-10-22 | End: 2019-10-22

## 2019-10-22 RX ADMIN — LIDOCAINE HYDROCHLORIDE 5 ML: 20 JELLY TOPICAL at 03:10

## 2019-10-22 RX ADMIN — CEPHALEXIN 500 MG: 500 CAPSULE ORAL at 03:10

## 2019-10-22 NOTE — PROCEDURES
"Cystoscopy  Date/Time: 10/22/2019 1:00 PM  Performed by: Dom Ferrer MD  Authorized by: Baldo Gates MD     Consent Done?:  Yes (Written)  Time out: Immediately prior to procedure a "time out" was called to verify the correct patient, procedure, equipment, support staff and site/side marked as required.    Indications: history bladder cancer    Position:  Dorsal lithotomy  Anesthesia:  Lidocaine jelly  Patient sedated?: No    Preparation: Patient was prepped and draped in usual sterile fashion      Scope type:  Flexible cystoscope  External exam normal: Yes    Digital exam performed: No    Urethra normal: Yes  Bladder neck normal: Bladder neck normal   Bladder normal: No (Small papillary tumor lateral to the left UO)         Number of tumors:  1    Patient tolerance:  Patient tolerated the procedure well with no immediate complications     Will proceed with TURBT with instillation of mitomycin C in the OR      "

## 2019-10-22 NOTE — DISCHARGE INSTRUCTIONS
PRE-ADMIT TESTING -  [phone]    [address]  [facility]          Your surgery has been scheduled at [facility]. We are pleased to have the opportunity to serve you. For Further Information please call [phone].    On the day of surgery please report to the Information Desk on the 1st floor.    · CONTACT YOUR PHYSICIAN'S OFFICE THE DAY PRIOR TO YOUR SURGERY TO OBTAIN YOUR ARRIVAL TIME.     · The evening before surgery do not eat anything after 9 p.m. ( this includes hard candy, chewing gum and mints).  You may only have GATORADE, POWERADE AND WATER  from 9 p.m. until you leave your home.   DO NOT DRINK ANY LIQUIDS ON THE WAY TO THE HOSPITAL.      SPECIAL MEDICATION INSTRUCTIONS: TAKE medications checked off by the Anesthesiologist on your Medication List.    Angiogram Patients: Take medications as instructed by your physician, including aspirin.     Surgery Patients:    If you take ASPIRIN - Your PHYSICIAN/SURGEON will need to inform you IF/OR when you need to stop taking aspirin prior to your surgery.     Do Not take any medications containing IBUPROFEN.  Do Not Wear any make-up or dark nail polish   (especially eye make-up) to surgery. If you come to surgery with makeup on you will be required to remove the makeup or nail polish.    Do not shave your surgical area at least 5 days prior to your surgery. The surgical prep will be performed at the hospital according to Infection Control regulations.    Leave all valuables at home.   Do Not wear any jewelry or watches, including any metal in body piercings. Jewelry must be removed prior to coming to the hospital.  There is a possibility that rings that are unable to be removed may be cut off if they are on the surgical extremity.    Contact Lens must be removed before surgery. Either do not wear the contact lens or bring a case and solution for storage.  Please bring a container for eyeglasses or dentures as required.  Bring  any paperwork your physician has provided, such as consent forms,  history and physicals, doctor's orders, etc.   Bring comfortable clothes that are loose fitting to wear upon discharge. Take into consideration the type of surgery being performed.  Maintain your diet as advised per your physician the day prior to surgery.      Adequate rest the night before surgery is advised.   Park in the Parking lot behind the hospital or in the [facility] across the street from the parking lot. Parking is complimentary.  If you will be discharged the same day as your procedure, please arrange for a responsible adult to drive you home or to accompany you if traveling by taxi.   YOU WILL NOT BE PERMITTED TO DRIVE OR TO LEAVE THE HOSPITAL ALONE AFTER SURGERY.   It is strongly recommended that you arrange for someone to remain with you for the first 24 hrs following your surgery.    The Surgeon will speak to your family/visitor after your surgery regarding the outcome of your surgery and post op care.  The Surgeon may speak to you after your surgery, but there is a possibility you may not remember the details.  Please check with your family members regarding the conversation with the Surgeon.    We strongly recommend whoever is bringing you home be present for discharge instructions.  This will ensure a thorough understanding for your post op home care.      Thank you for your cooperation.  The Staff of [facility].                Bathing Instructions with Hibiclens     Shower the evening before and morning of your procedure with Hibiclens:   Wash your face with water and your regular face wash/soap   Apply Hibiclens directly on your skin or on a wet washcloth and wash gently. When showering: Move away from the shower stream when applying Hibiclens to avoid rinsing off too soon.   Rinse thoroughly with warm water   Do not dilute Hibiclens         Dry off as usual, do not use any deodorant,  powder, body lotions, perfume, after shave or cologne.

## 2019-10-22 NOTE — ANESTHESIA PREPROCEDURE EVALUATION
10/22/2019  Selam Botello is a 74 y.o., female.    Anesthesia Evaluation    I have reviewed the Patient Summary Reports.    I have reviewed the Nursing Notes.   I have reviewed the Medications.     Review of Systems  Anesthesia Hx:  Hx of Anesthetic complications (post op nausea)    Social:  Non-Smoker    EENT/Dental:EENT/Dental Normal   Cardiovascular:   Exercise tolerance: good    Pulmonary:  Pulmonary Normal    Hepatic/GI:   GERD, well controlled Liver Disease,    Musculoskeletal:   Arthritis     Endocrine:   Hypothyroidism        Physical Exam  General:  Well nourished    Airway/Jaw/Neck:  Airway Findings: Mouth Opening: Normal Tongue: Normal  General Airway Assessment: Adult  Mallampati: II  TM Distance: Normal, at least 6 cm  Jaw/Neck Findings:     Neck ROM: Normal ROM      Dental:  Dental Findings: In tact             Anesthesia Plan  Type of Anesthesia, risks & benefits discussed:  Anesthesia Type:  general  Patient's Preference:   Intra-op Monitoring Plan: standard ASA monitors  Intra-op Monitoring Plan Comments:   Post Op Pain Control Plan: multimodal analgesia and per primary service following discharge from PACU  Post Op Pain Control Plan Comments:   Induction:   IV  Beta Blocker:         Informed Consent: Patient understands risks and agrees with Anesthesia plan.  Questions answered. Anesthesia consent signed with patient.  ASA Score: 3     Day of Surgery Review of History & Physical:    H&P update referred to the surgeon.     Anesthesia Plan Notes: 3 or 4 turbts in last 2  years, pt states been told propofol causes delayed awakening. scopalamine patch also causes over sedation,  pls avoid .  Recent labs 1 month ago.        Ready For Surgery From Anesthesia Perspective.

## 2019-10-28 ENCOUNTER — TELEPHONE (OUTPATIENT)
Dept: UROLOGY | Facility: CLINIC | Age: 74
End: 2019-10-28

## 2019-10-28 NOTE — TELEPHONE ENCOUNTER
----- Message from Mariana Adkins MA sent at 10/28/2019  3:40 PM CDT -----  Michelle [facility]land Staff  Caller: JORDAN STEINER [[id number]] (Today, 11:01 AM)         Name of Who is Calling:JORDAN STEINER [[id number]]       What is the request in detail: Pt is requesting to speak with clinical staff  In regard to her procedure. Please contact to further discuss and advise.       Can the clinic reply by [facility]:       What [facility]: [phone] or call cell

## 2019-10-29 ENCOUNTER — TELEPHONE (OUTPATIENT)
Dept: UROLOGY | Facility: CLINIC | Age: 74
End: 2019-10-29

## 2019-10-29 DIAGNOSIS — C67.2 MALIGNANT NEOPLASM OF LATERAL WALL OF URINARY BLADDER: Primary | ICD-10-CM

## 2019-10-29 DIAGNOSIS — R82.71 BACTERIURIA: ICD-10-CM

## 2019-10-29 RX ORDER — AMOXICILLIN AND CLAVULANATE POTASSIUM 875; 125 MG/1; MG/1
1 TABLET, FILM COATED ORAL 2 TIMES DAILY
Qty: 14 TABLET | Refills: 0 | Status: SHIPPED | OUTPATIENT
Start: 2019-10-29 | End: 2019-11-05

## 2019-10-29 NOTE — TELEPHONE ENCOUNTER
----- Message from Vandana Becker sent at 10/29/2019  1:34 PM CDT -----  Contact: Patient ph [phone]  Type:  Patient Returning Call    Who Called: Patient    Who Left Message for Patient: Joselin ZABALA    Does the patient know what this is regarding?: Questions about surgery on Thurs., 10- and also trouble with cloudy urine.    Would the patient rather a call back or a response via [facility]? Call back    Best Call Back Number: [phone]

## 2019-10-29 NOTE — TELEPHONE ENCOUNTER
The above patient just call to inform the doctor that she has burning with urination , no fever , no chills no pressure. Patient had a office visit on 10/22/2019. patient surgery is on 10/31/2019 at 1:30 . Patient also lives in [place] . Please advise.

## 2019-10-30 ENCOUNTER — TELEPHONE (OUTPATIENT)
Dept: UROLOGY | Facility: CLINIC | Age: 74
End: 2019-10-30

## 2019-10-30 NOTE — TELEPHONE ENCOUNTER
CALLED SPOKE TO PATIENT AND INFORMED HER ARRIVAL TIME IS 11:30 WITH SURGERY TIME TO START 1:30. PATIENT INFORMED AT 9:56 AM ARRIVAL TIME 11:30 AND SHE IS COMING FROM [place].

## 2019-10-30 NOTE — TELEPHONE ENCOUNTER
Patient stated that the person that she spoke to this am informed her that the surgery would no be until 3:00 in the pm.patient stated that she spoke to myself several times on 10/29/2019 and informed me that she lived in [place]. Patient stated that my tone was very offensive informed patient that my ear was stopped up ,sore throat and sucking on a blackmon drop at that time. Patient informed that I will call her first about surgery arrival time. Surgery times are called after 12:00 noon the day before just in case of cancellations  or reschedules.

## 2019-10-30 NOTE — TELEPHONE ENCOUNTER
----- Message from Linda Causey sent at 10/30/2019  8:11 AM CDT -----  Contact: PT  Name of Who is Calling: Selam Botello    What is the request in detail: pt has questions in regards to her surgery on tomorrow. Please contact to further discuss and advise.    Can the clinic reply by [facility]: n    What Number [facility] Call Back [facility]: [phone] pt would like a call by jesica

## 2019-10-30 NOTE — H&P (VIEW-ONLY)
Dr Gates forwarded these results to the patient via [employer].  He will discuss the results with the patient in person at the next appointment.  The patient was instructed to make an appointment if she does not already have one scheduled.

## 2019-10-31 ENCOUNTER — HOSPITAL ENCOUNTER (OUTPATIENT)
Facility: OTHER | Age: 74
Discharge: HOME | End: 2019-11-01
Attending: UROLOGY | Admitting: UROLOGY
Payer: MEDICARE

## 2019-10-31 ENCOUNTER — ANESTHESIA (OUTPATIENT)
Dept: SURGERY | Facility: OTHER | Age: 74
End: 2019-10-31
Payer: MEDICARE

## 2019-10-31 DIAGNOSIS — C67.2 MALIGNANT NEOPLASM OF LATERAL WALL OF URINARY BLADDER: ICD-10-CM

## 2019-10-31 PROCEDURE — 36000706: Performed by: UROLOGY

## 2019-10-31 PROCEDURE — 63600175 PHARM REV CODE 636 W HCPCS: Mod: JG | Performed by: UROLOGY

## 2019-10-31 PROCEDURE — 88305 TISSUE EXAM BY PATHOLOGIST: CPT | Mod: 26,,, | Performed by: PATHOLOGY

## 2019-10-31 PROCEDURE — 63600175 PHARM REV CODE 636 W HCPCS: Performed by: NURSE ANESTHETIST, CERTIFIED REGISTERED

## 2019-10-31 PROCEDURE — 36000707: Performed by: UROLOGY

## 2019-10-31 PROCEDURE — 88305 TISSUE SPECIMEN TO PATHOLOGY - SURGERY: ICD-10-PCS | Mod: 26,,, | Performed by: PATHOLOGY

## 2019-10-31 PROCEDURE — 25500020 PHARM REV CODE 255: Performed by: UROLOGY

## 2019-10-31 PROCEDURE — 25000003 PHARM REV CODE 250: Performed by: UROLOGY

## 2019-10-31 PROCEDURE — 74420 PR  X-RAY RETROGRADE PYELOGRAM: ICD-10-PCS | Mod: 26,,, | Performed by: UROLOGY

## 2019-10-31 PROCEDURE — 63600175 PHARM REV CODE 636 W HCPCS: Performed by: ANESTHESIOLOGY

## 2019-10-31 PROCEDURE — 52234 PR CYSTOURETHROSCOPY,FULGUR 0.5-2 CM LESN: ICD-10-PCS | Mod: ,,, | Performed by: UROLOGY

## 2019-10-31 PROCEDURE — 71000033 HC RECOVERY, INTIAL HOUR: Performed by: UROLOGY

## 2019-10-31 PROCEDURE — 71000039 HC RECOVERY, EACH ADD'L HOUR: Performed by: UROLOGY

## 2019-10-31 PROCEDURE — 27201423 OPTIME MED/SURG SUP & DEVICES STERILE SUPPLY: Performed by: UROLOGY

## 2019-10-31 PROCEDURE — 63600175 PHARM REV CODE 636 W HCPCS: Performed by: UROLOGY

## 2019-10-31 PROCEDURE — C1758 CATHETER, URETERAL: HCPCS | Performed by: UROLOGY

## 2019-10-31 PROCEDURE — 25000003 PHARM REV CODE 250: Performed by: ANESTHESIOLOGY

## 2019-10-31 PROCEDURE — A9589 INSTI HEXAMINOLEVULINATE HCL: HCPCS | Performed by: UROLOGY

## 2019-10-31 PROCEDURE — 94761 N-INVAS EAR/PLS OXIMETRY MLT: CPT

## 2019-10-31 PROCEDURE — 74420 UROGRAPHY RTRGR +-KUB: CPT | Mod: 26,,, | Performed by: UROLOGY

## 2019-10-31 PROCEDURE — 37000009 HC ANESTHESIA EA ADD 15 MINS: Performed by: UROLOGY

## 2019-10-31 PROCEDURE — 25000003 PHARM REV CODE 250: Performed by: NURSE ANESTHETIST, CERTIFIED REGISTERED

## 2019-10-31 PROCEDURE — 52234 CYSTOSCOPY AND TREATMENT: CPT | Mod: ,,, | Performed by: UROLOGY

## 2019-10-31 PROCEDURE — 37000008 HC ANESTHESIA 1ST 15 MINUTES: Performed by: UROLOGY

## 2019-10-31 PROCEDURE — 88305 TISSUE EXAM BY PATHOLOGIST: CPT | Performed by: PATHOLOGY

## 2019-10-31 RX ORDER — SODIUM CHLORIDE 0.9 % (FLUSH) 0.9 %
3 SYRINGE (ML) INJECTION
Status: DISCONTINUED | OUTPATIENT
Start: 2019-10-31 | End: 2019-11-01 | Stop reason: HOSPADM

## 2019-10-31 RX ORDER — LIDOCAINE HYDROCHLORIDE 10 MG/ML
0.5 INJECTION, SOLUTION EPIDURAL; INFILTRATION; INTRACAUDAL; PERINEURAL ONCE
Status: DISCONTINUED | OUTPATIENT
Start: 2019-10-31 | End: 2019-10-31 | Stop reason: HOSPADM

## 2019-10-31 RX ORDER — ETOMIDATE 2 MG/ML
INJECTION INTRAVENOUS
Status: DISCONTINUED | OUTPATIENT
Start: 2019-10-31 | End: 2019-10-31

## 2019-10-31 RX ORDER — HYDROCODONE BITARTRATE AND ACETAMINOPHEN 5; 325 MG/1; MG/1
1 TABLET ORAL EVERY 4 HOURS PRN
Status: DISCONTINUED | OUTPATIENT
Start: 2019-10-31 | End: 2019-11-01 | Stop reason: HOSPADM

## 2019-10-31 RX ORDER — LIDOCAINE HCL/PF 100 MG/5ML
SYRINGE (ML) INTRAVENOUS
Status: DISCONTINUED | OUTPATIENT
Start: 2019-10-31 | End: 2019-10-31

## 2019-10-31 RX ORDER — ACETAMINOPHEN 500 MG
1000 TABLET ORAL
Status: COMPLETED | OUTPATIENT
Start: 2019-10-31 | End: 2019-10-31

## 2019-10-31 RX ORDER — CEFAZOLIN SODIUM 2 G/50ML
2 SOLUTION INTRAVENOUS
Status: CANCELLED | OUTPATIENT
Start: 2019-10-31

## 2019-10-31 RX ORDER — LIDOCAINE HYDROCHLORIDE 20 MG/ML
JELLY TOPICAL ONCE
Status: DISCONTINUED | OUTPATIENT
Start: 2019-10-31 | End: 2019-10-31 | Stop reason: HOSPADM

## 2019-10-31 RX ORDER — CEFAZOLIN SODIUM 1 G/3ML
2 INJECTION, POWDER, FOR SOLUTION INTRAMUSCULAR; INTRAVENOUS
Status: COMPLETED | OUTPATIENT
Start: 2019-10-31 | End: 2019-10-31

## 2019-10-31 RX ORDER — ONDANSETRON 2 MG/ML
INJECTION INTRAMUSCULAR; INTRAVENOUS
Status: DISCONTINUED | OUTPATIENT
Start: 2019-10-31 | End: 2019-10-31

## 2019-10-31 RX ORDER — ONDANSETRON 2 MG/ML
4 INJECTION INTRAMUSCULAR; INTRAVENOUS EVERY 6 HOURS PRN
Status: DISCONTINUED | OUTPATIENT
Start: 2019-10-31 | End: 2019-11-01 | Stop reason: HOSPADM

## 2019-10-31 RX ORDER — MEPERIDINE HYDROCHLORIDE 25 MG/ML
12.5 INJECTION INTRAMUSCULAR; INTRAVENOUS; SUBCUTANEOUS ONCE AS NEEDED
Status: DISCONTINUED | OUTPATIENT
Start: 2019-10-31 | End: 2019-10-31 | Stop reason: HOSPADM

## 2019-10-31 RX ORDER — DIPHENHYDRAMINE HYDROCHLORIDE 50 MG/ML
12.5 INJECTION INTRAMUSCULAR; INTRAVENOUS EVERY 30 MIN PRN
Status: DISCONTINUED | OUTPATIENT
Start: 2019-10-31 | End: 2019-10-31 | Stop reason: HOSPADM

## 2019-10-31 RX ORDER — HYDROCODONE BITARTRATE AND ACETAMINOPHEN 10; 325 MG/1; MG/1
1 TABLET ORAL EVERY 4 HOURS PRN
Status: DISCONTINUED | OUTPATIENT
Start: 2019-10-31 | End: 2019-11-01 | Stop reason: HOSPADM

## 2019-10-31 RX ORDER — FAMOTIDINE 20 MG/1
20 TABLET, FILM COATED ORAL
Status: COMPLETED | OUTPATIENT
Start: 2019-10-31 | End: 2019-10-31

## 2019-10-31 RX ORDER — SODIUM CHLORIDE 9 MG/ML
INJECTION, SOLUTION INTRAVENOUS CONTINUOUS
Status: DISCONTINUED | OUTPATIENT
Start: 2019-10-31 | End: 2019-11-01 | Stop reason: HOSPADM

## 2019-10-31 RX ORDER — IBUPROFEN 600 MG/1
600 TABLET ORAL EVERY 6 HOURS PRN
Status: DISCONTINUED | OUTPATIENT
Start: 2019-10-31 | End: 2019-11-01 | Stop reason: HOSPADM

## 2019-10-31 RX ORDER — HYDROCODONE BITARTRATE AND ACETAMINOPHEN 5; 325 MG/1; MG/1
1 TABLET ORAL EVERY 4 HOURS PRN
Status: DISCONTINUED | OUTPATIENT
Start: 2019-10-31 | End: 2019-10-31 | Stop reason: SDUPTHER

## 2019-10-31 RX ORDER — OXYCODONE HYDROCHLORIDE 5 MG/1
5 TABLET ORAL
Status: DISCONTINUED | OUTPATIENT
Start: 2019-10-31 | End: 2019-10-31 | Stop reason: HOSPADM

## 2019-10-31 RX ORDER — DEXAMETHASONE SODIUM PHOSPHATE 4 MG/ML
INJECTION, SOLUTION INTRA-ARTICULAR; INTRALESIONAL; INTRAMUSCULAR; INTRAVENOUS; SOFT TISSUE
Status: DISCONTINUED | OUTPATIENT
Start: 2019-10-31 | End: 2019-10-31

## 2019-10-31 RX ORDER — FUROSEMIDE 10 MG/ML
INJECTION INTRAMUSCULAR; INTRAVENOUS
Status: DISCONTINUED | OUTPATIENT
Start: 2019-10-31 | End: 2019-10-31

## 2019-10-31 RX ORDER — HYDROCODONE BITARTRATE AND ACETAMINOPHEN 10; 325 MG/1; MG/1
1 TABLET ORAL EVERY 4 HOURS PRN
Status: DISCONTINUED | OUTPATIENT
Start: 2019-10-31 | End: 2019-10-31 | Stop reason: SDUPTHER

## 2019-10-31 RX ORDER — HYDROMORPHONE HYDROCHLORIDE 2 MG/ML
0.4 INJECTION, SOLUTION INTRAMUSCULAR; INTRAVENOUS; SUBCUTANEOUS EVERY 5 MIN PRN
Status: DISCONTINUED | OUTPATIENT
Start: 2019-10-31 | End: 2019-10-31 | Stop reason: HOSPADM

## 2019-10-31 RX ORDER — SODIUM CHLORIDE, SODIUM LACTATE, POTASSIUM CHLORIDE, CALCIUM CHLORIDE 600; 310; 30; 20 MG/100ML; MG/100ML; MG/100ML; MG/100ML
INJECTION, SOLUTION INTRAVENOUS CONTINUOUS
Status: DISCONTINUED | OUTPATIENT
Start: 2019-10-31 | End: 2019-11-01 | Stop reason: HOSPADM

## 2019-10-31 RX ORDER — ONDANSETRON 2 MG/ML
4 INJECTION INTRAMUSCULAR; INTRAVENOUS DAILY PRN
Status: DISCONTINUED | OUTPATIENT
Start: 2019-10-31 | End: 2019-10-31 | Stop reason: HOSPADM

## 2019-10-31 RX ADMIN — ACETAMINOPHEN 1000 MG: 500 TABLET, FILM COATED ORAL at 12:10

## 2019-10-31 RX ADMIN — ETOMIDATE 2 MG: 2 INJECTION, SOLUTION INTRAVENOUS at 02:10

## 2019-10-31 RX ADMIN — LIDOCAINE HYDROCHLORIDE 50 MG: 20 INJECTION, SOLUTION INTRAVENOUS at 01:10

## 2019-10-31 RX ADMIN — MITOMYCIN 40 MG: 40 INJECTION, POWDER, LYOPHILIZED, FOR SOLUTION INTRAVENOUS at 03:10

## 2019-10-31 RX ADMIN — SODIUM CHLORIDE, SODIUM LACTATE, POTASSIUM CHLORIDE, AND CALCIUM CHLORIDE: 600; 310; 30; 20 INJECTION, SOLUTION INTRAVENOUS at 01:10

## 2019-10-31 RX ADMIN — DEXAMETHASONE SODIUM PHOSPHATE 4 MG: 4 INJECTION, SOLUTION INTRAMUSCULAR; INTRAVENOUS at 02:10

## 2019-10-31 RX ADMIN — SODIUM CHLORIDE: 0.9 INJECTION, SOLUTION INTRAVENOUS at 07:10

## 2019-10-31 RX ADMIN — FUROSEMIDE 5 MG: 10 INJECTION, SOLUTION INTRAMUSCULAR; INTRAVENOUS at 02:10

## 2019-10-31 RX ADMIN — FAMOTIDINE 20 MG: 20 TABLET ORAL at 12:10

## 2019-10-31 RX ADMIN — ONDANSETRON 4 MG: 2 INJECTION INTRAMUSCULAR; INTRAVENOUS at 03:10

## 2019-10-31 RX ADMIN — ONDANSETRON 4 MG: 2 INJECTION INTRAMUSCULAR; INTRAVENOUS at 02:10

## 2019-10-31 RX ADMIN — PROMETHAZINE HYDROCHLORIDE 6.25 MG: 25 INJECTION INTRAMUSCULAR; INTRAVENOUS at 05:10

## 2019-10-31 RX ADMIN — HEXAMINOLEVULINATE HYDROCHLORIDE 50 ML: KIT at 12:10

## 2019-10-31 RX ADMIN — CEFAZOLIN 2 G: 330 INJECTION, POWDER, FOR SOLUTION INTRAMUSCULAR; INTRAVENOUS at 02:10

## 2019-10-31 RX ADMIN — ETOMIDATE 16 MG: 2 INJECTION, SOLUTION INTRAVENOUS at 01:10

## 2019-10-31 NOTE — OR NURSING
Urojet used to coat catheter.    Straight catheterization performed under sterile technique    Cysview instilled at 12:37 PM.     Catheter removed and perineal area cleaned with Obstetric towelette    Patient instructed to retain Cysview in bladder for at least 1 hour.  Call the nurse to get out of bed.

## 2019-10-31 NOTE — TRANSFER OF CARE
"Anesthesia Transfer of Care Note    Patient: Selam Botello    Procedure(s) Performed: Procedure(s) (LRB):  TURBT,WITH BLUE LIGHT CYSTOSCOPY AND CYSVIEW (Bilateral)  PYELOGRAM, RETROGRADE (Bilateral)    Patient location: PACU    Anesthesia Type: general    Transport from OR: Transported from OR on 2-3 L/min O2 by NC with adequate spontaneous ventilation    Post pain: adequate analgesia    Post assessment: no apparent anesthetic complications    Post vital signs: stable    Level of consciousness: awake, alert and oriented    Nausea/Vomiting: no nausea/vomiting    Complications: none          Last vitals:   Visit Vitals  BP (!) 145/70 (BP Location: Right arm, Patient Position: Lying)   Pulse 69   Temp 36.8 °C (98.3 °F) (Oral)   Resp 16   Ht 5' 2" (1.575 m)   Wt 61.7 kg (136 lb)   SpO2 100%   Breastfeeding? No   BMI 24.87 kg/m²     "

## 2019-10-31 NOTE — OP NOTE
[facility], [place], operated by [facility]  Surgery Department  Operative Note    SUMMARY     Date of Procedure: 10/31/2019     Procedure: Procedure(s) (LRB):  TURBT,WITH BLUE LIGHT CYSTOSCOPY AND CYSVIEW (Bilateral)  PYELOGRAM, RETROGRADE (Bilateral)     Surgeon(s) and Role:     * Baldo Gates MD - Primary        Assistant: none    Pre-Operative Diagnosis: Malignant neoplasm of lateral wall of urinary bladder [C67.2]    Post-Operative Diagnosis: Post-Op Diagnosis Codes:     * Malignant neoplasm of lateral wall of urinary bladder [C67.2]    Anesthesia: General    Complications: No    Estimated Blood Loss (EBL): * No values recorded between 10/31/2019  2:09 PM and 10/31/2019  2:43 PM *           Specimens:   Specimen (12h ago, onward)     Start     Ordered    10/31/19 1428  Specimen to Pathology - Surgery  Once     Comments:  1) Bladder Tumor      10/31/19 1427                        Condition: Good    Disposition: PACU - hemodynamically stable.    Attestation: I performed the procedure.    History:  74-year-old female with a history of non muscle invasive bladder cancer.  She recently transferred her care to me.  CT urogram was negative in January of this year.  Recent cytology is negative.  Cystoscopy in the office reveals a small approximately 3 mm cluster of tiny papillary tumors just adjacent to the left ureteral orifice.  She presents for treatment of the above.  Informed consent was obtained.  Prior to the procedure cysview was administered via straight catheter into the bladder.    Procedure In Detail:  The patient brought to operating room and underwent LMA anesthesia.  Sterile prep and drape was performed.  Time-out was performed.  Cystoscopy was performed with 30 and 70 degree lenses with white light and blue light.  The only lesion visible was the ask the above described area adjacent to the left ureteral orifice.  Bilateral retrograde pyelograms performed.  There were no filling defects and no hydronephrosis.   The ureters were normal course and caliber    Utilizing the resectoscope the lesion was resected with a single pass.  This was sent for pathologic analysis.  There was no evidence of bladder perforation.  Grossly the underlying lamina propria was unremarkable.  The margins of dissection were fulgurated.  Blue light again was used and there was no evidence of review residual disease.  There was clear efflux from left ureteral orifice.  A silicon Worthy catheter was placed.  The catheter irrigated clearly.  The patient tolerated the procedure well.  She woke from anesthesia without difficulty was brought to recovery room.  Mitomycin will be instilled in the recovery room.  If she is doing well she will have a voiding trial tomorrow morning.  She will follow up with me in 2 weeks to discuss the pathology results.

## 2019-10-31 NOTE — INTERVAL H&P NOTE
The patient has been examined and the H&P has been reviewed:    I concur with the findings and changes have been noted since the H&P was written: pt was started on abx but cs only shows multi org    Anesthesia/Surgery risks, benefits and alternative options discussed and understood by patient/family.          Active Hospital Problems    Diagnosis  POA    Malignant neoplasm of lateral wall of urinary bladder [C67.2]  Yes      Resolved Hospital Problems   No resolved problems to display.

## 2019-10-31 NOTE — ANESTHESIA POSTPROCEDURE EVALUATION
Anesthesia Post Evaluation    Patient: Sleam Botello    Procedure(s) Performed: Procedure(s) (LRB):  TURBT,WITH BLUE LIGHT CYSTOSCOPY AND CYSVIEW (Bilateral)  PYELOGRAM, RETROGRADE (Bilateral)    Final Anesthesia Type: general  Patient location during evaluation: PACU  Patient participation: Yes- Able to Participate  Level of consciousness: awake and alert  Post-procedure vital signs: reviewed and stable  Pain management: adequate  Airway patency: patent  PONV status at discharge: No PONV  Anesthetic complications: no      Cardiovascular status: blood pressure returned to baseline  Respiratory status: unassisted and spontaneous ventilation  Hydration status: euvolemic  Follow-up not needed.          Vitals Value Taken Time   /71 10/31/2019  4:02 PM   Temp 36.8 °C (98.3 °F) 10/31/2019  2:43 PM   Pulse 76 10/31/2019  4:08 PM   Resp 16 10/31/2019  3:45 PM   SpO2 98 % 10/31/2019  4:08 PM   Vitals shown include unvalidated device data.      No case tracking events are documented in the log.      Pain/Ortiz Score: Pain Rating Prior to Med Admin: 0 (10/31/2019 12:43 PM)  Ortiz Score: 9 (10/31/2019  3:45 PM)         States palpitations off and on for a month.  No other symptoms I.e. Chest pain, dizziness or shortness of breathe noted with them . Typically brief. Comes today because they just have not gone away.   Visit Vitals  BP (!) 157/99   Pulse 84   Temp 99.1 °F (37.3 °C) (Tympanic)   Resp 18   Ht 5' 4\" (1.626 m)   Wt 77.6 kg   LMP 09/02/2011   SpO2 95%   BMI 29.35 kg/m²

## 2019-10-31 NOTE — OR NURSING
Dr. Gates @ bedside to instill Mitomycin via catheter, catheter clamped and will hold pt in PACU until Dr. Gates back to flush bladder.    [place] update don pt status

## 2019-10-31 NOTE — PROGRESS NOTES
post op    Urine clear in pacu  Af vss  Ab soft and bladder not distended    40mg mitomycin in 50cc sterile water instilled by gravity  After 1 hour, bladder drained  Pt tolerated well    Some nausea  zofran prn      Sp small TURBT    Voiding trial in AM then DC home  Follow up with me in 2 weeks; pt asked for me to call her if path is available sooner   Telephone Encounter by Jethro Marin at 11/27/18 11:42 AM     Author:  Jethro Marin Service:  (none) Author Type:  Patient      Filed:  11/27/18 11:42 AM Encounter Date:  11/27/2018 Status:  Signed     :  Jethro Marin (Patient )            Medication and instructions reviewed with patient for upcoming[JZ1.1T] rios[JZ1.1M]. Patient instructed to take meds with sips of water the day of test.  Patient instructed to be NPO after midnight and instructed no caffeine 12 hours prior (no coffee, tea, soda, pop, chocolate and no decaffeinated beverages).  Patient instructed to wear comfortable clothes, tennis shoes, and no lotion, powder or oils on chest am of test. Patient instructed to bring something to eat and drink with day of test. Aware test takes 3 - 4 hours[JZ1.1T].[JZ1.1M] Patient instructed to call 24 hours in advance if need to cancel or reschedule test. Yane Ghosh verbalized understanding of information given.[JZ1.1T]        Revision History        User Key Date/Time User Provider Type Action    > JZ1.1 11/27/18 11:42 AM Jethro Marin Patient  Sign    M - Manual, T - Template

## 2019-11-01 VITALS
SYSTOLIC BLOOD PRESSURE: 115 MMHG | HEART RATE: 75 BPM | OXYGEN SATURATION: 98 % | WEIGHT: 136 LBS | BODY MASS INDEX: 25.03 KG/M2 | TEMPERATURE: 98 F | HEIGHT: 62 IN | RESPIRATION RATE: 18 BRPM | DIASTOLIC BLOOD PRESSURE: 76 MMHG

## 2019-11-01 NOTE — HPI
74 y.o. female with a history of low grade Ta bladder cancer with a recent surveillance cystoscopy showing small tumor adjacent to the left UO who underwent TURBT on 10/31/19.

## 2019-11-01 NOTE — PROGRESS NOTES
[facility]  Urology  Progress Note    Patient Name: Selam Botello  MRN: [id number]  Admission Date: 10/31/2019  Hospital Length of Stay: 0 days  Code Status: No Order   Attending Provider: Baldo Gates MD   Primary Care Physician: Kelli Gavin MD    Subjective:     HPI:  74 y.o. female with a history of low grade Ta bladder cancer with a recent surveillance cystoscopy showing small tumor adjacent to the left UO who underwent TURBT on 10/31/19.    Interval History: No acute events overnight. Pain controlled. Tolerating diet with no N/V. UOP good. Afebrile, vital signs stable.      Objective:     Temp:  [97.6 °F (36.4 °C)-98.3 °F (36.8 °C)] 97.8 °F (36.6 °C)  Pulse:  [60-80] 75  Resp:  [16-20] 18  SpO2:  [96 %-100 %] 98 %  BP: (108-148)/(56-79) 115/76     Body mass index is 24.88 kg/m².           Drains     Drain                 Urethral Catheter 10/31/19 1425 Non-latex;Straight-tip 20 Fr. less than 1 day                Physical Exam   Nursing note and vitals reviewed.  Constitutional: She is oriented to person, place, and time. She appears well-developed and well-nourished. No distress.   HENT:   Head: Normocephalic and atraumatic.   Eyes: Pupils are equal, round, and reactive to light. Right eye exhibits no discharge. Left eye exhibits no discharge.   Cardiovascular: Normal rate.    Pulmonary/Chest: Effort normal. No respiratory distress.   Abdominal: Soft. She exhibits no distension. There is no tenderness.   Genitourinary:   Genitourinary Comments: Worthy in place draining clear light pink urine   Neurological: She is alert and oriented to person, place, and time.   Skin: Skin is warm and dry.     Psychiatric: She has a normal mood and affect. Her behavior is normal. Judgment and thought content normal.       Significant Labs:    BMP:  No results for input(s): NA, K, CL, CO2, BUN, CREATININE, LABGLOM, GLUCOSE, CALCIUM in the last 168 hours.    CBC:   No results for input(s): WBC, HGB,  HCT, PLT in the last 168 hours.    All pertinent labs results from the past 24 hours have been reviewed.    Significant Imaging:  All pertinent imaging results/findings from the past 24 hours have been reviewed.      Assessment/Plan:     Malignant neoplasm of lateral wall of urinary bladder  74 y.o. female with a history of low grade Ta bladder cancer with a recent surveillance cystoscopy showing small tumor adjacent to the left UO who underwent TURBT on 10/31/19.    - Ambulate qid  - Strict I's/O's  - Drains: Worthy removed on rounds, f/u VT  - Regular diet  - Pain control  - PPx: SCD's/FRANCISCA's  - Dispo: Discharge pending voiding trial.        VTE Risk Mitigation (From admission, onward)         Ordered     Place FRANCISCA hose  Until discontinued      10/31/19 1145     Place sequential compression device  Until discontinued      10/31/19 1145                Dom Ferrer MD  Urology  [facility]

## 2019-11-01 NOTE — SUBJECTIVE & OBJECTIVE
Interval History: No acute events overnight. Pain controlled. Tolerating diet with no N/V. UOP good. Afebrile, vital signs stable.      Objective:     Temp:  [97.6 °F (36.4 °C)-98.3 °F (36.8 °C)] 97.8 °F (36.6 °C)  Pulse:  [60-80] 75  Resp:  [16-20] 18  SpO2:  [96 %-100 %] 98 %  BP: (108-148)/(56-79) 115/76     Body mass index is 24.88 kg/m².           Drains     Drain                 Urethral Catheter 10/31/19 1425 Non-latex;Straight-tip 20 Fr. less than 1 day                Physical Exam   Nursing note and vitals reviewed.  Constitutional: She is oriented to person, place, and time. She appears well-developed and well-nourished. No distress.   HENT:   Head: Normocephalic and atraumatic.   Eyes: Pupils are equal, round, and reactive to light. Right eye exhibits no discharge. Left eye exhibits no discharge.   Cardiovascular: Normal rate.    Pulmonary/Chest: Effort normal. No respiratory distress.   Abdominal: Soft. She exhibits no distension. There is no tenderness.   Genitourinary:   Genitourinary Comments: Worthy in place draining clear light pink urine   Neurological: She is alert and oriented to person, place, and time.   Skin: Skin is warm and dry.     Psychiatric: She has a normal mood and affect. Her behavior is normal. Judgment and thought content normal.       Significant Labs:    BMP:  No results for input(s): NA, K, CL, CO2, BUN, CREATININE, LABGLOM, GLUCOSE, CALCIUM in the last 168 hours.    CBC:   No results for input(s): WBC, HGB, HCT, PLT in the last 168 hours.    All pertinent labs results from the past 24 hours have been reviewed.    Significant Imaging:  All pertinent imaging results/findings from the past 24 hours have been reviewed.

## 2019-11-01 NOTE — ASSESSMENT & PLAN NOTE
74 y.o. female with a history of low grade Ta bladder cancer with a recent surveillance cystoscopy showing small tumor adjacent to the left UO who underwent TURBT on 10/31/19.    - Ambulate qid  - Strict I's/O's  - Drains: Worthy removed on rounds, f/u VT  - Regular diet  - Pain control  - PPx: SCD's/FRANCISCA's  - Dispo: Discharge pending voiding trial.

## 2019-11-01 NOTE — DISCHARGE INSTRUCTIONS
Post Cystoscopy Instructions  Do not strain to have a bowel movement  No strenuous exercise x 7 days  No driving while you are on narcotic pain medications or if your villalobos  catheter is in place    You can expect:  To pass stone fragments if you had a stone procedure  Have pain when you void from your stent if you have a stent in place  See blood in your urine if you have a stent in place    If you have a catheter, please return to the ER if your catheter stops draining or you are having abdominal pain.    Call the doctor if:   Temperature is greater than 101F   Persistent vomiting and inability to keep food down   Inability to void if you do not have a catheter

## 2019-11-04 ENCOUNTER — PATIENT MESSAGE (OUTPATIENT)
Dept: UROLOGY | Facility: CLINIC | Age: 74
End: 2019-11-04

## 2019-11-04 ENCOUNTER — TELEPHONE (OUTPATIENT)
Dept: UROLOGY | Facility: CLINIC | Age: 74
End: 2019-11-04

## 2019-11-04 NOTE — TELEPHONE ENCOUNTER
----- Message from Joselin Millard MA sent at 11/4/2019 10:42 AM CST -----  Contact: JORDAN STEINER [[id number]]  Please schedule.thank you  ----- Message -----  From: Sean Collier, Patient Care Assistant  Sent: 11/4/2019   8:49 AM CST  To: Kody Bland Staff    Name of Who is Calling: JORDAN STEINER [[id number]]    What is the request in detail:Requesting a call back in regards of getting scheduled for follow up appointment.  Please contact to further discuss and advise      Can the clinic reply by [facility]: Yes    What Number to Call Back if not in [facility]:   [phone]

## 2019-11-05 ENCOUNTER — PATIENT MESSAGE (OUTPATIENT)
Dept: UROLOGY | Facility: CLINIC | Age: 74
End: 2019-11-05

## 2019-11-05 ENCOUNTER — PATIENT MESSAGE (OUTPATIENT)
Dept: HEMATOLOGY/ONCOLOGY | Facility: CLINIC | Age: 74
End: 2019-11-05

## 2019-11-08 ENCOUNTER — TELEPHONE (OUTPATIENT)
Dept: UROLOGY | Facility: CLINIC | Age: 74
End: 2019-11-08

## 2019-11-08 DIAGNOSIS — C67.2 MALIGNANT NEOPLASM OF LATERAL WALL OF URINARY BLADDER: Primary | ICD-10-CM

## 2019-11-08 NOTE — TELEPHONE ENCOUNTER
----- Message from Tiff Lee sent at 11/8/2019  9:43 AM CST -----  Contact: Pt   Name of Who is Calling: JORDAN STEINER [[id number]]    What is the request in detail: JORDAN STEINER [[id number]] is calling to inform staff akshat wants to keep her appt on 11/19/19..................Please contact to further discuss and advise      Can the clinic reply by [facility]:     What Number [facility] Call Back [facility]:[phone]

## 2019-11-08 NOTE — PROGRESS NOTES
I called the patient twice and left a message that her pathology looks good.  She can keep appointment in 2 weeks to discuss this in person.  Or she can just see me in 6 months with cytology or cystoscopy.   Please contact her and see which she prefers

## 2019-11-19 ENCOUNTER — OFFICE VISIT (OUTPATIENT)
Dept: UROLOGY | Facility: CLINIC | Age: 74
End: 2019-11-19
Payer: MEDICARE

## 2019-11-19 VITALS
WEIGHT: 136 LBS | HEART RATE: 75 BPM | DIASTOLIC BLOOD PRESSURE: 63 MMHG | HEIGHT: 62 IN | SYSTOLIC BLOOD PRESSURE: 131 MMHG | BODY MASS INDEX: 25.03 KG/M2

## 2019-11-19 DIAGNOSIS — C67.2 MALIGNANT NEOPLASM OF LATERAL WALL OF URINARY BLADDER: Primary | ICD-10-CM

## 2019-11-19 PROCEDURE — 99214 OFFICE O/P EST MOD 30 MIN: CPT | Mod: S$GLB,,, | Performed by: UROLOGY

## 2019-11-19 PROCEDURE — 99214 PR OFFICE/OUTPT VISIT, EST, LEVL IV, 30-39 MIN: ICD-10-PCS | Mod: S$GLB,,, | Performed by: UROLOGY

## 2019-11-19 NOTE — PROGRESS NOTES
"Subjective:      Selam Botello is a 74 y.o. female who returns today regarding her     Two weeks status post TURBT and mitomycin.  This was a very small lesion.  Pathology showed a papillary urothelial neoplasm of low malignant potential.    She initially had low-grade TA bladder tumor in 2017.  She had a small papillary recurrence in May 2019 which was full was fulgurated by Dr Shah    The following portions of the patient's history were reviewed and updated as appropriate: allergies, current medications, past family history, past medical history, past social history, past surgical history and problem list.    Review of Systems  Pertinent items are noted in HPI.  A comprehensive multipoint review of systems was negative except as otherwise stated in the HPI.     Objective:   Vitals: /63 (BP Location: Right arm, Patient Position: Sitting)   Pulse 75   Ht 5' 2" (1.575 m)   Wt 61.7 kg (136 lb)   BMI 24.87 kg/m²     Physical Exam   General: alert and oriented, no acute distress  Respiratory: Symmetric expansion, non-labored breathing  Cardiovascular: normal to inspection  Abdomen: non distended   Skin: normal coloration and turgor, no rashes, no suspicious skin lesions noted  Neuro: no gross deficits  Psych: normal judgment and insight, normal mood/affect and non-anxious    Physical Exam    Lab Review   Urinalysis demonstrates trace leukocytes and blood otherwise negative    Lab Results   Component Value Date    WBC 5.20 10/10/2019    HGB 13.4 10/10/2019    HCT 40.3 10/10/2019    MCV 97 10/10/2019     10/10/2019     Lab Results   Component Value Date    CREATININE 0.8 04/12/2019    BUN 12 04/12/2019       Imaging  -  Assessment and Plan:   Malignant neoplasm of lateral wall of urinary bladder  Low grade Ta 2017; PUNLMP 11/19 sp TURBT and mitomycin  -     Cystoscopy; Future  -     Cytology, Urine    Return to clinic 6 months with cytology for cystoscopy.  If this is negative we will then go to " yearly.  She is somewhat anxious and would like very close follow-up      She tells me that she felt like she has some cognitive difficulties following her surgery.  This has completely resolved.  She has some questions about the anesthesia regimen that was used.  I have instructed her to call the anesthesia preop clinic here at [facility] to answer her questions

## 2019-11-25 RX ORDER — DIOSMIN COMPLEX NO.1 630 MG
1 TABLET ORAL DAILY
Qty: 90 TABLET | Refills: 4 | Status: SHIPPED | OUTPATIENT
Start: 2019-11-25 | End: 2021-04-12

## 2019-11-30 ENCOUNTER — PATIENT MESSAGE (OUTPATIENT)
Dept: HEMATOLOGY/ONCOLOGY | Facility: CLINIC | Age: 74
End: 2019-11-30

## 2019-12-02 ENCOUNTER — TELEPHONE (OUTPATIENT)
Dept: HEMATOLOGY/ONCOLOGY | Facility: CLINIC | Age: 74
End: 2019-12-02

## 2019-12-02 NOTE — TELEPHONE ENCOUNTER
Spoke to the patient I have informed her that she can drop off any necessary paperwork to [facility] location for Dr. Neri as we are at that location this week. Patient was instructed to leave it at the  where the  will gather and give to Dr. Neri staff.

## 2019-12-05 ENCOUNTER — OFFICE VISIT (OUTPATIENT)
Dept: HEMATOLOGY/ONCOLOGY | Facility: CLINIC | Age: 74
End: 2019-12-05
Payer: MEDICARE

## 2019-12-05 VITALS
HEART RATE: 78 BPM | BODY MASS INDEX: 24.87 KG/M2 | SYSTOLIC BLOOD PRESSURE: 127 MMHG | OXYGEN SATURATION: 98 % | WEIGHT: 135.13 LBS | TEMPERATURE: 97 F | DIASTOLIC BLOOD PRESSURE: 75 MMHG | HEIGHT: 62 IN | RESPIRATION RATE: 18 BRPM

## 2019-12-05 DIAGNOSIS — E03.9 ACQUIRED HYPOTHYROIDISM: Primary | ICD-10-CM

## 2019-12-05 DIAGNOSIS — D50.0 IRON DEFICIENCY ANEMIA DUE TO CHRONIC BLOOD LOSS: ICD-10-CM

## 2019-12-05 PROCEDURE — 99213 OFFICE O/P EST LOW 20 MIN: CPT | Mod: PBBFAC | Performed by: INTERNAL MEDICINE

## 2019-12-05 PROCEDURE — 99214 OFFICE O/P EST MOD 30 MIN: CPT | Mod: S$PBB,,, | Performed by: INTERNAL MEDICINE

## 2019-12-05 PROCEDURE — 99214 PR OFFICE/OUTPT VISIT, EST, LEVL IV, 30-39 MIN: ICD-10-PCS | Mod: S$PBB,,, | Performed by: INTERNAL MEDICINE

## 2019-12-05 PROCEDURE — 1159F PR MEDICATION LIST DOCUMENTED IN MEDICAL RECORD: ICD-10-PCS | Mod: ,,, | Performed by: INTERNAL MEDICINE

## 2019-12-05 PROCEDURE — 1125F PR PAIN SEVERITY QUANTIFIED, PAIN PRESENT: ICD-10-PCS | Mod: ,,, | Performed by: INTERNAL MEDICINE

## 2019-12-05 PROCEDURE — 1159F MED LIST DOCD IN RCRD: CPT | Mod: ,,, | Performed by: INTERNAL MEDICINE

## 2019-12-05 PROCEDURE — 1125F AMNT PAIN NOTED PAIN PRSNT: CPT | Mod: ,,, | Performed by: INTERNAL MEDICINE

## 2019-12-05 PROCEDURE — 99999 PR PBB SHADOW E&M-EST. PATIENT-LVL III: CPT | Mod: PBBFAC,,, | Performed by: INTERNAL MEDICINE

## 2019-12-05 PROCEDURE — 99999 PR PBB SHADOW E&M-EST. PATIENT-LVL III: ICD-10-PCS | Mod: PBBFAC,,, | Performed by: INTERNAL MEDICINE

## 2019-12-05 RX ORDER — LEVOTHYROXINE SODIUM 50 UG/1
50 TABLET ORAL DAILY
Refills: 11 | COMMUNITY
Start: 2019-11-20 | End: 2022-04-11 | Stop reason: SDUPTHER

## 2019-12-05 NOTE — PROGRESS NOTES
"Subjective:       Patient ID: Selam Botello is a 74 y.o. female.    Chief Complaint: Results    HPI 74-year-old female history of recurrent bladder cancer patient has had 4 separate surgeries and she states the most recent surgery she has had difficulty in terms of recovering from the anesthesia fog afterwards".  She requests for anesthesia records for subsequent surgeries to discuss with anesthesiologist.  Past Medical History:   Diagnosis Date    Acid reflux     Allergy     Arthritis     Bladder cancer     Breast cyst     Cataract     Colon polyp     Deep vein thrombosis     Degenerative disc disease     Encounter for blood transfusion     General anesthetics causing adverse effect in therapeutic use     GI bleed     Hematuria, gross     Inflammatory bowel disease     Liver disease     cyst    Osteoporosis     PONV (postoperative nausea and vomiting)     Retina disorder, left     Skin cancer     Thyroid disease     Urinary tract infection      Family History   Problem Relation Age of Onset    COPD Mother     Cancer Mother     COPD Father     Cancer Father     Cancer Sister      Social History     Socioeconomic History    Marital status:      Spouse name: Not on file    Number of children: Not on file    Years of education: Not on file    Highest education level: Not on file   Occupational History    Not on file   Social Needs    Financial resource strain: Not on file    Food insecurity:     Worry: Not on file     Inability: Not on file    Transportation needs:     Medical: Not on file     Non-medical: Not on file   Tobacco Use    Smoking status: Never Smoker    Smokeless tobacco: Never Used   Substance and Sexual Activity    Alcohol use: No     Frequency: Never    Drug use: No    Sexual activity: Yes     Partners: Male   Lifestyle    Physical activity:     Days per week: Not on file     Minutes per session: Not on file    Stress: Not on file   Relationships    " Social connections:     Talks on phone: Not on file     Gets together: Not on file     Attends Mu-ism service: Not on file     Active member of club or organization: Not on file     Attends meetings of clubs or organizations: Not on file     Relationship status: Not on file   Other Topics Concern    Not on file   Social History Narrative    Not on file     Past Surgical History:   Procedure Laterality Date    ADENOIDECTOMY      BLADDER FULGURATION Right 9/12/2018    Procedure: FULGURATION, BLADDER;  Surgeon: Isaias Shah MD;  Location: [facility];  Service: Urology;  Laterality: Right;    BLADDER FULGURATION N/A 4/24/2019    Procedure: FULGURATION, BLADDER;  Surgeon: Isaias Shah MD;  [facility];  Service: Urology;  Laterality: N/A;    BREAST SURGERY Bilateral     CYSTOSCOPY      CYSTOSCOPY N/A 4/24/2019    Procedure: CYSTOSCOPY;  Surgeon: Isaias Shah MD;  Location: [facility];  Service: Urology;  Laterality: N/A;    CYSTOSCOPY WITH URETEROSCOPY, RETROGRADE PYELOGRAPHY, AND INSERTION OF STENT Left 9/12/2018    Procedure: CYSTOSCOPY, WITH RETROGRADE PYELOGRAM AND URETERAL STENT INSERTION;  Surgeon: Isaias Shah MD;  Location: [facility];  Service: Urology;  Laterality: Left;    EYE SURGERY Left 2017    retina pucker    INSTILLATION OF URINARY BLADDER N/A 9/12/2018    Procedure: INSTILLATION, URINARY BLADDER;  Surgeon: Isaias Shah MD;  Location: [facility];  Service: Urology;  Laterality: N/A;  Chemo agent instillation     JOINT REPLACEMENT Bilateral     hip    JOINT REPLACEMENT Right     knee    MASTECTOMY Bilateral 1991    RETROGRADE PYELOGRAPHY Bilateral 10/31/2019    Procedure: PYELOGRAM, RETROGRADE;  Surgeon: Baldo Gates MD;  Location: [facility];  Service: Urology;  Laterality: Bilateral;    TONSILLECTOMY      TURBT      TURBT, WITH BLUE LIGHT CYSTOSCOPY AND CYSVIEW Bilateral 10/31/2019    Procedure: TURBT,WITH BLUE LIGHT CYSTOSCOPY AND CYSVIEW;  Surgeon: Baldo WRIGHT  MD Kody;  Location: [facility];  Service: Urology;  Laterality: Bilateral;    TYMPANOSTOMY TUBE PLACEMENT      VASCULAR SURGERY Right     popliteal stent    WISDOM TOOTH EXTRACTION         Labs:  Lab Results   Component Value Date    WBC 5.20 10/10/2019    HGB 13.4 10/10/2019    HCT 40.3 10/10/2019    MCV 97 10/10/2019     10/10/2019     BMP  Lab Results   Component Value Date     04/12/2019    K 4.8 04/12/2019     04/12/2019    CO2 28 04/12/2019    BUN 12 04/12/2019    CREATININE 0.8 04/12/2019    CALCIUM 9.9 04/12/2019    ANIONGAP 6 (L) 04/12/2019    ESTGFRAFRICA >60 04/12/2019    EGFRNONAA >60 04/12/2019     Lab Results   Component Value Date    ALT 19 04/12/2019    AST 20 04/12/2019    ALKPHOS 62 04/12/2019    BILITOT 0.6 04/12/2019       Lab Results   Component Value Date    IRON 165 (H) 10/10/2019    TIBC 244 (L) 10/10/2019    FERRITIN 65 10/10/2019     No results found for: [id number]  No results found for: FOLATE  Lab Results   Component Value Date    TSH 1.900 10/17/2019         Review of Systems   Constitutional: Negative for activity change, appetite change, chills, diaphoresis, fatigue, fever and unexpected weight change.   HENT: Negative for congestion, dental problem, drooling, ear discharge, ear pain, facial swelling, hearing loss, mouth sores, nosebleeds, postnasal drip, rhinorrhea, sinus pressure, sneezing, sore throat, tinnitus, trouble swallowing and voice change.    Eyes: Negative for photophobia, pain, discharge, redness, itching and visual disturbance.   Respiratory: Negative for cough, choking, chest tightness, shortness of breath, wheezing and stridor.    Cardiovascular: Negative for chest pain, palpitations and leg swelling.   Gastrointestinal: Negative for abdominal distention, abdominal pain, anal bleeding, blood in stool, constipation, diarrhea, nausea, rectal pain and vomiting.   Endocrine: Negative for cold intolerance, heat intolerance, polydipsia, polyphagia and  polyuria.   Genitourinary: Negative for decreased urine volume, difficulty urinating, dyspareunia, dysuria, enuresis, flank pain, frequency, genital sores, hematuria, menstrual problem, pelvic pain, urgency, vaginal bleeding, vaginal discharge and vaginal pain.   Musculoskeletal: Negative for arthralgias, back pain, gait problem, joint swelling, myalgias, neck pain and neck stiffness.   Skin: Negative for color change, pallor and rash.   Allergic/Immunologic: Negative for environmental allergies, food allergies and immunocompromised state.   Neurological: Negative for dizziness, tremors, seizures, syncope, facial asymmetry, speech difficulty, weakness, light-headedness, numbness and headaches.   Hematological: Negative for adenopathy. Does not bruise/bleed easily.   Psychiatric/Behavioral: Positive for decreased concentration. Negative for agitation, behavioral problems, confusion, dysphoric mood, hallucinations, self-injury, sleep disturbance and suicidal ideas. The patient is not nervous/anxious and is not hyperactive.        Objective:      Physical Exam   Constitutional: She is oriented to person, place, and time. She appears well-developed and well-nourished. She appears distressed.   HENT:   Head: Normocephalic and atraumatic.   Right Ear: External ear normal.   Left Ear: External ear normal.   Nose: Nose normal. Right sinus exhibits no maxillary sinus tenderness and no frontal sinus tenderness. Left sinus exhibits no maxillary sinus tenderness and no frontal sinus tenderness.   Mouth/Throat: Oropharynx is clear and moist. No oropharyngeal exudate.   Eyes: Pupils are equal, round, and reactive to light. Conjunctivae, EOM and lids are normal. Right eye exhibits no discharge. Left eye exhibits no discharge. Right conjunctiva is not injected. Right conjunctiva has no hemorrhage. Left conjunctiva is not injected. Left conjunctiva has no hemorrhage. No scleral icterus.   Neck: Normal range of motion. Neck supple.  No JVD present. No tracheal deviation present. No thyromegaly present.   Cardiovascular: Normal rate and regular rhythm.   Pulmonary/Chest: Effort normal. No stridor. No respiratory distress. She exhibits no tenderness.   Abdominal: Soft. She exhibits no distension and no mass. There is no splenomegaly or hepatomegaly. There is no tenderness. There is no rebound.   Musculoskeletal: Normal range of motion. She exhibits no edema or tenderness.   Lymphadenopathy:     She has no cervical adenopathy.     She has no axillary adenopathy.        Right: No supraclavicular adenopathy present.        Left: No supraclavicular adenopathy present.   Neurological: She is alert and oriented to person, place, and time. No cranial nerve deficit. Coordination normal.   Skin: Skin is dry. No rash noted. She is not diaphoretic. No erythema.   Psychiatric: She has a normal mood and affect. Her behavior is normal. Judgment and thought content normal.   Vitals reviewed.          Assessment:      1. Acquired hypothyroidism    2. Iron deficiency anemia due to chronic blood loss       Recurrent superficial bladder cancer    Plan:   I have tested patient's this TSH as well as iron status for previous history of angiodysplasias.  She periodically gets laboratory studies of placed standing orders in this regard.  In addition I printed out all of her anesthesia records through the operative note so that she has a copy of this with the medication that she was given to be able to discuss with further anesthesiologist to see whether not any modifications can be done since she will have recurrent episodes of cystoscopic examination for recurrent bladder cancer 25 min face-to-face time coordination of care written 50% time spent face-to-face with patient reviewed in copied all reports for her arranged in sequential order          Adonay Neri Jr, MD FACP

## 2019-12-17 DIAGNOSIS — E55.9 HYPOVITAMINOSIS D: ICD-10-CM

## 2019-12-17 RX ORDER — ERGOCALCIFEROL 1.25 MG/1
CAPSULE ORAL
Qty: 12 CAPSULE | Refills: 3 | Status: SHIPPED | OUTPATIENT
Start: 2019-12-17 | End: 2020-12-28

## 2020-01-06 ENCOUNTER — PATIENT MESSAGE (OUTPATIENT)
Dept: HEMATOLOGY/ONCOLOGY | Facility: CLINIC | Age: 75
End: 2020-01-06

## 2020-01-10 ENCOUNTER — LAB VISIT (OUTPATIENT)
Dept: LAB | Facility: HOSPITAL | Age: 75
End: 2020-01-10
Attending: INTERNAL MEDICINE
Payer: MEDICARE

## 2020-01-10 DIAGNOSIS — E03.9 ACQUIRED HYPOTHYROIDISM: ICD-10-CM

## 2020-01-10 DIAGNOSIS — D50.0 IRON DEFICIENCY ANEMIA DUE TO CHRONIC BLOOD LOSS: ICD-10-CM

## 2020-01-10 LAB
BASOPHILS # BLD AUTO: 0.04 K/UL (ref 0–0.2)
BASOPHILS NFR BLD: 0.8 % (ref 0–1.9)
DIFFERENTIAL METHOD: ABNORMAL
EOSINOPHIL # BLD AUTO: 0.2 K/UL (ref 0–0.5)
EOSINOPHIL NFR BLD: 3 % (ref 0–8)
ERYTHROCYTE [DISTWIDTH] IN BLOOD BY AUTOMATED COUNT: 12.8 % (ref 11.5–14.5)
FERRITIN SERPL-MCNC: 54 NG/ML (ref 20–300)
HCT VFR BLD AUTO: 42.1 % (ref 37–48.5)
HGB BLD-MCNC: 14.1 G/DL (ref 12–16)
IMM GRANULOCYTES # BLD AUTO: 0.01 K/UL (ref 0–0.04)
IMM GRANULOCYTES NFR BLD AUTO: 0.2 % (ref 0–0.5)
IRON SERPL-MCNC: 107 UG/DL (ref 30–160)
LYMPHOCYTES # BLD AUTO: 0.9 K/UL (ref 1–4.8)
LYMPHOCYTES NFR BLD: 18.5 % (ref 18–48)
MCH RBC QN AUTO: 33 PG (ref 27–31)
MCHC RBC AUTO-ENTMCNC: 33.5 G/DL (ref 32–36)
MCV RBC AUTO: 99 FL (ref 82–98)
MONOCYTES # BLD AUTO: 0.4 K/UL (ref 0.3–1)
MONOCYTES NFR BLD: 8.7 % (ref 4–15)
NEUTROPHILS # BLD AUTO: 3.4 K/UL (ref 1.8–7.7)
NEUTROPHILS NFR BLD: 69 % (ref 38–73)
NRBC BLD-RTO: 0 /100 WBC
PLATELET # BLD AUTO: 272 K/UL (ref 150–350)
PMV BLD AUTO: 9 FL (ref 9.2–12.9)
RBC # BLD AUTO: 4.27 M/UL (ref 4–5.4)
SATURATED IRON: 39 % (ref 20–50)
TOTAL IRON BINDING CAPACITY: 275 UG/DL (ref 250–450)
TRANSFERRIN SERPL-MCNC: 186 MG/DL (ref 200–375)
TSH SERPL DL<=0.005 MIU/L-ACNC: 3.26 UIU/ML (ref 0.4–4)
WBC # BLD AUTO: 4.92 K/UL (ref 3.9–12.7)

## 2020-01-10 PROCEDURE — 36415 COLL VENOUS BLD VENIPUNCTURE: CPT

## 2020-01-10 PROCEDURE — 82728 ASSAY OF FERRITIN: CPT

## 2020-01-10 PROCEDURE — 83540 ASSAY OF IRON: CPT

## 2020-01-10 PROCEDURE — 88112 CYTOPATH CELL ENHANCE TECH: CPT | Performed by: PATHOLOGY

## 2020-01-10 PROCEDURE — 85025 COMPLETE CBC W/AUTO DIFF WBC: CPT

## 2020-01-10 PROCEDURE — 84443 ASSAY THYROID STIM HORMONE: CPT

## 2020-05-05 ENCOUNTER — PATIENT MESSAGE (OUTPATIENT)
Dept: UROLOGY | Facility: CLINIC | Age: 75
End: 2020-05-05

## 2020-05-18 ENCOUNTER — PATIENT MESSAGE (OUTPATIENT)
Dept: UROLOGY | Facility: CLINIC | Age: 75
End: 2020-05-18

## 2020-05-19 ENCOUNTER — PROCEDURE VISIT (OUTPATIENT)
Dept: UROLOGY | Facility: CLINIC | Age: 75
End: 2020-05-19
Payer: MEDICARE

## 2020-05-19 VITALS
BODY MASS INDEX: 24.87 KG/M2 | SYSTOLIC BLOOD PRESSURE: 140 MMHG | WEIGHT: 135.13 LBS | HEIGHT: 62 IN | HEART RATE: 75 BPM | DIASTOLIC BLOOD PRESSURE: 70 MMHG

## 2020-05-19 DIAGNOSIS — C67.2 MALIGNANT NEOPLASM OF LATERAL WALL OF URINARY BLADDER: Primary | ICD-10-CM

## 2020-05-19 LAB
BILIRUB SERPL-MCNC: NEGATIVE MG/DL
BLOOD URINE, POC: ABNORMAL
COLOR, POC UA: ABNORMAL
GLUCOSE UR QL STRIP: NORMAL
KETONES UR QL STRIP: NEGATIVE
LEUKOCYTE ESTERASE URINE, POC: NEGATIVE
NITRITE, POC UA: NEGATIVE
PH, POC UA: 6
PROTEIN, POC: NEGATIVE
SPECIFIC GRAVITY, POC UA: 1.01
UROBILINOGEN, POC UA: NORMAL

## 2020-05-19 PROCEDURE — 81002 POCT URINE DIPSTICK WITHOUT MICROSCOPE: ICD-10-PCS | Mod: S$GLB,,, | Performed by: UROLOGY

## 2020-05-19 PROCEDURE — 52000 CYSTOURETHROSCOPY: CPT | Mod: S$GLB,,, | Performed by: UROLOGY

## 2020-05-19 PROCEDURE — 81002 URINALYSIS NONAUTO W/O SCOPE: CPT | Mod: S$GLB,,, | Performed by: UROLOGY

## 2020-05-19 PROCEDURE — 52000 CYSTOSCOPY: ICD-10-PCS | Mod: S$GLB,,, | Performed by: UROLOGY

## 2020-05-19 RX ORDER — METOPROLOL SUCCINATE 25 MG/1
25 TABLET, EXTENDED RELEASE ORAL
COMMUNITY
Start: 2020-03-16 | End: 2023-03-20

## 2020-05-19 RX ORDER — FLUTICASONE PROPIONATE 50 MCG
SPRAY, SUSPENSION (ML) NASAL
COMMUNITY
Start: 2020-04-15 | End: 2021-04-12

## 2020-05-19 RX ORDER — CEPHALEXIN 500 MG/1
500 CAPSULE ORAL
Status: COMPLETED | OUTPATIENT
Start: 2020-05-19 | End: 2020-05-19

## 2020-05-19 RX ORDER — LIDOCAINE HYDROCHLORIDE 20 MG/ML
JELLY TOPICAL
Status: COMPLETED | OUTPATIENT
Start: 2020-05-19 | End: 2020-05-19

## 2020-05-19 RX ADMIN — CEPHALEXIN 500 MG: 500 CAPSULE ORAL at 11:05

## 2020-05-19 RX ADMIN — LIDOCAINE HYDROCHLORIDE: 20 JELLY TOPICAL at 11:05

## 2020-05-19 NOTE — PROCEDURES
"Cystoscopy  Date/Time: 5/19/2020 10:48 AM  Performed by: Baldo Gates MD  Authorized by: Baldo Gates MD     Consent Done?:  Yes (Written)  Time out: Immediately prior to procedure a "time out" was called to verify the correct patient, procedure, equipment, support staff and site/side marked as required.    Indications: history bladder cancer    Position:  Supine  Anesthesia:  Lidocaine jelly  Patient sedated?: No    Preparation: Patient was prepped and draped in usual sterile fashion      Scope type:  Flexible cystoscope  External exam normal: Yes    Urethra normal: Yes  Bladder neck normal: Bladder neck normal   Bladder normal: Yes      Patient tolerance:  Patient tolerated the procedure well with no immediate complications      No mucosal lesions identified   a few prominent submucosal vessels on the posterior wall but no tumors    Bladder ca  Low grade Ta 2017; PUNLMP 11/19 sp TURBT and mitomycin RAUL     cytology today   if cytology is okay, return to clinic in 6 months with cytology for repeat cystoscopy.  I offered to increased interval to yearly but she prefers Q 6 months      "

## 2020-05-20 LAB — FINAL PATHOLOGIC DIAGNOSIS: NORMAL

## 2020-05-20 PROCEDURE — 88112 CYTOPATH CELL ENHANCE TECH: CPT | Mod: 26,,, | Performed by: PATHOLOGY

## 2020-05-20 PROCEDURE — 88112 PR  CYTOPATH, CELL ENHANCE TECH: ICD-10-PCS | Mod: 26,,, | Performed by: PATHOLOGY

## 2020-06-22 ENCOUNTER — TELEPHONE (OUTPATIENT)
Dept: HEMATOLOGY/ONCOLOGY | Facility: CLINIC | Age: 75
End: 2020-06-22

## 2020-06-22 DIAGNOSIS — E03.9 ACQUIRED HYPOTHYROIDISM: Primary | ICD-10-CM

## 2020-06-22 DIAGNOSIS — D50.0 IRON DEFICIENCY ANEMIA DUE TO CHRONIC BLOOD LOSS: ICD-10-CM

## 2020-06-22 NOTE — TELEPHONE ENCOUNTER
Attempted to reach the patient in regards to her requesting labs to be drawn . Patient did answer her phone I left an message let her know to check her portal for scheduled appt.

## 2020-06-23 ENCOUNTER — LAB VISIT (OUTPATIENT)
Dept: LAB | Facility: HOSPITAL | Age: 75
End: 2020-06-23
Attending: INTERNAL MEDICINE
Payer: MEDICARE

## 2020-06-23 DIAGNOSIS — E03.9 ACQUIRED HYPOTHYROIDISM: ICD-10-CM

## 2020-06-23 DIAGNOSIS — D50.0 IRON DEFICIENCY ANEMIA DUE TO CHRONIC BLOOD LOSS: ICD-10-CM

## 2020-06-23 LAB
ALBUMIN SERPL BCP-MCNC: 4.1 G/DL (ref 3.5–5.2)
ALP SERPL-CCNC: 87 U/L (ref 55–135)
ALT SERPL W/O P-5'-P-CCNC: 11 U/L (ref 10–44)
ANION GAP SERPL CALC-SCNC: 8 MMOL/L (ref 8–16)
AST SERPL-CCNC: 18 U/L (ref 10–40)
BASOPHILS # BLD AUTO: 0.02 K/UL (ref 0–0.2)
BASOPHILS NFR BLD: 0.4 % (ref 0–1.9)
BILIRUB SERPL-MCNC: 0.7 MG/DL (ref 0.1–1)
BUN SERPL-MCNC: 14 MG/DL (ref 8–23)
CALCIUM SERPL-MCNC: 9.7 MG/DL (ref 8.7–10.5)
CHLORIDE SERPL-SCNC: 105 MMOL/L (ref 95–110)
CO2 SERPL-SCNC: 26 MMOL/L (ref 23–29)
CREAT SERPL-MCNC: 0.7 MG/DL (ref 0.5–1.4)
DIFFERENTIAL METHOD: ABNORMAL
EOSINOPHIL # BLD AUTO: 0.1 K/UL (ref 0–0.5)
EOSINOPHIL NFR BLD: 1.8 % (ref 0–8)
ERYTHROCYTE [DISTWIDTH] IN BLOOD BY AUTOMATED COUNT: 13 % (ref 11.5–14.5)
EST. GFR  (AFRICAN AMERICAN): >60 ML/MIN/1.73 M^2
EST. GFR  (NON AFRICAN AMERICAN): >60 ML/MIN/1.73 M^2
GLUCOSE SERPL-MCNC: 85 MG/DL (ref 70–110)
HCT VFR BLD AUTO: 44.4 % (ref 37–48.5)
HGB BLD-MCNC: 14.5 G/DL (ref 12–16)
IMM GRANULOCYTES # BLD AUTO: 0.01 K/UL (ref 0–0.04)
IMM GRANULOCYTES NFR BLD AUTO: 0.2 % (ref 0–0.5)
LDH SERPL L TO P-CCNC: 167 U/L (ref 110–260)
LYMPHOCYTES # BLD AUTO: 1.1 K/UL (ref 1–4.8)
LYMPHOCYTES NFR BLD: 22 % (ref 18–48)
MCH RBC QN AUTO: 32.1 PG (ref 27–31)
MCHC RBC AUTO-ENTMCNC: 32.7 G/DL (ref 32–36)
MCV RBC AUTO: 98 FL (ref 82–98)
MONOCYTES # BLD AUTO: 0.4 K/UL (ref 0.3–1)
MONOCYTES NFR BLD: 7.6 % (ref 4–15)
NEUTROPHILS # BLD AUTO: 3.4 K/UL (ref 1.8–7.7)
NEUTROPHILS NFR BLD: 68.2 % (ref 38–73)
NRBC BLD-RTO: 0 /100 WBC
PLATELET # BLD AUTO: 274 K/UL (ref 150–350)
PMV BLD AUTO: 9 FL (ref 9.2–12.9)
POTASSIUM SERPL-SCNC: 4.6 MMOL/L (ref 3.5–5.1)
PROT SERPL-MCNC: 6.6 G/DL (ref 6–8.4)
RBC # BLD AUTO: 4.52 M/UL (ref 4–5.4)
SODIUM SERPL-SCNC: 139 MMOL/L (ref 136–145)
WBC # BLD AUTO: 4.99 K/UL (ref 3.9–12.7)

## 2020-06-23 PROCEDURE — 36415 COLL VENOUS BLD VENIPUNCTURE: CPT

## 2020-06-23 PROCEDURE — 82728 ASSAY OF FERRITIN: CPT

## 2020-06-23 PROCEDURE — 83615 LACTATE (LD) (LDH) ENZYME: CPT

## 2020-06-23 PROCEDURE — 84443 ASSAY THYROID STIM HORMONE: CPT

## 2020-06-23 PROCEDURE — 80053 COMPREHEN METABOLIC PANEL: CPT

## 2020-06-23 PROCEDURE — 83540 ASSAY OF IRON: CPT

## 2020-06-23 PROCEDURE — 85025 COMPLETE CBC W/AUTO DIFF WBC: CPT

## 2020-06-24 LAB
FERRITIN SERPL-MCNC: 46 NG/ML (ref 20–300)
IRON SERPL-MCNC: 212 UG/DL (ref 30–160)
SATURATED IRON: 77 % (ref 20–50)
TOTAL IRON BINDING CAPACITY: 275 UG/DL (ref 250–450)
TRANSFERRIN SERPL-MCNC: 186 MG/DL (ref 200–375)
TSH SERPL DL<=0.005 MIU/L-ACNC: 3.85 UIU/ML (ref 0.4–4)

## 2020-07-02 ENCOUNTER — OFFICE VISIT (OUTPATIENT)
Dept: HEMATOLOGY/ONCOLOGY | Facility: CLINIC | Age: 75
End: 2020-07-02
Payer: MEDICARE

## 2020-07-02 ENCOUNTER — LAB VISIT (OUTPATIENT)
Dept: LAB | Facility: HOSPITAL | Age: 75
End: 2020-07-02
Attending: INTERNAL MEDICINE
Payer: MEDICARE

## 2020-07-02 VITALS
DIASTOLIC BLOOD PRESSURE: 70 MMHG | HEART RATE: 61 BPM | TEMPERATURE: 98 F | OXYGEN SATURATION: 98 % | HEIGHT: 62 IN | SYSTOLIC BLOOD PRESSURE: 129 MMHG | WEIGHT: 136.69 LBS | BODY MASS INDEX: 25.15 KG/M2

## 2020-07-02 DIAGNOSIS — E83.19 IRON OVERLOAD: ICD-10-CM

## 2020-07-02 DIAGNOSIS — K55.20 ANGIODYSPLASIA OF CECUM: ICD-10-CM

## 2020-07-02 DIAGNOSIS — Z51.81 MEDICATION MONITORING ENCOUNTER: Chronic | ICD-10-CM

## 2020-07-02 DIAGNOSIS — Z80.3 FAMILY HISTORY OF BREAST CANCER IN SISTER: Primary | ICD-10-CM

## 2020-07-02 DIAGNOSIS — Z80.3 FAMILY HISTORY OF BREAST CANCER IN SISTER: ICD-10-CM

## 2020-07-02 PROCEDURE — 99214 OFFICE O/P EST MOD 30 MIN: CPT | Mod: S$PBB,,, | Performed by: INTERNAL MEDICINE

## 2020-07-02 PROCEDURE — 99999 PR PBB SHADOW E&M-EST. PATIENT-LVL IV: CPT | Mod: PBBFAC,,, | Performed by: INTERNAL MEDICINE

## 2020-07-02 PROCEDURE — 36415 COLL VENOUS BLD VENIPUNCTURE: CPT

## 2020-07-02 PROCEDURE — 99214 OFFICE O/P EST MOD 30 MIN: CPT | Mod: PBBFAC | Performed by: INTERNAL MEDICINE

## 2020-07-02 PROCEDURE — 99999 PR PBB SHADOW E&M-EST. PATIENT-LVL IV: ICD-10-PCS | Mod: PBBFAC,,, | Performed by: INTERNAL MEDICINE

## 2020-07-02 PROCEDURE — 99214 PR OFFICE/OUTPT VISIT, EST, LEVL IV, 30-39 MIN: ICD-10-PCS | Mod: S$PBB,,, | Performed by: INTERNAL MEDICINE

## 2020-07-02 NOTE — PROGRESS NOTES
Subjective:       Patient ID: Selam Botello is a 74 y.o. female.    Chief Complaint: Results and Anemia    HPI 74-year-old female previous history of angiodysplasias of cecum.  Patient was feeling tired and fatigued as to CBC in iron studies be done.  Patient returns for review    Past Medical History:   Diagnosis Date    Acid reflux     Allergy     Arthritis     Atrial tachycardia     Bladder cancer     Breast cyst     Cataract     Colon polyp     Deep vein thrombosis     Degenerative disc disease     Encounter for blood transfusion     General anesthetics causing adverse effect in therapeutic use     GI bleed     Hematuria, gross     Inflammatory bowel disease     Liver disease     cyst    Osteoporosis     PONV (postoperative nausea and vomiting)     Retina disorder, left     Skin cancer     Thyroid disease     Urinary tract infection      Family History   Problem Relation Age of Onset    COPD Mother     Cancer Mother     COPD Father     Cancer Father     Cancer Sister      Social History     Socioeconomic History    Marital status:      Spouse name: Not on file    Number of children: Not on file    Years of education: Not on file    Highest education level: Not on file   Occupational History    Not on file   Social Needs    Financial resource strain: Not on file    Food insecurity     Worry: Not on file     Inability: Not on file    Transportation needs     Medical: Not on file     Non-medical: Not on file   Tobacco Use    Smoking status: Never Smoker    Smokeless tobacco: Never Used   Substance and Sexual Activity    Alcohol use: No     Frequency: Never    Drug use: No    Sexual activity: Yes     Partners: Male   Lifestyle    Physical activity     Days per week: Not on file     Minutes per session: Not on file    Stress: Not on file   Relationships    Social connections     Talks on phone: Not on file     Gets together: Not on file     Attends Worship service:  Not on file     Active member of club or organization: Not on file     Attends meetings of clubs or organizations: Not on file     Relationship status: Not on file   Other Topics Concern    Not on file   Social History Narrative    Not on file     Past Surgical History:   Procedure Laterality Date    ADENOIDECTOMY      BLADDER FULGURATION Right 9/12/2018    Procedure: FULGURATION, BLADDER;  Surgeon: Isaias Shah MD;  Location: [facility];  Service: Urology;  Laterality: Right;    BLADDER FULGURATION N/A 4/24/2019    Procedure: FULGURATION, BLADDER;  Surgeon: Isaias Shah MD;  Location: [facility];  Service: Urology;  Laterality: N/A;    BREAST SURGERY Bilateral     CYSTOSCOPY      CYSTOSCOPY N/A 4/24/2019    Procedure: CYSTOSCOPY;  Surgeon: Isaias Shah MD;  Location: [facility];  Service: Urology;  Laterality: N/A;    CYSTOSCOPY WITH URETEROSCOPY, RETROGRADE PYELOGRAPHY, AND INSERTION OF STENT Left 9/12/2018    Procedure: CYSTOSCOPY, WITH RETROGRADE PYELOGRAM AND URETERAL STENT INSERTION;  Surgeon: Isaias Shah MD;  Location: [facility];  Service: Urology;  Laterality: Left;    EYE SURGERY Left 2017    retina pucker    INSTILLATION OF URINARY BLADDER N/A 9/12/2018    Procedure: INSTILLATION, URINARY BLADDER;  Surgeon: Isaias Shah MD;  Location: [facility];  Service: Urology;  Laterality: N/A;  Chemo agent instillation     JOINT REPLACEMENT Bilateral     hip    JOINT REPLACEMENT Right     knee    MASTECTOMY Bilateral 1991    RETROGRADE PYELOGRAPHY Bilateral 10/31/2019    Procedure: PYELOGRAM, RETROGRADE;  Surgeon: Baldo Gates MD;  Location: [facility];  Service: Urology;  Laterality: Bilateral;    TONSILLECTOMY      TURBT      TURBT, WITH BLUE LIGHT CYSTOSCOPY AND CYSVIEW Bilateral 10/31/2019    Procedure: TURBT,WITH BLUE LIGHT CYSTOSCOPY AND CYSVIEW;  Surgeon: Baldo Gates MD;  Location: [facility];  Service: Urology;  Laterality: Bilateral;    TYMPANOSTOMY TUBE PLACEMENT       VASCULAR SURGERY Right     popliteal stent    WISDOM TOOTH EXTRACTION         Labs:  Lab Results   Component Value Date    WBC 4.99 06/23/2020    HGB 14.5 06/23/2020    HCT 44.4 06/23/2020    MCV 98 06/23/2020     06/23/2020     BMP  Lab Results   Component Value Date     06/23/2020    K 4.6 06/23/2020     06/23/2020    CO2 26 06/23/2020    BUN 14 06/23/2020    CREATININE 0.7 06/23/2020    CALCIUM 9.7 06/23/2020    ANIONGAP 8 06/23/2020    ESTGFRAFRICA >60 06/23/2020    EGFRNONAA >60 06/23/2020     Lab Results   Component Value Date    ALT 11 06/23/2020    AST 18 06/23/2020    ALKPHOS 87 06/23/2020    BILITOT 0.7 06/23/2020       Lab Results   Component Value Date    IRON 212 (H) 06/23/2020    TIBC 275 06/23/2020    FERRITIN 46 06/23/2020     No results found for: [id number]  No results found for: FOLATE  Lab Results   Component Value Date    TSH 3.851 06/23/2020         Review of Systems   Constitutional: Positive for fatigue. Negative for activity change, appetite change, chills, diaphoresis, fever and unexpected weight change.   HENT: Negative for congestion, dental problem, drooling, ear discharge, ear pain, facial swelling, hearing loss, mouth sores, nosebleeds, postnasal drip, rhinorrhea, sinus pressure, sneezing, sore throat, tinnitus, trouble swallowing and voice change.    Eyes: Negative for photophobia, pain, discharge, redness, itching and visual disturbance.   Respiratory: Negative for cough, choking, chest tightness, shortness of breath, wheezing and stridor.    Cardiovascular: Negative for chest pain, palpitations and leg swelling.   Gastrointestinal: Negative for abdominal distention, abdominal pain, anal bleeding, blood in stool, constipation, diarrhea, nausea, rectal pain and vomiting.   Endocrine: Negative for cold intolerance, heat intolerance, polydipsia, polyphagia and polyuria.   Genitourinary: Negative for decreased urine volume, difficulty urinating, dyspareunia, dysuria,  enuresis, flank pain, frequency, genital sores, hematuria, menstrual problem, pelvic pain, urgency, vaginal bleeding, vaginal discharge and vaginal pain.   Musculoskeletal: Negative for arthralgias, back pain, gait problem, joint swelling, myalgias, neck pain and neck stiffness.   Skin: Negative for color change, pallor and rash.   Allergic/Immunologic: Negative for environmental allergies, food allergies and immunocompromised state.   Neurological: Negative for dizziness, tremors, seizures, syncope, facial asymmetry, speech difficulty, weakness, light-headedness, numbness and headaches.   Hematological: Negative for adenopathy. Does not bruise/bleed easily.   Psychiatric/Behavioral: Positive for dysphoric mood. Negative for agitation, behavioral problems, confusion, decreased concentration, hallucinations, self-injury, sleep disturbance and suicidal ideas. The patient is nervous/anxious. The patient is not hyperactive.        Objective:      Physical Exam  Vitals signs reviewed.   Constitutional:       General: She is not in acute distress.     Appearance: She is well-developed. She is not diaphoretic.   HENT:      Head: Normocephalic and atraumatic.      Right Ear: External ear normal.      Left Ear: External ear normal.      Nose: Nose normal.      Right Sinus: No maxillary sinus tenderness or frontal sinus tenderness.      Left Sinus: No maxillary sinus tenderness or frontal sinus tenderness.      Mouth/Throat:      Pharynx: No oropharyngeal exudate.   Eyes:      General: Lids are normal. No scleral icterus.        Right eye: No discharge.         Left eye: No discharge.      Conjunctiva/sclera: Conjunctivae normal.      Right eye: Right conjunctiva is not injected. No hemorrhage.     Left eye: Left conjunctiva is not injected. No hemorrhage.     Pupils: Pupils are equal, round, and reactive to light.   Neck:      Musculoskeletal: Normal range of motion and neck supple.      Thyroid: No thyromegaly.       Vascular: No JVD.      Trachea: No tracheal deviation.   Cardiovascular:      Rate and Rhythm: Normal rate.   Pulmonary:      Effort: Pulmonary effort is normal. No respiratory distress.      Breath sounds: No stridor.   Chest:      Chest wall: No tenderness.   Abdominal:      General: Bowel sounds are normal. There is no distension.      Palpations: Abdomen is soft. There is no hepatomegaly, splenomegaly or mass.      Tenderness: There is no abdominal tenderness. There is no rebound.   Musculoskeletal: Normal range of motion.         General: No tenderness.   Lymphadenopathy:      Cervical: No cervical adenopathy.      Upper Body:      Right upper body: No supraclavicular adenopathy.      Left upper body: No supraclavicular adenopathy.   Skin:     General: Skin is dry.      Findings: No erythema or rash.   Neurological:      Mental Status: She is alert and oriented to person, place, and time.      Cranial Nerves: No cranial nerve deficit.      Coordination: Coordination normal.   Psychiatric:         Behavior: Behavior normal.         Thought Content: Thought content normal.         Judgment: Judgment normal.             Assessment:      1. Family history of breast cancer in sister    2. Iron overload    3. Angiodysplasia of cecum    4. Medication monitoring encounter           Plan:     Patient wishes expanded genetic testing sister was diagnosed with breast cancer at age 42 father with prostate cancer at age 58.  Previous testing for BRCA 1 and 2-.  Results of her CBC demonstrate normal ferritin but elevated iron she takes a number of supplements of told to discontinue will check for hemochromatosis.  At her request communicate results through portal see back with repeat CBC and iron status in approximately 3 months and communicate results of germ line testing when available        Adonay Neri Jr, MD FACP

## 2020-07-13 LAB
GENETICIST REVIEW: NORMAL
HFE GENE MUT ANL BLD/T: NORMAL
HFE RELEASED BY: NORMAL
HFE RESULT SUMMARY: NORMAL
REF LAB TEST METHOD: NORMAL
SPECIMEN SOURCE: NORMAL
SPECIMEN,  HEMOCHROMATOSIS: NORMAL

## 2020-07-14 ENCOUNTER — TELEPHONE (OUTPATIENT)
Dept: HEMATOLOGY/ONCOLOGY | Facility: CLINIC | Age: 75
End: 2020-07-14

## 2020-07-15 DIAGNOSIS — Z71.89 COMPLEX CARE COORDINATION: ICD-10-CM

## 2020-07-16 ENCOUNTER — OFFICE VISIT (OUTPATIENT)
Dept: HEMATOLOGY/ONCOLOGY | Facility: CLINIC | Age: 75
End: 2020-07-16
Payer: MEDICARE

## 2020-07-16 VITALS
OXYGEN SATURATION: 98 % | TEMPERATURE: 98 F | RESPIRATION RATE: 18 BRPM | BODY MASS INDEX: 25.72 KG/M2 | WEIGHT: 139.75 LBS | HEIGHT: 62 IN | DIASTOLIC BLOOD PRESSURE: 77 MMHG | HEART RATE: 64 BPM | SYSTOLIC BLOOD PRESSURE: 133 MMHG

## 2020-07-16 DIAGNOSIS — Z80.3 FAMILY HISTORY OF BREAST CANCER IN SISTER: ICD-10-CM

## 2020-07-16 DIAGNOSIS — Z90.13 H/O BILATERAL MASTECTOMY: Primary | ICD-10-CM

## 2020-07-16 PROCEDURE — 99214 OFFICE O/P EST MOD 30 MIN: CPT | Mod: PBBFAC | Performed by: INTERNAL MEDICINE

## 2020-07-16 PROCEDURE — 99999 PR PBB SHADOW E&M-EST. PATIENT-LVL IV: CPT | Mod: PBBFAC,,, | Performed by: INTERNAL MEDICINE

## 2020-07-16 PROCEDURE — 99214 OFFICE O/P EST MOD 30 MIN: CPT | Mod: S$PBB,,, | Performed by: INTERNAL MEDICINE

## 2020-07-16 PROCEDURE — 99214 PR OFFICE/OUTPT VISIT, EST, LEVL IV, 30-39 MIN: ICD-10-PCS | Mod: S$PBB,,, | Performed by: INTERNAL MEDICINE

## 2020-07-16 PROCEDURE — 99999 PR PBB SHADOW E&M-EST. PATIENT-LVL IV: ICD-10-PCS | Mod: PBBFAC,,, | Performed by: INTERNAL MEDICINE

## 2020-07-16 NOTE — PROGRESS NOTES
Subjective:       Patient ID: Selam Botello is a 74 y.o. female.    Chief Complaint: Results    HPI 74-year-old female returns for review awaiting results of germ line testing for my risk.  Because of elevated serum iron DNA testing for hemochromatosis was done.  With results for review    Past Medical History:   Diagnosis Date    Acid reflux     Allergy     Arthritis     Atrial tachycardia     Bladder cancer     Breast cyst     Cataract     Colon polyp     Deep vein thrombosis     Degenerative disc disease     Encounter for blood transfusion     General anesthetics causing adverse effect in therapeutic use     GI bleed     Hematuria, gross     Inflammatory bowel disease     Liver disease     cyst    Osteoporosis     PONV (postoperative nausea and vomiting)     Retina disorder, left     Skin cancer     Thyroid disease     Urinary tract infection      Family History   Problem Relation Age of Onset    COPD Mother     Cancer Mother     COPD Father     Cancer Father     Cancer Sister      Social History     Socioeconomic History    Marital status:      Spouse name: Not on file    Number of children: Not on file    Years of education: Not on file    Highest education level: Not on file   Occupational History    Not on file   Social Needs    Financial resource strain: Not on file    Food insecurity     Worry: Not on file     Inability: Not on file    Transportation needs     Medical: Not on file     Non-medical: Not on file   Tobacco Use    Smoking status: Never Smoker    Smokeless tobacco: Never Used   Substance and Sexual Activity    Alcohol use: No     Frequency: Never    Drug use: No    Sexual activity: Yes     Partners: Male   Lifestyle    Physical activity     Days per week: Not on file     Minutes per session: Not on file    Stress: Not on file   Relationships    Social connections     Talks on phone: Not on file     Gets together: Not on file     Attends Sabianism  service: Not on file     Active member of club or organization: Not on file     Attends meetings of clubs or organizations: Not on file     Relationship status: Not on file   Other Topics Concern    Not on file   Social History Narrative    Not on file     Past Surgical History:   Procedure Laterality Date    ADENOIDECTOMY      BLADDER FULGURATION Right 9/12/2018    Procedure: FULGURATION, BLADDER;  Surgeon: Isaias Shah MD;  Location: [facility];  Service: Urology;  Laterality: Right;    BLADDER FULGURATION N/A 4/24/2019    Procedure: FULGURATION, BLADDER;  Surgeon: Isaias Shah MD;  Location: [facility];  Service: Urology;  Laterality: N/A;    BREAST SURGERY Bilateral     CYSTOSCOPY      CYSTOSCOPY N/A 4/24/2019    Procedure: CYSTOSCOPY;  Surgeon: Isaias Shah MD;  Location: [facility];  Service: Urology;  Laterality: N/A;    CYSTOSCOPY WITH URETEROSCOPY, RETROGRADE PYELOGRAPHY, AND INSERTION OF STENT Left 9/12/2018    Procedure: CYSTOSCOPY, WITH RETROGRADE PYELOGRAM AND URETERAL STENT INSERTION;  Surgeon: Isaias Shah MD;  Location: [facility];  Service: Urology;  Laterality: Left;    EYE SURGERY Left 2017    retina pucker    INSTILLATION OF URINARY BLADDER N/A 9/12/2018    Procedure: INSTILLATION, URINARY BLADDER;  Surgeon: Isaias Shah MD;  Location: [facility];  Service: Urology;  Laterality: N/A;  Chemo agent instillation     JOINT REPLACEMENT Bilateral     hip    JOINT REPLACEMENT Right     knee    MASTECTOMY Bilateral 1991    RETROGRADE PYELOGRAPHY Bilateral 10/31/2019    Procedure: PYELOGRAM, RETROGRADE;  Surgeon: Baldo Gates MD;  Location: [facility];  Service: Urology;  Laterality: Bilateral;    TONSILLECTOMY      TURBT      TURBT, WITH BLUE LIGHT CYSTOSCOPY AND CYSVIEW Bilateral 10/31/2019    Procedure: TURBT,WITH BLUE LIGHT CYSTOSCOPY AND CYSVIEW;  Surgeon: Baldo Gates MD;  Location: [facility];  Service: Urology;  Laterality: Bilateral;    TYMPANOSTOMY TUBE PLACEMENT       VASCULAR SURGERY Right     popliteal stent    WISDOM TOOTH EXTRACTION         Labs:  Lab Results   Component Value Date    WBC 4.99 06/23/2020    HGB 14.5 06/23/2020    HCT 44.4 06/23/2020    MCV 98 06/23/2020     06/23/2020     BMP  Lab Results   Component Value Date     06/23/2020    K 4.6 06/23/2020     06/23/2020    CO2 26 06/23/2020    BUN 14 06/23/2020    CREATININE 0.7 06/23/2020    CALCIUM 9.7 06/23/2020    ANIONGAP 8 06/23/2020    ESTGFRAFRICA >60 06/23/2020    EGFRNONAA >60 06/23/2020     Lab Results   Component Value Date    ALT 11 06/23/2020    AST 18 06/23/2020    ALKPHOS 87 06/23/2020    BILITOT 0.7 06/23/2020       Lab Results   Component Value Date    IRON 212 (H) 06/23/2020    TIBC 275 06/23/2020    FERRITIN 46 06/23/2020     No results found for: [id number]  No results found for: FOLATE  Lab Results   Component Value Date    TSH 3.851 06/23/2020         Review of Systems   Constitutional: Negative for activity change, appetite change, chills, diaphoresis, fatigue, fever and unexpected weight change.   HENT: Negative for congestion, dental problem, drooling, ear discharge, ear pain, facial swelling, hearing loss, mouth sores, nosebleeds, postnasal drip, rhinorrhea, sinus pressure, sneezing, sore throat, tinnitus, trouble swallowing and voice change.    Eyes: Negative for photophobia, pain, discharge, redness, itching and visual disturbance.   Respiratory: Negative for cough, choking, chest tightness, shortness of breath, wheezing and stridor.    Cardiovascular: Negative for chest pain, palpitations and leg swelling.   Gastrointestinal: Negative for abdominal distention, abdominal pain, anal bleeding, blood in stool, constipation, diarrhea, nausea, rectal pain and vomiting.   Endocrine: Negative for cold intolerance, heat intolerance, polydipsia, polyphagia and polyuria.   Genitourinary: Negative for decreased urine volume, difficulty urinating, dyspareunia, dysuria, enuresis,  flank pain, frequency, genital sores, hematuria, menstrual problem, pelvic pain, urgency, vaginal bleeding, vaginal discharge and vaginal pain.   Musculoskeletal: Positive for arthralgias and myalgias. Negative for back pain, gait problem, joint swelling, neck pain and neck stiffness.   Skin: Negative for color change, pallor and rash.   Allergic/Immunologic: Negative for environmental allergies, food allergies and immunocompromised state.   Neurological: Negative for dizziness, tremors, seizures, syncope, facial asymmetry, speech difficulty, weakness, light-headedness, numbness and headaches.   Hematological: Negative for adenopathy. Does not bruise/bleed easily.   Psychiatric/Behavioral: Positive for dysphoric mood. Negative for agitation, behavioral problems, confusion, decreased concentration, hallucinations, self-injury, sleep disturbance and suicidal ideas. The patient is nervous/anxious. The patient is not hyperactive.        Objective:      Physical Exam  Vitals signs reviewed.   Constitutional:       General: She is not in acute distress.     Appearance: She is well-developed. She is not diaphoretic.   HENT:      Head: Normocephalic and atraumatic.      Right Ear: External ear normal.      Left Ear: External ear normal.      Nose: Nose normal.      Right Sinus: No maxillary sinus tenderness or frontal sinus tenderness.      Left Sinus: No maxillary sinus tenderness or frontal sinus tenderness.      Mouth/Throat:      Pharynx: No oropharyngeal exudate.   Eyes:      General: Lids are normal. No scleral icterus.        Right eye: No discharge.         Left eye: No discharge.      Conjunctiva/sclera: Conjunctivae normal.      Right eye: Right conjunctiva is not injected. No hemorrhage.     Left eye: Left conjunctiva is not injected. No hemorrhage.     Pupils: Pupils are equal, round, and reactive to light.   Neck:      Musculoskeletal: Normal range of motion and neck supple.      Thyroid: No thyromegaly.       Vascular: No JVD.      Trachea: No tracheal deviation.   Cardiovascular:      Rate and Rhythm: Normal rate.   Pulmonary:      Effort: Pulmonary effort is normal. No respiratory distress.      Breath sounds: No stridor.   Chest:      Chest wall: No tenderness.   Abdominal:      General: Bowel sounds are normal. There is no distension.      Palpations: Abdomen is soft. There is no hepatomegaly, splenomegaly or mass.      Tenderness: There is no abdominal tenderness. There is no rebound.   Musculoskeletal: Normal range of motion.         General: No tenderness.   Lymphadenopathy:      Cervical: No cervical adenopathy.      Upper Body:      Right upper body: No supraclavicular adenopathy.      Left upper body: No supraclavicular adenopathy.   Skin:     General: Skin is dry.      Findings: No erythema or rash.   Neurological:      Mental Status: She is alert and oriented to person, place, and time.      Cranial Nerves: No cranial nerve deficit.      Coordination: Coordination normal.   Psychiatric:         Mood and Affect: Mood is anxious.         Behavior: Behavior normal.         Thought Content: Thought content normal.         Judgment: Judgment normal.             Assessment:      1. H/O bilateral mastectomy    2. 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  The patient has had previous serum iron levels that have been normal as well she is currently taking a dietary supplement.  Recommended since 07/02/2020 to discontinue in addition will have serum iron TIBC in fasting state.  Discussed implications with her communicate results back through electronic patient portal..          Adonay Neri Jr, MD FACP

## 2020-07-23 ENCOUNTER — LAB VISIT (OUTPATIENT)
Dept: LAB | Facility: HOSPITAL | Age: 75
End: 2020-07-23
Attending: INTERNAL MEDICINE
Payer: MEDICARE

## 2020-07-23 DIAGNOSIS — D50.0 IRON DEFICIENCY ANEMIA DUE TO CHRONIC BLOOD LOSS: ICD-10-CM

## 2020-07-23 DIAGNOSIS — E03.9 ACQUIRED HYPOTHYROIDISM: ICD-10-CM

## 2020-07-23 LAB
BASOPHILS # BLD AUTO: 0.03 K/UL (ref 0–0.2)
BASOPHILS NFR BLD: 0.7 % (ref 0–1.9)
DIFFERENTIAL METHOD: ABNORMAL
EOSINOPHIL # BLD AUTO: 0.1 K/UL (ref 0–0.5)
EOSINOPHIL NFR BLD: 1.7 % (ref 0–8)
ERYTHROCYTE [DISTWIDTH] IN BLOOD BY AUTOMATED COUNT: 12.7 % (ref 11.5–14.5)
FERRITIN SERPL-MCNC: 67 NG/ML (ref 20–300)
HCT VFR BLD AUTO: 43.9 % (ref 37–48.5)
HGB BLD-MCNC: 14.3 G/DL (ref 12–16)
IMM GRANULOCYTES # BLD AUTO: 0 K/UL (ref 0–0.04)
IMM GRANULOCYTES NFR BLD AUTO: 0 % (ref 0–0.5)
IRON SERPL-MCNC: 170 UG/DL (ref 30–160)
LYMPHOCYTES # BLD AUTO: 0.8 K/UL (ref 1–4.8)
LYMPHOCYTES NFR BLD: 20.3 % (ref 18–48)
MCH RBC QN AUTO: 32 PG (ref 27–31)
MCHC RBC AUTO-ENTMCNC: 32.6 G/DL (ref 32–36)
MCV RBC AUTO: 98 FL (ref 82–98)
MONOCYTES # BLD AUTO: 0.4 K/UL (ref 0.3–1)
MONOCYTES NFR BLD: 8.7 % (ref 4–15)
NEUTROPHILS # BLD AUTO: 2.8 K/UL (ref 1.8–7.7)
NEUTROPHILS NFR BLD: 68.6 % (ref 38–73)
NRBC BLD-RTO: 0 /100 WBC
PLATELET # BLD AUTO: 253 K/UL (ref 150–350)
PMV BLD AUTO: 9.1 FL (ref 9.2–12.9)
RBC # BLD AUTO: 4.47 M/UL (ref 4–5.4)
SATURATED IRON: 62 % (ref 20–50)
TOTAL IRON BINDING CAPACITY: 274 UG/DL (ref 250–450)
TRANSFERRIN SERPL-MCNC: 185 MG/DL (ref 200–375)
TSH SERPL DL<=0.005 MIU/L-ACNC: 3.7 UIU/ML (ref 0.4–4)
WBC # BLD AUTO: 4.03 K/UL (ref 3.9–12.7)

## 2020-07-23 PROCEDURE — 82728 ASSAY OF FERRITIN: CPT

## 2020-07-23 PROCEDURE — 85025 COMPLETE CBC W/AUTO DIFF WBC: CPT

## 2020-07-23 PROCEDURE — 83540 ASSAY OF IRON: CPT

## 2020-07-23 PROCEDURE — 84443 ASSAY THYROID STIM HORMONE: CPT

## 2020-07-23 PROCEDURE — 36415 COLL VENOUS BLD VENIPUNCTURE: CPT

## 2020-07-24 DIAGNOSIS — E83.19 IRON OVERLOAD: Primary | ICD-10-CM

## 2020-08-06 ENCOUNTER — LAB VISIT (OUTPATIENT)
Dept: LAB | Facility: HOSPITAL | Age: 75
End: 2020-08-06
Attending: INTERNAL MEDICINE
Payer: MEDICARE

## 2020-08-06 DIAGNOSIS — E83.19 IRON OVERLOAD: ICD-10-CM

## 2020-08-06 LAB
BASOPHILS # BLD AUTO: 0.04 K/UL (ref 0–0.2)
BASOPHILS NFR BLD: 0.9 % (ref 0–1.9)
DIFFERENTIAL METHOD: ABNORMAL
EOSINOPHIL # BLD AUTO: 0.1 K/UL (ref 0–0.5)
EOSINOPHIL NFR BLD: 2 % (ref 0–8)
ERYTHROCYTE [DISTWIDTH] IN BLOOD BY AUTOMATED COUNT: 12.9 % (ref 11.5–14.5)
HCT VFR BLD AUTO: 42.6 % (ref 37–48.5)
HGB BLD-MCNC: 14.1 G/DL (ref 12–16)
IMM GRANULOCYTES # BLD AUTO: 0 K/UL (ref 0–0.04)
IMM GRANULOCYTES NFR BLD AUTO: 0 % (ref 0–0.5)
LYMPHOCYTES # BLD AUTO: 1.2 K/UL (ref 1–4.8)
LYMPHOCYTES NFR BLD: 26 % (ref 18–48)
MCH RBC QN AUTO: 32.5 PG (ref 27–31)
MCHC RBC AUTO-ENTMCNC: 33.1 G/DL (ref 32–36)
MCV RBC AUTO: 98 FL (ref 82–98)
MONOCYTES # BLD AUTO: 0.3 K/UL (ref 0.3–1)
MONOCYTES NFR BLD: 7.3 % (ref 4–15)
NEUTROPHILS # BLD AUTO: 2.9 K/UL (ref 1.8–7.7)
NEUTROPHILS NFR BLD: 63.8 % (ref 38–73)
NRBC BLD-RTO: 0 /100 WBC
PLATELET # BLD AUTO: 274 K/UL (ref 150–350)
PMV BLD AUTO: 9.7 FL (ref 9.2–12.9)
RBC # BLD AUTO: 4.34 M/UL (ref 4–5.4)
WBC # BLD AUTO: 4.5 K/UL (ref 3.9–12.7)

## 2020-08-06 PROCEDURE — 36415 COLL VENOUS BLD VENIPUNCTURE: CPT

## 2020-08-06 PROCEDURE — 85025 COMPLETE CBC W/AUTO DIFF WBC: CPT

## 2020-08-06 PROCEDURE — 83540 ASSAY OF IRON: CPT

## 2020-08-06 PROCEDURE — 82728 ASSAY OF FERRITIN: CPT

## 2020-08-07 LAB
FERRITIN SERPL-MCNC: 58 NG/ML (ref 20–300)
IRON SERPL-MCNC: 147 UG/DL (ref 30–160)
SATURATED IRON: 51 % (ref 20–50)
TOTAL IRON BINDING CAPACITY: 290 UG/DL (ref 250–450)
TRANSFERRIN SERPL-MCNC: 196 MG/DL (ref 200–375)

## 2020-08-26 ENCOUNTER — HOSPITAL ENCOUNTER (OUTPATIENT)
Dept: RADIOLOGY | Facility: HOSPITAL | Age: 75
Discharge: HOME | End: 2020-08-26
Attending: INTERNAL MEDICINE
Payer: MEDICARE

## 2020-08-26 DIAGNOSIS — E83.119 HEMOCHROMATOSIS: ICD-10-CM

## 2020-08-26 DIAGNOSIS — R93.5 ABNORMAL ABDOMINAL ULTRASOUND: ICD-10-CM

## 2020-08-26 PROCEDURE — 74183 MRI ABD W/O CNTR FLWD CNTR: CPT | Mod: 26,,, | Performed by: RADIOLOGY

## 2020-08-26 PROCEDURE — 74183 MRI ABD W/O CNTR FLWD CNTR: CPT | Mod: TC

## 2020-08-26 PROCEDURE — 74183 MRI ABDOMEN W WO CONTRAST: ICD-10-PCS | Mod: 26,,, | Performed by: RADIOLOGY

## 2020-08-26 PROCEDURE — 25500020 PHARM REV CODE 255: Performed by: RADIOLOGY

## 2020-08-26 PROCEDURE — A9585 GADOBUTROL INJECTION: HCPCS | Performed by: RADIOLOGY

## 2020-08-26 RX ORDER — GADOBUTROL 604.72 MG/ML
6 INJECTION INTRAVENOUS
Status: COMPLETED | OUTPATIENT
Start: 2020-08-26 | End: 2020-08-26

## 2020-08-26 RX ADMIN — GADOBUTROL 6 ML: 604.72 INJECTION INTRAVENOUS at 12:08

## 2020-08-31 DIAGNOSIS — R94.5 ABNORMAL RESULTS OF LIVER FUNCTION STUDIES: ICD-10-CM

## 2020-08-31 DIAGNOSIS — E83.19 IRON OVERLOAD: Primary | ICD-10-CM

## 2020-09-02 DIAGNOSIS — Z90.13 H/O BILATERAL MASTECTOMY: ICD-10-CM

## 2020-09-02 LAB — INTEGRATED BRAC ANALYSIS: NORMAL

## 2020-09-14 DIAGNOSIS — Z90.13 H/O BILATERAL MASTECTOMY: ICD-10-CM

## 2020-10-05 ENCOUNTER — PATIENT MESSAGE (OUTPATIENT)
Dept: HEMATOLOGY/ONCOLOGY | Facility: CLINIC | Age: 75
End: 2020-10-05

## 2020-10-07 ENCOUNTER — LAB VISIT (OUTPATIENT)
Dept: LAB | Facility: HOSPITAL | Age: 75
End: 2020-10-07
Attending: INTERNAL MEDICINE
Payer: MEDICARE

## 2020-10-07 ENCOUNTER — PATIENT MESSAGE (OUTPATIENT)
Dept: HEMATOLOGY/ONCOLOGY | Facility: CLINIC | Age: 75
End: 2020-10-07

## 2020-10-07 DIAGNOSIS — R94.5 ABNORMAL RESULTS OF LIVER FUNCTION STUDIES: ICD-10-CM

## 2020-10-07 DIAGNOSIS — E83.19 IRON OVERLOAD: ICD-10-CM

## 2020-10-07 LAB
ALBUMIN SERPL BCP-MCNC: 4.4 G/DL (ref 3.5–5.2)
ALP SERPL-CCNC: 90 U/L (ref 55–135)
ALT SERPL W/O P-5'-P-CCNC: 13 U/L (ref 10–44)
ANION GAP SERPL CALC-SCNC: 8 MMOL/L (ref 8–16)
AST SERPL-CCNC: 21 U/L (ref 10–40)
BASOPHILS # BLD AUTO: 0.03 K/UL (ref 0–0.2)
BASOPHILS NFR BLD: 0.6 % (ref 0–1.9)
BILIRUB SERPL-MCNC: 0.9 MG/DL (ref 0.1–1)
BUN SERPL-MCNC: 14 MG/DL (ref 8–23)
CALCIUM SERPL-MCNC: 9.8 MG/DL (ref 8.7–10.5)
CHLORIDE SERPL-SCNC: 108 MMOL/L (ref 95–110)
CO2 SERPL-SCNC: 26 MMOL/L (ref 23–29)
CREAT SERPL-MCNC: 0.7 MG/DL (ref 0.5–1.4)
DIFFERENTIAL METHOD: ABNORMAL
EOSINOPHIL # BLD AUTO: 0.1 K/UL (ref 0–0.5)
EOSINOPHIL NFR BLD: 2.6 % (ref 0–8)
ERYTHROCYTE [DISTWIDTH] IN BLOOD BY AUTOMATED COUNT: 13 % (ref 11.5–14.5)
EST. GFR  (AFRICAN AMERICAN): >60 ML/MIN/1.73 M^2
EST. GFR  (NON AFRICAN AMERICAN): >60 ML/MIN/1.73 M^2
FERRITIN SERPL-MCNC: 43 NG/ML (ref 20–300)
GLUCOSE SERPL-MCNC: 95 MG/DL (ref 70–110)
HCT VFR BLD AUTO: 43.6 % (ref 37–48.5)
HGB BLD-MCNC: 14.4 G/DL (ref 12–16)
IMM GRANULOCYTES # BLD AUTO: 0.01 K/UL (ref 0–0.04)
IMM GRANULOCYTES NFR BLD AUTO: 0.2 % (ref 0–0.5)
IRON SERPL-MCNC: 218 UG/DL (ref 30–160)
LYMPHOCYTES # BLD AUTO: 1 K/UL (ref 1–4.8)
LYMPHOCYTES NFR BLD: 21.3 % (ref 18–48)
MCH RBC QN AUTO: 31.7 PG (ref 27–31)
MCHC RBC AUTO-ENTMCNC: 33 G/DL (ref 32–36)
MCV RBC AUTO: 96 FL (ref 82–98)
MONOCYTES # BLD AUTO: 0.3 K/UL (ref 0.3–1)
MONOCYTES NFR BLD: 6.5 % (ref 4–15)
NEUTROPHILS # BLD AUTO: 3.2 K/UL (ref 1.8–7.7)
NEUTROPHILS NFR BLD: 69 % (ref 38–73)
NRBC BLD-RTO: 0 /100 WBC
PLATELET # BLD AUTO: 297 K/UL (ref 150–350)
PMV BLD AUTO: 9.3 FL (ref 9.2–12.9)
POTASSIUM SERPL-SCNC: 4.2 MMOL/L (ref 3.5–5.1)
PROT SERPL-MCNC: 6.8 G/DL (ref 6–8.4)
RBC # BLD AUTO: 4.54 M/UL (ref 4–5.4)
SATURATED IRON: 74 % (ref 20–50)
SODIUM SERPL-SCNC: 142 MMOL/L (ref 136–145)
TOTAL IRON BINDING CAPACITY: 295 UG/DL (ref 250–450)
TRANSFERRIN SERPL-MCNC: 199 MG/DL (ref 200–375)
WBC # BLD AUTO: 4.64 K/UL (ref 3.9–12.7)

## 2020-10-07 PROCEDURE — 83540 ASSAY OF IRON: CPT

## 2020-10-07 PROCEDURE — 85025 COMPLETE CBC W/AUTO DIFF WBC: CPT

## 2020-10-07 PROCEDURE — 82728 ASSAY OF FERRITIN: CPT

## 2020-10-07 PROCEDURE — 86703 HIV-1/HIV-2 1 RESULT ANTBDY: CPT

## 2020-10-07 PROCEDURE — 36415 COLL VENOUS BLD VENIPUNCTURE: CPT

## 2020-10-07 PROCEDURE — 80074 ACUTE HEPATITIS PANEL: CPT

## 2020-10-07 PROCEDURE — 80053 COMPREHEN METABOLIC PANEL: CPT

## 2020-10-08 LAB
HAV IGM SERPL QL IA: NEGATIVE
HBV CORE IGM SERPL QL IA: NEGATIVE
HBV SURFACE AG SERPL QL IA: NEGATIVE
HCV AB SERPL QL IA: NEGATIVE
HIV 1+2 AB+HIV1 P24 AG SERPL QL IA: NEGATIVE

## 2020-10-12 ENCOUNTER — OFFICE VISIT (OUTPATIENT)
Dept: HEMATOLOGY/ONCOLOGY | Facility: CLINIC | Age: 75
End: 2020-10-12
Payer: MEDICARE

## 2020-10-12 VITALS
SYSTOLIC BLOOD PRESSURE: 114 MMHG | WEIGHT: 132.06 LBS | HEIGHT: 62 IN | DIASTOLIC BLOOD PRESSURE: 74 MMHG | HEART RATE: 68 BPM | OXYGEN SATURATION: 98 % | BODY MASS INDEX: 24.3 KG/M2 | TEMPERATURE: 97 F

## 2020-10-12 DIAGNOSIS — Z90.13 H/O BILATERAL MASTECTOMY: ICD-10-CM

## 2020-10-12 DIAGNOSIS — Z80.3 FAMILY HISTORY OF BREAST CANCER IN SISTER: ICD-10-CM

## 2020-10-12 DIAGNOSIS — E83.19 IRON OVERLOAD SYNDROME: Primary | ICD-10-CM

## 2020-10-12 PROBLEM — D50.0 IRON DEFICIENCY ANEMIA DUE TO CHRONIC BLOOD LOSS: Status: RESOLVED | Noted: 2017-03-24 | Resolved: 2020-10-12

## 2020-10-12 PROCEDURE — 99214 PR OFFICE/OUTPT VISIT, EST, LEVL IV, 30-39 MIN: ICD-10-PCS | Mod: S$PBB,,, | Performed by: INTERNAL MEDICINE

## 2020-10-12 PROCEDURE — 99214 OFFICE O/P EST MOD 30 MIN: CPT | Mod: S$PBB,,, | Performed by: INTERNAL MEDICINE

## 2020-10-12 PROCEDURE — 99999 PR PBB SHADOW E&M-EST. PATIENT-LVL V: CPT | Mod: PBBFAC,,, | Performed by: INTERNAL MEDICINE

## 2020-10-12 PROCEDURE — 99999 PR PBB SHADOW E&M-EST. PATIENT-LVL V: ICD-10-PCS | Mod: PBBFAC,,, | Performed by: INTERNAL MEDICINE

## 2020-10-12 PROCEDURE — 99215 OFFICE O/P EST HI 40 MIN: CPT | Mod: PBBFAC | Performed by: INTERNAL MEDICINE

## 2020-10-12 NOTE — PROGRESS NOTES
Subjective:       Patient ID: Selam Botello is a 75 y.o. female.    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  Patient returns for review and discussion as well as evaluation of germ line testing for family history of breast cancer in sister    Past Medical History:   Diagnosis Date    Acid reflux     Allergy     Arthritis     Atrial tachycardia     Bladder cancer     Breast cyst     Cataract     Colon polyp     Deep vein thrombosis     Degenerative disc disease     Encounter for blood transfusion     General anesthetics causing adverse effect in therapeutic use     GI bleed     Hematuria, gross     Inflammatory bowel disease     Liver disease     cyst    Osteoporosis     PONV (postoperative nausea and vomiting)     Retina disorder, left     Skin cancer     Thyroid disease     Urinary tract infection      Family History   Problem Relation Age of Onset    COPD Mother     Cancer Mother     COPD Father     Cancer Father     Cancer Sister      Social History     Socioeconomic History    Marital status:      Spouse name: Not on file    Number of children: Not on file    Years of education: Not on file    Highest education level: Not on file   Occupational History    Not on file   Social Needs    Financial resource strain: Not on file    Food insecurity     Worry: Not on file     Inability: Not on file    Transportation needs     Medical: Not on file     Non-medical: Not on file   Tobacco Use    Smoking status: Never Smoker    Smokeless tobacco: Never Used   Substance and Sexual Activity    Alcohol use: No     Frequency: Never    Drug use: No    Sexual activity: Yes     Partners: Male   Lifestyle    Physical activity     Days per week: Not on file     Minutes per  session: Not on file    Stress: Not on file   Relationships    Social connections     Talks on phone: Not on file     Gets together: Not on file     Attends Hoahaoism service: Not on file     Active member of club or organization: Not on file     Attends meetings of clubs or organizations: Not on file     Relationship status: Not on file   Other Topics Concern    Not on file   Social History Narrative    Not on file     Past Surgical History:   Procedure Laterality Date    ADENOIDECTOMY      BLADDER FULGURATION Right 9/12/2018    Procedure: FULGURATION, BLADDER;  Surgeon: Isaias Shah MD;  Location: [facility];  Service: Urology;  Laterality: Right;    BLADDER FULGURATION N/A 4/24/2019    Procedure: FULGURATION, BLADDER;  Surgeon: Isaias Shah MD;  Location: [facility];  Service: Urology;  Laterality: N/A;    BREAST SURGERY Bilateral     CYSTOSCOPY      CYSTOSCOPY N/A 4/24/2019    Procedure: CYSTOSCOPY;  Surgeon: Isaias Shah MD;  Location: [facility];  Service: Urology;  Laterality: N/A;    CYSTOSCOPY WITH URETEROSCOPY, RETROGRADE PYELOGRAPHY, AND INSERTION OF STENT Left 9/12/2018    Procedure: CYSTOSCOPY, WITH RETROGRADE PYELOGRAM AND URETERAL STENT INSERTION;  Surgeon: Isaias Shah MD;  Location: [facility] OR;  Service: Urology;  Laterality: Left;    EYE SURGERY Left 2017    retina pucker    INSTILLATION OF URINARY BLADDER N/A 9/12/2018    Procedure: INSTILLATION, URINARY BLADDER;  Surgeon: Isaias Shah MD;  Location: [facility];  Service: Urology;  Laterality: N/A;  Chemo agent instillation     JOINT REPLACEMENT Bilateral     hip    JOINT REPLACEMENT Right     knee    MASTECTOMY Bilateral 1991    RETROGRADE PYELOGRAPHY Bilateral 10/31/2019    Procedure: PYELOGRAM, RETROGRADE;  Surgeon: Baldo Gates MD;  Location: [facility];  Service: Urology;  Laterality: Bilateral;    TONSILLECTOMY      TURBT      TURBT, WITH BLUE LIGHT CYSTOSCOPY AND CYSVIEW Bilateral 10/31/2019    Procedure:  TURBT,WITH BLUE LIGHT CYSTOSCOPY AND CYSVIEW;  Surgeon: Baldo Gates MD;  Location: [facility];  Service: Urology;  Laterality: Bilateral;    TYMPANOSTOMY TUBE PLACEMENT      VASCULAR SURGERY Right     popliteal stent    WISDOM TOOTH EXTRACTION         Labs:  Lab Results   Component Value Date    WBC 4.64 10/07/2020    HGB 14.4 10/07/2020    HCT 43.6 10/07/2020    MCV 96 10/07/2020     10/07/2020     BMP  Lab Results   Component Value Date     10/07/2020    K 4.2 10/07/2020     10/07/2020    CO2 26 10/07/2020    BUN 14 10/07/2020    CREATININE 0.7 10/07/2020    CALCIUM 9.8 10/07/2020    ANIONGAP 8 10/07/2020    ESTGFRAFRICA >60 10/07/2020    EGFRNONAA >60 10/07/2020     Lab Results   Component Value Date    ALT 13 10/07/2020    AST 21 10/07/2020    ALKPHOS 90 10/07/2020    BILITOT 0.9 10/07/2020       Lab Results   Component Value Date    IRON 218 (H) 10/07/2020    TIBC 295 10/07/2020    FERRITIN 43 10/07/2020     No results found for: [id number]  No results found for: FOLATE  Lab Results   Component Value Date    TSH 3.703 07/23/2020         Review of Systems   Constitutional: Positive for fatigue. Negative for activity change, appetite change, chills, diaphoresis, fever and unexpected weight change.   HENT: Negative for congestion, dental problem, drooling, ear discharge, ear pain, facial swelling, hearing loss, mouth sores, nosebleeds, postnasal drip, rhinorrhea, sinus pressure, sneezing, sore throat, tinnitus, trouble swallowing and voice change.    Eyes: Negative for photophobia, pain, discharge, redness, itching and visual disturbance.   Respiratory: Negative for cough, choking, chest tightness, shortness of breath, wheezing and stridor.    Cardiovascular: Negative for chest pain, palpitations and leg swelling.   Gastrointestinal: Negative for abdominal distention, abdominal pain, anal bleeding, blood in stool, constipation, diarrhea, nausea, rectal pain and vomiting.   Endocrine:  Negative for cold intolerance, heat intolerance, polydipsia, polyphagia and polyuria.   Genitourinary: Negative for decreased urine volume, difficulty urinating, dyspareunia, dysuria, enuresis, flank pain, frequency, genital sores, hematuria, menstrual problem, pelvic pain, urgency, vaginal bleeding, vaginal discharge and vaginal pain.   Musculoskeletal: Negative for arthralgias, back pain, gait problem, joint swelling, myalgias, neck pain and neck stiffness.   Skin: Negative for color change, pallor and rash.   Allergic/Immunologic: Negative for environmental allergies, food allergies and immunocompromised state.   Neurological: Positive for weakness. Negative for dizziness, tremors, seizures, syncope, facial asymmetry, speech difficulty, light-headedness, numbness and headaches.   Hematological: Negative for adenopathy. Does not bruise/bleed easily.   Psychiatric/Behavioral: Positive for dysphoric mood. Negative for agitation, behavioral problems, confusion, decreased concentration, hallucinations, self-injury, sleep disturbance and suicidal ideas. The patient is nervous/anxious. The patient is not hyperactive.        Objective:      Physical Exam  Vitals signs reviewed.   Constitutional:       General: She is not in acute distress.     Appearance: She is well-developed. She is not diaphoretic.   HENT:      Head: Normocephalic and atraumatic.      Right Ear: External ear normal.      Left Ear: External ear normal.      Nose: Nose normal.      Right Sinus: No maxillary sinus tenderness or frontal sinus tenderness.      Left Sinus: No maxillary sinus tenderness or frontal sinus tenderness.      Mouth/Throat:      Pharynx: No oropharyngeal exudate.   Eyes:      General: Lids are normal. No scleral icterus.        Right eye: No discharge.         Left eye: No discharge.      Conjunctiva/sclera: Conjunctivae normal.      Right eye: Right conjunctiva is not injected. No hemorrhage.     Left eye: Left conjunctiva is not  injected. No hemorrhage.     Pupils: Pupils are equal, round, and reactive to light.   Neck:      Musculoskeletal: Normal range of motion and neck supple.      Thyroid: No thyromegaly.      Vascular: No JVD.      Trachea: No tracheal deviation.   Cardiovascular:      Rate and Rhythm: Normal rate.   Pulmonary:      Effort: Pulmonary effort is normal. No respiratory distress.      Breath sounds: No stridor.   Chest:      Chest wall: No tenderness.   Abdominal:      General: Bowel sounds are normal. There is no distension.      Palpations: Abdomen is soft. There is no hepatomegaly, splenomegaly or mass.      Tenderness: There is no abdominal tenderness. There is no rebound.   Musculoskeletal: Normal range of motion.         General: No tenderness.   Lymphadenopathy:      Cervical: No cervical adenopathy.      Upper Body:      Right upper body: No supraclavicular adenopathy.      Left upper body: No supraclavicular adenopathy.   Skin:     General: Skin is dry.      Findings: No erythema or rash.   Neurological:      Mental Status: She is alert and oriented to person, place, and time.      Cranial Nerves: No cranial nerve deficit.      Coordination: Coordination normal.   Psychiatric:         Mood and Affect: Mood is anxious.         Behavior: Behavior normal.         Thought Content: Thought content normal.         Judgment: Judgment normal.             Assessment:      1. Iron overload syndrome    2. H/O bilateral mastectomy    3. Family history of breast cancer in sister           Plan:     Reviewed results of iron overload syndrome with discordance noted.  At this time the patient wishes to proceed with phlebotomy will reattempt 1 unit phlebotomies q.2 months and recheck iron studies and 6 months.  In addition germ line testing demonstrated no clinically at significant mutation identified.  Reviewed information with her follow-up as detailed discussed above reviewed information from up-to-date and discuss the  discordance between serum iron level serum ferritin level and germ line testing for hemochromatosis 25 min face-to-face time coordination of care greater 50% time face-to-face with patient        Adonay Neri Jr, MD FACP

## 2020-10-13 ENCOUNTER — PATIENT MESSAGE (OUTPATIENT)
Dept: HEMATOLOGY/ONCOLOGY | Facility: CLINIC | Age: 75
End: 2020-10-13

## 2020-10-22 ENCOUNTER — INFUSION (OUTPATIENT)
Dept: INFUSION THERAPY | Facility: HOSPITAL | Age: 75
End: 2020-10-22
Attending: PHYSICIAN ASSISTANT
Payer: MEDICARE

## 2020-10-22 VITALS
SYSTOLIC BLOOD PRESSURE: 124 MMHG | OXYGEN SATURATION: 98 % | TEMPERATURE: 98 F | HEART RATE: 56 BPM | RESPIRATION RATE: 18 BRPM | DIASTOLIC BLOOD PRESSURE: 72 MMHG

## 2020-10-22 DIAGNOSIS — M85.89 OSTEOPENIA OF MULTIPLE SITES: ICD-10-CM

## 2020-10-22 DIAGNOSIS — E83.19 IRON OVERLOAD SYNDROME: Primary | ICD-10-CM

## 2020-10-22 PROCEDURE — 99195 PHLEBOTOMY: CPT

## 2020-10-22 NOTE — NURSING
.Phlebotomy performed to pt, 1 unit of blood drawn from  R and LAC, each location produced 1/2 unit, totaling 1 unit.  Pressure dressing applied to site after phlebotomy. Pt tolerated well, no complaints. BP reading taken after draw.

## 2020-10-26 NOTE — PROGRESS NOTES
"  Nutrition Assessment  Client name:  Selam Botello  :  1945  Age:  75 y.o.  Gender:  female  Referral Reseaon: Iron Overload    Client states:  Here to learn more about hemochromatosis. Had intestinal bleed due to NSAID that put her in the hospital. Currently has bladder cancer. She had a difficulty figuring out what to eat after diagnosis, as she did her own research and her counts went up. Will get blood taken off every 3 months for 6 months and reassess labs. Patient has solid understanding of nutrition as she was a home . Softer stools, liver pain, rash, fatigue, and feeling of heaviness since July. No weight loss. Patient was on Coumadin for 11 years, she feels this masked her hemochromatosis. She cut back on alcohol and red meat, cut back on sugar. This was most difficult for her. Beans tear stomach up. Has solid understanding of preventing iron overload, but wanted a review with an RD.     Anthropometrics  Height:  5' 2"    Weight:  133 lbs  BMI:  24.35 kg/m2      Clinical Signs/Symptoms  N/V/D:  No but reports softer stool but not quite diarrhea. This has occurred since July.  Appetite:  good       Past Medical History:   Diagnosis Date    Acid reflux     Allergy     Arthritis     Atrial tachycardia     Bladder cancer     Breast cyst     Cataract     Colon polyp     Deep vein thrombosis     Degenerative disc disease     Encounter for blood transfusion     General anesthetics causing adverse effect in therapeutic use     GI bleed     Hematuria, gross     Inflammatory bowel disease     Liver disease     cyst    Osteoporosis     PONV (postoperative nausea and vomiting)     Retina disorder, left     Skin cancer     Thyroid disease     Urinary tract infection        Past Surgical History:   Procedure Laterality Date    ADENOIDECTOMY      BLADDER FULGURATION Right 2018    Procedure: FULGURATION, BLADDER;  Surgeon: Isaias Shah MD;  Location: [facility] OR;  Service: " Urology;  Laterality: Right;    BLADDER FULGURATION N/A 4/24/2019    Procedure: FULGURATION, BLADDER;  Surgeon: Isaias Shah MD;  Location: [facility] OR;  Service: Urology;  Laterality: N/A;    BREAST SURGERY Bilateral     CYSTOSCOPY      CYSTOSCOPY N/A 4/24/2019    Procedure: CYSTOSCOPY;  Surgeon: Isaias Shah MD;  Location: [facility] OR;  Service: Urology;  Laterality: N/A;    CYSTOSCOPY WITH URETEROSCOPY, RETROGRADE PYELOGRAPHY, AND INSERTION OF STENT Left 9/12/2018    Procedure: CYSTOSCOPY, WITH RETROGRADE PYELOGRAM AND URETERAL STENT INSERTION;  Surgeon: Isaias Shah MD;  Location: [facility] OR;  Service: Urology;  Laterality: Left;    EYE SURGERY Left 2017    retina pucker    INSTILLATION OF URINARY BLADDER N/A 9/12/2018    Procedure: INSTILLATION, URINARY BLADDER;  Surgeon: Isaias Shah MD;  Location: [facility] OR;  Service: Urology;  Laterality: N/A;  Chemo agent instillation     JOINT REPLACEMENT Bilateral     hip    JOINT REPLACEMENT Right     knee    MASTECTOMY Bilateral 1991    RETROGRADE PYELOGRAPHY Bilateral 10/31/2019    Procedure: PYELOGRAM, RETROGRADE;  Surgeon: Baldo Gates MD;  Location: [facility];  Service: Urology;  Laterality: Bilateral;    TONSILLECTOMY      TURBT      TURBT, WITH BLUE LIGHT CYSTOSCOPY AND CYSVIEW Bilateral 10/31/2019    Procedure: TURBT,WITH BLUE LIGHT CYSTOSCOPY AND CYSVIEW;  Surgeon: Baldo Gates MD;  Location: [facility];  Service: Urology;  Laterality: Bilateral;    TYMPANOSTOMY TUBE PLACEMENT      VASCULAR SURGERY Right     popliteal stent    WISDOM TOOTH EXTRACTION         Medications    has a current medication list which includes the following prescription(s): brompheniramine-pseudoephedrine, budesonide, carboxymethylcellulose sodium, cetirizine, ergocalciferol, fluticasone propionate, fluticasone propionate, lidocaine-prilocaine, lidoderm, metoprolol succinate, mometasone, mupirocin, pyridoxine (vitamin b6), synthroid, and vasculera, and the  following Facility-Administered Medications: lactated ringers and lidocaine (pf) 10 mg/ml (1%).    Vitamins, Minerals, and/or Supplements:  Vitamin D, Dr. Neri asked her to remove all supplements from regimen    Food/Medication Interactions:  Reviewed     Food Allergies or Intolerances:  Beans make pateint extremely gassy, does not like sour cream, cream cheese, yogurt     Social History    Marital status:    Employment:  Retired home     Social History     Tobacco Use    Smoking status: Never Smoker    Smokeless tobacco: Never Used   Substance Use Topics    Alcohol use: No     Frequency: Never        Lab Reports     Iron 30 - 160 ug/dL 218High     Transferrin 200 - 375 mg/dL 199Low     TIBC 250 - 450 ug/dL 295    Saturated Iron 20 - 50 % 74High        BP Readings from Last 1 Encounters:   10/22/20 124/72       Food History  Breakfast:  Mini shredded wheat, blueberries, skim milk (might have egg white muffin)  Lunch:  Skipped (chocolate croissant)  Mid-afternoon Snack:  6-12 Nut Thin crackers  Dinner:  Chicken Salad stuffed Avocado with okra on the side   H.S. Snack:  1/2 honey crisp apples or fruits  *Fluid intake:  1/2 cup coffee with skim milk, 1 cup of tea, 3-4 bottles of water    Exercise History:  Reports an active lifestyle but does not intentially exercise due to fear of falling. She does arm and leg exercises to get heart rate going in morning.     Cultural/Spiritual/Personal Preferences:  None identified    Support System:   is diabetic and is a carboholic and does not want to change practices.    State of Change:  Action    Barriers to Change:   is resistant to dietary changes.     Diagnosis    Food and nutrition related knowledge deficit related to hemochromatosis  as evidenced by no nutrition education related to this diagnosis. .    Intervention    Calorie Needs (via [place] [employer]):  Activity Factor:  1.2   - Maintenance: 1859-5740 kcal per day  Protein (via  "0.8-1.0 g/kg): 47-59 g  Fluid (30 ml/kg): 1797 ml or 60 oz    Goals:  1. Patient to follow hemochromatosis recommendations to prevent iron overload as outlined in [facility] nutritional guidelines.      Nutrition Education  Educated patient based on hemochromatosishandout from [facility] health system. This covered:  - Physiology and etiology of hemochromatosis  -Avoiding Red Meat/sources of heme iron  -Increasing intake of tannins (from red wine, tea, and coffee)  - limiting excessive consumption of sugar in sweetened foods and beverages, specifically when eaten with iron heavy foods  - Limiting vitamin C supplementation and safety of eating vitamin c containing foods  -Avoiding iron supplementation  -Avoiding raw or undercooked seafood/shellfish  -Avoiding cooking with a cast iron pot  -Avoiding alcohol with liver damage  -Enjoying a variety of fruits and vegetables without concerns due to nonheme iron    We also discussed appropriate ways to do research online, searching for resources from "[employer]" rather than ". [employer]"    Patient verbalized understanding of nutrition education and recommendations received.    Handouts Provided  Hemochotosis handout from [facility]    Monitoring/Evaluation    Monitor the following:  Weight  BMI  Caloric intake  Labs:  Iron panel     Follow Up Plan: PRN  "

## 2020-10-27 ENCOUNTER — NUTRITION (OUTPATIENT)
Dept: NUTRITION | Facility: CLINIC | Age: 75
End: 2020-10-27
Payer: MEDICARE

## 2020-10-27 VITALS — WEIGHT: 133.19 LBS | HEIGHT: 62 IN | BODY MASS INDEX: 24.51 KG/M2

## 2020-10-27 DIAGNOSIS — E83.19 IRON OVERLOAD SYNDROME: ICD-10-CM

## 2020-10-27 DIAGNOSIS — Z71.3 DIETARY COUNSELING: Primary | ICD-10-CM

## 2020-10-27 PROCEDURE — 99999 PR PBB SHADOW E&M-EST. PATIENT-LVL II: CPT | Mod: PBBFAC,,, | Performed by: DIETITIAN, REGISTERED

## 2020-10-27 PROCEDURE — 97802 MEDICAL NUTRITION INDIV IN: CPT | Mod: PBBFAC | Performed by: DIETITIAN, REGISTERED

## 2020-10-27 PROCEDURE — 99999 PR PBB SHADOW E&M-EST. PATIENT-LVL II: ICD-10-PCS | Mod: PBBFAC,,, | Performed by: DIETITIAN, REGISTERED

## 2020-10-27 PROCEDURE — 99212 OFFICE O/P EST SF 10 MIN: CPT | Mod: PBBFAC | Performed by: DIETITIAN, REGISTERED

## 2020-10-27 NOTE — LETTER
October 27, 2020        Adonay Neri MD  [address]  [place] [address]             [facility]  [address]  [place] [address]  Phone: [phone]  Fax: [phone]   Patient: Selma Botello   MR Number: [id number]   YOB: 1945   Date of Visit: 10/27/2020       Dear Dr. Neri:    Thank you for referring Selam Botello to me for evaluation. Below are the relevant portions of my assessment and plan of care.    Had a pleasant visit with Mrs. Botello. Educated her on hemochromatosis and its nutrition implications. Pt expressed understanding. Provided nutritional handout on Hemochromatosis from [facility] website.           If you have questions, please do not hesitate to call me. I look forward to following Selam along with you.    Sincerely,      Maria M Dutton RD           CC  No Recipients

## 2020-11-03 ENCOUNTER — PATIENT MESSAGE (OUTPATIENT)
Dept: NUTRITION | Facility: CLINIC | Age: 75
End: 2020-11-03

## 2020-11-03 ENCOUNTER — PATIENT MESSAGE (OUTPATIENT)
Dept: HEMATOLOGY/ONCOLOGY | Facility: CLINIC | Age: 75
End: 2020-11-03

## 2020-11-04 ENCOUNTER — PATIENT MESSAGE (OUTPATIENT)
Dept: HEMATOLOGY/ONCOLOGY | Facility: CLINIC | Age: 75
End: 2020-11-04

## 2020-11-04 DIAGNOSIS — E03.9 ACQUIRED HYPOTHYROIDISM: Primary | ICD-10-CM

## 2020-11-05 ENCOUNTER — LAB VISIT (OUTPATIENT)
Dept: LAB | Facility: HOSPITAL | Age: 75
End: 2020-11-05
Attending: UROLOGY
Payer: MEDICARE

## 2020-11-05 DIAGNOSIS — C67.2 MALIGNANT NEOPLASM OF LATERAL WALL OF URINARY BLADDER: ICD-10-CM

## 2020-11-05 PROCEDURE — 88112 CYTOPATH CELL ENHANCE TECH: CPT | Mod: 26,,, | Performed by: PATHOLOGY

## 2020-11-05 PROCEDURE — 88112 CYTOPATH CELL ENHANCE TECH: CPT | Performed by: PATHOLOGY

## 2020-11-05 PROCEDURE — 88112 PR  CYTOPATH, CELL ENHANCE TECH: ICD-10-PCS | Mod: 26,,, | Performed by: PATHOLOGY

## 2020-11-09 LAB — FINAL PATHOLOGIC DIAGNOSIS: NORMAL

## 2020-11-09 NOTE — PROGRESS NOTES
Dr Gates forwarded these results to the patient via [employer].  He will discuss the results with the patient in person at the next appointment.  The patient was instructed to make an appointment if she does not already have one scheduled.

## 2020-11-20 ENCOUNTER — PROCEDURE VISIT (OUTPATIENT)
Dept: UROLOGY | Facility: CLINIC | Age: 75
End: 2020-11-20
Payer: MEDICARE

## 2020-11-20 VITALS
WEIGHT: 133 LBS | HEART RATE: 67 BPM | BODY MASS INDEX: 24.48 KG/M2 | HEIGHT: 62 IN | SYSTOLIC BLOOD PRESSURE: 124 MMHG | DIASTOLIC BLOOD PRESSURE: 73 MMHG

## 2020-11-20 DIAGNOSIS — C67.2 MALIGNANT NEOPLASM OF LATERAL WALL OF URINARY BLADDER: Primary | ICD-10-CM

## 2020-11-20 PROCEDURE — 52000 CYSTOSCOPY: ICD-10-PCS | Mod: S$GLB,,, | Performed by: UROLOGY

## 2020-11-20 PROCEDURE — 52000 CYSTOURETHROSCOPY: CPT | Mod: S$GLB,,, | Performed by: UROLOGY

## 2020-11-20 RX ORDER — CEPHALEXIN 500 MG/1
500 CAPSULE ORAL
Status: COMPLETED | OUTPATIENT
Start: 2020-11-20 | End: 2020-11-20

## 2020-11-20 RX ORDER — CHLORHEXIDINE GLUCONATE ORAL RINSE 1.2 MG/ML
SOLUTION DENTAL
COMMUNITY
Start: 2020-11-06 | End: 2021-04-12

## 2020-11-20 RX ORDER — LIDOCAINE HYDROCHLORIDE 20 MG/ML
JELLY TOPICAL
Status: COMPLETED | OUTPATIENT
Start: 2020-11-20 | End: 2020-11-20

## 2020-11-20 RX ORDER — LEVOTHYROXINE SODIUM 50 UG/1
50 TABLET ORAL
COMMUNITY
Start: 2020-11-16 | End: 2021-04-12

## 2020-11-20 RX ADMIN — LIDOCAINE HYDROCHLORIDE 5 ML: 20 JELLY TOPICAL at 04:11

## 2020-11-20 RX ADMIN — CEPHALEXIN 500 MG: 500 CAPSULE ORAL at 03:11

## 2020-11-20 NOTE — PROCEDURES
"Cystoscopy    Date/Time: 11/20/2020 4:17 PM  Performed by: Baldo Gates MD  Authorized by: Baldo Gates MD     Consent Done?:  Yes (Written)  Time out: Immediately prior to procedure a "time out" was called to verify the correct patient, procedure, equipment, support staff and site/side marked as required.    Indications: history bladder cancer    Position:  Dorsal lithotomy  Anesthesia:  Intraurethral instillation  Patient sedated?: No    Preparation: Patient was prepped and draped in usual sterile fashion      Scope type:  Flexible cystoscope  External exam normal: Yes    Urethra normal: Yes  Bladder neck normal: Bladder neck normal   Bladder normal: Yes      Patient tolerance:  Patient tolerated the procedure well with no immediate complications     Cytology no malignant cells    Bladder ca  Low grade Ta 2017; PUNLMP 11/19 sp TURBT and mitomycin RAUL    Return to clinic 1 year with cytology for repeat cysto or sooner if any hematuria or other issues      "

## 2020-12-17 ENCOUNTER — INFUSION (OUTPATIENT)
Dept: INFUSION THERAPY | Facility: HOSPITAL | Age: 75
End: 2020-12-17
Attending: PHYSICIAN ASSISTANT
Payer: MEDICARE

## 2020-12-17 VITALS
OXYGEN SATURATION: 98 % | HEART RATE: 60 BPM | SYSTOLIC BLOOD PRESSURE: 122 MMHG | TEMPERATURE: 98 F | RESPIRATION RATE: 18 BRPM | DIASTOLIC BLOOD PRESSURE: 73 MMHG

## 2020-12-17 DIAGNOSIS — E83.19 IRON OVERLOAD SYNDROME: Primary | ICD-10-CM

## 2020-12-17 DIAGNOSIS — M85.89 OSTEOPENIA OF MULTIPLE SITES: ICD-10-CM

## 2020-12-17 PROCEDURE — 99195 PHLEBOTOMY: CPT

## 2020-12-17 NOTE — NURSING
One unit therapeutic phlebotomy performed via left A/C without difficulty.  Upon completion, needle dc'd, pressure dressing applied, and blood discarded in appropriate container.   Juice/snack refused.  B/P 122/73, pulse 60.  Pt denies weakness/dizziness.  Pt discharged with next appt scheduled.

## 2020-12-29 ENCOUNTER — PATIENT MESSAGE (OUTPATIENT)
Dept: HEMATOLOGY/ONCOLOGY | Facility: CLINIC | Age: 75
End: 2020-12-29

## 2021-01-03 ENCOUNTER — PATIENT MESSAGE (OUTPATIENT)
Dept: HEMATOLOGY/ONCOLOGY | Facility: CLINIC | Age: 76
End: 2021-01-03

## 2021-01-17 ENCOUNTER — PATIENT MESSAGE (OUTPATIENT)
Dept: HEMATOLOGY/ONCOLOGY | Facility: CLINIC | Age: 76
End: 2021-01-17

## 2021-01-21 ENCOUNTER — PATIENT MESSAGE (OUTPATIENT)
Dept: HEMATOLOGY/ONCOLOGY | Facility: CLINIC | Age: 76
End: 2021-01-21

## 2021-01-21 DIAGNOSIS — E03.9 ACQUIRED HYPOTHYROIDISM: Primary | ICD-10-CM

## 2021-01-22 ENCOUNTER — PATIENT MESSAGE (OUTPATIENT)
Dept: ADMINISTRATIVE | Facility: OTHER | Age: 76
End: 2021-01-22

## 2021-01-22 ENCOUNTER — PATIENT MESSAGE (OUTPATIENT)
Dept: HEMATOLOGY/ONCOLOGY | Facility: CLINIC | Age: 76
End: 2021-01-22

## 2021-01-23 DIAGNOSIS — E03.9 ACQUIRED HYPOTHYROIDISM: Primary | ICD-10-CM

## 2021-02-01 ENCOUNTER — PATIENT MESSAGE (OUTPATIENT)
Dept: HEMATOLOGY/ONCOLOGY | Facility: CLINIC | Age: 76
End: 2021-02-01

## 2021-02-10 ENCOUNTER — OFFICE VISIT (OUTPATIENT)
Dept: HEMATOLOGY/ONCOLOGY | Facility: CLINIC | Age: 76
End: 2021-02-10
Payer: MEDICARE

## 2021-02-10 DIAGNOSIS — E83.19 IRON OVERLOAD SYNDROME: Primary | ICD-10-CM

## 2021-02-10 DIAGNOSIS — E03.9 ACQUIRED HYPOTHYROIDISM: ICD-10-CM

## 2021-02-10 DIAGNOSIS — R71.8 OTHER ABNORMALITY OF RED BLOOD CELLS: ICD-10-CM

## 2021-02-10 PROCEDURE — 99214 PR OFFICE/OUTPT VISIT, EST, LEVL IV, 30-39 MIN: ICD-10-PCS | Mod: 95,,, | Performed by: INTERNAL MEDICINE

## 2021-02-10 PROCEDURE — 99214 OFFICE O/P EST MOD 30 MIN: CPT | Mod: 95,,, | Performed by: INTERNAL MEDICINE

## 2021-02-11 ENCOUNTER — INFUSION (OUTPATIENT)
Dept: INFUSION THERAPY | Facility: HOSPITAL | Age: 76
End: 2021-02-11
Attending: PHYSICIAN ASSISTANT
Payer: MEDICARE

## 2021-02-11 VITALS
RESPIRATION RATE: 18 BRPM | OXYGEN SATURATION: 98 % | HEART RATE: 61 BPM | SYSTOLIC BLOOD PRESSURE: 112 MMHG | DIASTOLIC BLOOD PRESSURE: 69 MMHG | TEMPERATURE: 98 F

## 2021-02-11 DIAGNOSIS — M85.89 OSTEOPENIA OF MULTIPLE SITES: ICD-10-CM

## 2021-02-11 DIAGNOSIS — E83.19 IRON OVERLOAD SYNDROME: Primary | ICD-10-CM

## 2021-02-11 PROCEDURE — 99195 PHLEBOTOMY: CPT

## 2021-02-12 DIAGNOSIS — E83.19 IRON OVERLOAD SYNDROME: Primary | ICD-10-CM

## 2021-03-10 ENCOUNTER — TELEPHONE (OUTPATIENT)
Dept: HEMATOLOGY/ONCOLOGY | Facility: CLINIC | Age: 76
End: 2021-03-10

## 2021-03-22 ENCOUNTER — TELEPHONE (OUTPATIENT)
Dept: RHEUMATOLOGY | Facility: CLINIC | Age: 76
End: 2021-03-22

## 2021-03-22 DIAGNOSIS — M81.0 AGE-RELATED OSTEOPOROSIS WITHOUT CURRENT PATHOLOGICAL FRACTURE: Primary | ICD-10-CM

## 2021-03-24 ENCOUNTER — TELEPHONE (OUTPATIENT)
Dept: RHEUMATOLOGY | Facility: CLINIC | Age: 76
End: 2021-03-24

## 2021-04-06 ENCOUNTER — PATIENT MESSAGE (OUTPATIENT)
Dept: HEMATOLOGY/ONCOLOGY | Facility: CLINIC | Age: 76
End: 2021-04-06

## 2021-04-06 ENCOUNTER — PATIENT MESSAGE (OUTPATIENT)
Dept: RHEUMATOLOGY | Facility: CLINIC | Age: 76
End: 2021-04-06

## 2021-04-07 ENCOUNTER — TELEPHONE (OUTPATIENT)
Dept: RHEUMATOLOGY | Facility: CLINIC | Age: 76
End: 2021-04-07

## 2021-04-08 ENCOUNTER — OFFICE VISIT (OUTPATIENT)
Dept: RHEUMATOLOGY | Facility: CLINIC | Age: 76
End: 2021-04-08
Payer: MEDICARE

## 2021-04-08 ENCOUNTER — LAB VISIT (OUTPATIENT)
Dept: LAB | Facility: HOSPITAL | Age: 76
End: 2021-04-08
Attending: INTERNAL MEDICINE
Payer: MEDICARE

## 2021-04-08 VITALS
SYSTOLIC BLOOD PRESSURE: 126 MMHG | HEART RATE: 66 BPM | DIASTOLIC BLOOD PRESSURE: 78 MMHG | WEIGHT: 137.56 LBS | BODY MASS INDEX: 25.32 KG/M2 | HEIGHT: 62 IN

## 2021-04-08 DIAGNOSIS — C67.8 MALIGNANT NEOPLASM OF OVERLAPPING SITES OF BLADDER: ICD-10-CM

## 2021-04-08 DIAGNOSIS — E83.19 IRON OVERLOAD SYNDROME: ICD-10-CM

## 2021-04-08 DIAGNOSIS — E55.9 HYPOVITAMINOSIS D: ICD-10-CM

## 2021-04-08 DIAGNOSIS — M19.042 PRIMARY OSTEOARTHRITIS OF BOTH HANDS: ICD-10-CM

## 2021-04-08 DIAGNOSIS — M81.0 AGE-RELATED OSTEOPOROSIS WITHOUT CURRENT PATHOLOGICAL FRACTURE: ICD-10-CM

## 2021-04-08 DIAGNOSIS — M19.041 PRIMARY OSTEOARTHRITIS OF BOTH HANDS: ICD-10-CM

## 2021-04-08 DIAGNOSIS — M81.0 AGE-RELATED OSTEOPOROSIS WITHOUT CURRENT PATHOLOGICAL FRACTURE: Primary | ICD-10-CM

## 2021-04-08 LAB
ALBUMIN SERPL BCP-MCNC: 4 G/DL (ref 3.5–5.2)
ALP SERPL-CCNC: 81 U/L (ref 55–135)
ALT SERPL W/O P-5'-P-CCNC: 14 U/L (ref 10–44)
ANION GAP SERPL CALC-SCNC: 8 MMOL/L (ref 8–16)
AST SERPL-CCNC: 17 U/L (ref 10–40)
BASOPHILS # BLD AUTO: 0.04 K/UL (ref 0–0.2)
BASOPHILS NFR BLD: 0.8 % (ref 0–1.9)
BILIRUB SERPL-MCNC: 0.7 MG/DL (ref 0.1–1)
BUN SERPL-MCNC: 18 MG/DL (ref 8–23)
CALCIUM SERPL-MCNC: 9.7 MG/DL (ref 8.7–10.5)
CHLORIDE SERPL-SCNC: 107 MMOL/L (ref 95–110)
CO2 SERPL-SCNC: 27 MMOL/L (ref 23–29)
CREAT SERPL-MCNC: 0.7 MG/DL (ref 0.5–1.4)
DIFFERENTIAL METHOD: ABNORMAL
EOSINOPHIL # BLD AUTO: 0.1 K/UL (ref 0–0.5)
EOSINOPHIL NFR BLD: 1.8 % (ref 0–8)
ERYTHROCYTE [DISTWIDTH] IN BLOOD BY AUTOMATED COUNT: 13.1 % (ref 11.5–14.5)
EST. GFR  (AFRICAN AMERICAN): >60 ML/MIN/1.73 M^2
EST. GFR  (NON AFRICAN AMERICAN): >60 ML/MIN/1.73 M^2
GLUCOSE SERPL-MCNC: 101 MG/DL (ref 70–110)
HCT VFR BLD AUTO: 37.9 % (ref 37–48.5)
HGB BLD-MCNC: 12.1 G/DL (ref 12–16)
IMM GRANULOCYTES # BLD AUTO: 0.01 K/UL (ref 0–0.04)
IMM GRANULOCYTES NFR BLD AUTO: 0.2 % (ref 0–0.5)
LYMPHOCYTES # BLD AUTO: 1 K/UL (ref 1–4.8)
LYMPHOCYTES NFR BLD: 20.4 % (ref 18–48)
MCH RBC QN AUTO: 29.6 PG (ref 27–31)
MCHC RBC AUTO-ENTMCNC: 31.9 G/DL (ref 32–36)
MCV RBC AUTO: 93 FL (ref 82–98)
MONOCYTES # BLD AUTO: 0.4 K/UL (ref 0.3–1)
MONOCYTES NFR BLD: 7.1 % (ref 4–15)
NEUTROPHILS # BLD AUTO: 3.5 K/UL (ref 1.8–7.7)
NEUTROPHILS NFR BLD: 69.7 % (ref 38–73)
NRBC BLD-RTO: 0 /100 WBC
PLATELET # BLD AUTO: 292 K/UL (ref 150–450)
PMV BLD AUTO: 9.2 FL (ref 9.2–12.9)
POTASSIUM SERPL-SCNC: 4.1 MMOL/L (ref 3.5–5.1)
PROT SERPL-MCNC: 6.4 G/DL (ref 6–8.4)
RBC # BLD AUTO: 4.09 M/UL (ref 4–5.4)
SODIUM SERPL-SCNC: 142 MMOL/L (ref 136–145)
WBC # BLD AUTO: 4.96 K/UL (ref 3.9–12.7)

## 2021-04-08 PROCEDURE — 85025 COMPLETE CBC W/AUTO DIFF WBC: CPT | Performed by: INTERNAL MEDICINE

## 2021-04-08 PROCEDURE — 99999 PR PBB SHADOW E&M-EST. PATIENT-LVL V: CPT | Mod: PBBFAC,,, | Performed by: PHYSICIAN ASSISTANT

## 2021-04-08 PROCEDURE — 99214 PR OFFICE/OUTPT VISIT, EST, LEVL IV, 30-39 MIN: ICD-10-PCS | Mod: S$PBB,,, | Performed by: PHYSICIAN ASSISTANT

## 2021-04-08 PROCEDURE — 82728 ASSAY OF FERRITIN: CPT | Performed by: INTERNAL MEDICINE

## 2021-04-08 PROCEDURE — 80053 COMPREHEN METABOLIC PANEL: CPT | Performed by: PHYSICIAN ASSISTANT

## 2021-04-08 PROCEDURE — 36415 COLL VENOUS BLD VENIPUNCTURE: CPT | Performed by: INTERNAL MEDICINE

## 2021-04-08 PROCEDURE — 83540 ASSAY OF IRON: CPT | Performed by: INTERNAL MEDICINE

## 2021-04-08 PROCEDURE — 99215 OFFICE O/P EST HI 40 MIN: CPT | Mod: PBBFAC,25 | Performed by: PHYSICIAN ASSISTANT

## 2021-04-08 PROCEDURE — 99214 OFFICE O/P EST MOD 30 MIN: CPT | Mod: S$PBB,,, | Performed by: PHYSICIAN ASSISTANT

## 2021-04-08 PROCEDURE — 99999 PR PBB SHADOW E&M-EST. PATIENT-LVL V: ICD-10-PCS | Mod: PBBFAC,,, | Performed by: PHYSICIAN ASSISTANT

## 2021-04-08 RX ORDER — LIDOCAINE 50 MG/G
PATCH CUTANEOUS
Qty: 30 PATCH | Refills: 6 | Status: SHIPPED | OUTPATIENT
Start: 2021-04-08

## 2021-04-09 ENCOUNTER — PATIENT MESSAGE (OUTPATIENT)
Dept: HEMATOLOGY/ONCOLOGY | Facility: CLINIC | Age: 76
End: 2021-04-09

## 2021-04-09 LAB
FERRITIN SERPL-MCNC: 16 NG/ML (ref 20–300)
IRON SERPL-MCNC: 61 UG/DL (ref 30–160)
SATURATED IRON: 17 % (ref 20–50)
TOTAL IRON BINDING CAPACITY: 349 UG/DL (ref 250–450)
TRANSFERRIN SERPL-MCNC: 236 MG/DL (ref 200–375)

## 2021-04-12 ENCOUNTER — OFFICE VISIT (OUTPATIENT)
Dept: HEMATOLOGY/ONCOLOGY | Facility: CLINIC | Age: 76
End: 2021-04-12
Payer: MEDICARE

## 2021-04-12 VITALS
HEIGHT: 62 IN | DIASTOLIC BLOOD PRESSURE: 75 MMHG | WEIGHT: 134.94 LBS | OXYGEN SATURATION: 99 % | HEART RATE: 65 BPM | BODY MASS INDEX: 24.83 KG/M2 | SYSTOLIC BLOOD PRESSURE: 122 MMHG | TEMPERATURE: 97 F

## 2021-04-12 DIAGNOSIS — K55.20 ANGIODYSPLASIA OF CECUM: ICD-10-CM

## 2021-04-12 DIAGNOSIS — E83.19 IRON OVERLOAD SYNDROME: Primary | ICD-10-CM

## 2021-04-12 DIAGNOSIS — R10.11 RIGHT UPPER QUADRANT ABDOMINAL PAIN: ICD-10-CM

## 2021-04-12 PROBLEM — C67.8 MALIGNANT NEOPLASM OF OVERLAPPING SITES OF BLADDER: Status: RESOLVED | Noted: 2018-09-12 | Resolved: 2021-04-12

## 2021-04-12 PROBLEM — C68.9 UROTHELIAL CARCINOMA: Status: RESOLVED | Noted: 2017-12-18 | Resolved: 2021-04-12

## 2021-04-12 PROCEDURE — 99214 OFFICE O/P EST MOD 30 MIN: CPT | Mod: PBBFAC | Performed by: INTERNAL MEDICINE

## 2021-04-12 PROCEDURE — 99214 OFFICE O/P EST MOD 30 MIN: CPT | Mod: S$PBB,,, | Performed by: INTERNAL MEDICINE

## 2021-04-12 PROCEDURE — 99999 PR PBB SHADOW E&M-EST. PATIENT-LVL IV: CPT | Mod: PBBFAC,,, | Performed by: INTERNAL MEDICINE

## 2021-04-12 PROCEDURE — 99999 PR PBB SHADOW E&M-EST. PATIENT-LVL IV: ICD-10-PCS | Mod: PBBFAC,,, | Performed by: INTERNAL MEDICINE

## 2021-04-12 PROCEDURE — 99214 PR OFFICE/OUTPT VISIT, EST, LEVL IV, 30-39 MIN: ICD-10-PCS | Mod: S$PBB,,, | Performed by: INTERNAL MEDICINE

## 2021-04-12 RX ORDER — ASPIRIN 81 MG/1
81 TABLET ORAL
COMMUNITY
End: 2022-08-17

## 2021-04-13 ENCOUNTER — PATIENT MESSAGE (OUTPATIENT)
Dept: RHEUMATOLOGY | Facility: CLINIC | Age: 76
End: 2021-04-13

## 2021-04-13 ENCOUNTER — TELEPHONE (OUTPATIENT)
Dept: RHEUMATOLOGY | Facility: CLINIC | Age: 76
End: 2021-04-13

## 2021-04-14 ENCOUNTER — TELEPHONE (OUTPATIENT)
Dept: HEMATOLOGY/ONCOLOGY | Facility: CLINIC | Age: 76
End: 2021-04-14

## 2021-04-16 ENCOUNTER — TELEPHONE (OUTPATIENT)
Dept: UROLOGY | Facility: CLINIC | Age: 76
End: 2021-04-16

## 2021-04-19 ENCOUNTER — PATIENT MESSAGE (OUTPATIENT)
Dept: UROLOGY | Facility: CLINIC | Age: 76
End: 2021-04-19

## 2021-04-19 ENCOUNTER — TELEPHONE (OUTPATIENT)
Dept: UROLOGY | Facility: CLINIC | Age: 76
End: 2021-04-19

## 2021-04-19 DIAGNOSIS — C67.2 MALIGNANT NEOPLASM OF LATERAL WALL OF URINARY BLADDER: Primary | ICD-10-CM

## 2021-04-19 DIAGNOSIS — R82.71 BACTERIURIA: ICD-10-CM

## 2021-04-20 ENCOUNTER — DOCUMENTATION ONLY (OUTPATIENT)
Dept: RHEUMATOLOGY | Facility: CLINIC | Age: 76
End: 2021-04-20

## 2021-04-20 ENCOUNTER — LAB VISIT (OUTPATIENT)
Dept: LAB | Facility: HOSPITAL | Age: 76
End: 2021-04-20
Payer: MEDICARE

## 2021-04-20 DIAGNOSIS — C67.2 MALIGNANT NEOPLASM OF LATERAL WALL OF URINARY BLADDER: ICD-10-CM

## 2021-04-20 LAB
BILIRUB UR QL STRIP: NEGATIVE
CLARITY UR: CLEAR
COLOR UR: YELLOW
GLUCOSE UR QL STRIP: NEGATIVE
HGB UR QL STRIP: ABNORMAL
KETONES UR QL STRIP: NEGATIVE
LEUKOCYTE ESTERASE UR QL STRIP: ABNORMAL
MICROSCOPIC COMMENT: NORMAL
NITRITE UR QL STRIP: NEGATIVE
PH UR STRIP: 6 [PH] (ref 5–8)
PROT UR QL STRIP: NEGATIVE
RBC #/AREA URNS HPF: 4 /HPF (ref 0–4)
SP GR UR STRIP: 1.01 (ref 1–1.03)
URN SPEC COLLECT METH UR: ABNORMAL
WBC #/AREA URNS HPF: 2 /HPF (ref 0–5)

## 2021-04-20 PROCEDURE — 81000 URINALYSIS NONAUTO W/SCOPE: CPT | Performed by: UROLOGY

## 2021-04-21 ENCOUNTER — TELEPHONE (OUTPATIENT)
Dept: UROLOGY | Facility: CLINIC | Age: 76
End: 2021-04-21

## 2021-04-23 ENCOUNTER — PROCEDURE VISIT (OUTPATIENT)
Dept: UROLOGY | Facility: CLINIC | Age: 76
End: 2021-04-23
Payer: MEDICARE

## 2021-04-23 VITALS
BODY MASS INDEX: 24.66 KG/M2 | SYSTOLIC BLOOD PRESSURE: 136 MMHG | WEIGHT: 134 LBS | HEIGHT: 62 IN | HEART RATE: 64 BPM | DIASTOLIC BLOOD PRESSURE: 83 MMHG

## 2021-04-23 DIAGNOSIS — C67.2 MALIGNANT NEOPLASM OF LATERAL WALL OF URINARY BLADDER: ICD-10-CM

## 2021-04-23 PROCEDURE — 52000 CYSTOSCOPY: ICD-10-PCS | Mod: S$GLB,,, | Performed by: UROLOGY

## 2021-04-23 PROCEDURE — 52000 CYSTOURETHROSCOPY: CPT | Mod: S$GLB,,, | Performed by: UROLOGY

## 2021-04-27 ENCOUNTER — LAB VISIT (OUTPATIENT)
Dept: LAB | Facility: HOSPITAL | Age: 76
End: 2021-04-27
Attending: INTERNAL MEDICINE
Payer: MEDICARE

## 2021-04-27 DIAGNOSIS — E03.9 ACQUIRED HYPOTHYROIDISM: ICD-10-CM

## 2021-04-27 DIAGNOSIS — R71.8 OTHER ABNORMALITY OF RED BLOOD CELLS: ICD-10-CM

## 2021-04-27 LAB
BASOPHILS # BLD AUTO: 0.04 K/UL (ref 0–0.2)
BASOPHILS NFR BLD: 0.7 % (ref 0–1.9)
DIFFERENTIAL METHOD: ABNORMAL
EOSINOPHIL # BLD AUTO: 0.1 K/UL (ref 0–0.5)
EOSINOPHIL NFR BLD: 2.1 % (ref 0–8)
ERYTHROCYTE [DISTWIDTH] IN BLOOD BY AUTOMATED COUNT: 13.3 % (ref 11.5–14.5)
FERRITIN SERPL-MCNC: 14 NG/ML (ref 20–300)
HCT VFR BLD AUTO: 38.3 % (ref 37–48.5)
HGB BLD-MCNC: 12.2 G/DL (ref 12–16)
IMM GRANULOCYTES # BLD AUTO: 0.02 K/UL (ref 0–0.04)
IMM GRANULOCYTES NFR BLD AUTO: 0.4 % (ref 0–0.5)
IRON SERPL-MCNC: 55 UG/DL (ref 30–160)
LYMPHOCYTES # BLD AUTO: 1.2 K/UL (ref 1–4.8)
LYMPHOCYTES NFR BLD: 20.7 % (ref 18–48)
MCH RBC QN AUTO: 29.2 PG (ref 27–31)
MCHC RBC AUTO-ENTMCNC: 31.9 G/DL (ref 32–36)
MCV RBC AUTO: 92 FL (ref 82–98)
MONOCYTES # BLD AUTO: 0.5 K/UL (ref 0.3–1)
MONOCYTES NFR BLD: 8.5 % (ref 4–15)
NEUTROPHILS # BLD AUTO: 3.8 K/UL (ref 1.8–7.7)
NEUTROPHILS NFR BLD: 67.6 % (ref 38–73)
NRBC BLD-RTO: 0 /100 WBC
PLATELET # BLD AUTO: 272 K/UL (ref 150–450)
PMV BLD AUTO: 9.1 FL (ref 9.2–12.9)
RBC # BLD AUTO: 4.18 M/UL (ref 4–5.4)
SATURATED IRON: 16 % (ref 20–50)
T4 FREE SERPL-MCNC: 0.96 NG/DL (ref 0.71–1.51)
TOTAL IRON BINDING CAPACITY: 345 UG/DL (ref 250–450)
TRANSFERRIN SERPL-MCNC: 233 MG/DL (ref 200–375)
TSH SERPL DL<=0.005 MIU/L-ACNC: 2.29 UIU/ML (ref 0.4–4)
WBC # BLD AUTO: 5.64 K/UL (ref 3.9–12.7)

## 2021-04-27 PROCEDURE — 82728 ASSAY OF FERRITIN: CPT | Performed by: INTERNAL MEDICINE

## 2021-04-27 PROCEDURE — 85025 COMPLETE CBC W/AUTO DIFF WBC: CPT | Performed by: INTERNAL MEDICINE

## 2021-04-27 PROCEDURE — 36415 COLL VENOUS BLD VENIPUNCTURE: CPT | Performed by: INTERNAL MEDICINE

## 2021-04-27 PROCEDURE — 84443 ASSAY THYROID STIM HORMONE: CPT | Performed by: INTERNAL MEDICINE

## 2021-04-27 PROCEDURE — 83540 ASSAY OF IRON: CPT | Performed by: INTERNAL MEDICINE

## 2021-04-27 PROCEDURE — 84439 ASSAY OF FREE THYROXINE: CPT | Performed by: INTERNAL MEDICINE

## 2021-04-28 ENCOUNTER — PATIENT MESSAGE (OUTPATIENT)
Dept: RESEARCH | Facility: HOSPITAL | Age: 76
End: 2021-04-28

## 2021-04-28 ENCOUNTER — PATIENT MESSAGE (OUTPATIENT)
Dept: HEMATOLOGY/ONCOLOGY | Facility: CLINIC | Age: 76
End: 2021-04-28

## 2021-05-11 ENCOUNTER — PATIENT MESSAGE (OUTPATIENT)
Dept: HEMATOLOGY/ONCOLOGY | Facility: CLINIC | Age: 76
End: 2021-05-11

## 2021-05-11 ENCOUNTER — HOSPITAL ENCOUNTER (OUTPATIENT)
Dept: RADIOLOGY | Facility: HOSPITAL | Age: 76
Discharge: HOME | End: 2021-05-11
Attending: INTERNAL MEDICINE
Payer: MEDICARE

## 2021-05-11 DIAGNOSIS — R10.11 RIGHT UPPER QUADRANT ABDOMINAL PAIN: ICD-10-CM

## 2021-05-11 PROCEDURE — 74183 MRI ABD W/O CNTR FLWD CNTR: CPT | Mod: TC

## 2021-05-11 PROCEDURE — 25500020 PHARM REV CODE 255: Performed by: INTERNAL MEDICINE

## 2021-05-11 PROCEDURE — A9585 GADOBUTROL INJECTION: HCPCS | Performed by: INTERNAL MEDICINE

## 2021-05-11 RX ORDER — GADOBUTROL 604.72 MG/ML
10 INJECTION INTRAVENOUS
Status: COMPLETED | OUTPATIENT
Start: 2021-05-11 | End: 2021-05-11

## 2021-05-11 RX ADMIN — GADOBUTROL 10 ML: 604.72 INJECTION INTRAVENOUS at 11:05

## 2021-05-14 ENCOUNTER — LAB VISIT (OUTPATIENT)
Dept: LAB | Facility: HOSPITAL | Age: 76
End: 2021-05-14
Attending: PHYSICIAN ASSISTANT
Payer: MEDICARE

## 2021-05-14 DIAGNOSIS — E03.9 ACQUIRED HYPOTHYROIDISM: ICD-10-CM

## 2021-05-14 DIAGNOSIS — R71.8 OTHER ABNORMALITY OF RED BLOOD CELLS: ICD-10-CM

## 2021-05-14 LAB
BASOPHILS # BLD AUTO: 0.03 K/UL (ref 0–0.2)
BASOPHILS NFR BLD: 0.8 % (ref 0–1.9)
DIFFERENTIAL METHOD: ABNORMAL
EOSINOPHIL # BLD AUTO: 0.1 K/UL (ref 0–0.5)
EOSINOPHIL NFR BLD: 2.9 % (ref 0–8)
ERYTHROCYTE [DISTWIDTH] IN BLOOD BY AUTOMATED COUNT: 13.8 % (ref 11.5–14.5)
HCT VFR BLD AUTO: 36.2 % (ref 37–48.5)
HGB BLD-MCNC: 11.8 G/DL (ref 12–16)
IMM GRANULOCYTES # BLD AUTO: 0.01 K/UL (ref 0–0.04)
IMM GRANULOCYTES NFR BLD AUTO: 0.3 % (ref 0–0.5)
LYMPHOCYTES # BLD AUTO: 1.2 K/UL (ref 1–4.8)
LYMPHOCYTES NFR BLD: 31.4 % (ref 18–48)
MCH RBC QN AUTO: 29.1 PG (ref 27–31)
MCHC RBC AUTO-ENTMCNC: 32.6 G/DL (ref 32–36)
MCV RBC AUTO: 89 FL (ref 82–98)
MONOCYTES # BLD AUTO: 0.4 K/UL (ref 0.3–1)
MONOCYTES NFR BLD: 9.2 % (ref 4–15)
NEUTROPHILS # BLD AUTO: 2.1 K/UL (ref 1.8–7.7)
NEUTROPHILS NFR BLD: 55.4 % (ref 38–73)
NRBC BLD-RTO: 0 /100 WBC
PLATELET # BLD AUTO: 312 K/UL (ref 150–450)
PMV BLD AUTO: 9 FL (ref 9.2–12.9)
RBC # BLD AUTO: 4.06 M/UL (ref 4–5.4)
WBC # BLD AUTO: 3.82 K/UL (ref 3.9–12.7)

## 2021-05-14 PROCEDURE — 85025 COMPLETE CBC W/AUTO DIFF WBC: CPT | Performed by: INTERNAL MEDICINE

## 2021-05-14 PROCEDURE — 36415 COLL VENOUS BLD VENIPUNCTURE: CPT | Performed by: INTERNAL MEDICINE

## 2021-05-14 PROCEDURE — 83540 ASSAY OF IRON: CPT | Performed by: INTERNAL MEDICINE

## 2021-05-14 PROCEDURE — 82728 ASSAY OF FERRITIN: CPT | Performed by: INTERNAL MEDICINE

## 2021-05-15 ENCOUNTER — PATIENT MESSAGE (OUTPATIENT)
Dept: HEMATOLOGY/ONCOLOGY | Facility: CLINIC | Age: 76
End: 2021-05-15

## 2021-05-15 LAB
FERRITIN SERPL-MCNC: 18 NG/ML (ref 20–300)
IRON SERPL-MCNC: 39 UG/DL (ref 30–160)
SATURATED IRON: 12 % (ref 20–50)
TOTAL IRON BINDING CAPACITY: 327 UG/DL (ref 250–450)
TRANSFERRIN SERPL-MCNC: 221 MG/DL (ref 200–375)

## 2021-05-17 ENCOUNTER — OFFICE VISIT (OUTPATIENT)
Dept: HEMATOLOGY/ONCOLOGY | Facility: CLINIC | Age: 76
End: 2021-05-17
Payer: MEDICARE

## 2021-05-17 ENCOUNTER — PATIENT MESSAGE (OUTPATIENT)
Dept: UROLOGY | Facility: CLINIC | Age: 76
End: 2021-05-17

## 2021-05-17 VITALS
OXYGEN SATURATION: 98 % | HEART RATE: 81 BPM | HEIGHT: 62 IN | DIASTOLIC BLOOD PRESSURE: 71 MMHG | SYSTOLIC BLOOD PRESSURE: 114 MMHG | WEIGHT: 133.63 LBS | TEMPERATURE: 98 F | BODY MASS INDEX: 24.59 KG/M2

## 2021-05-17 DIAGNOSIS — E83.19 IRON OVERLOAD SYNDROME: ICD-10-CM

## 2021-05-17 DIAGNOSIS — K55.20 ANGIODYSPLASIA OF CECUM: ICD-10-CM

## 2021-05-17 DIAGNOSIS — Z80.3 FAMILY HISTORY OF BREAST CANCER IN SISTER: ICD-10-CM

## 2021-05-17 DIAGNOSIS — C67.2 MALIGNANT NEOPLASM OF LATERAL WALL OF URINARY BLADDER: Primary | ICD-10-CM

## 2021-05-17 DIAGNOSIS — M85.89 OSTEOPENIA OF MULTIPLE SITES: ICD-10-CM

## 2021-05-17 PROCEDURE — 99214 OFFICE O/P EST MOD 30 MIN: CPT | Mod: PBBFAC | Performed by: INTERNAL MEDICINE

## 2021-05-17 PROCEDURE — 99214 PR OFFICE/OUTPT VISIT, EST, LEVL IV, 30-39 MIN: ICD-10-PCS | Mod: S$PBB,,, | Performed by: INTERNAL MEDICINE

## 2021-05-17 PROCEDURE — 99999 PR PBB SHADOW E&M-EST. PATIENT-LVL IV: CPT | Mod: PBBFAC,,, | Performed by: INTERNAL MEDICINE

## 2021-05-17 PROCEDURE — 99214 OFFICE O/P EST MOD 30 MIN: CPT | Mod: S$PBB,,, | Performed by: INTERNAL MEDICINE

## 2021-05-17 PROCEDURE — 99999 PR PBB SHADOW E&M-EST. PATIENT-LVL IV: ICD-10-PCS | Mod: PBBFAC,,, | Performed by: INTERNAL MEDICINE

## 2021-05-18 ENCOUNTER — TELEPHONE (OUTPATIENT)
Dept: UROLOGY | Facility: CLINIC | Age: 76
End: 2021-05-18

## 2021-05-18 DIAGNOSIS — C67.2 MALIGNANT NEOPLASM OF LATERAL WALL OF URINARY BLADDER: Primary | ICD-10-CM

## 2021-05-19 ENCOUNTER — PATIENT MESSAGE (OUTPATIENT)
Dept: HEMATOLOGY/ONCOLOGY | Facility: CLINIC | Age: 76
End: 2021-05-19

## 2021-05-25 ENCOUNTER — PATIENT MESSAGE (OUTPATIENT)
Dept: HEMATOLOGY/ONCOLOGY | Facility: CLINIC | Age: 76
End: 2021-05-25

## 2021-05-25 ENCOUNTER — PATIENT MESSAGE (OUTPATIENT)
Dept: UROLOGY | Facility: CLINIC | Age: 76
End: 2021-05-25

## 2021-05-25 DIAGNOSIS — C67.2 MALIGNANT NEOPLASM OF LATERAL WALL OF URINARY BLADDER: Primary | ICD-10-CM

## 2021-05-28 ENCOUNTER — CLINICAL SUPPORT (OUTPATIENT)
Dept: UROLOGY | Facility: CLINIC | Age: 76
End: 2021-05-28
Attending: UROLOGY
Payer: MEDICARE

## 2021-05-28 ENCOUNTER — HOSPITAL ENCOUNTER (OUTPATIENT)
Dept: RADIOLOGY | Facility: OTHER | Age: 76
Discharge: HOME | End: 2021-05-28
Attending: UROLOGY
Payer: MEDICARE

## 2021-05-28 ENCOUNTER — TELEPHONE (OUTPATIENT)
Dept: UROLOGY | Facility: CLINIC | Age: 76
End: 2021-05-28

## 2021-05-28 DIAGNOSIS — C67.9 MALIGNANT NEOPLASM OF URINARY BLADDER, UNSPECIFIED SITE: Primary | ICD-10-CM

## 2021-05-28 DIAGNOSIS — C67.2 MALIGNANT NEOPLASM OF LATERAL WALL OF URINARY BLADDER: ICD-10-CM

## 2021-05-28 PROCEDURE — 88112 CYTOPATH CELL ENHANCE TECH: CPT | Mod: 26,,, | Performed by: PATHOLOGY

## 2021-05-28 PROCEDURE — 88112 PR  CYTOPATH, CELL ENHANCE TECH: ICD-10-PCS | Mod: 26,,, | Performed by: PATHOLOGY

## 2021-05-28 PROCEDURE — 74178 CT ABD&PLV WO CNTR FLWD CNTR: CPT | Mod: 26,,, | Performed by: RADIOLOGY

## 2021-05-28 PROCEDURE — 74178 CT ABD&PLV WO CNTR FLWD CNTR: CPT | Mod: TC

## 2021-05-28 PROCEDURE — 88112 CYTOPATH CELL ENHANCE TECH: CPT | Performed by: PATHOLOGY

## 2021-05-28 PROCEDURE — 74178 CT UROGRAM ABD PELVIS W WO: ICD-10-PCS | Mod: 26,,, | Performed by: RADIOLOGY

## 2021-05-28 PROCEDURE — 25500020 PHARM REV CODE 255: Performed by: UROLOGY

## 2021-05-28 RX ADMIN — IOHEXOL 125 ML: 350 INJECTION, SOLUTION INTRAVENOUS at 11:05

## 2021-06-01 LAB
FINAL PATHOLOGIC DIAGNOSIS: NORMAL
Lab: NORMAL

## 2021-06-08 ENCOUNTER — PATIENT MESSAGE (OUTPATIENT)
Dept: HEMATOLOGY/ONCOLOGY | Facility: CLINIC | Age: 76
End: 2021-06-08

## 2021-06-08 DIAGNOSIS — E03.9 ACQUIRED HYPOTHYROIDISM: ICD-10-CM

## 2021-06-08 DIAGNOSIS — E83.19 IRON OVERLOAD SYNDROME: Primary | ICD-10-CM

## 2021-06-11 ENCOUNTER — OFFICE VISIT (OUTPATIENT)
Dept: UROLOGY | Facility: CLINIC | Age: 76
End: 2021-06-11
Payer: MEDICARE

## 2021-06-11 DIAGNOSIS — C67.9 MALIGNANT NEOPLASM OF URINARY BLADDER, UNSPECIFIED SITE: Primary | ICD-10-CM

## 2021-06-11 DIAGNOSIS — R82.71 BACTERIURIA: ICD-10-CM

## 2021-06-11 PROCEDURE — 99213 PR OFFICE/OUTPT VISIT, EST, LEVL III, 20-29 MIN: ICD-10-PCS | Mod: 95,,, | Performed by: UROLOGY

## 2021-06-11 PROCEDURE — 99213 OFFICE O/P EST LOW 20 MIN: CPT | Mod: 95,,, | Performed by: UROLOGY

## 2021-06-14 ENCOUNTER — LAB VISIT (OUTPATIENT)
Dept: LAB | Facility: HOSPITAL | Age: 76
End: 2021-06-14
Attending: INTERNAL MEDICINE
Payer: MEDICARE

## 2021-06-14 DIAGNOSIS — E03.9 ACQUIRED HYPOTHYROIDISM: ICD-10-CM

## 2021-06-14 DIAGNOSIS — E83.19 IRON OVERLOAD SYNDROME: ICD-10-CM

## 2021-06-14 LAB
BASOPHILS # BLD AUTO: 0.04 K/UL (ref 0–0.2)
BASOPHILS NFR BLD: 0.7 % (ref 0–1.9)
DIFFERENTIAL METHOD: ABNORMAL
EOSINOPHIL # BLD AUTO: 0.1 K/UL (ref 0–0.5)
EOSINOPHIL NFR BLD: 1.1 % (ref 0–8)
ERYTHROCYTE [DISTWIDTH] IN BLOOD BY AUTOMATED COUNT: 17.2 % (ref 11.5–14.5)
HCT VFR BLD AUTO: 40.1 % (ref 37–48.5)
HGB BLD-MCNC: 12.9 G/DL (ref 12–16)
IMM GRANULOCYTES # BLD AUTO: 0.01 K/UL (ref 0–0.04)
IMM GRANULOCYTES NFR BLD AUTO: 0.2 % (ref 0–0.5)
LYMPHOCYTES # BLD AUTO: 1.1 K/UL (ref 1–4.8)
LYMPHOCYTES NFR BLD: 18.3 % (ref 18–48)
MCH RBC QN AUTO: 29.7 PG (ref 27–31)
MCHC RBC AUTO-ENTMCNC: 32.2 G/DL (ref 32–36)
MCV RBC AUTO: 92 FL (ref 82–98)
MONOCYTES # BLD AUTO: 0.4 K/UL (ref 0.3–1)
MONOCYTES NFR BLD: 7 % (ref 4–15)
NEUTROPHILS # BLD AUTO: 4.5 K/UL (ref 1.8–7.7)
NEUTROPHILS NFR BLD: 72.7 % (ref 38–73)
NRBC BLD-RTO: 0 /100 WBC
PLATELET # BLD AUTO: 309 K/UL (ref 150–450)
PMV BLD AUTO: 9.7 FL (ref 9.2–12.9)
RBC # BLD AUTO: 4.35 M/UL (ref 4–5.4)
WBC # BLD AUTO: 6.13 K/UL (ref 3.9–12.7)

## 2021-06-14 PROCEDURE — 83540 ASSAY OF IRON: CPT | Performed by: INTERNAL MEDICINE

## 2021-06-14 PROCEDURE — 36415 COLL VENOUS BLD VENIPUNCTURE: CPT | Performed by: INTERNAL MEDICINE

## 2021-06-14 PROCEDURE — 82728 ASSAY OF FERRITIN: CPT | Performed by: INTERNAL MEDICINE

## 2021-06-14 PROCEDURE — 84443 ASSAY THYROID STIM HORMONE: CPT | Performed by: INTERNAL MEDICINE

## 2021-06-14 PROCEDURE — 85025 COMPLETE CBC W/AUTO DIFF WBC: CPT | Performed by: INTERNAL MEDICINE

## 2021-06-15 LAB
FERRITIN SERPL-MCNC: 49 NG/ML (ref 20–300)
IRON SERPL-MCNC: 127 UG/DL (ref 30–160)
SATURATED IRON: 45 % (ref 20–50)
TOTAL IRON BINDING CAPACITY: 280 UG/DL (ref 250–450)
TRANSFERRIN SERPL-MCNC: 189 MG/DL (ref 200–375)
TSH SERPL DL<=0.005 MIU/L-ACNC: 1.87 UIU/ML (ref 0.4–4)

## 2021-07-05 ENCOUNTER — PATIENT MESSAGE (OUTPATIENT)
Dept: HEMATOLOGY/ONCOLOGY | Facility: CLINIC | Age: 76
End: 2021-07-05

## 2021-07-06 DIAGNOSIS — E03.9 ACQUIRED HYPOTHYROIDISM: ICD-10-CM

## 2021-07-06 DIAGNOSIS — E83.19 IRON OVERLOAD SYNDROME: Primary | ICD-10-CM

## 2021-07-09 ENCOUNTER — PATIENT MESSAGE (OUTPATIENT)
Dept: UROLOGY | Facility: CLINIC | Age: 76
End: 2021-07-09

## 2021-07-09 DIAGNOSIS — C67.9 MALIGNANT NEOPLASM OF URINARY BLADDER, UNSPECIFIED SITE: Primary | ICD-10-CM

## 2021-07-12 ENCOUNTER — LAB VISIT (OUTPATIENT)
Dept: LAB | Facility: HOSPITAL | Age: 76
End: 2021-07-12
Attending: INTERNAL MEDICINE
Payer: MEDICARE

## 2021-07-12 DIAGNOSIS — E83.19 IRON OVERLOAD SYNDROME: ICD-10-CM

## 2021-07-12 DIAGNOSIS — E03.9 ACQUIRED HYPOTHYROIDISM: ICD-10-CM

## 2021-07-12 LAB
BASOPHILS # BLD AUTO: 0.03 K/UL (ref 0–0.2)
BASOPHILS NFR BLD: 0.4 % (ref 0–1.9)
DIFFERENTIAL METHOD: ABNORMAL
EOSINOPHIL # BLD AUTO: 0.1 K/UL (ref 0–0.5)
EOSINOPHIL NFR BLD: 1.1 % (ref 0–8)
ERYTHROCYTE [DISTWIDTH] IN BLOOD BY AUTOMATED COUNT: 17.5 % (ref 11.5–14.5)
HCT VFR BLD AUTO: 41.9 % (ref 37–48.5)
HGB BLD-MCNC: 13.7 G/DL (ref 12–16)
IMM GRANULOCYTES # BLD AUTO: 0.03 K/UL (ref 0–0.04)
IMM GRANULOCYTES NFR BLD AUTO: 0.4 % (ref 0–0.5)
LYMPHOCYTES # BLD AUTO: 1.3 K/UL (ref 1–4.8)
LYMPHOCYTES NFR BLD: 16.6 % (ref 18–48)
MCH RBC QN AUTO: 30.6 PG (ref 27–31)
MCHC RBC AUTO-ENTMCNC: 32.7 G/DL (ref 32–36)
MCV RBC AUTO: 94 FL (ref 82–98)
MONOCYTES # BLD AUTO: 0.6 K/UL (ref 0.3–1)
MONOCYTES NFR BLD: 7.3 % (ref 4–15)
NEUTROPHILS # BLD AUTO: 6 K/UL (ref 1.8–7.7)
NEUTROPHILS NFR BLD: 74.2 % (ref 38–73)
NRBC BLD-RTO: 0 /100 WBC
PLATELET # BLD AUTO: 282 K/UL (ref 150–450)
PMV BLD AUTO: 9.6 FL (ref 9.2–12.9)
RBC # BLD AUTO: 4.48 M/UL (ref 4–5.4)
WBC # BLD AUTO: 8.03 K/UL (ref 3.9–12.7)

## 2021-07-12 PROCEDURE — 36415 COLL VENOUS BLD VENIPUNCTURE: CPT | Performed by: INTERNAL MEDICINE

## 2021-07-12 PROCEDURE — 85025 COMPLETE CBC W/AUTO DIFF WBC: CPT | Performed by: INTERNAL MEDICINE

## 2021-07-12 PROCEDURE — 82728 ASSAY OF FERRITIN: CPT | Performed by: INTERNAL MEDICINE

## 2021-07-12 PROCEDURE — 84443 ASSAY THYROID STIM HORMONE: CPT | Performed by: INTERNAL MEDICINE

## 2021-07-12 PROCEDURE — 83540 ASSAY OF IRON: CPT | Performed by: INTERNAL MEDICINE

## 2021-07-13 ENCOUNTER — PATIENT MESSAGE (OUTPATIENT)
Dept: HEMATOLOGY/ONCOLOGY | Facility: CLINIC | Age: 76
End: 2021-07-13

## 2021-07-13 LAB
FERRITIN SERPL-MCNC: 44 NG/ML (ref 20–300)
IRON SERPL-MCNC: 110 UG/DL (ref 30–160)
SATURATED IRON: 42 % (ref 20–50)
TOTAL IRON BINDING CAPACITY: 259 UG/DL (ref 250–450)
TRANSFERRIN SERPL-MCNC: 175 MG/DL (ref 200–375)
TSH SERPL DL<=0.005 MIU/L-ACNC: 1.69 UIU/ML (ref 0.4–4)

## 2021-07-18 ENCOUNTER — PATIENT MESSAGE (OUTPATIENT)
Dept: UROLOGY | Facility: CLINIC | Age: 76
End: 2021-07-18

## 2021-07-20 ENCOUNTER — PATIENT MESSAGE (OUTPATIENT)
Dept: HEMATOLOGY/ONCOLOGY | Facility: CLINIC | Age: 76
End: 2021-07-20

## 2021-07-21 ENCOUNTER — PATIENT MESSAGE (OUTPATIENT)
Dept: UROLOGY | Facility: CLINIC | Age: 76
End: 2021-07-21

## 2021-07-22 ENCOUNTER — PATIENT MESSAGE (OUTPATIENT)
Dept: HEMATOLOGY/ONCOLOGY | Facility: CLINIC | Age: 76
End: 2021-07-22

## 2021-07-22 ENCOUNTER — OFFICE VISIT (OUTPATIENT)
Dept: HEMATOLOGY/ONCOLOGY | Facility: CLINIC | Age: 76
End: 2021-07-22
Payer: MEDICARE

## 2021-07-22 DIAGNOSIS — Z90.13 H/O BILATERAL MASTECTOMY: ICD-10-CM

## 2021-07-22 DIAGNOSIS — C67.2 MALIGNANT NEOPLASM OF LATERAL WALL OF URINARY BLADDER: ICD-10-CM

## 2021-07-22 DIAGNOSIS — E03.4 HYPOTHYROIDISM DUE TO ACQUIRED ATROPHY OF THYROID: ICD-10-CM

## 2021-07-22 DIAGNOSIS — E83.19 IRON OVERLOAD SYNDROME: Primary | ICD-10-CM

## 2021-07-22 PROCEDURE — 99214 OFFICE O/P EST MOD 30 MIN: CPT | Mod: 95,,, | Performed by: INTERNAL MEDICINE

## 2021-07-22 PROCEDURE — 99214 PR OFFICE/OUTPT VISIT, EST, LEVL IV, 30-39 MIN: ICD-10-PCS | Mod: 95,,, | Performed by: INTERNAL MEDICINE

## 2021-08-18 ENCOUNTER — PATIENT MESSAGE (OUTPATIENT)
Dept: HEMATOLOGY/ONCOLOGY | Facility: CLINIC | Age: 76
End: 2021-08-18

## 2021-08-19 ENCOUNTER — LAB VISIT (OUTPATIENT)
Dept: LAB | Facility: HOSPITAL | Age: 76
End: 2021-08-19
Attending: INTERNAL MEDICINE
Payer: MEDICARE

## 2021-08-19 DIAGNOSIS — E83.19 IRON OVERLOAD SYNDROME: ICD-10-CM

## 2021-08-19 LAB
BASOPHILS # BLD AUTO: 0.03 K/UL (ref 0–0.2)
BASOPHILS NFR BLD: 0.6 % (ref 0–1.9)
DIFFERENTIAL METHOD: ABNORMAL
EOSINOPHIL # BLD AUTO: 0.1 K/UL (ref 0–0.5)
EOSINOPHIL NFR BLD: 2.1 % (ref 0–8)
ERYTHROCYTE [DISTWIDTH] IN BLOOD BY AUTOMATED COUNT: 15.9 % (ref 11.5–14.5)
FERRITIN SERPL-MCNC: 53 NG/ML (ref 20–300)
HCT VFR BLD AUTO: 40.4 % (ref 37–48.5)
HGB BLD-MCNC: 13.4 G/DL (ref 12–16)
IMM GRANULOCYTES # BLD AUTO: 0 K/UL (ref 0–0.04)
IMM GRANULOCYTES NFR BLD AUTO: 0 % (ref 0–0.5)
IRON SERPL-MCNC: 128 UG/DL (ref 30–160)
LYMPHOCYTES # BLD AUTO: 1.2 K/UL (ref 1–4.8)
LYMPHOCYTES NFR BLD: 25.4 % (ref 18–48)
MCH RBC QN AUTO: 31.2 PG (ref 27–31)
MCHC RBC AUTO-ENTMCNC: 33.2 G/DL (ref 32–36)
MCV RBC AUTO: 94 FL (ref 82–98)
MONOCYTES # BLD AUTO: 0.4 K/UL (ref 0.3–1)
MONOCYTES NFR BLD: 8.1 % (ref 4–15)
NEUTROPHILS # BLD AUTO: 3.1 K/UL (ref 1.8–7.7)
NEUTROPHILS NFR BLD: 63.8 % (ref 38–73)
NRBC BLD-RTO: 0 /100 WBC
PLATELET # BLD AUTO: 252 K/UL (ref 150–450)
PMV BLD AUTO: 9.1 FL (ref 9.2–12.9)
RBC # BLD AUTO: 4.29 M/UL (ref 4–5.4)
SATURATED IRON: 52 % (ref 20–50)
TOTAL IRON BINDING CAPACITY: 247 UG/DL (ref 250–450)
TRANSFERRIN SERPL-MCNC: 167 MG/DL (ref 200–375)
TSH SERPL DL<=0.005 MIU/L-ACNC: 1.66 UIU/ML (ref 0.4–4)
WBC # BLD AUTO: 4.84 K/UL (ref 3.9–12.7)

## 2021-08-19 PROCEDURE — 84443 ASSAY THYROID STIM HORMONE: CPT | Performed by: INTERNAL MEDICINE

## 2021-08-19 PROCEDURE — 85025 COMPLETE CBC W/AUTO DIFF WBC: CPT | Performed by: INTERNAL MEDICINE

## 2021-08-19 PROCEDURE — 82728 ASSAY OF FERRITIN: CPT | Performed by: INTERNAL MEDICINE

## 2021-08-19 PROCEDURE — 36415 COLL VENOUS BLD VENIPUNCTURE: CPT | Performed by: INTERNAL MEDICINE

## 2021-08-19 PROCEDURE — 84466 ASSAY OF TRANSFERRIN: CPT | Performed by: INTERNAL MEDICINE

## 2021-08-24 ENCOUNTER — PATIENT MESSAGE (OUTPATIENT)
Dept: HEMATOLOGY/ONCOLOGY | Facility: CLINIC | Age: 76
End: 2021-08-24

## 2021-09-08 ENCOUNTER — INFUSION (OUTPATIENT)
Dept: INFUSION THERAPY | Facility: HOSPITAL | Age: 76
End: 2021-09-08
Attending: INTERNAL MEDICINE
Payer: MEDICARE

## 2021-09-08 VITALS
TEMPERATURE: 98 F | OXYGEN SATURATION: 98 % | HEART RATE: 75 BPM | RESPIRATION RATE: 18 BRPM | DIASTOLIC BLOOD PRESSURE: 68 MMHG | SYSTOLIC BLOOD PRESSURE: 102 MMHG

## 2021-09-08 DIAGNOSIS — E83.19 IRON OVERLOAD SYNDROME: Primary | ICD-10-CM

## 2021-09-08 DIAGNOSIS — M85.89 OSTEOPENIA OF MULTIPLE SITES: ICD-10-CM

## 2021-09-08 PROCEDURE — 99195 PHLEBOTOMY: CPT

## 2021-09-17 ENCOUNTER — PATIENT MESSAGE (OUTPATIENT)
Dept: HEMATOLOGY/ONCOLOGY | Facility: CLINIC | Age: 76
End: 2021-09-17

## 2021-09-18 ENCOUNTER — IMMUNIZATION (OUTPATIENT)
Dept: PRIMARY CARE CLINIC | Facility: CLINIC | Age: 76
End: 2021-09-18
Payer: MEDICARE

## 2021-09-18 DIAGNOSIS — Z23 NEED FOR VACCINATION: Primary | ICD-10-CM

## 2021-09-18 PROCEDURE — 0003A COVID-19, MRNA, LNP-S, PF, 30 MCG/0.3 ML DOSE VACCINE: CPT | Mod: CV19,S$GLB,, | Performed by: FAMILY MEDICINE

## 2021-09-18 PROCEDURE — 0003A COVID-19, MRNA, LNP-S, PF, 30 MCG/0.3 ML DOSE VACCINE: ICD-10-PCS | Mod: CV19,S$GLB,, | Performed by: FAMILY MEDICINE

## 2021-09-18 PROCEDURE — 91300 COVID-19, MRNA, LNP-S, PF, 30 MCG/0.3 ML DOSE VACCINE: CPT | Mod: S$GLB,,, | Performed by: FAMILY MEDICINE

## 2021-09-18 PROCEDURE — 91300 COVID-19, MRNA, LNP-S, PF, 30 MCG/0.3 ML DOSE VACCINE: ICD-10-PCS | Mod: S$GLB,,, | Performed by: FAMILY MEDICINE

## 2021-09-30 ENCOUNTER — TELEPHONE (OUTPATIENT)
Dept: UROLOGY | Facility: CLINIC | Age: 76
End: 2021-09-30

## 2021-10-11 ENCOUNTER — TELEPHONE (OUTPATIENT)
Dept: UROLOGY | Facility: CLINIC | Age: 76
End: 2021-10-11

## 2021-10-11 ENCOUNTER — LAB VISIT (OUTPATIENT)
Dept: LAB | Facility: HOSPITAL | Age: 76
End: 2021-10-11
Attending: INTERNAL MEDICINE
Payer: MEDICARE

## 2021-10-11 DIAGNOSIS — R30.0 DYSURIA: Primary | ICD-10-CM

## 2021-10-11 DIAGNOSIS — E83.19 IRON OVERLOAD SYNDROME: ICD-10-CM

## 2021-10-11 LAB
BASOPHILS # BLD AUTO: 0.03 K/UL (ref 0–0.2)
BASOPHILS NFR BLD: 0.4 % (ref 0–1.9)
DIFFERENTIAL METHOD: ABNORMAL
EOSINOPHIL # BLD AUTO: 0 K/UL (ref 0–0.5)
EOSINOPHIL NFR BLD: 0.6 % (ref 0–8)
ERYTHROCYTE [DISTWIDTH] IN BLOOD BY AUTOMATED COUNT: 13 % (ref 11.5–14.5)
HCT VFR BLD AUTO: 40.8 % (ref 37–48.5)
HGB BLD-MCNC: 13.4 G/DL (ref 12–16)
IMM GRANULOCYTES # BLD AUTO: 0.01 K/UL (ref 0–0.04)
IMM GRANULOCYTES NFR BLD AUTO: 0.1 % (ref 0–0.5)
LYMPHOCYTES # BLD AUTO: 1.4 K/UL (ref 1–4.8)
LYMPHOCYTES NFR BLD: 21.2 % (ref 18–48)
MCH RBC QN AUTO: 32.2 PG (ref 27–31)
MCHC RBC AUTO-ENTMCNC: 32.8 G/DL (ref 32–36)
MCV RBC AUTO: 98 FL (ref 82–98)
MONOCYTES # BLD AUTO: 0.4 K/UL (ref 0.3–1)
MONOCYTES NFR BLD: 6.3 % (ref 4–15)
NEUTROPHILS # BLD AUTO: 4.8 K/UL (ref 1.8–7.7)
NEUTROPHILS NFR BLD: 71.4 % (ref 38–73)
NRBC BLD-RTO: 0 /100 WBC
PLATELET # BLD AUTO: 303 K/UL (ref 150–450)
PMV BLD AUTO: 9.3 FL (ref 9.2–12.9)
RBC # BLD AUTO: 4.16 M/UL (ref 4–5.4)
WBC # BLD AUTO: 6.69 K/UL (ref 3.9–12.7)

## 2021-10-11 PROCEDURE — 84443 ASSAY THYROID STIM HORMONE: CPT | Performed by: INTERNAL MEDICINE

## 2021-10-11 PROCEDURE — 82728 ASSAY OF FERRITIN: CPT | Performed by: INTERNAL MEDICINE

## 2021-10-11 PROCEDURE — 84466 ASSAY OF TRANSFERRIN: CPT | Performed by: INTERNAL MEDICINE

## 2021-10-11 PROCEDURE — 85025 COMPLETE CBC W/AUTO DIFF WBC: CPT | Performed by: INTERNAL MEDICINE

## 2021-10-11 PROCEDURE — 36415 COLL VENOUS BLD VENIPUNCTURE: CPT | Performed by: INTERNAL MEDICINE

## 2021-10-12 ENCOUNTER — LAB VISIT (OUTPATIENT)
Dept: LAB | Facility: HOSPITAL | Age: 76
End: 2021-10-12
Payer: MEDICARE

## 2021-10-12 DIAGNOSIS — R30.0 DYSURIA: ICD-10-CM

## 2021-10-12 LAB
FERRITIN SERPL-MCNC: 52 NG/ML (ref 20–300)
IRON SERPL-MCNC: 175 UG/DL (ref 30–160)
SATURATED IRON: 63 % (ref 20–50)
TOTAL IRON BINDING CAPACITY: 277 UG/DL (ref 250–450)
TRANSFERRIN SERPL-MCNC: 187 MG/DL (ref 200–375)
TSH SERPL DL<=0.005 MIU/L-ACNC: 1.81 UIU/ML (ref 0.4–4)

## 2021-10-12 PROCEDURE — 87086 URINE CULTURE/COLONY COUNT: CPT | Performed by: NURSE PRACTITIONER

## 2021-10-13 ENCOUNTER — INFUSION (OUTPATIENT)
Dept: INFUSION THERAPY | Facility: HOSPITAL | Age: 76
End: 2021-10-13
Attending: INTERNAL MEDICINE
Payer: MEDICARE

## 2021-10-13 ENCOUNTER — OFFICE VISIT (OUTPATIENT)
Dept: HEMATOLOGY/ONCOLOGY | Facility: CLINIC | Age: 76
End: 2021-10-13
Payer: MEDICARE

## 2021-10-13 VITALS
SYSTOLIC BLOOD PRESSURE: 112 MMHG | WEIGHT: 129.88 LBS | HEART RATE: 51 BPM | OXYGEN SATURATION: 99 % | OXYGEN SATURATION: 97 % | DIASTOLIC BLOOD PRESSURE: 67 MMHG | RESPIRATION RATE: 18 BRPM | HEIGHT: 62 IN | DIASTOLIC BLOOD PRESSURE: 71 MMHG | HEART RATE: 62 BPM | SYSTOLIC BLOOD PRESSURE: 97 MMHG | BODY MASS INDEX: 23.9 KG/M2 | TEMPERATURE: 98 F | TEMPERATURE: 100 F

## 2021-10-13 DIAGNOSIS — E03.4 HYPOTHYROIDISM DUE TO ACQUIRED ATROPHY OF THYROID: ICD-10-CM

## 2021-10-13 DIAGNOSIS — K55.20 ANGIODYSPLASIA OF CECUM: ICD-10-CM

## 2021-10-13 DIAGNOSIS — R53.82 CHRONIC FATIGUE: ICD-10-CM

## 2021-10-13 DIAGNOSIS — E83.19 IRON OVERLOAD SYNDROME: Primary | ICD-10-CM

## 2021-10-13 DIAGNOSIS — Z80.3 FAMILY HISTORY OF BREAST CANCER IN SISTER: ICD-10-CM

## 2021-10-13 DIAGNOSIS — M85.89 OSTEOPENIA OF MULTIPLE SITES: ICD-10-CM

## 2021-10-13 PROCEDURE — 99999 PR PBB SHADOW E&M-EST. PATIENT-LVL IV: ICD-10-PCS | Mod: PBBFAC,,, | Performed by: INTERNAL MEDICINE

## 2021-10-13 PROCEDURE — 99195 PHLEBOTOMY: CPT

## 2021-10-13 PROCEDURE — 99214 PR OFFICE/OUTPT VISIT, EST, LEVL IV, 30-39 MIN: ICD-10-PCS | Mod: 25,S$PBB,, | Performed by: INTERNAL MEDICINE

## 2021-10-13 PROCEDURE — 99999 PR PBB SHADOW E&M-EST. PATIENT-LVL IV: CPT | Mod: PBBFAC,,, | Performed by: INTERNAL MEDICINE

## 2021-10-13 PROCEDURE — 99214 OFFICE O/P EST MOD 30 MIN: CPT | Mod: 25,S$PBB,, | Performed by: INTERNAL MEDICINE

## 2021-10-13 PROCEDURE — 99214 OFFICE O/P EST MOD 30 MIN: CPT | Mod: PBBFAC | Performed by: INTERNAL MEDICINE

## 2021-10-14 ENCOUNTER — PATIENT MESSAGE (OUTPATIENT)
Dept: UROLOGY | Facility: CLINIC | Age: 76
End: 2021-10-14
Payer: MEDICARE

## 2021-10-14 LAB — BACTERIA UR CULT: NORMAL

## 2021-11-09 ENCOUNTER — LAB VISIT (OUTPATIENT)
Dept: LAB | Facility: HOSPITAL | Age: 76
End: 2021-11-09
Attending: INTERNAL MEDICINE
Payer: MEDICARE

## 2021-11-09 DIAGNOSIS — E83.19 IRON OVERLOAD SYNDROME: ICD-10-CM

## 2021-11-09 DIAGNOSIS — E03.4 HYPOTHYROIDISM DUE TO ACQUIRED ATROPHY OF THYROID: ICD-10-CM

## 2021-11-09 LAB
BASOPHILS # BLD AUTO: 0.03 K/UL (ref 0–0.2)
BASOPHILS NFR BLD: 0.5 % (ref 0–1.9)
DIFFERENTIAL METHOD: ABNORMAL
EOSINOPHIL # BLD AUTO: 0.1 K/UL (ref 0–0.5)
EOSINOPHIL NFR BLD: 1.1 % (ref 0–8)
ERYTHROCYTE [DISTWIDTH] IN BLOOD BY AUTOMATED COUNT: 12.4 % (ref 11.5–14.5)
FERRITIN SERPL-MCNC: 24 NG/ML (ref 20–300)
HCT VFR BLD AUTO: 42.2 % (ref 37–48.5)
HGB BLD-MCNC: 14 G/DL (ref 12–16)
IMM GRANULOCYTES # BLD AUTO: 0.02 K/UL (ref 0–0.04)
IMM GRANULOCYTES NFR BLD AUTO: 0.4 % (ref 0–0.5)
IRON SERPL-MCNC: 107 UG/DL (ref 30–160)
LYMPHOCYTES # BLD AUTO: 1.2 K/UL (ref 1–4.8)
LYMPHOCYTES NFR BLD: 20.8 % (ref 18–48)
MCH RBC QN AUTO: 32.4 PG (ref 27–31)
MCHC RBC AUTO-ENTMCNC: 33.2 G/DL (ref 32–36)
MCV RBC AUTO: 98 FL (ref 82–98)
MONOCYTES # BLD AUTO: 0.4 K/UL (ref 0.3–1)
MONOCYTES NFR BLD: 7.9 % (ref 4–15)
NEUTROPHILS # BLD AUTO: 3.8 K/UL (ref 1.8–7.7)
NEUTROPHILS NFR BLD: 69.3 % (ref 38–73)
NRBC BLD-RTO: 0 /100 WBC
PLATELET # BLD AUTO: 309 K/UL (ref 150–450)
PMV BLD AUTO: 9 FL (ref 9.2–12.9)
RBC # BLD AUTO: 4.32 M/UL (ref 4–5.4)
SATURATED IRON: 32 % (ref 20–50)
TOTAL IRON BINDING CAPACITY: 336 UG/DL (ref 250–450)
TRANSFERRIN SERPL-MCNC: 227 MG/DL (ref 200–375)
TSH SERPL DL<=0.005 MIU/L-ACNC: 2 UIU/ML (ref 0.4–4)
WBC # BLD AUTO: 5.54 K/UL (ref 3.9–12.7)

## 2021-11-09 PROCEDURE — 85025 COMPLETE CBC W/AUTO DIFF WBC: CPT | Performed by: INTERNAL MEDICINE

## 2021-11-09 PROCEDURE — 84466 ASSAY OF TRANSFERRIN: CPT | Performed by: INTERNAL MEDICINE

## 2021-11-09 PROCEDURE — 82728 ASSAY OF FERRITIN: CPT | Performed by: INTERNAL MEDICINE

## 2021-11-09 PROCEDURE — 84443 ASSAY THYROID STIM HORMONE: CPT | Performed by: INTERNAL MEDICINE

## 2021-11-09 PROCEDURE — 36415 COLL VENOUS BLD VENIPUNCTURE: CPT | Performed by: INTERNAL MEDICINE

## 2021-11-10 ENCOUNTER — OFFICE VISIT (OUTPATIENT)
Dept: HEMATOLOGY/ONCOLOGY | Facility: CLINIC | Age: 76
End: 2021-11-10
Payer: MEDICARE

## 2021-11-10 VITALS
SYSTOLIC BLOOD PRESSURE: 112 MMHG | BODY MASS INDEX: 23.93 KG/M2 | OXYGEN SATURATION: 98 % | TEMPERATURE: 97 F | WEIGHT: 130.06 LBS | HEIGHT: 62 IN | DIASTOLIC BLOOD PRESSURE: 74 MMHG | HEART RATE: 59 BPM

## 2021-11-10 DIAGNOSIS — Z90.13 H/O BILATERAL MASTECTOMY: ICD-10-CM

## 2021-11-10 DIAGNOSIS — K55.20 ANGIODYSPLASIA OF CECUM: ICD-10-CM

## 2021-11-10 DIAGNOSIS — C67.2 MALIGNANT NEOPLASM OF LATERAL WALL OF URINARY BLADDER: ICD-10-CM

## 2021-11-10 DIAGNOSIS — E83.19 IRON OVERLOAD SYNDROME: Primary | ICD-10-CM

## 2021-11-10 PROCEDURE — 99999 PR PBB SHADOW E&M-EST. PATIENT-LVL III: CPT | Mod: PBBFAC,,, | Performed by: INTERNAL MEDICINE

## 2021-11-10 PROCEDURE — 99213 PR OFFICE/OUTPT VISIT, EST, LEVL III, 20-29 MIN: ICD-10-PCS | Mod: S$PBB,,, | Performed by: INTERNAL MEDICINE

## 2021-11-10 PROCEDURE — 99213 OFFICE O/P EST LOW 20 MIN: CPT | Mod: S$PBB,,, | Performed by: INTERNAL MEDICINE

## 2021-11-10 PROCEDURE — 99999 PR PBB SHADOW E&M-EST. PATIENT-LVL III: ICD-10-PCS | Mod: PBBFAC,,, | Performed by: INTERNAL MEDICINE

## 2021-11-10 PROCEDURE — 99213 OFFICE O/P EST LOW 20 MIN: CPT | Mod: PBBFAC | Performed by: INTERNAL MEDICINE

## 2021-11-11 ENCOUNTER — TELEPHONE (OUTPATIENT)
Dept: RHEUMATOLOGY | Facility: CLINIC | Age: 76
End: 2021-11-11
Payer: MEDICARE

## 2021-12-07 ENCOUNTER — PATIENT MESSAGE (OUTPATIENT)
Dept: HEMATOLOGY/ONCOLOGY | Facility: CLINIC | Age: 76
End: 2021-12-07
Payer: MEDICARE

## 2022-01-21 ENCOUNTER — LAB VISIT (OUTPATIENT)
Dept: LAB | Facility: HOSPITAL | Age: 77
End: 2022-01-21
Attending: INTERNAL MEDICINE
Payer: MEDICARE

## 2022-01-21 DIAGNOSIS — E83.19 IRON OVERLOAD SYNDROME: ICD-10-CM

## 2022-01-21 LAB
BASOPHILS # BLD AUTO: 0.03 K/UL (ref 0–0.2)
BASOPHILS NFR BLD: 0.5 % (ref 0–1.9)
DIFFERENTIAL METHOD: ABNORMAL
EOSINOPHIL # BLD AUTO: 0.1 K/UL (ref 0–0.5)
EOSINOPHIL NFR BLD: 2 % (ref 0–8)
ERYTHROCYTE [DISTWIDTH] IN BLOOD BY AUTOMATED COUNT: 12.6 % (ref 11.5–14.5)
FERRITIN SERPL-MCNC: 29 NG/ML (ref 20–300)
HCT VFR BLD AUTO: 42.6 % (ref 37–48.5)
HGB BLD-MCNC: 13.7 G/DL (ref 12–16)
IMM GRANULOCYTES # BLD AUTO: 0.01 K/UL (ref 0–0.04)
IMM GRANULOCYTES NFR BLD AUTO: 0.2 % (ref 0–0.5)
IRON SERPL-MCNC: 80 UG/DL (ref 30–160)
LYMPHOCYTES # BLD AUTO: 0.9 K/UL (ref 1–4.8)
LYMPHOCYTES NFR BLD: 16.1 % (ref 18–48)
MCH RBC QN AUTO: 30.2 PG (ref 27–31)
MCHC RBC AUTO-ENTMCNC: 32.2 G/DL (ref 32–36)
MCV RBC AUTO: 94 FL (ref 82–98)
MONOCYTES # BLD AUTO: 0.5 K/UL (ref 0.3–1)
MONOCYTES NFR BLD: 8.6 % (ref 4–15)
NEUTROPHILS # BLD AUTO: 4.1 K/UL (ref 1.8–7.7)
NEUTROPHILS NFR BLD: 72.6 % (ref 38–73)
NRBC BLD-RTO: 0 /100 WBC
PLATELET # BLD AUTO: 316 K/UL (ref 150–450)
PMV BLD AUTO: 9.5 FL (ref 9.2–12.9)
RBC # BLD AUTO: 4.54 M/UL (ref 4–5.4)
SATURATED IRON: 24 % (ref 20–50)
TOTAL IRON BINDING CAPACITY: 327 UG/DL (ref 250–450)
TRANSFERRIN SERPL-MCNC: 221 MG/DL (ref 200–375)
TSH SERPL DL<=0.005 MIU/L-ACNC: 2.04 UIU/ML (ref 0.4–4)
WBC # BLD AUTO: 5.58 K/UL (ref 3.9–12.7)

## 2022-01-21 PROCEDURE — 85025 COMPLETE CBC W/AUTO DIFF WBC: CPT | Performed by: INTERNAL MEDICINE

## 2022-01-21 PROCEDURE — 84443 ASSAY THYROID STIM HORMONE: CPT | Performed by: INTERNAL MEDICINE

## 2022-01-21 PROCEDURE — 84466 ASSAY OF TRANSFERRIN: CPT | Performed by: INTERNAL MEDICINE

## 2022-01-21 PROCEDURE — 82728 ASSAY OF FERRITIN: CPT | Performed by: INTERNAL MEDICINE

## 2022-01-21 PROCEDURE — 36415 COLL VENOUS BLD VENIPUNCTURE: CPT | Performed by: INTERNAL MEDICINE

## 2022-01-23 ENCOUNTER — PATIENT MESSAGE (OUTPATIENT)
Dept: HEMATOLOGY/ONCOLOGY | Facility: CLINIC | Age: 77
End: 2022-01-23
Payer: MEDICARE

## 2022-01-24 ENCOUNTER — TELEPHONE (OUTPATIENT)
Dept: HEMATOLOGY/ONCOLOGY | Facility: CLINIC | Age: 77
End: 2022-01-24
Payer: MEDICARE

## 2022-01-24 ENCOUNTER — LAB VISIT (OUTPATIENT)
Dept: PRIMARY CARE CLINIC | Facility: OTHER | Age: 77
End: 2022-01-24
Attending: INTERNAL MEDICINE
Payer: MEDICARE

## 2022-01-24 DIAGNOSIS — R11.0 NAUSEA: ICD-10-CM

## 2022-01-24 PROCEDURE — U0003 INFECTIOUS AGENT DETECTION BY NUCLEIC ACID (DNA OR RNA); SEVERE ACUTE RESPIRATORY SYNDROME CORONAVIRUS 2 (SARS-COV-2) (CORONAVIRUS DISEASE [COVID-19]), AMPLIFIED PROBE TECHNIQUE, MAKING USE OF HIGH THROUGHPUT TECHNOLOGIES AS DESCRIBED BY CMS-2020-01-R: HCPCS | Performed by: INTERNAL MEDICINE

## 2022-01-24 NOTE — TELEPHONE ENCOUNTER
Returned call. Pt still requesting information on PCR test.  She will go to [facility] to have done

## 2022-01-24 NOTE — TELEPHONE ENCOUNTER
----- Message from Jasmina Kasper sent at 1/24/2022 10:46 AM CST -----  Contact: JORDAN STEINER [[id number]]  .Type:  Needs Medical Advice    Who Called: JORDAN STEINER [[id number]]  Would the patient rather a call back or a response via [facility]? Call   Best Call Back Number: .[phone] (home)    Additional Information: Pt is req a call back in regards to her upcoming appointment

## 2022-01-25 LAB
SARS-COV-2 RNA RESP QL NAA+PROBE: NOT DETECTED
SARS-COV-2- CYCLE NUMBER: NORMAL

## 2022-01-26 ENCOUNTER — OFFICE VISIT (OUTPATIENT)
Dept: HEMATOLOGY/ONCOLOGY | Facility: CLINIC | Age: 77
End: 2022-01-26
Payer: MEDICARE

## 2022-01-26 DIAGNOSIS — K55.20 ANGIODYSPLASIA OF CECUM: ICD-10-CM

## 2022-01-26 DIAGNOSIS — E03.4 HYPOTHYROIDISM DUE TO ACQUIRED ATROPHY OF THYROID: ICD-10-CM

## 2022-01-26 DIAGNOSIS — R74.01 ELEVATION OF LEVELS OF LIVER TRANSAMINASE LEVELS: ICD-10-CM

## 2022-01-26 DIAGNOSIS — C67.2 MALIGNANT NEOPLASM OF LATERAL WALL OF URINARY BLADDER: ICD-10-CM

## 2022-01-26 DIAGNOSIS — E83.19 IRON OVERLOAD SYNDROME: Primary | ICD-10-CM

## 2022-01-26 DIAGNOSIS — Z90.13 H/O BILATERAL MASTECTOMY: ICD-10-CM

## 2022-01-26 PROCEDURE — 99214 PR OFFICE/OUTPT VISIT, EST, LEVL IV, 30-39 MIN: ICD-10-PCS | Mod: 95,,, | Performed by: INTERNAL MEDICINE

## 2022-01-26 PROCEDURE — 99214 OFFICE O/P EST MOD 30 MIN: CPT | Mod: 95,,, | Performed by: INTERNAL MEDICINE

## 2022-01-26 NOTE — PROGRESS NOTES
Subjective:       Patient ID: Selam Botello is a 76 y.o. female.    Chief Complaint: Results    HPI 76-year-old female history of iron overload syndrome seen in video visit for review of laboratory studies.    Past Medical History:   Diagnosis Date    Acid reflux     Allergy     Arthritis     Atrial tachycardia     Bladder cancer     Breast cyst     Cataract     Colon polyp     Deep vein thrombosis     Degenerative disc disease     Encounter for blood transfusion     General anesthetics causing adverse effect in therapeutic use     GI bleed     Hematuria, gross     Inflammatory bowel disease     Liver disease     cyst    Osteoporosis     PONV (postoperative nausea and vomiting)     Retina disorder, left     Skin cancer     Thyroid disease     Urinary tract infection      Family History   Problem Relation Age of Onset    COPD Mother     Cancer Mother     COPD Father     Cancer Father     Cancer Sister      Social History     Socioeconomic History    Marital status:    Tobacco Use    Smoking status: Never Smoker    Smokeless tobacco: Never Used   Substance and Sexual Activity    Alcohol use: No    Drug use: No    Sexual activity: Yes     Partners: Male     Past Surgical History:   Procedure Laterality Date    ADENOIDECTOMY      BLADDER FULGURATION Right 9/12/2018    Procedure: FULGURATION, BLADDER;  Surgeon: Isaias Shah MD;  Location: [facility] OR;  Service: Urology;  Laterality: Right;    BLADDER FULGURATION N/A 4/24/2019    Procedure: FULGURATION, BLADDER;  Surgeon: Isaias Shah MD;  Location: [facility] OR;  Service: Urology;  Laterality: N/A;    BREAST SURGERY Bilateral     CYSTOSCOPY      CYSTOSCOPY N/A 4/24/2019    Procedure: CYSTOSCOPY;  Surgeon: Isaias Shah MD;  Location: [facility] OR;  Service: Urology;  Laterality: N/A;    CYSTOSCOPY WITH URETEROSCOPY, RETROGRADE PYELOGRAPHY, AND INSERTION OF STENT Left 9/12/2018    Procedure: CYSTOSCOPY, WITH RETROGRADE  PYELOGRAM AND URETERAL STENT INSERTION;  Surgeon: Isaias Shah MD;  Location: [facility] OR;  Service: Urology;  Laterality: Left;    EYE SURGERY Left 2017    retina pucker    INSTILLATION OF URINARY BLADDER N/A 9/12/2018    Procedure: INSTILLATION, URINARY BLADDER;  Surgeon: Isaias Shah MD;  Location: [facility] OR;  Service: Urology;  Laterality: N/A;  Chemo agent instillation     JOINT REPLACEMENT Bilateral     hip    JOINT REPLACEMENT Right     knee    MASTECTOMY Bilateral 1991    RETROGRADE PYELOGRAPHY Bilateral 10/31/2019    Procedure: PYELOGRAM, RETROGRADE;  Surgeon: Baldo Gates MD;  Location: [facility];  Service: Urology;  Laterality: Bilateral;    TONSILLECTOMY      TURBT      TURBT, WITH BLUE LIGHT CYSTOSCOPY AND CYSVIEW Bilateral 10/31/2019    Procedure: TURBT,WITH BLUE LIGHT CYSTOSCOPY AND CYSVIEW;  Surgeon: Baldo Gates MD;  Location: [facility];  Service: Urology;  Laterality: Bilateral;    TYMPANOSTOMY TUBE PLACEMENT      VASCULAR SURGERY Right     popliteal stent    WISDOM TOOTH EXTRACTION         Labs:  Lab Results   Component Value Date    WBC 5.58 01/21/2022    HGB 13.7 01/21/2022    HCT 42.6 01/21/2022    MCV 94 01/21/2022     01/21/2022     BMP  Lab Results   Component Value Date     04/08/2021    K 4.1 04/08/2021     04/08/2021    CO2 27 04/08/2021    BUN 18 04/08/2021    CREATININE 0.7 05/28/2021    CALCIUM 9.7 04/08/2021    ANIONGAP 8 04/08/2021    ESTGFRAFRICA >60 05/28/2021    EGFRNONAA >60 05/28/2021     Lab Results   Component Value Date    ALT 14 04/08/2021    AST 17 04/08/2021    ALKPHOS 81 04/08/2021    BILITOT 0.7 04/08/2021       Lab Results   Component Value Date    IRON 80 01/21/2022    TIBC 327 01/21/2022    FERRITIN 29 01/21/2022     No results found for: [id number]  No results found for: FOLATE  Lab Results   Component Value Date    TSH 2.036 01/21/2022         Review of Systems   Psychiatric/Behavioral: Positive for dysphoric mood. The patient  is nervous/anxious.        Objective:      Physical Exam        Assessment:      1. Iron overload syndrome    2. Elevation of levels of liver transaminase levels     3. Hypothyroidism due to acquired atrophy of thyroid    4. Angiodysplasia of cecum    5. Malignant neoplasm of lateral wall of urinary bladder    6. H/O bilateral mastectomy           Plan:       The patient location is:  Home  The chief complaint leading to consultation is:  Results    Visit type:     Face to Face time with patient: 25 minutes of total time spent on the encounter, which includes face to face time and non-face to face time preparing to see the patient (eg, review of tests), Obtaining and/or reviewing separately obtained history, Documenting clinical information in the electronic or other health record, Independently interpreting results (not separately reported) and communicating results to the patient/family/caregiver, or Care coordination (not separately reported).         Each patient to whom he or she provides medical services by telemedicine is:  (1) informed of the relationship between the physician and patient and the respective role of any other health care provider with respect to management of the patient; and (2) notified that he or she may decline to receive medical services by telemedicine and may withdraw from such care at any time.    Notes:   Extensive conversation with patient at this particular point the patient is not iron overloaded I have told this may be related to diet modification see states she has stop eating red meat.  We can certainly recheck in 6 weeks.  She had questions concerning her liver function studies they were totally normal in August of place last bone acute hepatitis panel as well as liver function should she wish to have these done prior.  We then turned our attention to COVID COVID vaccination and and options for living in the community talk to her about her  was recently diagnosed with  brachial plexopathy article from up-to-date followed to the ER for his review.  RTC 6 weeks with laboratory studies prior    Adonay Neri Jr, MD FACP

## 2022-02-01 ENCOUNTER — PATIENT MESSAGE (OUTPATIENT)
Dept: HEMATOLOGY/ONCOLOGY | Facility: CLINIC | Age: 77
End: 2022-02-01
Payer: MEDICARE

## 2022-02-16 ENCOUNTER — PATIENT MESSAGE (OUTPATIENT)
Dept: HEMATOLOGY/ONCOLOGY | Facility: CLINIC | Age: 77
End: 2022-02-16
Payer: MEDICARE

## 2022-02-17 ENCOUNTER — TELEPHONE (OUTPATIENT)
Dept: HEMATOLOGY/ONCOLOGY | Facility: CLINIC | Age: 77
End: 2022-02-17
Payer: MEDICARE

## 2022-02-17 NOTE — TELEPHONE ENCOUNTER
Mrs. Rougon called this morning concerned about 3/16/22 appointments with Dr. Neri and poss Phlebotomy scheduled in different locations. I spoke to Madisyn at [facility] to assist with appointment changes for patient. Madisyn will confirm appointments.

## 2022-02-17 NOTE — TELEPHONE ENCOUNTER
Spoke with  Patient regarding appointment change to 3/18/22. Patient does not have any problems with the new date and time.

## 2022-03-09 ENCOUNTER — LAB VISIT (OUTPATIENT)
Dept: LAB | Facility: HOSPITAL | Age: 77
End: 2022-03-09
Attending: INTERNAL MEDICINE
Payer: MEDICARE

## 2022-03-09 DIAGNOSIS — E83.19 IRON OVERLOAD SYNDROME: ICD-10-CM

## 2022-03-09 LAB
BASOPHILS # BLD AUTO: 0.04 K/UL (ref 0–0.2)
BASOPHILS NFR BLD: 0.8 % (ref 0–1.9)
DIFFERENTIAL METHOD: ABNORMAL
EOSINOPHIL # BLD AUTO: 0.1 K/UL (ref 0–0.5)
EOSINOPHIL NFR BLD: 1.8 % (ref 0–8)
ERYTHROCYTE [DISTWIDTH] IN BLOOD BY AUTOMATED COUNT: 14.2 % (ref 11.5–14.5)
FERRITIN SERPL-MCNC: 24 NG/ML (ref 20–300)
HCT VFR BLD AUTO: 40.8 % (ref 37–48.5)
HGB BLD-MCNC: 13 G/DL (ref 12–16)
IMM GRANULOCYTES # BLD AUTO: 0.01 K/UL (ref 0–0.04)
IMM GRANULOCYTES NFR BLD AUTO: 0.2 % (ref 0–0.5)
IRON SERPL-MCNC: 104 UG/DL (ref 30–160)
LYMPHOCYTES # BLD AUTO: 1.1 K/UL (ref 1–4.8)
LYMPHOCYTES NFR BLD: 22.4 % (ref 18–48)
MCH RBC QN AUTO: 30 PG (ref 27–31)
MCHC RBC AUTO-ENTMCNC: 31.9 G/DL (ref 32–36)
MCV RBC AUTO: 94 FL (ref 82–98)
MONOCYTES # BLD AUTO: 0.4 K/UL (ref 0.3–1)
MONOCYTES NFR BLD: 8.7 % (ref 4–15)
NEUTROPHILS # BLD AUTO: 3.3 K/UL (ref 1.8–7.7)
NEUTROPHILS NFR BLD: 66.1 % (ref 38–73)
NRBC BLD-RTO: 0 /100 WBC
PLATELET # BLD AUTO: 299 K/UL (ref 150–450)
PMV BLD AUTO: 9.3 FL (ref 9.2–12.9)
RBC # BLD AUTO: 4.33 M/UL (ref 4–5.4)
SATURATED IRON: 32 % (ref 20–50)
TOTAL IRON BINDING CAPACITY: 326 UG/DL (ref 250–450)
TRANSFERRIN SERPL-MCNC: 220 MG/DL (ref 200–375)
TSH SERPL DL<=0.005 MIU/L-ACNC: 2.94 UIU/ML (ref 0.4–4)
WBC # BLD AUTO: 5.05 K/UL (ref 3.9–12.7)

## 2022-03-09 PROCEDURE — 84466 ASSAY OF TRANSFERRIN: CPT | Performed by: INTERNAL MEDICINE

## 2022-03-09 PROCEDURE — 82728 ASSAY OF FERRITIN: CPT | Performed by: INTERNAL MEDICINE

## 2022-03-09 PROCEDURE — 84443 ASSAY THYROID STIM HORMONE: CPT | Performed by: INTERNAL MEDICINE

## 2022-03-09 PROCEDURE — 85025 COMPLETE CBC W/AUTO DIFF WBC: CPT | Performed by: INTERNAL MEDICINE

## 2022-03-09 PROCEDURE — 36415 COLL VENOUS BLD VENIPUNCTURE: CPT | Performed by: INTERNAL MEDICINE

## 2022-03-11 ENCOUNTER — DOCUMENTATION ONLY (OUTPATIENT)
Dept: RHEUMATOLOGY | Facility: CLINIC | Age: 77
End: 2022-03-11
Payer: MEDICARE

## 2022-03-18 ENCOUNTER — OFFICE VISIT (OUTPATIENT)
Dept: HEMATOLOGY/ONCOLOGY | Facility: CLINIC | Age: 77
End: 2022-03-18
Payer: MEDICARE

## 2022-03-18 VITALS
WEIGHT: 135.81 LBS | HEIGHT: 62 IN | SYSTOLIC BLOOD PRESSURE: 134 MMHG | HEART RATE: 68 BPM | OXYGEN SATURATION: 98 % | BODY MASS INDEX: 24.99 KG/M2 | TEMPERATURE: 97 F | DIASTOLIC BLOOD PRESSURE: 74 MMHG

## 2022-03-18 DIAGNOSIS — C67.2 MALIGNANT NEOPLASM OF LATERAL WALL OF URINARY BLADDER: ICD-10-CM

## 2022-03-18 DIAGNOSIS — R74.01 ELEVATION OF LEVELS OF LIVER TRANSAMINASE LEVELS: Primary | ICD-10-CM

## 2022-03-18 DIAGNOSIS — Z80.3 FAMILY HISTORY OF BREAST CANCER IN SISTER: ICD-10-CM

## 2022-03-18 DIAGNOSIS — K55.20 ANGIODYSPLASIA OF CECUM: ICD-10-CM

## 2022-03-18 DIAGNOSIS — E83.19 IRON OVERLOAD SYNDROME: ICD-10-CM

## 2022-03-18 DIAGNOSIS — Z90.13 H/O BILATERAL MASTECTOMY: ICD-10-CM

## 2022-03-18 DIAGNOSIS — R94.6 ABNORMAL RESULTS OF THYROID FUNCTION STUDIES: ICD-10-CM

## 2022-03-18 DIAGNOSIS — E03.4 HYPOTHYROIDISM DUE TO ACQUIRED ATROPHY OF THYROID: ICD-10-CM

## 2022-03-18 PROCEDURE — 99214 PR OFFICE/OUTPT VISIT, EST, LEVL IV, 30-39 MIN: ICD-10-PCS | Mod: S$PBB,,, | Performed by: INTERNAL MEDICINE

## 2022-03-18 PROCEDURE — 99999 PR PBB SHADOW E&M-EST. PATIENT-LVL IV: CPT | Mod: PBBFAC,,, | Performed by: INTERNAL MEDICINE

## 2022-03-18 PROCEDURE — 99214 OFFICE O/P EST MOD 30 MIN: CPT | Mod: PBBFAC | Performed by: INTERNAL MEDICINE

## 2022-03-18 PROCEDURE — 99999 PR PBB SHADOW E&M-EST. PATIENT-LVL IV: ICD-10-PCS | Mod: PBBFAC,,, | Performed by: INTERNAL MEDICINE

## 2022-03-18 PROCEDURE — 99214 OFFICE O/P EST MOD 30 MIN: CPT | Mod: S$PBB,,, | Performed by: INTERNAL MEDICINE

## 2022-03-18 NOTE — PROGRESS NOTES
Subjective:       Patient ID: Selam oBtello is a 76 y.o. female.    Chief Complaint: Results    HPI 76-year-old female history of iron overload patient returns for review of laboratory studies patient does report some discomfort in her right upper quadrant and abdomen    Past Medical History:   Diagnosis Date    Acid reflux     Allergy     Arthritis     Atrial tachycardia     Bladder cancer     Breast cyst     Cataract     Colon polyp     Deep vein thrombosis     Degenerative disc disease     Encounter for blood transfusion     General anesthetics causing adverse effect in therapeutic use     GI bleed     Hematuria, gross     Inflammatory bowel disease     Liver disease     cyst    Osteoporosis     PONV (postoperative nausea and vomiting)     Retina disorder, left     Skin cancer     Thyroid disease     Urinary tract infection      Family History   Problem Relation Age of Onset    COPD Mother     Cancer Mother     COPD Father     Cancer Father     Cancer Sister      Social History     Socioeconomic History    Marital status:    Tobacco Use    Smoking status: Never Smoker    Smokeless tobacco: Never Used   Substance and Sexual Activity    Alcohol use: No    Drug use: No    Sexual activity: Yes     Partners: Male     Past Surgical History:   Procedure Laterality Date    ADENOIDECTOMY      BLADDER FULGURATION Right 9/12/2018    Procedure: FULGURATION, BLADDER;  Surgeon: Isaias Shah MD;  Location: [facility] OR;  Service: Urology;  Laterality: Right;    BLADDER FULGURATION N/A 4/24/2019    Procedure: FULGURATION, BLADDER;  Surgeon: Isaias Shah MD;  Location: [facility] OR;  Service: Urology;  Laterality: N/A;    BREAST SURGERY Bilateral     CYSTOSCOPY      CYSTOSCOPY N/A 4/24/2019    Procedure: CYSTOSCOPY;  Surgeon: Isaias Shah MD;  Location: [facility] OR;  Service: Urology;  Laterality: N/A;    CYSTOSCOPY WITH URETEROSCOPY, RETROGRADE PYELOGRAPHY, AND INSERTION OF STENT  Left 9/12/2018    Procedure: CYSTOSCOPY, WITH RETROGRADE PYELOGRAM AND URETERAL STENT INSERTION;  Surgeon: Isaias Shah MD;  Location: [facility] OR;  Service: Urology;  Laterality: Left;    EYE SURGERY Left 2017    retina pucker    INSTILLATION OF URINARY BLADDER N/A 9/12/2018    Procedure: INSTILLATION, URINARY BLADDER;  Surgeon: Isaias Shah MD;  Location: [facility] OR;  Service: Urology;  Laterality: N/A;  Chemo agent instillation     JOINT REPLACEMENT Bilateral     hip    JOINT REPLACEMENT Right     knee    MASTECTOMY Bilateral 1991    RETROGRADE PYELOGRAPHY Bilateral 10/31/2019    Procedure: PYELOGRAM, RETROGRADE;  Surgeon: Baldo Gates MD;  Location: [facility];  Service: Urology;  Laterality: Bilateral;    TONSILLECTOMY      TURBT      TURBT, WITH BLUE LIGHT CYSTOSCOPY AND CYSVIEW Bilateral 10/31/2019    Procedure: TURBT,WITH BLUE LIGHT CYSTOSCOPY AND CYSVIEW;  Surgeon: Baldo Gates MD;  Location: [facility];  Service: Urology;  Laterality: Bilateral;    TYMPANOSTOMY TUBE PLACEMENT      VASCULAR SURGERY Right     popliteal stent    WISDOM TOOTH EXTRACTION         Labs:  Lab Results   Component Value Date    WBC 5.05 03/09/2022    HGB 13.0 03/09/2022    HCT 40.8 03/09/2022    MCV 94 03/09/2022     03/09/2022     BMP  Lab Results   Component Value Date     04/08/2021    K 4.1 04/08/2021     04/08/2021    CO2 27 04/08/2021    BUN 18 04/08/2021    CREATININE 0.7 05/28/2021    CALCIUM 9.7 04/08/2021    ANIONGAP 8 04/08/2021    ESTGFRAFRICA >60 05/28/2021    EGFRNONAA >60 05/28/2021     Lab Results   Component Value Date    ALT 14 04/08/2021    AST 17 04/08/2021    ALKPHOS 81 04/08/2021    BILITOT 0.7 04/08/2021       Lab Results   Component Value Date    IRON 104 03/09/2022    TIBC 326 03/09/2022    FERRITIN 24 03/09/2022     No results found for: [id number]  No results found for: FOLATE  Lab Results   Component Value Date    TSH 2.944 03/09/2022         Review of Systems    Constitutional: Negative for activity change, appetite change, chills, diaphoresis, fatigue, fever and unexpected weight change.   HENT: Negative for congestion, dental problem, drooling, ear discharge, ear pain, facial swelling, hearing loss, mouth sores, nosebleeds, postnasal drip, rhinorrhea, sinus pressure, sneezing, sore throat, tinnitus, trouble swallowing and voice change.    Eyes: Negative for photophobia, pain, discharge, redness, itching and visual disturbance.   Respiratory: Negative for cough, choking, chest tightness, shortness of breath, wheezing and stridor.    Cardiovascular: Negative for chest pain, palpitations and leg swelling.   Gastrointestinal: Positive for abdominal pain. Negative for abdominal distention, anal bleeding, blood in stool, constipation, diarrhea, nausea, rectal pain and vomiting.   Endocrine: Negative for cold intolerance, heat intolerance, polydipsia, polyphagia and polyuria.   Genitourinary: Negative for decreased urine volume, difficulty urinating, dyspareunia, dysuria, enuresis, flank pain, frequency, genital sores, hematuria, menstrual problem, pelvic pain, urgency, vaginal bleeding, vaginal discharge and vaginal pain.   Musculoskeletal: Negative for arthralgias, back pain, gait problem, joint swelling, myalgias, neck pain and neck stiffness.   Skin: Negative for color change, pallor and rash.   Allergic/Immunologic: Negative for environmental allergies, food allergies and immunocompromised state.   Neurological: Negative for dizziness, tremors, seizures, syncope, facial asymmetry, speech difficulty, weakness, light-headedness, numbness and headaches.   Hematological: Negative for adenopathy. Does not bruise/bleed easily.   Psychiatric/Behavioral: Negative for agitation, behavioral problems, confusion, decreased concentration, dysphoric mood, hallucinations, self-injury, sleep disturbance and suicidal ideas. The patient is not nervous/anxious and is not hyperactive.         Objective:      Physical Exam  Vitals reviewed.   Constitutional:       General: She is not in acute distress.     Appearance: She is well-developed. She is not diaphoretic.   HENT:      Head: Normocephalic and atraumatic.      Right Ear: External ear normal.      Left Ear: External ear normal.      Nose: Nose normal.      Right Sinus: No maxillary sinus tenderness or frontal sinus tenderness.      Left Sinus: No maxillary sinus tenderness or frontal sinus tenderness.      Mouth/Throat:      Pharynx: No oropharyngeal exudate.   Eyes:      General: Lids are normal. No scleral icterus.        Right eye: No discharge.         Left eye: No discharge.      Conjunctiva/sclera: Conjunctivae normal.      Right eye: Right conjunctiva is not injected. No hemorrhage.     Left eye: Left conjunctiva is not injected. No hemorrhage.     Pupils: Pupils are equal, round, and reactive to light.   Neck:      Thyroid: No thyromegaly.      Vascular: No JVD.      Trachea: No tracheal deviation.   Cardiovascular:      Rate and Rhythm: Normal rate.   Pulmonary:      Effort: Pulmonary effort is normal. No respiratory distress.      Breath sounds: No stridor.   Chest:      Chest wall: No tenderness.   Breasts:      Right: No supraclavicular adenopathy.      Left: No supraclavicular adenopathy.       Abdominal:      General: Bowel sounds are normal. There is no distension.      Palpations: Abdomen is soft. There is no hepatomegaly, splenomegaly or mass.      Tenderness: There is no abdominal tenderness. There is no rebound.   Musculoskeletal:         General: No tenderness. Normal range of motion.      Cervical back: Normal range of motion and neck supple.   Lymphadenopathy:      Cervical: No cervical adenopathy.      Upper Body:      Right upper body: No supraclavicular adenopathy.      Left upper body: No supraclavicular adenopathy.   Skin:     General: Skin is dry.      Findings: No erythema or rash.   Neurological:      Mental Status:  She is alert and oriented to person, place, and time.      Cranial Nerves: No cranial nerve deficit.      Coordination: Coordination normal.   Psychiatric:         Behavior: Behavior normal.         Thought Content: Thought content normal.         Judgment: Judgment normal.             Assessment:      1. Elevation of levels of liver transaminase levels    2. Abnormal results of thyroid function studies     3. Iron overload syndrome    4. Angiodysplasia of cecum    5. Hypothyroidism due to acquired atrophy of thyroid    6. H/O bilateral mastectomy    7. Malignant neoplasm of lateral wall of urinary bladder    8. Family history of breast cancer in sister           Plan:     Review of laboratory studies demonstrates no evidence iron overload.  Continue to check laboratories every 2 months communicate through portal.  Request ultrasound of abdomen follow-up with me information given on names of primary physicians read specialist discussed implications answered questions        Adonay Neri Jr, MD FACP

## 2022-03-23 ENCOUNTER — PATIENT MESSAGE (OUTPATIENT)
Dept: HEMATOLOGY/ONCOLOGY | Facility: CLINIC | Age: 77
End: 2022-03-23
Payer: MEDICARE

## 2022-03-28 ENCOUNTER — HOSPITAL ENCOUNTER (OUTPATIENT)
Dept: RADIOLOGY | Facility: HOSPITAL | Age: 77
Discharge: HOME | End: 2022-03-28
Attending: INTERNAL MEDICINE
Payer: MEDICARE

## 2022-03-28 DIAGNOSIS — R74.01 ELEVATION OF LEVELS OF LIVER TRANSAMINASE LEVELS: ICD-10-CM

## 2022-03-28 PROCEDURE — 76700 US EXAM ABDOM COMPLETE: CPT | Mod: TC

## 2022-03-28 PROCEDURE — 76700 US ABDOMEN COMPLETE: ICD-10-PCS | Mod: 26,,, | Performed by: RADIOLOGY

## 2022-03-28 PROCEDURE — 76700 US EXAM ABDOM COMPLETE: CPT | Mod: 26,,, | Performed by: RADIOLOGY

## 2022-04-06 DIAGNOSIS — E55.9 HYPOVITAMINOSIS D: ICD-10-CM

## 2022-04-07 RX ORDER — ERGOCALCIFEROL 1.25 MG/1
CAPSULE ORAL
Qty: 8 CAPSULE | Refills: 0 | Status: SHIPPED | OUTPATIENT
Start: 2022-04-07 | End: 2023-02-01

## 2022-04-09 ENCOUNTER — PATIENT MESSAGE (OUTPATIENT)
Dept: HEMATOLOGY/ONCOLOGY | Facility: CLINIC | Age: 77
End: 2022-04-09
Payer: MEDICARE

## 2022-04-11 DIAGNOSIS — R94.6 ABNORMAL RESULTS OF THYROID FUNCTION STUDIES: Primary | ICD-10-CM

## 2022-04-11 RX ORDER — LEVOTHYROXINE SODIUM 50 UG/1
50 TABLET ORAL DAILY
Qty: 90 TABLET | Refills: 3 | Status: SHIPPED | OUTPATIENT
Start: 2022-04-11 | End: 2023-03-10

## 2022-04-21 NOTE — PROGRESS NOTES
===============================  Date today is 4/26/2022  Selam Botello is a 76 y.o. female  Last visit [facility]: :Visit date not found   Last visit eye dept. Visit date not found    Corrected distance visual acuity was 20/25 in the right eye and 20/25 in the left eye.  Tonometry     Tonometry (Applanation, 9:50 AM)       Right Left    Pressure 16 16              Wearing Rx     Wearing Rx       Sphere Cylinder Axis Add    Right +1.25 +0.25 003 +2.50    Left -1.50 +1.25 163 +2.50              Not recorded       Not recorded       Chief Complaint   Patient presents with    Concerns About Ocular Health     Pt states her ou are very dry. She also states she had retina puc repair 6 yrs ago        Problem List Items Addressed This Visit    None     Visit Diagnoses     Epiretinal membrane (ERM) of left eye    -  Primary    Relevant Orders    Posterior Segment OCT Retina-Both eyes (Completed)    Family history of macular degeneration        Pseudophakia              ________________  4/26/2022 today    ERM OS with inner layer thickening and whitening    PCIOL OU with YAG OU  No macula degeneration  Dry eye- recommend tears as needed    RTC 1 year  Instructed to call 24/7 for any worsening of vision or symptoms. Check OU daily.   Gave my office and cell phone number.        ===============================

## 2022-04-26 ENCOUNTER — OFFICE VISIT (OUTPATIENT)
Dept: OPHTHALMOLOGY | Facility: CLINIC | Age: 77
End: 2022-04-26
Payer: MEDICARE

## 2022-04-26 ENCOUNTER — PATIENT MESSAGE (OUTPATIENT)
Dept: HEMATOLOGY/ONCOLOGY | Facility: CLINIC | Age: 77
End: 2022-04-26
Payer: MEDICARE

## 2022-04-26 DIAGNOSIS — Z83.518 FAMILY HISTORY OF MACULAR DEGENERATION: ICD-10-CM

## 2022-04-26 DIAGNOSIS — Z96.1 PSEUDOPHAKIA: ICD-10-CM

## 2022-04-26 DIAGNOSIS — H35.372 EPIRETINAL MEMBRANE (ERM) OF LEFT EYE: Primary | ICD-10-CM

## 2022-04-26 PROCEDURE — 99999 PR PBB SHADOW E&M-EST. PATIENT-LVL III: CPT | Mod: PBBFAC,,, | Performed by: OPHTHALMOLOGY

## 2022-04-26 PROCEDURE — 99213 OFFICE O/P EST LOW 20 MIN: CPT | Mod: PBBFAC | Performed by: OPHTHALMOLOGY

## 2022-04-26 PROCEDURE — 99999 PR PBB SHADOW E&M-EST. PATIENT-LVL III: ICD-10-PCS | Mod: PBBFAC,,, | Performed by: OPHTHALMOLOGY

## 2022-04-26 PROCEDURE — 99203 OFFICE O/P NEW LOW 30 MIN: CPT | Mod: S$PBB,,, | Performed by: OPHTHALMOLOGY

## 2022-04-26 PROCEDURE — 92134 POSTERIOR SEGMENT OCT RETINA (OCULAR COHERENCE TOMOGRAPHY)-BOTH EYES: ICD-10-PCS | Mod: 26,S$PBB,, | Performed by: OPHTHALMOLOGY

## 2022-04-26 PROCEDURE — 92134 CPTRZ OPH DX IMG PST SGM RTA: CPT | Mod: PBBFAC | Performed by: OPHTHALMOLOGY

## 2022-04-26 PROCEDURE — 99203 PR OFFICE/OUTPT VISIT, NEW, LEVL III, 30-44 MIN: ICD-10-PCS | Mod: S$PBB,,, | Performed by: OPHTHALMOLOGY

## 2022-05-19 ENCOUNTER — PATIENT MESSAGE (OUTPATIENT)
Dept: HEMATOLOGY/ONCOLOGY | Facility: CLINIC | Age: 77
End: 2022-05-19
Payer: MEDICARE

## 2022-05-19 DIAGNOSIS — U07.1 COVID: Primary | ICD-10-CM

## 2022-05-20 ENCOUNTER — PATIENT MESSAGE (OUTPATIENT)
Dept: HEMATOLOGY/ONCOLOGY | Facility: CLINIC | Age: 77
End: 2022-05-20
Payer: MEDICARE

## 2022-05-20 ENCOUNTER — PATIENT MESSAGE (OUTPATIENT)
Dept: INFECTIOUS DISEASES | Facility: HOSPITAL | Age: 77
End: 2022-05-20
Payer: MEDICARE

## 2022-05-21 ENCOUNTER — PATIENT MESSAGE (OUTPATIENT)
Dept: INFECTIOUS DISEASES | Facility: HOSPITAL | Age: 77
End: 2022-05-21

## 2022-05-21 ENCOUNTER — INFUSION (OUTPATIENT)
Dept: INFECTIOUS DISEASES | Facility: HOSPITAL | Age: 77
End: 2022-05-21
Attending: INTERNAL MEDICINE
Payer: MEDICARE

## 2022-05-21 VITALS
DIASTOLIC BLOOD PRESSURE: 70 MMHG | OXYGEN SATURATION: 99 % | RESPIRATION RATE: 18 BRPM | TEMPERATURE: 98 F | HEART RATE: 64 BPM | SYSTOLIC BLOOD PRESSURE: 123 MMHG

## 2022-05-21 DIAGNOSIS — U07.1 COVID-19: Primary | ICD-10-CM

## 2022-05-21 PROCEDURE — M0222 HC IV INJECTION, BEBTELOVIMAB, INCL POST ADMIN MONIT: HCPCS | Performed by: INTERNAL MEDICINE

## 2022-05-21 PROCEDURE — 63600175 PHARM REV CODE 636 W HCPCS: Performed by: INTERNAL MEDICINE

## 2022-05-21 RX ORDER — DIPHENHYDRAMINE HYDROCHLORIDE 50 MG/ML
25 INJECTION INTRAMUSCULAR; INTRAVENOUS
Status: ACTIVE | OUTPATIENT
Start: 2022-05-21 | End: 2022-05-22

## 2022-05-21 RX ORDER — ALBUTEROL SULFATE 90 UG/1
2 AEROSOL, METERED RESPIRATORY (INHALATION)
Status: ACTIVE | OUTPATIENT
Start: 2022-05-21 | End: 2022-05-24

## 2022-05-21 RX ORDER — ACETAMINOPHEN 325 MG/1
650 TABLET ORAL
Status: ACTIVE | OUTPATIENT
Start: 2022-05-21 | End: 2022-05-22

## 2022-05-21 RX ORDER — EPINEPHRINE 0.3 MG/.3ML
0.3 INJECTION SUBCUTANEOUS
Status: ACTIVE | OUTPATIENT
Start: 2022-05-21 | End: 2022-05-24

## 2022-05-21 RX ORDER — BEBTELOVIMAB 87.5 MG/ML
175 INJECTION, SOLUTION INTRAVENOUS
Status: COMPLETED | OUTPATIENT
Start: 2022-05-21 | End: 2022-05-21

## 2022-05-21 RX ORDER — ONDANSETRON 4 MG/1
4 TABLET, ORALLY DISINTEGRATING ORAL
Status: ACTIVE | OUTPATIENT
Start: 2022-05-21 | End: 2022-05-22

## 2022-05-21 RX ADMIN — BEBTELOVIMAB 175 MG: 87.5 INJECTION, SOLUTION INTRAVENOUS at 10:05

## 2022-05-21 NOTE — PROGRESS NOTES
If you have any further COVID related symptoms that have not improved or feel you're having a reaction to your infusion please notify your primary care physician, go to the nearest emergency room or call 911. Patient discharged with escort to front door of hospital in no apparent distress. Tolerated infusion well. Instructed patient to notify primary care physician if symptoms do not resolve or worsen and to continue to quarantine for the full 5-10 day period.  Patient verbalized intent to comply.

## 2022-05-21 NOTE — PATIENT INSTRUCTIONS
Patient was instructed If they have any further COVID related symtoms that have not improved or feel they are having a reaction to the infusion please notify your primary care physician, go to the nearest emergency room or call 911. Patient was discharged via wheelchair/ambulatory with escort to front door of hospital in no apparent distress. Tolerated infusion well. Instructed patient to notify primary care physician if symptoms do not resolve or worsen and to continue to quarantine for the full 5 day period. Patient verbalized intent to comply.

## 2022-05-22 ENCOUNTER — PATIENT MESSAGE (OUTPATIENT)
Dept: HEMATOLOGY/ONCOLOGY | Facility: CLINIC | Age: 77
End: 2022-05-22
Payer: MEDICARE

## 2022-06-08 ENCOUNTER — LAB VISIT (OUTPATIENT)
Dept: LAB | Facility: HOSPITAL | Age: 77
End: 2022-06-08
Attending: INTERNAL MEDICINE
Payer: MEDICARE

## 2022-06-08 DIAGNOSIS — R94.6 ABNORMAL RESULTS OF THYROID FUNCTION STUDIES: ICD-10-CM

## 2022-06-08 DIAGNOSIS — R74.01 ELEVATION OF LEVELS OF LIVER TRANSAMINASE LEVELS: ICD-10-CM

## 2022-06-08 LAB
ALBUMIN SERPL BCP-MCNC: 4.2 G/DL (ref 3.5–5.2)
ALP SERPL-CCNC: 75 U/L (ref 55–135)
ALT SERPL W/O P-5'-P-CCNC: 14 U/L (ref 10–44)
ANION GAP SERPL CALC-SCNC: 8 MMOL/L (ref 8–16)
AST SERPL-CCNC: 18 U/L (ref 10–40)
BASOPHILS # BLD AUTO: 0.03 K/UL (ref 0–0.2)
BASOPHILS NFR BLD: 0.6 % (ref 0–1.9)
BILIRUB SERPL-MCNC: 0.9 MG/DL (ref 0.1–1)
BUN SERPL-MCNC: 15 MG/DL (ref 8–23)
CALCIUM SERPL-MCNC: 10.1 MG/DL (ref 8.7–10.5)
CHLORIDE SERPL-SCNC: 107 MMOL/L (ref 95–110)
CO2 SERPL-SCNC: 27 MMOL/L (ref 23–29)
CREAT SERPL-MCNC: 0.8 MG/DL (ref 0.5–1.4)
DIFFERENTIAL METHOD: NORMAL
EOSINOPHIL # BLD AUTO: 0.1 K/UL (ref 0–0.5)
EOSINOPHIL NFR BLD: 1 % (ref 0–8)
ERYTHROCYTE [DISTWIDTH] IN BLOOD BY AUTOMATED COUNT: 13.4 % (ref 11.5–14.5)
EST. GFR  (AFRICAN AMERICAN): >60 ML/MIN/1.73 M^2
EST. GFR  (NON AFRICAN AMERICAN): >60 ML/MIN/1.73 M^2
FERRITIN SERPL-MCNC: 35 NG/ML (ref 20–300)
GLUCOSE SERPL-MCNC: 87 MG/DL (ref 70–110)
HCT VFR BLD AUTO: 43.7 % (ref 37–48.5)
HGB BLD-MCNC: 14.4 G/DL (ref 12–16)
IMM GRANULOCYTES # BLD AUTO: 0.01 K/UL (ref 0–0.04)
IMM GRANULOCYTES NFR BLD AUTO: 0.2 % (ref 0–0.5)
IRON SERPL-MCNC: 212 UG/DL (ref 30–160)
LYMPHOCYTES # BLD AUTO: 1.2 K/UL (ref 1–4.8)
LYMPHOCYTES NFR BLD: 25 % (ref 18–48)
MCH RBC QN AUTO: 30.7 PG (ref 27–31)
MCHC RBC AUTO-ENTMCNC: 33 G/DL (ref 32–36)
MCV RBC AUTO: 93 FL (ref 82–98)
MONOCYTES # BLD AUTO: 0.4 K/UL (ref 0.3–1)
MONOCYTES NFR BLD: 8.9 % (ref 4–15)
NEUTROPHILS # BLD AUTO: 3.1 K/UL (ref 1.8–7.7)
NEUTROPHILS NFR BLD: 64.3 % (ref 38–73)
NRBC BLD-RTO: 0 /100 WBC
PLATELET # BLD AUTO: 327 K/UL (ref 150–450)
PMV BLD AUTO: 9.3 FL (ref 9.2–12.9)
POTASSIUM SERPL-SCNC: 4.5 MMOL/L (ref 3.5–5.1)
PROT SERPL-MCNC: 6.5 G/DL (ref 6–8.4)
RBC # BLD AUTO: 4.69 M/UL (ref 4–5.4)
SATURATED IRON: 63 % (ref 20–50)
SODIUM SERPL-SCNC: 142 MMOL/L (ref 136–145)
TOTAL IRON BINDING CAPACITY: 339 UG/DL (ref 250–450)
TRANSFERRIN SERPL-MCNC: 229 MG/DL (ref 200–375)
TSH SERPL DL<=0.005 MIU/L-ACNC: 1.44 UIU/ML (ref 0.4–4)
WBC # BLD AUTO: 4.84 K/UL (ref 3.9–12.7)

## 2022-06-08 PROCEDURE — 85025 COMPLETE CBC W/AUTO DIFF WBC: CPT | Performed by: INTERNAL MEDICINE

## 2022-06-08 PROCEDURE — 84443 ASSAY THYROID STIM HORMONE: CPT | Performed by: INTERNAL MEDICINE

## 2022-06-08 PROCEDURE — 84466 ASSAY OF TRANSFERRIN: CPT | Performed by: INTERNAL MEDICINE

## 2022-06-08 PROCEDURE — 82728 ASSAY OF FERRITIN: CPT | Performed by: INTERNAL MEDICINE

## 2022-06-08 PROCEDURE — 36415 COLL VENOUS BLD VENIPUNCTURE: CPT | Performed by: INTERNAL MEDICINE

## 2022-06-08 PROCEDURE — 80053 COMPREHEN METABOLIC PANEL: CPT | Performed by: INTERNAL MEDICINE

## 2022-06-09 ENCOUNTER — PATIENT MESSAGE (OUTPATIENT)
Dept: HEMATOLOGY/ONCOLOGY | Facility: CLINIC | Age: 77
End: 2022-06-09
Payer: MEDICARE

## 2022-06-09 ENCOUNTER — TELEPHONE (OUTPATIENT)
Dept: HEMATOLOGY/ONCOLOGY | Facility: CLINIC | Age: 77
End: 2022-06-09
Payer: MEDICARE

## 2022-06-09 NOTE — TELEPHONE ENCOUNTER
----- Message from Rachell Sexton sent at 6/9/2022  1:53 PM CDT -----  Contact: Patient [phone]  Pt called in regards to her edmundo on 06/17/22 she cannot make it on that day she will like it scheduled for 06/13 or 06/15 please call and advise

## 2022-06-09 NOTE — TELEPHONE ENCOUNTER
Nurse spoke with pt in regards to scheduling an appt for (Don) appts scheduled. Verbalized understanding

## 2022-06-09 NOTE — TELEPHONE ENCOUNTER
Nurse spoke with pt in regards to rescheduling her appt with , pt has been rescheduled.pt confirmed. Nurse also informed pt  that the infusion department will be reaching out to her in regards to scheduling her phlebotomy. Pt verbalized understanding.

## 2022-06-13 ENCOUNTER — OFFICE VISIT (OUTPATIENT)
Dept: FAMILY MEDICINE | Facility: CLINIC | Age: 77
End: 2022-06-13
Payer: MEDICARE

## 2022-06-13 VITALS
OXYGEN SATURATION: 96 % | WEIGHT: 129 LBS | HEART RATE: 70 BPM | TEMPERATURE: 97 F | HEIGHT: 62 IN | DIASTOLIC BLOOD PRESSURE: 70 MMHG | SYSTOLIC BLOOD PRESSURE: 120 MMHG | BODY MASS INDEX: 23.74 KG/M2

## 2022-06-13 DIAGNOSIS — E83.118 OTHER HEMOCHROMATOSIS: ICD-10-CM

## 2022-06-13 DIAGNOSIS — Z80.3 FAMILY HISTORY OF BREAST CANCER IN SISTER: ICD-10-CM

## 2022-06-13 DIAGNOSIS — I47.19 ATRIAL TACHYCARDIA, PAROXYSMAL: ICD-10-CM

## 2022-06-13 DIAGNOSIS — E03.9 ACQUIRED HYPOTHYROIDISM: Primary | ICD-10-CM

## 2022-06-13 DIAGNOSIS — K51.00 ULCERATIVE PANCOLITIS WITHOUT COMPLICATION: ICD-10-CM

## 2022-06-13 DIAGNOSIS — C67.2 MALIGNANT NEOPLASM OF LATERAL WALL OF URINARY BLADDER: ICD-10-CM

## 2022-06-13 DIAGNOSIS — M81.0 AGE-RELATED OSTEOPOROSIS WITHOUT CURRENT PATHOLOGICAL FRACTURE: ICD-10-CM

## 2022-06-13 DIAGNOSIS — E55.9 VITAMIN D DEFICIENCY: ICD-10-CM

## 2022-06-13 PROBLEM — K51.90 ULCERATIVE COLITIS: Status: ACTIVE | Noted: 2022-06-13

## 2022-06-13 PROCEDURE — 99999 PR PBB SHADOW E&M-EST. PATIENT-LVL III: CPT | Mod: PBBFAC,,, | Performed by: INTERNAL MEDICINE

## 2022-06-13 PROCEDURE — 99203 PR OFFICE/OUTPT VISIT, NEW, LEVL III, 30-44 MIN: ICD-10-PCS | Mod: S$PBB,,, | Performed by: INTERNAL MEDICINE

## 2022-06-13 PROCEDURE — 88112 CYTOPATH CELL ENHANCE TECH: CPT | Performed by: PATHOLOGY

## 2022-06-13 PROCEDURE — 99203 OFFICE O/P NEW LOW 30 MIN: CPT | Mod: S$PBB,,, | Performed by: INTERNAL MEDICINE

## 2022-06-13 PROCEDURE — 99999 PR PBB SHADOW E&M-EST. PATIENT-LVL III: ICD-10-PCS | Mod: PBBFAC,,, | Performed by: INTERNAL MEDICINE

## 2022-06-13 PROCEDURE — 99213 OFFICE O/P EST LOW 20 MIN: CPT | Mod: PBBFAC,PO | Performed by: INTERNAL MEDICINE

## 2022-06-13 NOTE — PROGRESS NOTES
"Subjective:      Patient ID: Selam Botello is a 76 y.o. female.    Chief Complaint: Establish Care (New pt/)      HPI  Patient is here to establish care, and for preventive exam.  Gets phlebotomy, Dr. Neri checks every 2-3 months.  Energy good.  Exercise is restricted by knee and hip pain.   No f/c/sw/cough.  Allergies, good on zyrtec.  No cp/sob/palp.  BMs normal.  Urine normal.  Taking vit D weekly.   has dementia.    SH:  No tob, no EtOH, .    FH:  Sis breast CA.  Father pulm fibrosis, prostate CA.    HM:  2/21 covid vaccines/booster, 11/17 [id number], 2/19 [id number], 2/22 TDaP, 4/21 BMD, no MMG, 2022 Cscope Dr. Bailey rep 3y, Cards Dr. Garcia.    Review of Systems   Constitutional: Negative for appetite change, chills, diaphoresis and fever.   HENT: Negative for congestion, ear pain, rhinorrhea, sinus pressure and sore throat.    Respiratory: Negative for cough, chest tightness and shortness of breath.    Cardiovascular: Negative for chest pain and palpitations.   Gastrointestinal: Negative for blood in stool, constipation, diarrhea, nausea and vomiting.   Genitourinary: Negative for dysuria, frequency, hematuria, menstrual problem, urgency and vaginal discharge.   Musculoskeletal: Negative for arthralgias.   Skin: Negative for rash.   Neurological: Negative for dizziness and headaches.   Psychiatric/Behavioral: Negative for sleep disturbance. The patient is not nervous/anxious.          Objective:   /70 (BP Location: Right arm, Patient Position: Sitting, BP Method: Small (Manual))   Pulse 70   Temp 97.1 °F (36.2 °C)   Ht 5' 2" (1.575 m)   Wt 58.5 kg (128 lb 15.5 oz)   SpO2 96%   BMI 23.59 kg/m²     Physical Exam  Constitutional:       Appearance: She is well-developed.   HENT:      Right Ear: External ear normal. Tympanic membrane is not injected.      Left Ear: External ear normal. Tympanic membrane is not injected.   Eyes:      Conjunctiva/sclera: Conjunctivae normal.   Neck:      " Thyroid: No thyromegaly.   Cardiovascular:      Rate and Rhythm: Normal rate and regular rhythm.      Heart sounds: No murmur heard.    No friction rub. No gallop.   Pulmonary:      Effort: Pulmonary effort is normal.      Breath sounds: Normal breath sounds. No wheezing or rales.   Abdominal:      General: Bowel sounds are normal.      Palpations: Abdomen is soft. There is no mass.      Tenderness: There is no abdominal tenderness.   Musculoskeletal:      Cervical back: Normal range of motion and neck supple.   Lymphadenopathy:      Cervical: No cervical adenopathy.   Skin:     General: Skin is warm.      Findings: No rash.   Neurological:      Mental Status: She is alert and oriented to person, place, and time.          Latest Reference Range & Units 06/08/22 13:34   WBC 3.90 - 12.70 K/uL 4.84   RBC 4.00 - 5.40 M/uL 4.69   Hemoglobin 12.0 - 16.0 g/dL 14.4   Hematocrit 37.0 - 48.5 % 43.7   MCV 82 - 98 fL 93   MCH 27.0 - 31.0 pg 30.7   MCHC 32.0 - 36.0 g/dL 33.0   RDW 11.5 - 14.5 % 13.4   Platelets 150 - 450 K/uL 327   MPV 9.2 - 12.9 fL 9.3   Gran % 38.0 - 73.0 % 64.3   Lymph % 18.0 - 48.0 % 25.0   Mono % 4.0 - 15.0 % 8.9   Eosinophil % 0.0 - 8.0 % 1.0   Basophil % 0.0 - 1.9 % 0.6   Immature Granulocytes 0.0 - 0.5 % 0.2   Gran # (ANC) 1.8 - 7.7 K/uL 3.1   Lymph # 1.0 - 4.8 K/uL 1.2   Mono # 0.3 - 1.0 K/uL 0.4   Eos # 0.0 - 0.5 K/uL 0.1   Baso # 0.00 - 0.20 K/uL 0.03   Immature Grans (Abs) 0.00 - 0.04 K/uL 0.01   nRBC 0 /100 WBC 0   Differential Method  Automated   Iron 30 - 160 ug/dL 212 (H)   TIBC 250 - 450 ug/dL 339   Saturated Iron 20 - 50 % 63 (H)   Transferrin 200 - 375 mg/dL 229   Ferritin 20.0 - 300.0 ng/mL 35   Sodium 136 - 145 mmol/L 142   Potassium 3.5 - 5.1 mmol/L 4.5   Chloride 95 - 110 mmol/L 107   CO2 23 - 29 mmol/L 27   Anion Gap 8 - 16 mmol/L 8   BUN 8 - 23 mg/dL 15   Creatinine 0.5 - 1.4 mg/dL 0.8   eGFR if non African American >60 mL/min/1.73 m^2 >60   eGFR if African American >60 mL/min/1.73 m^2  >60   Glucose 70 - 110 mg/dL 87   Calcium 8.7 - 10.5 mg/dL 10.1   Alkaline Phosphatase 55 - 135 U/L 75   PROTEIN TOTAL 6.0 - 8.4 g/dL 6.5   Albumin 3.5 - 5.2 g/dL 4.2   BILIRUBIN TOTAL 0.1 - 1.0 mg/dL 0.9   AST 10 - 40 U/L 18   ALT 10 - 44 U/L 14   TSH 0.400 - 4.000 uIU/mL 1.438   (H): Data is abnormally high    Assessment:       1. Acquired hypothyroidism    2. Age-related osteoporosis without current pathological fracture    3. Atrial tachycardia, paroxysmal    4. Malignant neoplasm of lateral wall of urinary bladder    5. Unspecified vitamin D deficiency    6. Family history of breast cancer in sister    7. Ulcerative pancolitis without complication    8. Other hemochromatosis          Plan:     Acquired hypothyroidism- Clinically stable, continue present treatment.    Age-related osteoporosis without current pathological fracture, doing well now, to f/u with Cassandra Mejia.    Atrial tachycardia, paroxysmal- doing well on bb, f/w Cards.    Malignant neoplasm of lateral wall of urinary bladder- f/w Urol.    Unspecified vitamin D deficiency- cont supp weekly.    Family history of breast cancer in sister    Ulcerative pancolitis without complication, now in remission, Dr. Bailey.    Other hemochromatosis- per Dr. Neri.    Discussed pt needs to get Shingrix vaccination at pharmacy.  RTC 6mo.

## 2022-06-16 ENCOUNTER — OFFICE VISIT (OUTPATIENT)
Dept: HEMATOLOGY/ONCOLOGY | Facility: CLINIC | Age: 77
End: 2022-06-16
Payer: MEDICARE

## 2022-06-16 ENCOUNTER — INFUSION (OUTPATIENT)
Dept: INFUSION THERAPY | Facility: HOSPITAL | Age: 77
End: 2022-06-16
Attending: INTERNAL MEDICINE
Payer: MEDICARE

## 2022-06-16 VITALS
TEMPERATURE: 98 F | DIASTOLIC BLOOD PRESSURE: 71 MMHG | HEART RATE: 63 BPM | RESPIRATION RATE: 16 BRPM | OXYGEN SATURATION: 98 % | SYSTOLIC BLOOD PRESSURE: 140 MMHG

## 2022-06-16 VITALS
BODY MASS INDEX: 23.9 KG/M2 | HEART RATE: 62 BPM | WEIGHT: 129.88 LBS | DIASTOLIC BLOOD PRESSURE: 71 MMHG | HEIGHT: 62 IN | TEMPERATURE: 97 F | SYSTOLIC BLOOD PRESSURE: 131 MMHG | OXYGEN SATURATION: 98 %

## 2022-06-16 DIAGNOSIS — E83.118 OTHER HEMOCHROMATOSIS: Primary | ICD-10-CM

## 2022-06-16 DIAGNOSIS — U07.1 COVID: ICD-10-CM

## 2022-06-16 DIAGNOSIS — Z90.13 H/O BILATERAL MASTECTOMY: ICD-10-CM

## 2022-06-16 DIAGNOSIS — C67.2 MALIGNANT NEOPLASM OF LATERAL WALL OF URINARY BLADDER: ICD-10-CM

## 2022-06-16 DIAGNOSIS — K55.20 ANGIODYSPLASIA OF CECUM: ICD-10-CM

## 2022-06-16 DIAGNOSIS — M85.89 OSTEOPENIA OF MULTIPLE SITES: ICD-10-CM

## 2022-06-16 PROCEDURE — 99214 OFFICE O/P EST MOD 30 MIN: CPT | Mod: 25,S$PBB,CR, | Performed by: INTERNAL MEDICINE

## 2022-06-16 PROCEDURE — 99214 PR OFFICE/OUTPT VISIT, EST, LEVL IV, 30-39 MIN: ICD-10-PCS | Mod: 25,S$PBB,CR, | Performed by: INTERNAL MEDICINE

## 2022-06-16 PROCEDURE — 99214 OFFICE O/P EST MOD 30 MIN: CPT | Mod: PBBFAC | Performed by: INTERNAL MEDICINE

## 2022-06-16 PROCEDURE — 99195 PHLEBOTOMY: CPT

## 2022-06-16 PROCEDURE — 99999 PR PBB SHADOW E&M-EST. PATIENT-LVL IV: CPT | Mod: PBBFAC,CR,, | Performed by: INTERNAL MEDICINE

## 2022-06-16 PROCEDURE — 99999 PR PBB SHADOW E&M-EST. PATIENT-LVL IV: ICD-10-PCS | Mod: PBBFAC,CR,, | Performed by: INTERNAL MEDICINE

## 2022-06-16 NOTE — PROGRESS NOTES
Subjective:       Patient ID: Selam Botello is a 76 y.o. female.    Chief Complaint: Results    HPI 76-year-old female history of iron overload recent episode of COVID.  Patient is found to have elevated saturated iron but normal ferritin.  At this point patient has had recurrent episodes of this with phlebotomy relieving.    Past Medical History:   Diagnosis Date    Acid reflux     Allergy     Arthritis     Atrial tachycardia     Bladder cancer     Breast cyst     Cataract     Colon polyp     Deep vein thrombosis     Degenerative disc disease     Encounter for blood transfusion     General anesthetics causing adverse effect in therapeutic use     GI bleed     Hematuria, gross     Inflammatory bowel disease     Liver disease     cyst    Osteoporosis     PONV (postoperative nausea and vomiting)     Retina disorder, left     Skin cancer     Thyroid disease     Urinary tract infection      Family History   Problem Relation Age of Onset    COPD Mother     Cancer Mother     COPD Father     Cancer Father     Cancer Sister      Social History     Socioeconomic History    Marital status:    Tobacco Use    Smoking status: Never Smoker    Smokeless tobacco: Never Used   Substance and Sexual Activity    Alcohol use: No    Drug use: No    Sexual activity: Yes     Partners: Male     Past Surgical History:   Procedure Laterality Date    ADENOIDECTOMY      BLADDER FULGURATION Right 9/12/2018    Procedure: FULGURATION, BLADDER;  Surgeon: Isaias Shah MD;  Location: [facility] OR;  Service: Urology;  Laterality: Right;    BLADDER FULGURATION N/A 4/24/2019    Procedure: FULGURATION, BLADDER;  Surgeon: Isaias Shah MD;  Location: [facility] OR;  Service: Urology;  Laterality: N/A;    BREAST SURGERY Bilateral     CATARACT EXTRACTION      CYSTOSCOPY      CYSTOSCOPY N/A 4/24/2019    Procedure: CYSTOSCOPY;  Surgeon: Isaias Shah MD;  Location: [facility] OR;  Service: Urology;  Laterality: N/A;     CYSTOSCOPY WITH URETEROSCOPY, RETROGRADE PYELOGRAPHY, AND INSERTION OF STENT Left 9/12/2018    Procedure: CYSTOSCOPY, WITH RETROGRADE PYELOGRAM AND URETERAL STENT INSERTION;  Surgeon: Isaias Shah MD;  Location: [facility] OR;  Service: Urology;  Laterality: Left;    EYE SURGERY Left 2017    retina pucker    INSTILLATION OF URINARY BLADDER N/A 9/12/2018    Procedure: INSTILLATION, URINARY BLADDER;  Surgeon: Isaias Shah MD;  Location: [facility] OR;  Service: Urology;  Laterality: N/A;  Chemo agent instillation     JOINT REPLACEMENT Bilateral     hip    JOINT REPLACEMENT Right     knee    MASTECTOMY Bilateral 1991    RETROGRADE PYELOGRAPHY Bilateral 10/31/2019    Procedure: PYELOGRAM, RETROGRADE;  Surgeon: Baldo Gates MD;  Location: [facility];  Service: Urology;  Laterality: Bilateral;    TONSILLECTOMY      TURBT      TURBT, WITH BLUE LIGHT CYSTOSCOPY AND CYSVIEW Bilateral 10/31/2019    Procedure: TURBT,WITH BLUE LIGHT CYSTOSCOPY AND CYSVIEW;  Surgeon: Baldo Gates MD;  Location: [facility];  Service: Urology;  Laterality: Bilateral;    TYMPANOSTOMY TUBE PLACEMENT      VASCULAR SURGERY Right     popliteal stent    WISDOM TOOTH EXTRACTION         Labs:  Lab Results   Component Value Date    WBC 4.84 06/08/2022    HGB 14.4 06/08/2022    HCT 43.7 06/08/2022    MCV 93 06/08/2022     06/08/2022     BMP  Lab Results   Component Value Date     06/08/2022    K 4.5 06/08/2022     06/08/2022    CO2 27 06/08/2022    BUN 15 06/08/2022    CREATININE 0.8 06/08/2022    CALCIUM 10.1 06/08/2022    ANIONGAP 8 06/08/2022    ESTGFRAFRICA >60 06/08/2022    EGFRNONAA >60 06/08/2022     Lab Results   Component Value Date    ALT 14 06/08/2022    AST 18 06/08/2022    ALKPHOS 75 06/08/2022    BILITOT 0.9 06/08/2022       Lab Results   Component Value Date    IRON 212 (H) 06/08/2022    TIBC 339 06/08/2022    FERRITIN 35 06/08/2022     No results found for: [id number]  No results found for: FOLATE  Lab  Results   Component Value Date    TSH 1.438 06/08/2022         Review of Systems   Constitutional: Negative for activity change, appetite change, chills, diaphoresis, fatigue, fever and unexpected weight change.   HENT: Negative for congestion, dental problem, drooling, ear discharge, ear pain, facial swelling, hearing loss, mouth sores, nosebleeds, postnasal drip, rhinorrhea, sinus pressure, sneezing, sore throat, tinnitus, trouble swallowing and voice change.    Eyes: Negative for photophobia, pain, discharge, redness, itching and visual disturbance.   Respiratory: Negative for cough, choking, chest tightness, shortness of breath, wheezing and stridor.    Cardiovascular: Negative for chest pain, palpitations and leg swelling.   Gastrointestinal: Negative for abdominal distention, abdominal pain, anal bleeding, blood in stool, constipation, diarrhea, nausea, rectal pain and vomiting.   Endocrine: Negative for cold intolerance, heat intolerance, polydipsia, polyphagia and polyuria.   Genitourinary: Negative for decreased urine volume, difficulty urinating, dyspareunia, dysuria, enuresis, flank pain, frequency, genital sores, hematuria, menstrual problem, pelvic pain, urgency, vaginal bleeding, vaginal discharge and vaginal pain.   Musculoskeletal: Negative for arthralgias, back pain, gait problem, joint swelling, myalgias, neck pain and neck stiffness.   Skin: Negative for color change, pallor and rash.   Allergic/Immunologic: Negative for environmental allergies, food allergies and immunocompromised state.   Neurological: Negative for dizziness, tremors, seizures, syncope, facial asymmetry, speech difficulty, weakness, light-headedness, numbness and headaches.   Hematological: Negative for adenopathy. Does not bruise/bleed easily.   Psychiatric/Behavioral: Positive for dysphoric mood. Negative for agitation, behavioral problems, confusion, decreased concentration, hallucinations, self-injury, sleep disturbance and  suicidal ideas. The patient is nervous/anxious. The patient is not hyperactive.         Concern over 's medical condition       Objective:      Physical Exam  Vitals reviewed.   Constitutional:       General: She is not in acute distress.     Appearance: She is well-developed. She is not diaphoretic.   HENT:      Head: Normocephalic and atraumatic.      Right Ear: External ear normal.      Left Ear: External ear normal.      Nose: Nose normal.      Right Sinus: No maxillary sinus tenderness or frontal sinus tenderness.      Left Sinus: No maxillary sinus tenderness or frontal sinus tenderness.      Mouth/Throat:      Pharynx: No oropharyngeal exudate.   Eyes:      General: Lids are normal. No scleral icterus.        Right eye: No discharge.         Left eye: No discharge.      Conjunctiva/sclera: Conjunctivae normal.      Right eye: Right conjunctiva is not injected. No hemorrhage.     Left eye: Left conjunctiva is not injected. No hemorrhage.     Pupils: Pupils are equal, round, and reactive to light.   Neck:      Thyroid: No thyromegaly.      Vascular: No JVD.      Trachea: No tracheal deviation.   Cardiovascular:      Rate and Rhythm: Normal rate.   Pulmonary:      Effort: Pulmonary effort is normal. No respiratory distress.      Breath sounds: Normal breath sounds. No stridor.   Chest:      Chest wall: No tenderness.   Breasts:      Right: No supraclavicular adenopathy.      Left: No supraclavicular adenopathy.       Abdominal:      General: Bowel sounds are normal. There is no distension.      Palpations: Abdomen is soft. There is no hepatomegaly, splenomegaly or mass.      Tenderness: There is no abdominal tenderness. There is no rebound.   Musculoskeletal:         General: No tenderness. Normal range of motion.      Cervical back: Normal range of motion and neck supple.   Lymphadenopathy:      Cervical: No cervical adenopathy.      Upper Body:      Right upper body: No supraclavicular adenopathy.       Left upper body: No supraclavicular adenopathy.   Skin:     General: Skin is dry.      Findings: No erythema or rash.   Neurological:      Mental Status: She is alert and oriented to person, place, and time.      Cranial Nerves: No cranial nerve deficit.      Coordination: Coordination normal.   Psychiatric:         Behavior: Behavior normal.         Thought Content: Thought content normal.         Judgment: Judgment normal.             Assessment:      1. Other hemochromatosis    2. Angiodysplasia of cecum    3. COVID    4. Malignant neoplasm of lateral wall of urinary bladder    5. H/O bilateral mastectomy           Plan:     Elevated saturated iron proceed with 1 unit phlebotomy recheck in 2 months orders written reviewed.  Discussed situation with her  gave them the names of physicians for possible primary care physician[facility].        Adonay Neri Jr, MD FACP

## 2022-06-16 NOTE — NURSING
One unit therapeutic phlebotomy performed via right a/c without difficulty. Upon completion, needle dc'd, pressure dressing applied, and blood discarded in appropriate container.  Juice/snack given.  Vitals documented in flowsheets. Pt denies weakness/dizziness. Pt discharged with next appt scheduled.

## 2022-06-21 NOTE — PROGRESS NOTES
9/26/2018     Pre Procedure Diagnosis: 1. Bladder cancer s/p TURBT near left ureteral orifice            2. Indwelling JJ stent    Post Procedure Diagnosis: same    Anesthesia: 10 cc 2% lidocaine jelly applied per urethra.    14 FR Flexible Olympus cystoscope used.    FINDINGS: Indwelling left JJ stent with stent changes    Specimens: JJ stent    The patient was taken to the cystoscopy suite and placed in supine position with legs in frog legged position.  The genitalia was prepped and draped  in the usual sterile fashion.  Two percent lidocaine jelly was inserted in the urethra.  After sufficent time had passed to allow good local anesthesia, the cystoscope was inserted in the urethra.     The ureteral orifices were in their usual position and configuration. The indwelling JJ stent was visualized and grasped with the flexible stent grasper. The cystoscope and JJ stent were removed intact and in toto. The patient was instructed to urinate proir to leaving the office.     ASSESSMENT/PLAN: Patient status post flexible cystoscopy and JJ stent removal.  1. Push fluids for 24 hours.  2. May see blood in the urine, this should gradually improve over the next 2-3 days.  3. The patient was instructed to return to the office or go to the emergency should fever, chills, cloudy urine, or inability to urinate develop.  4. Follow up in 6 months for surveillance cystoscopy.        
21:36

## 2022-07-08 PROCEDURE — 88112 PR  CYTOPATH, CELL ENHANCE TECH: ICD-10-PCS | Mod: 26,,, | Performed by: PATHOLOGY

## 2022-07-08 PROCEDURE — 88112 CYTOPATH CELL ENHANCE TECH: CPT | Mod: 26,,, | Performed by: PATHOLOGY

## 2022-07-11 ENCOUNTER — PATIENT MESSAGE (OUTPATIENT)
Dept: HEMATOLOGY/ONCOLOGY | Facility: CLINIC | Age: 77
End: 2022-07-11
Payer: MEDICARE

## 2022-07-11 ENCOUNTER — HOSPITAL ENCOUNTER (OUTPATIENT)
Dept: RADIOLOGY | Facility: HOSPITAL | Age: 77
Discharge: HOME | End: 2022-07-11
Attending: INTERNAL MEDICINE
Payer: MEDICARE

## 2022-07-11 ENCOUNTER — NURSE TRIAGE (OUTPATIENT)
Dept: ADMINISTRATIVE | Facility: CLINIC | Age: 77
End: 2022-07-11
Payer: MEDICARE

## 2022-07-11 DIAGNOSIS — R42 DIZZINESS AND GIDDINESS: ICD-10-CM

## 2022-07-11 PROCEDURE — 70553 MRI BRAIN STEM W/O & W/DYE: CPT | Mod: TC

## 2022-07-11 PROCEDURE — 25500020 PHARM REV CODE 255: Performed by: INTERNAL MEDICINE

## 2022-07-11 PROCEDURE — A9585 GADOBUTROL INJECTION: HCPCS | Performed by: INTERNAL MEDICINE

## 2022-07-11 RX ORDER — GADOBUTROL 604.72 MG/ML
10 INJECTION INTRAVENOUS
Status: COMPLETED | OUTPATIENT
Start: 2022-07-11 | End: 2022-07-11

## 2022-07-11 RX ADMIN — GADOBUTROL 6 ML: 604.72 INJECTION INTRAVENOUS at 06:07

## 2022-07-11 NOTE — TELEPHONE ENCOUNTER
Patient states she believes she is having a heart attack.   Patient is having 7/10 chest pain which radiates to her left arm.  Patient also reports havign severe difficulty breathing.  Advised patient to call [phone]. Patient verbalized understanding she is currently home with her .     Reason for Disposition   SEVERE difficulty breathing (e.g., struggling for each breath, speaks in single words)    Protocols used: CHEST PAIN-A-OH

## 2022-07-12 ENCOUNTER — PATIENT MESSAGE (OUTPATIENT)
Dept: UROLOGY | Facility: CLINIC | Age: 77
End: 2022-07-12
Payer: MEDICARE

## 2022-07-12 ENCOUNTER — PATIENT MESSAGE (OUTPATIENT)
Dept: HEMATOLOGY/ONCOLOGY | Facility: CLINIC | Age: 77
End: 2022-07-12
Payer: MEDICARE

## 2022-07-12 LAB
FINAL PATHOLOGIC DIAGNOSIS: NORMAL
Lab: NORMAL

## 2022-07-13 DIAGNOSIS — C67.9 MALIGNANT NEOPLASM OF URINARY BLADDER, UNSPECIFIED SITE: Primary | ICD-10-CM

## 2022-07-13 RX ORDER — LIDOCAINE HYDROCHLORIDE 20 MG/ML
JELLY TOPICAL
Status: COMPLETED | OUTPATIENT
Start: 2022-07-13 | End: 2022-07-13

## 2022-07-13 RX ADMIN — LIDOCAINE HYDROCHLORIDE 1 ML: 20 JELLY TOPICAL at 10:07

## 2022-07-14 NOTE — TELEPHONE ENCOUNTER
SPOKE TO THE PATIENT TO CONFIRMED YEARLY URINE FOR PROCEDURE  PATIENT VERBALLY UNDERSTANDS  WILL SEE PATIENT IN June 2023      POLLY LITTLE

## 2022-07-24 ENCOUNTER — PATIENT MESSAGE (OUTPATIENT)
Dept: HEMATOLOGY/ONCOLOGY | Facility: CLINIC | Age: 77
End: 2022-07-24
Payer: MEDICARE

## 2022-07-25 ENCOUNTER — PATIENT MESSAGE (OUTPATIENT)
Dept: HEMATOLOGY/ONCOLOGY | Facility: CLINIC | Age: 77
End: 2022-07-25
Payer: MEDICARE

## 2022-08-05 ENCOUNTER — LAB VISIT (OUTPATIENT)
Dept: LAB | Facility: HOSPITAL | Age: 77
End: 2022-08-05
Attending: INTERNAL MEDICINE
Payer: MEDICARE

## 2022-08-05 DIAGNOSIS — R94.6 ABNORMAL RESULTS OF THYROID FUNCTION STUDIES: ICD-10-CM

## 2022-08-05 DIAGNOSIS — R74.01 ELEVATION OF LEVELS OF LIVER TRANSAMINASE LEVELS: ICD-10-CM

## 2022-08-05 LAB
ALBUMIN SERPL BCP-MCNC: 4 G/DL (ref 3.5–5.2)
ALP SERPL-CCNC: 73 U/L (ref 55–135)
ALT SERPL W/O P-5'-P-CCNC: 9 U/L (ref 10–44)
ANION GAP SERPL CALC-SCNC: 8 MMOL/L (ref 8–16)
AST SERPL-CCNC: 17 U/L (ref 10–40)
BASOPHILS # BLD AUTO: 0.03 K/UL (ref 0–0.2)
BASOPHILS NFR BLD: 0.6 % (ref 0–1.9)
BILIRUB SERPL-MCNC: 0.8 MG/DL (ref 0.1–1)
BUN SERPL-MCNC: 16 MG/DL (ref 8–23)
CALCIUM SERPL-MCNC: 9.7 MG/DL (ref 8.7–10.5)
CHLORIDE SERPL-SCNC: 105 MMOL/L (ref 95–110)
CO2 SERPL-SCNC: 25 MMOL/L (ref 23–29)
CREAT SERPL-MCNC: 0.7 MG/DL (ref 0.5–1.4)
DIFFERENTIAL METHOD: ABNORMAL
EOSINOPHIL # BLD AUTO: 0.1 K/UL (ref 0–0.5)
EOSINOPHIL NFR BLD: 1.4 % (ref 0–8)
ERYTHROCYTE [DISTWIDTH] IN BLOOD BY AUTOMATED COUNT: 14.6 % (ref 11.5–14.5)
EST. GFR  (NO RACE VARIABLE): >60 ML/MIN/1.73 M^2
FERRITIN SERPL-MCNC: 21 NG/ML (ref 20–300)
GLUCOSE SERPL-MCNC: 96 MG/DL (ref 70–110)
HCT VFR BLD AUTO: 37.1 % (ref 37–48.5)
HGB BLD-MCNC: 11.8 G/DL (ref 12–16)
IMM GRANULOCYTES # BLD AUTO: 0.01 K/UL (ref 0–0.04)
IMM GRANULOCYTES NFR BLD AUTO: 0.2 % (ref 0–0.5)
IRON SERPL-MCNC: 116 UG/DL (ref 30–160)
LYMPHOCYTES # BLD AUTO: 1.2 K/UL (ref 1–4.8)
LYMPHOCYTES NFR BLD: 23.6 % (ref 18–48)
MCH RBC QN AUTO: 29.5 PG (ref 27–31)
MCHC RBC AUTO-ENTMCNC: 31.8 G/DL (ref 32–36)
MCV RBC AUTO: 93 FL (ref 82–98)
MONOCYTES # BLD AUTO: 0.4 K/UL (ref 0.3–1)
MONOCYTES NFR BLD: 9 % (ref 4–15)
NEUTROPHILS # BLD AUTO: 3.2 K/UL (ref 1.8–7.7)
NEUTROPHILS NFR BLD: 65.2 % (ref 38–73)
NRBC BLD-RTO: 0 /100 WBC
PLATELET # BLD AUTO: 277 K/UL (ref 150–450)
PMV BLD AUTO: 9.8 FL (ref 9.2–12.9)
POTASSIUM SERPL-SCNC: 4.3 MMOL/L (ref 3.5–5.1)
PROT SERPL-MCNC: 6.4 G/DL (ref 6–8.4)
RBC # BLD AUTO: 4 M/UL (ref 4–5.4)
SATURATED IRON: 32 % (ref 20–50)
SODIUM SERPL-SCNC: 138 MMOL/L (ref 136–145)
TOTAL IRON BINDING CAPACITY: 367 UG/DL (ref 250–450)
TRANSFERRIN SERPL-MCNC: 248 MG/DL (ref 200–375)
TSH SERPL DL<=0.005 MIU/L-ACNC: 1.45 UIU/ML (ref 0.4–4)
WBC # BLD AUTO: 4.91 K/UL (ref 3.9–12.7)

## 2022-08-05 PROCEDURE — 84466 ASSAY OF TRANSFERRIN: CPT | Performed by: INTERNAL MEDICINE

## 2022-08-05 PROCEDURE — 84443 ASSAY THYROID STIM HORMONE: CPT | Performed by: INTERNAL MEDICINE

## 2022-08-05 PROCEDURE — 82728 ASSAY OF FERRITIN: CPT | Performed by: INTERNAL MEDICINE

## 2022-08-05 PROCEDURE — 80053 COMPREHEN METABOLIC PANEL: CPT | Performed by: INTERNAL MEDICINE

## 2022-08-05 PROCEDURE — 36415 COLL VENOUS BLD VENIPUNCTURE: CPT | Performed by: INTERNAL MEDICINE

## 2022-08-05 PROCEDURE — 85025 COMPLETE CBC W/AUTO DIFF WBC: CPT | Performed by: INTERNAL MEDICINE

## 2022-08-11 ENCOUNTER — OFFICE VISIT (OUTPATIENT)
Dept: HEMATOLOGY/ONCOLOGY | Facility: CLINIC | Age: 77
End: 2022-08-11
Payer: MEDICARE

## 2022-08-11 VITALS
SYSTOLIC BLOOD PRESSURE: 123 MMHG | HEIGHT: 62 IN | OXYGEN SATURATION: 98 % | TEMPERATURE: 98 F | BODY MASS INDEX: 23.93 KG/M2 | WEIGHT: 130.06 LBS | DIASTOLIC BLOOD PRESSURE: 72 MMHG | HEART RATE: 59 BPM | RESPIRATION RATE: 18 BRPM

## 2022-08-11 DIAGNOSIS — E83.118 OTHER HEMOCHROMATOSIS: Primary | ICD-10-CM

## 2022-08-11 DIAGNOSIS — Z80.3 FAMILY HISTORY OF BREAST CANCER IN SISTER: ICD-10-CM

## 2022-08-11 DIAGNOSIS — C67.2 MALIGNANT NEOPLASM OF LATERAL WALL OF URINARY BLADDER: ICD-10-CM

## 2022-08-11 DIAGNOSIS — Z90.13 H/O BILATERAL MASTECTOMY: ICD-10-CM

## 2022-08-11 PROCEDURE — 99213 OFFICE O/P EST LOW 20 MIN: CPT | Mod: S$PBB,,, | Performed by: INTERNAL MEDICINE

## 2022-08-11 PROCEDURE — 99999 PR PBB SHADOW E&M-EST. PATIENT-LVL IV: ICD-10-PCS | Mod: PBBFAC,,, | Performed by: INTERNAL MEDICINE

## 2022-08-11 PROCEDURE — 99999 PR PBB SHADOW E&M-EST. PATIENT-LVL IV: CPT | Mod: PBBFAC,,, | Performed by: INTERNAL MEDICINE

## 2022-08-11 PROCEDURE — 99213 PR OFFICE/OUTPT VISIT, EST, LEVL III, 20-29 MIN: ICD-10-PCS | Mod: S$PBB,,, | Performed by: INTERNAL MEDICINE

## 2022-08-11 PROCEDURE — 99214 OFFICE O/P EST MOD 30 MIN: CPT | Mod: PBBFAC | Performed by: INTERNAL MEDICINE

## 2022-08-11 NOTE — PROGRESS NOTES
Subjective:       Patient ID: Selam Botello is a 77 y.o. female.    Chief Complaint: Results    HPI 77-year-old female history of iron overload.  Patient had phlebotomy approximately 2 months ago scheduled for knee replacement in mid September 2022. Has not had recurrence of live tumor in within 2 years ECOG status 1  Past Medical History:   Diagnosis Date    Acid reflux     Allergy     Arthritis     Atrial tachycardia     Bladder cancer     Breast cyst     Cataract     Colon polyp     Deep vein thrombosis     Degenerative disc disease     Encounter for blood transfusion     General anesthetics causing adverse effect in therapeutic use     GI bleed     Hematuria, gross     Inflammatory bowel disease     Liver disease     cyst    Osteoporosis     PONV (postoperative nausea and vomiting)     Retina disorder, left     Skin cancer     Thyroid disease     Urinary tract infection      Family History   Problem Relation Age of Onset    COPD Mother     Cancer Mother     COPD Father     Cancer Father     Cancer Sister      Social History     Socioeconomic History    Marital status:    Tobacco Use    Smoking status: Never Smoker    Smokeless tobacco: Never Used   Substance and Sexual Activity    Alcohol use: No    Drug use: No    Sexual activity: Yes     Partners: Male     Past Surgical History:   Procedure Laterality Date    ADENOIDECTOMY      BLADDER FULGURATION Right 9/12/2018    Procedure: FULGURATION, BLADDER;  Surgeon: Isaias Shah MD;  Location: [facility] OR;  Service: Urology;  Laterality: Right;    BLADDER FULGURATION N/A 4/24/2019    Procedure: FULGURATION, BLADDER;  Surgeon: Isaias Shah MD;  Location: [facility] OR;  Service: Urology;  Laterality: N/A;    BREAST SURGERY Bilateral     CATARACT EXTRACTION      CYSTOSCOPY      CYSTOSCOPY N/A 4/24/2019    Procedure: CYSTOSCOPY;  Surgeon: Isaias Shah MD;  Location: [facility] OR;  Service: Urology;  Laterality: N/A;     CYSTOSCOPY WITH URETEROSCOPY, RETROGRADE PYELOGRAPHY, AND INSERTION OF STENT Left 9/12/2018    Procedure: CYSTOSCOPY, WITH RETROGRADE PYELOGRAM AND URETERAL STENT INSERTION;  Surgeon: Isaias Shah MD;  Location: [facility] OR;  Service: Urology;  Laterality: Left;    EYE SURGERY Left 2017    retina pucker    INSTILLATION OF URINARY BLADDER N/A 9/12/2018    Procedure: INSTILLATION, URINARY BLADDER;  Surgeon: Isaias Shah MD;  Location: [facility] OR;  Service: Urology;  Laterality: N/A;  Chemo agent instillation     JOINT REPLACEMENT Bilateral     hip    JOINT REPLACEMENT Right     knee    MASTECTOMY Bilateral 1991    RETROGRADE PYELOGRAPHY Bilateral 10/31/2019    Procedure: PYELOGRAM, RETROGRADE;  Surgeon: Baldo Gates MD;  Location: [facility] OR;  Service: Urology;  Laterality: Bilateral;    TONSILLECTOMY      TURBT      TURBT, WITH BLUE LIGHT CYSTOSCOPY AND CYSVIEW Bilateral 10/31/2019    Procedure: TURBT,WITH BLUE LIGHT CYSTOSCOPY AND CYSVIEW;  Surgeon: Baldo Gates MD;  Location: [facility];  Service: Urology;  Laterality: Bilateral;    TYMPANOSTOMY TUBE PLACEMENT      VASCULAR SURGERY Right     popliteal stent    WISDOM TOOTH EXTRACTION         Labs:  Lab Results   Component Value Date    WBC 4.91 08/05/2022    HGB 11.8 (L) 08/05/2022    HCT 37.1 08/05/2022    MCV 93 08/05/2022     08/05/2022     BMP  Lab Results   Component Value Date     08/05/2022    K 4.3 08/05/2022     08/05/2022    CO2 25 08/05/2022    BUN 16 08/05/2022    CREATININE 0.7 08/05/2022    CALCIUM 9.7 08/05/2022    ANIONGAP 8 08/05/2022    ESTGFRAFRICA >60 06/08/2022    EGFRNONAA >60 06/08/2022     Lab Results   Component Value Date    ALT 9 (L) 08/05/2022    AST 17 08/05/2022    ALKPHOS 73 08/05/2022    BILITOT 0.8 08/05/2022       Lab Results   Component Value Date    IRON 116 08/05/2022    TIBC 367 08/05/2022    FERRITIN 21 08/05/2022     No results found for: [id number]  No results found for: FOLATE  Lab  Results   Component Value Date    TSH 1.448 08/05/2022         Review of Systems   Constitutional: Negative for activity change, appetite change, chills, diaphoresis, fatigue, fever and unexpected weight change.   HENT: Negative for congestion, dental problem, drooling, ear discharge, ear pain, facial swelling, hearing loss, mouth sores, nosebleeds, postnasal drip, rhinorrhea, sinus pressure, sneezing, sore throat, tinnitus, trouble swallowing and voice change.    Eyes: Negative for photophobia, pain, discharge, redness, itching and visual disturbance.   Respiratory: Negative for cough, choking, chest tightness, shortness of breath, wheezing and stridor.    Cardiovascular: Negative for chest pain, palpitations and leg swelling.   Gastrointestinal: Negative for abdominal distention, abdominal pain, anal bleeding, blood in stool, constipation, diarrhea, nausea, rectal pain and vomiting.   Endocrine: Negative for cold intolerance, heat intolerance, polydipsia, polyphagia and polyuria.   Genitourinary: Negative for decreased urine volume, difficulty urinating, dyspareunia, dysuria, enuresis, flank pain, frequency, genital sores, hematuria, menstrual problem, pelvic pain, urgency, vaginal bleeding, vaginal discharge and vaginal pain.   Musculoskeletal: Negative for arthralgias, back pain, gait problem, joint swelling, myalgias, neck pain and neck stiffness.   Skin: Negative for color change, pallor and rash.   Allergic/Immunologic: Negative for environmental allergies, food allergies and immunocompromised state.   Neurological: Negative for dizziness, tremors, seizures, syncope, facial asymmetry, speech difficulty, weakness, light-headedness, numbness and headaches.   Hematological: Negative for adenopathy. Does not bruise/bleed easily.   Psychiatric/Behavioral: Positive for dysphoric mood. Negative for agitation, behavioral problems, confusion, decreased concentration, hallucinations, self-injury, sleep disturbance and  suicidal ideas. The patient is nervous/anxious. The patient is not hyperactive.         Concerned over issues with        Objective:      Physical Exam  Vitals reviewed.   Constitutional:       General: She is not in acute distress.     Appearance: She is well-developed. She is not diaphoretic.   HENT:      Head: Normocephalic and atraumatic.      Right Ear: External ear normal.      Left Ear: External ear normal.      Nose: Nose normal.      Right Sinus: No maxillary sinus tenderness or frontal sinus tenderness.      Left Sinus: No maxillary sinus tenderness or frontal sinus tenderness.      Mouth/Throat:      Pharynx: No oropharyngeal exudate.   Eyes:      General: Lids are normal. No scleral icterus.        Right eye: No discharge.         Left eye: No discharge.      Conjunctiva/sclera: Conjunctivae normal.      Right eye: Right conjunctiva is not injected. No hemorrhage.     Left eye: Left conjunctiva is not injected. No hemorrhage.     Pupils: Pupils are equal, round, and reactive to light.   Neck:      Thyroid: No thyromegaly.      Vascular: No JVD.      Trachea: No tracheal deviation.   Cardiovascular:      Rate and Rhythm: Normal rate.   Pulmonary:      Effort: Pulmonary effort is normal. No respiratory distress.      Breath sounds: No stridor.   Chest:      Chest wall: No tenderness.   Breasts:      Right: No supraclavicular adenopathy.      Left: No supraclavicular adenopathy.       Abdominal:      General: Bowel sounds are normal. There is no distension.      Palpations: Abdomen is soft. There is no hepatomegaly, splenomegaly or mass.      Tenderness: There is no abdominal tenderness. There is no rebound.   Musculoskeletal:         General: No tenderness. Normal range of motion.      Cervical back: Normal range of motion and neck supple.   Lymphadenopathy:      Cervical: No cervical adenopathy.      Upper Body:      Right upper body: No supraclavicular adenopathy.      Left upper body: No  supraclavicular adenopathy.   Skin:     General: Skin is dry.      Findings: No erythema or rash.   Neurological:      Mental Status: She is alert and oriented to person, place, and time.      Cranial Nerves: No cranial nerve deficit.      Motor: Weakness present.      Coordination: Coordination normal.   Psychiatric:         Behavior: Behavior normal.         Thought Content: Thought content normal.         Judgment: Judgment normal.             Assessment:      1. Other hemochromatosis    2. Family history of breast cancer in sister    3. Malignant neoplasm of lateral wall of urinary bladder    4. H/O bilateral mastectomy           Plan:       Patient's iron levels are acceptable is scheduled to have knee replacement in mid September will see back at the end of October with laboratory studies prior discussed above information on 's condition sent to her      Adonay Neri Jr, MD FACP

## 2022-08-16 NOTE — PROGRESS NOTES
Subjective:      Patient ID: Selam Botello is a 77 y.o. female.    Chief Complaint: Pre-op Exam      HPI  Patient is here for follow up of medical problems and preoperative evaluation for left TKR 9/15/22 with Dr. Perla.  Has had some significant problems with some anesthesia meds.  No bleeding diathesis.  No exertional cp/sob/palp.  No significant renal or hepatic disease.    8/8/22 Cardiologist Dr. Garcia:  Overview:  · Normal CTA coronaries March 2013  · Negative exercise stress test January 2017  · CTA coronaries July 2022 revealing a very discrete focus of nonobstructive calcification in the proximal LAD only, ejection fraction 69%           Past Medical History:   Diagnosis Date    Acid reflux     Allergy     Arthritis     Atrial tachycardia     Bladder cancer     Breast cyst     Cataract     Colon polyp     Deep vein thrombosis     Degenerative disc disease     Encounter for blood transfusion     General anesthetics causing adverse effect in therapeutic use     GI bleed     Hematuria, gross     Inflammatory bowel disease     Liver disease     cyst    Osteoporosis     PONV (postoperative nausea and vomiting)     Retina disorder, left     Skin cancer     Thyroid disease     Urinary tract infection      Past Surgical History:   Procedure Laterality Date    ADENOIDECTOMY      BLADDER FULGURATION Right 9/12/2018    Procedure: FULGURATION, BLADDER;  Surgeon: Isaias Shah MD;  Location: [facility] OR;  Service: Urology;  Laterality: Right;    BLADDER FULGURATION N/A 4/24/2019    Procedure: FULGURATION, BLADDER;  Surgeon: Isaias Shah MD;  Location: [facility] OR;  Service: Urology;  Laterality: N/A;    BREAST SURGERY Bilateral     CATARACT EXTRACTION      CYSTOSCOPY      CYSTOSCOPY N/A 4/24/2019    Procedure: CYSTOSCOPY;  Surgeon: Isaias Shah MD;  Location: [facility] OR;  Service: Urology;  Laterality: N/A;    CYSTOSCOPY WITH URETEROSCOPY, RETROGRADE PYELOGRAPHY, AND INSERTION OF STENT  Left 2018    Procedure: CYSTOSCOPY, WITH RETROGRADE PYELOGRAM AND URETERAL STENT INSERTION;  Surgeon: Isaias Shah MD;  Location: [facility] OR;  Service: Urology;  Laterality: Left;    EYE SURGERY Left 2017    retina pucker    INSTILLATION OF URINARY BLADDER N/A 2018    Procedure: INSTILLATION, URINARY BLADDER;  Surgeon: Isaias Shah MD;  Location: [facility] OR;  Service: Urology;  Laterality: N/A;  Chemo agent instillation     JOINT REPLACEMENT Bilateral     hip    JOINT REPLACEMENT Right     knee    MASTECTOMY Bilateral     RETROGRADE PYELOGRAPHY Bilateral 10/31/2019    Procedure: PYELOGRAM, RETROGRADE;  Surgeon: Baldo Gates MD;  Location: [facility];  Service: Urology;  Laterality: Bilateral;    TONSILLECTOMY      TURBT      TURBT, WITH BLUE LIGHT CYSTOSCOPY AND CYSVIEW Bilateral 10/31/2019    Procedure: TURBT,WITH BLUE LIGHT CYSTOSCOPY AND CYSVIEW;  Surgeon: Baldo Gates MD;  Location: [facility];  Service: Urology;  Laterality: Bilateral;    TYMPANOSTOMY TUBE PLACEMENT      VASCULAR SURGERY Right     popliteal stent    WISDOM TOOTH EXTRACTION       MEDICATION:        CARBOXYMETHYLCELLULOSE SODIUM (REFRESH TEARS OPHT) 2 times daily              Note (2018): Use morning of surgery     cetirizine (ZYRTEC) 10 MG tablet 10 mg, Daily      ergocalciferol (ERGOCALCIFEROL) 50,000 unit Cap     LIDODERM 5 %      metoprolol succinate (TOPROL-XL) 25 MG 24 hr tablet () 25 mg           pantoprazole sodium (PROTONIX ORAL)      SYNTHROID 50 mcg tablet 50 mcg, Daily          ALLERGIES:  Review of patient's allergies indicates:   Allergen Reactions    Etomidate Other (See Comments), Palpitations and Shortness Of Breath    Latex Rash     Other reaction(s): mouth ulcers    Latex, natural rubber      Mouth ulcers    Nsaids (non-steroidal anti-inflammatory drug) Other (See Comments)     Bleeding of the colon-hospitalized    Propofol analogues Other (See Comments)     Cognitive function  "declines for approx 2 weeks  Loses memory function/ability to recall  Does not wake up easily after     Levofloxacin      Other reaction(s): Joint pain    Tramadol Hives and Itching    Epinephrine Palpitations     Arms twitch      Sulfa (sulfonamide antibiotics) Rash         Updated/ annual due 6/23:  HM:  2/21 covid vaccines/booster, 11/17 [id number], 2/19 [id number], 2/22 TDaP, 4/21 BMD on prolia, no MMG, 2022 Cscope Dr. Bailey rep 3y, Cards Dr. Garcia.     Review of Systems   Constitutional: Negative for chills, diaphoresis and fever.   Respiratory: Negative for cough and shortness of breath.    Cardiovascular: Negative for chest pain, palpitations and leg swelling.   Gastrointestinal: Negative for blood in stool, constipation, diarrhea, nausea and vomiting.   Genitourinary: Negative for dysuria, frequency and hematuria.   Psychiatric/Behavioral: The patient is not nervous/anxious.          Objective:   /74 (BP Location: Right arm, Patient Position: Sitting, BP Method: Small (Manual))   Pulse 75   Temp 97.2 °F (36.2 °C)   Ht 5' 2" (1.575 m)   Wt 59.1 kg (130 lb 2.9 oz)   SpO2 98%   BMI 23.81 kg/m²     Physical Exam  Constitutional:       Appearance: She is well-developed.   Neck:      Thyroid: No thyroid mass.      Vascular: No carotid bruit.   Cardiovascular:      Rate and Rhythm: Normal rate and regular rhythm.      Heart sounds: No murmur heard.    No friction rub. No gallop.   Pulmonary:      Effort: Pulmonary effort is normal.      Breath sounds: Normal breath sounds. No wheezing or rales.   Abdominal:      General: Bowel sounds are normal.      Palpations: Abdomen is soft. There is no mass.      Tenderness: There is no abdominal tenderness.   Musculoskeletal:      Cervical back: Neck supple.   Lymphadenopathy:      Cervical: No cervical adenopathy.   Neurological:      Mental Status: She is alert and oriented to person, place, and time.       8/16 22 outside tests:  CXR clear.  EKG nsr 63, no q's " or acute changes.  O+, Ab neg,  MRSA neg, H/H 12.7/41.0, WBC 6.9, plt 325K,  BUN/creat 14/0.65, liver enz normal, Na 136, K 4.3, PT/INR 13.7/1.0, PTT 30, UA clear.      Assessment:       1. Preop cardiovascular exam    2. Chronic pain of left knee    3. Acquired hypothyroidism    4. Age-related osteoporosis without current pathological fracture    5. Malignant neoplasm of lateral wall of urinary bladder    6. Ulcerative pancolitis without complication    7. Other hemochromatosis          Plan:     Preop cardiovascular exam, Chronic pain of left knee, left TKR, 9/15/22, Dr. Perla- Pt is clear for anesthesia and surgery with Ziggy Index Class I, very low risk for perioperative complications.  NEEDS ANESTH alternatives due to side effects.    Acquired hypothyroidism- Clinically stable, continue present treatment.    Age-related osteoporosis without current pathological fracture- on prolia.    Malignant neoplasm of lateral wall of urinary bladder    Ulcerative pancolitis without complication    Other hemochromatosis

## 2022-08-17 ENCOUNTER — OFFICE VISIT (OUTPATIENT)
Dept: FAMILY MEDICINE | Facility: CLINIC | Age: 77
End: 2022-08-17
Payer: MEDICARE

## 2022-08-17 VITALS
HEIGHT: 62 IN | BODY MASS INDEX: 23.96 KG/M2 | WEIGHT: 130.19 LBS | TEMPERATURE: 97 F | HEART RATE: 75 BPM | DIASTOLIC BLOOD PRESSURE: 74 MMHG | OXYGEN SATURATION: 98 % | SYSTOLIC BLOOD PRESSURE: 120 MMHG

## 2022-08-17 DIAGNOSIS — M81.0 AGE-RELATED OSTEOPOROSIS WITHOUT CURRENT PATHOLOGICAL FRACTURE: ICD-10-CM

## 2022-08-17 DIAGNOSIS — Z01.810 PREOP CARDIOVASCULAR EXAM: Primary | ICD-10-CM

## 2022-08-17 DIAGNOSIS — M25.562 CHRONIC PAIN OF LEFT KNEE: ICD-10-CM

## 2022-08-17 DIAGNOSIS — G89.29 CHRONIC PAIN OF LEFT KNEE: ICD-10-CM

## 2022-08-17 DIAGNOSIS — E03.9 ACQUIRED HYPOTHYROIDISM: ICD-10-CM

## 2022-08-17 DIAGNOSIS — C67.2 MALIGNANT NEOPLASM OF LATERAL WALL OF URINARY BLADDER: ICD-10-CM

## 2022-08-17 DIAGNOSIS — E83.118 OTHER HEMOCHROMATOSIS: ICD-10-CM

## 2022-08-17 DIAGNOSIS — K51.00 ULCERATIVE PANCOLITIS WITHOUT COMPLICATION: ICD-10-CM

## 2022-08-17 PROCEDURE — 99214 PR OFFICE/OUTPT VISIT, EST, LEVL IV, 30-39 MIN: ICD-10-PCS | Mod: S$PBB,,, | Performed by: INTERNAL MEDICINE

## 2022-08-17 PROCEDURE — 99999 PR PBB SHADOW E&M-EST. PATIENT-LVL III: ICD-10-PCS | Mod: PBBFAC,,, | Performed by: INTERNAL MEDICINE

## 2022-08-17 PROCEDURE — 99999 PR PBB SHADOW E&M-EST. PATIENT-LVL III: CPT | Mod: PBBFAC,,, | Performed by: INTERNAL MEDICINE

## 2022-08-17 PROCEDURE — 99213 OFFICE O/P EST LOW 20 MIN: CPT | Mod: PBBFAC,PO | Performed by: INTERNAL MEDICINE

## 2022-08-17 PROCEDURE — 99214 OFFICE O/P EST MOD 30 MIN: CPT | Mod: S$PBB,,, | Performed by: INTERNAL MEDICINE

## 2022-08-21 ENCOUNTER — PATIENT MESSAGE (OUTPATIENT)
Dept: UROLOGY | Facility: CLINIC | Age: 77
End: 2022-08-21
Payer: MEDICARE

## 2022-08-25 ENCOUNTER — PATIENT MESSAGE (OUTPATIENT)
Dept: HEMATOLOGY/ONCOLOGY | Facility: CLINIC | Age: 77
End: 2022-08-25
Payer: MEDICARE

## 2022-08-26 DIAGNOSIS — C67.2 MALIGNANT NEOPLASM OF LATERAL WALL OF URINARY BLADDER: Primary | ICD-10-CM

## 2022-08-28 ENCOUNTER — PATIENT MESSAGE (OUTPATIENT)
Dept: HEMATOLOGY/ONCOLOGY | Facility: CLINIC | Age: 77
End: 2022-08-28
Payer: MEDICARE

## 2022-09-01 ENCOUNTER — TELEPHONE (OUTPATIENT)
Dept: FAMILY MEDICINE | Facility: CLINIC | Age: 77
End: 2022-09-01
Payer: MEDICARE

## 2022-09-01 NOTE — TELEPHONE ENCOUNTER
----- Message from Gracie Cross MD sent at 9/1/2022 11:42 AM CDT -----  If he is fine with using the first preop clearance, then that is fine with me.      ----- Message -----  From: Catherine Kincaid MA  Sent: 9/1/2022  11:31 AM CDT  To: Gracie Cross MD      ----- Message -----  From: Sabrina Longo  Sent: 8/31/2022   8:42 AM CDT  To: Tay OCHOA Staff    Dr. Jarad Perla's office would like to speak with someone regarding rescheduling the pt's surgery to 0929. The office would like to know if the pt needs to do anything different or can they use the same order. Please call [phone]

## 2022-09-01 NOTE — TELEPHONE ENCOUNTER
Spoke with pt pt stated she will tell her dr about the preop clearance I couldn't get the doctor on the phone.

## 2022-09-02 ENCOUNTER — TELEPHONE (OUTPATIENT)
Dept: FAMILY MEDICINE | Facility: CLINIC | Age: 77
End: 2022-09-02
Payer: MEDICARE

## 2022-09-02 NOTE — TELEPHONE ENCOUNTER
I couldn't get in touch with anyone at Dr. Perla office melinda been trying but I did speak with the pt pt stated she would also be trying to get in touch with him also so he can relay the messege

## 2022-09-02 NOTE — TELEPHONE ENCOUNTER
----- Message from Gracie Cross MD sent at 9/2/2022  8:33 AM CDT -----  Was Dr. Perla contacted?      ----- Message -----  From: Catherine Kincaid MA  Sent: 9/1/2022   3:08 PM CDT  To: Gracie Cross MD      ----- Message -----  From: Gracie Cross MD  Sent: 9/1/2022  11:42 AM CDT  To: Catherine Kincaid MA    If he is fine with using the first preop clearance, then that is fine with me.  SM    ----- Message -----  From: Catherine Kincaid MA  Sent: 9/1/2022  11:31 AM CDT  To: Gracie Cross MD      ----- Message -----  From: Sabrina Longo  Sent: 8/31/2022   8:42 AM CDT  To: Tay OCHOA Staff    Dr. Jarad Perla's office would like to speak with someone regarding rescheduling the pt's surgery to 0929. The office would like to know if the pt needs to do anything different or can they use the same order. Please call [phone]

## 2022-09-13 ENCOUNTER — PATIENT MESSAGE (OUTPATIENT)
Dept: HEMATOLOGY/ONCOLOGY | Facility: CLINIC | Age: 77
End: 2022-09-13
Payer: MEDICARE

## 2022-09-14 DIAGNOSIS — R30.0 DYSURIA: Primary | ICD-10-CM

## 2022-09-15 ENCOUNTER — LAB VISIT (OUTPATIENT)
Dept: LAB | Facility: HOSPITAL | Age: 77
End: 2022-09-15
Attending: INTERNAL MEDICINE
Payer: MEDICARE

## 2022-09-15 ENCOUNTER — PATIENT MESSAGE (OUTPATIENT)
Dept: UROLOGY | Facility: CLINIC | Age: 77
End: 2022-09-15
Payer: MEDICARE

## 2022-09-15 DIAGNOSIS — R30.0 DYSURIA: ICD-10-CM

## 2022-09-15 LAB
BILIRUB UR QL STRIP: NEGATIVE
CLARITY UR: CLEAR
COLOR UR: YELLOW
GLUCOSE UR QL STRIP: NEGATIVE
HGB UR QL STRIP: ABNORMAL
KETONES UR QL STRIP: NEGATIVE
LEUKOCYTE ESTERASE UR QL STRIP: NEGATIVE
NITRITE UR QL STRIP: NEGATIVE
PH UR STRIP: 6 [PH] (ref 5–8)
PROT UR QL STRIP: NEGATIVE
SP GR UR STRIP: <=1.005 (ref 1–1.03)
URN SPEC COLLECT METH UR: ABNORMAL

## 2022-09-15 PROCEDURE — 81003 URINALYSIS AUTO W/O SCOPE: CPT | Performed by: INTERNAL MEDICINE

## 2022-09-16 ENCOUNTER — PATIENT MESSAGE (OUTPATIENT)
Dept: UROLOGY | Facility: CLINIC | Age: 77
End: 2022-09-16
Payer: MEDICARE

## 2022-09-19 ENCOUNTER — TELEPHONE (OUTPATIENT)
Dept: UROLOGY | Facility: CLINIC | Age: 77
End: 2022-09-19
Payer: MEDICARE

## 2022-09-19 ENCOUNTER — HOSPITAL ENCOUNTER (OUTPATIENT)
Dept: RADIOLOGY | Facility: HOSPITAL | Age: 77
Discharge: HOME | End: 2022-09-19
Attending: INTERNAL MEDICINE
Payer: MEDICARE

## 2022-09-19 DIAGNOSIS — R82.90 ABNORMAL URINALYSIS: Primary | ICD-10-CM

## 2022-09-19 DIAGNOSIS — C67.2 MALIGNANT NEOPLASM OF LATERAL WALL OF URINARY BLADDER: ICD-10-CM

## 2022-09-19 PROCEDURE — 71260 CT THORAX DX C+: CPT | Mod: TC

## 2022-09-19 PROCEDURE — 74177 CT CHEST ABDOMEN PELVIS WITH CONTRAST (XPD): ICD-10-PCS | Mod: 26,,, | Performed by: RADIOLOGY

## 2022-09-19 PROCEDURE — 71260 CT CHEST ABDOMEN PELVIS WITH CONTRAST (XPD): ICD-10-PCS | Mod: 26,,, | Performed by: RADIOLOGY

## 2022-09-19 PROCEDURE — 25500020 PHARM REV CODE 255: Performed by: INTERNAL MEDICINE

## 2022-09-19 PROCEDURE — 71260 CT THORAX DX C+: CPT | Mod: 26,,, | Performed by: RADIOLOGY

## 2022-09-19 PROCEDURE — 74177 CT ABD & PELVIS W/CONTRAST: CPT | Mod: TC

## 2022-09-19 PROCEDURE — 74177 CT ABD & PELVIS W/CONTRAST: CPT | Mod: 26,,, | Performed by: RADIOLOGY

## 2022-09-19 RX ADMIN — IOHEXOL 75 ML: 350 INJECTION, SOLUTION INTRAVENOUS at 01:09

## 2022-09-19 RX ADMIN — IOHEXOL 30 ML: 350 INJECTION, SOLUTION INTRAVENOUS at 12:09

## 2022-09-20 ENCOUNTER — PATIENT MESSAGE (OUTPATIENT)
Dept: UROLOGY | Facility: CLINIC | Age: 77
End: 2022-09-20
Payer: MEDICARE

## 2022-09-20 ENCOUNTER — LAB VISIT (OUTPATIENT)
Dept: LAB | Facility: HOSPITAL | Age: 77
End: 2022-09-20
Attending: NURSE PRACTITIONER
Payer: MEDICARE

## 2022-09-20 ENCOUNTER — PATIENT MESSAGE (OUTPATIENT)
Dept: HEMATOLOGY/ONCOLOGY | Facility: CLINIC | Age: 77
End: 2022-09-20
Payer: MEDICARE

## 2022-09-20 ENCOUNTER — TELEPHONE (OUTPATIENT)
Dept: UROLOGY | Facility: CLINIC | Age: 77
End: 2022-09-20
Payer: MEDICARE

## 2022-09-20 DIAGNOSIS — R82.90 ABNORMAL URINALYSIS: ICD-10-CM

## 2022-09-20 DIAGNOSIS — R93.89 ABNORMAL CT OF THE CHEST: Primary | ICD-10-CM

## 2022-09-20 LAB
MICROSCOPIC COMMENT: NORMAL
RBC #/AREA URNS HPF: 0 /HPF (ref 0–4)
WBC #/AREA URNS HPF: 1 /HPF (ref 0–5)

## 2022-09-20 PROCEDURE — 81000 URINALYSIS NONAUTO W/SCOPE: CPT | Performed by: NURSE PRACTITIONER

## 2022-09-20 PROCEDURE — 87086 URINE CULTURE/COLONY COUNT: CPT | Performed by: NURSE PRACTITIONER

## 2022-09-21 ENCOUNTER — PATIENT MESSAGE (OUTPATIENT)
Dept: PULMONOLOGY | Facility: CLINIC | Age: 77
End: 2022-09-21
Payer: MEDICARE

## 2022-09-21 LAB — BACTERIA UR CULT: NO GROWTH

## 2022-09-26 ENCOUNTER — PATIENT MESSAGE (OUTPATIENT)
Dept: HEMATOLOGY/ONCOLOGY | Facility: CLINIC | Age: 77
End: 2022-09-26
Payer: MEDICARE

## 2022-09-28 ENCOUNTER — OFFICE VISIT (OUTPATIENT)
Dept: HEMATOLOGY/ONCOLOGY | Facility: CLINIC | Age: 77
End: 2022-09-28
Payer: MEDICARE

## 2022-09-28 DIAGNOSIS — M17.10 PRIMARY LOCALIZED OSTEOARTHROSIS OF LOWER LEG, UNSPECIFIED LATERALITY: ICD-10-CM

## 2022-09-28 DIAGNOSIS — R91.8 PULMONARY NODULES: Primary | ICD-10-CM

## 2022-09-28 PROCEDURE — 99214 OFFICE O/P EST MOD 30 MIN: CPT | Mod: 95,,, | Performed by: INTERNAL MEDICINE

## 2022-09-28 PROCEDURE — 99214 PR OFFICE/OUTPT VISIT, EST, LEVL IV, 30-39 MIN: ICD-10-PCS | Mod: 95,,, | Performed by: INTERNAL MEDICINE

## 2022-09-28 NOTE — PROGRESS NOTES
Subjective:       Patient ID: Selam Botello is a 77 y.o. female.    Chief Complaint: Results    HPI 77-year-old female history of recent CT chest abdomen pelvis demonstrating pulmonary nodule son with calcification patient seen in video visit consultation prior to knee replacement    Past Medical History:   Diagnosis Date    Acid reflux     Allergy     Arthritis     Atrial tachycardia     Bladder cancer     Breast cyst     Cataract     Colon polyp     Deep vein thrombosis     Degenerative disc disease     Encounter for blood transfusion     General anesthetics causing adverse effect in therapeutic use     GI bleed     Hematuria, gross     Inflammatory bowel disease     Liver disease     cyst    Osteoporosis     PONV (postoperative nausea and vomiting)     Retina disorder, left     Skin cancer     Thyroid disease     Urinary tract infection      Family History   Problem Relation Age of Onset    COPD Mother     Cancer Mother     COPD Father     Cancer Father     Cancer Sister      Social History     Socioeconomic History    Marital status:    Tobacco Use    Smoking status: Never    Smokeless tobacco: Never   Substance and Sexual Activity    Alcohol use: No    Drug use: No    Sexual activity: Yes     Partners: Male     Past Surgical History:   Procedure Laterality Date    ADENOIDECTOMY      BLADDER FULGURATION Right 9/12/2018    Procedure: FULGURATION, BLADDER;  Surgeon: Isaias Shah MD;  Location: [facility] OR;  Service: Urology;  Laterality: Right;    BLADDER FULGURATION N/A 4/24/2019    Procedure: FULGURATION, BLADDER;  Surgeon: Isaias Shah MD;  Location: [facility] OR;  Service: Urology;  Laterality: N/A;    BREAST SURGERY Bilateral     CATARACT EXTRACTION      CYSTOSCOPY      CYSTOSCOPY N/A 4/24/2019    Procedure: CYSTOSCOPY;  Surgeon: Isaias Shah MD;  Location: [facility] OR;  Service: Urology;  Laterality: N/A;    CYSTOSCOPY WITH URETEROSCOPY, RETROGRADE PYELOGRAPHY, AND INSERTION OF STENT Left 9/12/2018     Procedure: CYSTOSCOPY, WITH RETROGRADE PYELOGRAM AND URETERAL STENT INSERTION;  Surgeon: Isaias Shah MD;  Location: [facility] OR;  Service: Urology;  Laterality: Left;    EYE SURGERY Left 2017    retina pucker    INSTILLATION OF URINARY BLADDER N/A 9/12/2018    Procedure: INSTILLATION, URINARY BLADDER;  Surgeon: Isaias Shah MD;  Location: [facility] OR;  Service: Urology;  Laterality: N/A;  Chemo agent instillation     JOINT REPLACEMENT Bilateral     hip    JOINT REPLACEMENT Right     knee    MASTECTOMY Bilateral 1991    RETROGRADE PYELOGRAPHY Bilateral 10/31/2019    Procedure: PYELOGRAM, RETROGRADE;  Surgeon: Baldo Gates MD;  Location: [facility];  Service: Urology;  Laterality: Bilateral;    TONSILLECTOMY      TURBT      TURBT, WITH BLUE LIGHT CYSTOSCOPY AND CYSVIEW Bilateral 10/31/2019    Procedure: TURBT,WITH BLUE LIGHT CYSTOSCOPY AND CYSVIEW;  Surgeon: Baldo Gates MD;  Location: [facility];  Service: Urology;  Laterality: Bilateral;    TYMPANOSTOMY TUBE PLACEMENT      VASCULAR SURGERY Right     popliteal stent    WISDOM TOOTH EXTRACTION         Labs:  Lab Results   Component Value Date    WBC 4.91 08/05/2022    HGB 11.8 (L) 08/05/2022    HCT 37.1 08/05/2022    MCV 93 08/05/2022     08/05/2022     BMP  Lab Results   Component Value Date     09/19/2022    K 4.6 09/19/2022     09/19/2022    CO2 25 09/19/2022    BUN 12 09/19/2022    CREATININE 0.7 09/19/2022    CALCIUM 9.8 09/19/2022    ANIONGAP 8 09/19/2022    ESTGFRAFRICA >60 06/08/2022    EGFRNONAA >60 06/08/2022     Lab Results   Component Value Date    ALT 12 09/19/2022    AST 15 09/19/2022    ALKPHOS 59 09/19/2022    BILITOT 0.9 09/19/2022       Lab Results   Component Value Date    IRON 116 08/05/2022    TIBC 367 08/05/2022    FERRITIN 21 08/05/2022     No results found for: ZJEDVSXP36  No results found for: FOLATE  Lab Results   Component Value Date    TSH 1.448 08/05/2022         Review of Systems   Psychiatric/Behavioral:  The  patient is nervous/anxious.      Objective:      Physical Exam  Constitutional:       Appearance: Normal appearance.           Assessment:      1. Pulmonary nodules    2. Primary localized osteoarthrosis of lower leg, unspecified laterality           Plan:       The patient location is:  Home  The chief complaint leading to consultation is:  Results    Visit type: audiovisual    Face to Face time with patient: 25 minutes of total time spent on the encounter, which includes face to face time and non-face to face time preparing to see the patient (eg, review of tests), Obtaining and/or reviewing separately obtained history, Documenting clinical information in the electronic or other health record, Independently interpreting results (not separately reported) and communicating results to the patient/family/caregiver, or Care coordination (not separately reported).         Each patient to whom he or she provides medical services by telemedicine is:  (1) informed of the relationship between the physician and patient and the respective role of any other health care provider with respect to management of the patient; and (2) notified that he or she may decline to receive medical services by telemedicine and may withdraw from such care at any time.    Notes:    Patient is scheduled for knee replacement tomorrow reviewed recent CT demonstrating pulmonary nodules some with calcifications states that her mother had tuberculosis at this time no active findings closes may be residual previous fungal infection in the germ infections no clear evidence of use proceed with surgical intervention plan discussed extensively 's condition well.    Adonay Neri Jr, MD FACP

## 2022-10-26 ENCOUNTER — LAB VISIT (OUTPATIENT)
Dept: LAB | Facility: HOSPITAL | Age: 77
End: 2022-10-26
Attending: INTERNAL MEDICINE
Payer: MEDICARE

## 2022-10-26 ENCOUNTER — PATIENT MESSAGE (OUTPATIENT)
Dept: HEMATOLOGY/ONCOLOGY | Facility: CLINIC | Age: 77
End: 2022-10-26
Payer: MEDICARE

## 2022-10-26 DIAGNOSIS — E83.118 OTHER HEMOCHROMATOSIS: ICD-10-CM

## 2022-10-26 LAB
ALBUMIN SERPL BCP-MCNC: 3.9 G/DL (ref 3.5–5.2)
ALP SERPL-CCNC: 65 U/L (ref 55–135)
ALT SERPL W/O P-5'-P-CCNC: 11 U/L (ref 10–44)
ANION GAP SERPL CALC-SCNC: 11 MMOL/L (ref 8–16)
AST SERPL-CCNC: 14 U/L (ref 10–40)
BASOPHILS # BLD AUTO: 0.03 K/UL (ref 0–0.2)
BASOPHILS NFR BLD: 0.7 % (ref 0–1.9)
BILIRUB SERPL-MCNC: 0.5 MG/DL (ref 0.1–1)
BUN SERPL-MCNC: 15 MG/DL (ref 8–23)
CALCIUM SERPL-MCNC: 9.6 MG/DL (ref 8.7–10.5)
CHLORIDE SERPL-SCNC: 109 MMOL/L (ref 95–110)
CO2 SERPL-SCNC: 22 MMOL/L (ref 23–29)
CREAT SERPL-MCNC: 0.7 MG/DL (ref 0.5–1.4)
DIFFERENTIAL METHOD: ABNORMAL
EOSINOPHIL # BLD AUTO: 0.1 K/UL (ref 0–0.5)
EOSINOPHIL NFR BLD: 2.2 % (ref 0–8)
ERYTHROCYTE [DISTWIDTH] IN BLOOD BY AUTOMATED COUNT: 15.9 % (ref 11.5–14.5)
EST. GFR  (NO RACE VARIABLE): >60 ML/MIN/1.73 M^2
FERRITIN SERPL-MCNC: 149 NG/ML (ref 20–300)
GLUCOSE SERPL-MCNC: 117 MG/DL (ref 70–110)
HCT VFR BLD AUTO: 34.4 % (ref 37–48.5)
HGB BLD-MCNC: 11 G/DL (ref 12–16)
IMM GRANULOCYTES # BLD AUTO: 0.01 K/UL (ref 0–0.04)
IMM GRANULOCYTES NFR BLD AUTO: 0.2 % (ref 0–0.5)
IRON SERPL-MCNC: 38 UG/DL (ref 30–160)
LDH SERPL L TO P-CCNC: 190 U/L (ref 110–260)
LYMPHOCYTES # BLD AUTO: 1.1 K/UL (ref 1–4.8)
LYMPHOCYTES NFR BLD: 24.3 % (ref 18–48)
MCH RBC QN AUTO: 29.6 PG (ref 27–31)
MCHC RBC AUTO-ENTMCNC: 32 G/DL (ref 32–36)
MCV RBC AUTO: 93 FL (ref 82–98)
MONOCYTES # BLD AUTO: 0.3 K/UL (ref 0.3–1)
MONOCYTES NFR BLD: 6.6 % (ref 4–15)
NEUTROPHILS # BLD AUTO: 3 K/UL (ref 1.8–7.7)
NEUTROPHILS NFR BLD: 66 % (ref 38–73)
NRBC BLD-RTO: 0 /100 WBC
PLATELET # BLD AUTO: 335 K/UL (ref 150–450)
PMV BLD AUTO: 8.9 FL (ref 9.2–12.9)
POTASSIUM SERPL-SCNC: 4 MMOL/L (ref 3.5–5.1)
PROT SERPL-MCNC: 6.5 G/DL (ref 6–8.4)
RBC # BLD AUTO: 3.71 M/UL (ref 4–5.4)
SATURATED IRON: 11 % (ref 20–50)
SODIUM SERPL-SCNC: 142 MMOL/L (ref 136–145)
TOTAL IRON BINDING CAPACITY: 332 UG/DL (ref 250–450)
TRANSFERRIN SERPL-MCNC: 224 MG/DL (ref 200–375)
WBC # BLD AUTO: 4.56 K/UL (ref 3.9–12.7)

## 2022-10-26 PROCEDURE — 36415 COLL VENOUS BLD VENIPUNCTURE: CPT | Performed by: INTERNAL MEDICINE

## 2022-10-26 PROCEDURE — 80053 COMPREHEN METABOLIC PANEL: CPT | Performed by: INTERNAL MEDICINE

## 2022-10-26 PROCEDURE — 83615 LACTATE (LD) (LDH) ENZYME: CPT | Performed by: INTERNAL MEDICINE

## 2022-10-26 PROCEDURE — 84466 ASSAY OF TRANSFERRIN: CPT | Performed by: INTERNAL MEDICINE

## 2022-10-26 PROCEDURE — 82728 ASSAY OF FERRITIN: CPT | Performed by: INTERNAL MEDICINE

## 2022-10-26 PROCEDURE — 85025 COMPLETE CBC W/AUTO DIFF WBC: CPT | Performed by: INTERNAL MEDICINE

## 2022-10-31 ENCOUNTER — OFFICE VISIT (OUTPATIENT)
Dept: HEMATOLOGY/ONCOLOGY | Facility: CLINIC | Age: 77
End: 2022-10-31
Payer: MEDICARE

## 2022-10-31 VITALS
WEIGHT: 125.44 LBS | BODY MASS INDEX: 23.08 KG/M2 | RESPIRATION RATE: 20 BRPM | TEMPERATURE: 98 F | HEIGHT: 62 IN | OXYGEN SATURATION: 100 % | DIASTOLIC BLOOD PRESSURE: 65 MMHG | HEART RATE: 65 BPM | SYSTOLIC BLOOD PRESSURE: 117 MMHG

## 2022-10-31 DIAGNOSIS — R94.6 ABNORMAL RESULTS OF THYROID FUNCTION STUDIES: ICD-10-CM

## 2022-10-31 DIAGNOSIS — Z96.652 PAIN DUE TO TOTAL LEFT KNEE REPLACEMENT, INITIAL ENCOUNTER: ICD-10-CM

## 2022-10-31 DIAGNOSIS — E03.9 ACQUIRED HYPOTHYROIDISM: ICD-10-CM

## 2022-10-31 DIAGNOSIS — T84.84XA PAIN DUE TO TOTAL LEFT KNEE REPLACEMENT, INITIAL ENCOUNTER: ICD-10-CM

## 2022-10-31 DIAGNOSIS — M15.9 PRIMARY OSTEOARTHRITIS INVOLVING MULTIPLE JOINTS: ICD-10-CM

## 2022-10-31 DIAGNOSIS — K55.20 ANGIODYSPLASIA OF CECUM: ICD-10-CM

## 2022-10-31 DIAGNOSIS — E83.19 IRON OVERLOAD SYNDROME: Primary | ICD-10-CM

## 2022-10-31 PROCEDURE — 99214 OFFICE O/P EST MOD 30 MIN: CPT | Mod: PBBFAC | Performed by: INTERNAL MEDICINE

## 2022-10-31 PROCEDURE — 99214 OFFICE O/P EST MOD 30 MIN: CPT | Mod: S$PBB,,, | Performed by: INTERNAL MEDICINE

## 2022-10-31 PROCEDURE — 99999 PR PBB SHADOW E&M-EST. PATIENT-LVL IV: CPT | Mod: PBBFAC,,, | Performed by: INTERNAL MEDICINE

## 2022-10-31 PROCEDURE — 99214 PR OFFICE/OUTPT VISIT, EST, LEVL IV, 30-39 MIN: ICD-10-PCS | Mod: S$PBB,,, | Performed by: INTERNAL MEDICINE

## 2022-10-31 PROCEDURE — 99999 PR PBB SHADOW E&M-EST. PATIENT-LVL IV: ICD-10-PCS | Mod: PBBFAC,,, | Performed by: INTERNAL MEDICINE

## 2022-10-31 RX ORDER — ASPIRIN 325 MG
50000 TABLET, DELAYED RELEASE (ENTERIC COATED) ORAL
COMMUNITY
Start: 2022-09-19

## 2022-10-31 NOTE — PROGRESS NOTES
Subjective:       Patient ID: Selam Botello is a 77 y.o. female.    Chief Complaint: Results and Anemia    HPI:  77-year-old female history recent left knee replacement patient has need to 109°.  Patient has iron overload syndrome recent saturated iron status post surgery ECOG status 1    Past Medical History:   Diagnosis Date    Acid reflux     Allergy     Arthritis     Atrial tachycardia     Bladder cancer     Breast cyst     Cataract     Colon polyp     Deep vein thrombosis     Degenerative disc disease     Encounter for blood transfusion     General anesthetics causing adverse effect in therapeutic use     GI bleed     Hematuria, gross     Inflammatory bowel disease     Liver disease     cyst    Osteoporosis     PONV (postoperative nausea and vomiting)     Retina disorder, left     Skin cancer     Thyroid disease     Urinary tract infection      Family History   Problem Relation Age of Onset    COPD Mother     Cancer Mother     COPD Father     Cancer Father     Cancer Sister      Social History     Socioeconomic History    Marital status:    Tobacco Use    Smoking status: Never    Smokeless tobacco: Never   Substance and Sexual Activity    Alcohol use: No    Drug use: No    Sexual activity: Yes     Partners: Male     Past Surgical History:   Procedure Laterality Date    ADENOIDECTOMY      BLADDER FULGURATION Right 9/12/2018    Procedure: FULGURATION, BLADDER;  Surgeon: Isaias Shah MD;  Location: [facility] OR;  Service: Urology;  Laterality: Right;    BLADDER FULGURATION N/A 4/24/2019    Procedure: FULGURATION, BLADDER;  Surgeon: Isaias Shah MD;  Location: [facility] OR;  Service: Urology;  Laterality: N/A;    BREAST SURGERY Bilateral     CATARACT EXTRACTION      CYSTOSCOPY      CYSTOSCOPY N/A 4/24/2019    Procedure: CYSTOSCOPY;  Surgeon: Isaias Shah MD;  Location: [facility] OR;  Service: Urology;  Laterality: N/A;    CYSTOSCOPY WITH URETEROSCOPY, RETROGRADE PYELOGRAPHY, AND INSERTION OF STENT Left  9/12/2018    Procedure: CYSTOSCOPY, WITH RETROGRADE PYELOGRAM AND URETERAL STENT INSERTION;  Surgeon: Isaias Shah MD;  Location: [facility] OR;  Service: Urology;  Laterality: Left;    EYE SURGERY Left 2017    retina pucker    INSTILLATION OF URINARY BLADDER N/A 9/12/2018    Procedure: INSTILLATION, URINARY BLADDER;  Surgeon: Isaias Shah MD;  Location: [facility] [place] OR;  Service: Urology;  Laterality: N/A;  Chemo agent instillation     JOINT REPLACEMENT Bilateral     hip    JOINT REPLACEMENT Right     knee    MASTECTOMY Bilateral 1991    RETROGRADE PYELOGRAPHY Bilateral 10/31/2019    Procedure: PYELOGRAM, RETROGRADE;  Surgeon: Baldo Gates MD;  Location: [facility];  Service: Urology;  Laterality: Bilateral;    TONSILLECTOMY      TURBT      TURBT, WITH BLUE LIGHT CYSTOSCOPY AND CYSVIEW Bilateral 10/31/2019    Procedure: TURBT,WITH BLUE LIGHT CYSTOSCOPY AND CYSVIEW;  Surgeon: Baldo Gates MD;  Location: [facility];  Service: Urology;  Laterality: Bilateral;    TYMPANOSTOMY TUBE PLACEMENT      VASCULAR SURGERY Right     popliteal stent    WISDOM TOOTH EXTRACTION         Labs:  Lab Results   Component Value Date    WBC 4.56 10/26/2022    HGB 11.0 (L) 10/26/2022    HCT 34.4 (L) 10/26/2022    MCV 93 10/26/2022     10/26/2022     BMP  Lab Results   Component Value Date     10/26/2022    K 4.0 10/26/2022     10/26/2022    CO2 22 (L) 10/26/2022    BUN 15 10/26/2022    CREATININE 0.7 10/26/2022    CALCIUM 9.6 10/26/2022    ANIONGAP 11 10/26/2022    ESTGFRAFRICA >60 06/08/2022    EGFRNONAA >60 06/08/2022     Lab Results   Component Value Date    ALT 11 10/26/2022    AST 14 10/26/2022    ALKPHOS 65 10/26/2022    BILITOT 0.5 10/26/2022       Lab Results   Component Value Date    IRON 38 10/26/2022    TIBC 332 10/26/2022    FERRITIN 149 10/26/2022     No results found for: NONJAHNP25  No results found for: FOLATE  Lab Results   Component Value Date    TSH 1.448 08/05/2022         Review of Systems    Constitutional:  Negative for activity change, appetite change, chills, diaphoresis, fatigue, fever and unexpected weight change.   HENT:  Negative for congestion, dental problem, drooling, ear discharge, ear pain, facial swelling, hearing loss, mouth sores, nosebleeds, postnasal drip, rhinorrhea, sinus pressure, sneezing, sore throat, tinnitus, trouble swallowing and voice change.    Eyes:  Negative for photophobia, pain, discharge, redness, itching and visual disturbance.   Respiratory:  Negative for cough, choking, chest tightness, shortness of breath, wheezing and stridor.    Cardiovascular:  Negative for chest pain, palpitations and leg swelling.   Gastrointestinal:  Negative for abdominal distention, abdominal pain, anal bleeding, blood in stool, constipation, diarrhea, nausea, rectal pain and vomiting.   Endocrine: Negative for cold intolerance, heat intolerance, polydipsia, polyphagia and polyuria.   Genitourinary:  Negative for decreased urine volume, difficulty urinating, dyspareunia, dysuria, enuresis, flank pain, frequency, genital sores, hematuria, menstrual problem, pelvic pain, urgency, vaginal bleeding, vaginal discharge and vaginal pain.   Musculoskeletal:  Positive for gait problem. Negative for arthralgias, back pain, joint swelling, myalgias, neck pain and neck stiffness.   Skin:  Negative for color change, pallor and rash.   Allergic/Immunologic: Negative for environmental allergies, food allergies and immunocompromised state.   Neurological:  Negative for dizziness, tremors, seizures, syncope, facial asymmetry, speech difficulty, weakness, light-headedness, numbness and headaches.   Hematological:  Negative for adenopathy. Does not bruise/bleed easily.   Psychiatric/Behavioral:  Positive for dysphoric mood. Negative for agitation, behavioral problems, confusion, decreased concentration, hallucinations, self-injury, sleep disturbance and suicidal ideas. The patient is nervous/anxious. The  patient is not hyperactive.      Objective:      Physical Exam  Vitals reviewed.   Constitutional:       General: She is not in acute distress.     Appearance: She is well-developed. She is not diaphoretic.   HENT:      Head: Normocephalic and atraumatic.      Right Ear: External ear normal.      Left Ear: External ear normal.      Nose: Nose normal.      Right Sinus: No maxillary sinus tenderness or frontal sinus tenderness.      Left Sinus: No maxillary sinus tenderness or frontal sinus tenderness.      Mouth/Throat:      Pharynx: No oropharyngeal exudate.   Eyes:      General: Lids are normal. No scleral icterus.        Right eye: No discharge.         Left eye: No discharge.      Conjunctiva/sclera: Conjunctivae normal.      Right eye: Right conjunctiva is not injected. No hemorrhage.     Left eye: Left conjunctiva is not injected. No hemorrhage.     Pupils: Pupils are equal, round, and reactive to light.   Neck:      Thyroid: No thyromegaly.      Vascular: No JVD.      Trachea: No tracheal deviation.   Cardiovascular:      Rate and Rhythm: Normal rate.   Pulmonary:      Effort: Pulmonary effort is normal. No respiratory distress.      Breath sounds: No stridor.   Chest:      Chest wall: No tenderness.   Abdominal:      General: Bowel sounds are normal. There is no distension.      Palpations: Abdomen is soft. There is no hepatomegaly, splenomegaly or mass.      Tenderness: There is no abdominal tenderness. There is no rebound.   Musculoskeletal:         General: No tenderness. Normal range of motion.      Cervical back: Normal range of motion and neck supple.   Lymphadenopathy:      Cervical: No cervical adenopathy.      Upper Body:      Right upper body: No supraclavicular adenopathy.      Left upper body: No supraclavicular adenopathy.   Skin:     General: Skin is dry.      Findings: No erythema or rash.   Neurological:      Mental Status: She is alert and oriented to person, place, and time.      Cranial  Nerves: No cranial nerve deficit.      Coordination: Coordination normal.      Gait: Gait abnormal.   Psychiatric:         Behavior: Behavior normal.         Thought Content: Thought content normal.         Judgment: Judgment normal.           Assessment:      1. Iron overload syndrome    2. Angiodysplasia of cecum    3. Acquired hypothyroidism    4. Abnormal results of thyroid function studies    5. Primary osteoarthritis involving multiple joints    6. Pain due to total left knee replacement, initial encounter           Plan:     Decreased saturated iron.  Patient wishes laboratory studies 1 month proceed in 2 months.  Abnormality status post placement 90 Ki 6 weeks postop        Adonay Neri Jr, MD FACP

## 2022-12-01 ENCOUNTER — PATIENT MESSAGE (OUTPATIENT)
Dept: HEMATOLOGY/ONCOLOGY | Facility: CLINIC | Age: 77
End: 2022-12-01
Payer: MEDICARE

## 2022-12-01 ENCOUNTER — LAB VISIT (OUTPATIENT)
Dept: LAB | Facility: HOSPITAL | Age: 77
End: 2022-12-01
Attending: INTERNAL MEDICINE
Payer: MEDICARE

## 2022-12-01 DIAGNOSIS — E83.19 IRON OVERLOAD SYNDROME: ICD-10-CM

## 2022-12-01 DIAGNOSIS — R94.6 ABNORMAL RESULTS OF THYROID FUNCTION STUDIES: ICD-10-CM

## 2022-12-01 DIAGNOSIS — K55.20 ANGIODYSPLASIA OF CECUM: ICD-10-CM

## 2022-12-01 DIAGNOSIS — E03.9 ACQUIRED HYPOTHYROIDISM: ICD-10-CM

## 2022-12-01 LAB
ALBUMIN SERPL BCP-MCNC: 3.9 G/DL (ref 3.5–5.2)
ALP SERPL-CCNC: 60 U/L (ref 55–135)
ALT SERPL W/O P-5'-P-CCNC: 12 U/L (ref 10–44)
ANION GAP SERPL CALC-SCNC: 7 MMOL/L (ref 8–16)
AST SERPL-CCNC: 15 U/L (ref 10–40)
BASOPHILS # BLD AUTO: 0.03 K/UL (ref 0–0.2)
BASOPHILS NFR BLD: 0.6 % (ref 0–1.9)
BILIRUB SERPL-MCNC: 0.4 MG/DL (ref 0.1–1)
BUN SERPL-MCNC: 16 MG/DL (ref 8–23)
CALCIUM SERPL-MCNC: 9.6 MG/DL (ref 8.7–10.5)
CHLORIDE SERPL-SCNC: 106 MMOL/L (ref 95–110)
CO2 SERPL-SCNC: 25 MMOL/L (ref 23–29)
CREAT SERPL-MCNC: 0.7 MG/DL (ref 0.5–1.4)
DIFFERENTIAL METHOD: ABNORMAL
EOSINOPHIL # BLD AUTO: 0.1 K/UL (ref 0–0.5)
EOSINOPHIL NFR BLD: 1.3 % (ref 0–8)
ERYTHROCYTE [DISTWIDTH] IN BLOOD BY AUTOMATED COUNT: 15.4 % (ref 11.5–14.5)
EST. GFR  (NO RACE VARIABLE): >60 ML/MIN/1.73 M^2
FERRITIN SERPL-MCNC: 46 NG/ML (ref 20–300)
GLUCOSE SERPL-MCNC: 106 MG/DL (ref 70–110)
HCT VFR BLD AUTO: 35.7 % (ref 37–48.5)
HGB BLD-MCNC: 11.6 G/DL (ref 12–16)
IMM GRANULOCYTES # BLD AUTO: 0.02 K/UL (ref 0–0.04)
IMM GRANULOCYTES NFR BLD AUTO: 0.4 % (ref 0–0.5)
IRON SERPL-MCNC: 25 UG/DL (ref 30–160)
LYMPHOCYTES # BLD AUTO: 1 K/UL (ref 1–4.8)
LYMPHOCYTES NFR BLD: 17.9 % (ref 18–48)
MCH RBC QN AUTO: 29.3 PG (ref 27–31)
MCHC RBC AUTO-ENTMCNC: 32.5 G/DL (ref 32–36)
MCV RBC AUTO: 90 FL (ref 82–98)
MONOCYTES # BLD AUTO: 0.5 K/UL (ref 0.3–1)
MONOCYTES NFR BLD: 9 % (ref 4–15)
NEUTROPHILS # BLD AUTO: 3.8 K/UL (ref 1.8–7.7)
NEUTROPHILS NFR BLD: 70.8 % (ref 38–73)
NRBC BLD-RTO: 0 /100 WBC
PLATELET # BLD AUTO: 366 K/UL (ref 150–450)
PMV BLD AUTO: 9 FL (ref 9.2–12.9)
POTASSIUM SERPL-SCNC: 4.3 MMOL/L (ref 3.5–5.1)
PROT SERPL-MCNC: 6.4 G/DL (ref 6–8.4)
RBC # BLD AUTO: 3.96 M/UL (ref 4–5.4)
SATURATED IRON: 8 % (ref 20–50)
SODIUM SERPL-SCNC: 138 MMOL/L (ref 136–145)
TOTAL IRON BINDING CAPACITY: 315 UG/DL (ref 250–450)
TRANSFERRIN SERPL-MCNC: 213 MG/DL (ref 200–375)
TSH SERPL DL<=0.005 MIU/L-ACNC: 2.04 UIU/ML (ref 0.4–4)
WBC # BLD AUTO: 5.31 K/UL (ref 3.9–12.7)

## 2022-12-01 PROCEDURE — 84443 ASSAY THYROID STIM HORMONE: CPT | Performed by: INTERNAL MEDICINE

## 2022-12-01 PROCEDURE — 85025 COMPLETE CBC W/AUTO DIFF WBC: CPT | Performed by: INTERNAL MEDICINE

## 2022-12-01 PROCEDURE — 36415 COLL VENOUS BLD VENIPUNCTURE: CPT | Performed by: INTERNAL MEDICINE

## 2022-12-01 PROCEDURE — 80053 COMPREHEN METABOLIC PANEL: CPT | Performed by: INTERNAL MEDICINE

## 2022-12-01 PROCEDURE — 84466 ASSAY OF TRANSFERRIN: CPT | Performed by: INTERNAL MEDICINE

## 2022-12-01 PROCEDURE — 82728 ASSAY OF FERRITIN: CPT | Performed by: INTERNAL MEDICINE

## 2022-12-02 ENCOUNTER — PATIENT MESSAGE (OUTPATIENT)
Dept: HEMATOLOGY/ONCOLOGY | Facility: CLINIC | Age: 77
End: 2022-12-02
Payer: MEDICARE

## 2022-12-19 ENCOUNTER — PATIENT MESSAGE (OUTPATIENT)
Dept: HEMATOLOGY/ONCOLOGY | Facility: CLINIC | Age: 77
End: 2022-12-19
Payer: MEDICARE

## 2022-12-27 ENCOUNTER — PATIENT MESSAGE (OUTPATIENT)
Dept: HEMATOLOGY/ONCOLOGY | Facility: CLINIC | Age: 77
End: 2022-12-27
Payer: MEDICARE

## 2022-12-29 ENCOUNTER — PATIENT MESSAGE (OUTPATIENT)
Dept: HEMATOLOGY/ONCOLOGY | Facility: CLINIC | Age: 77
End: 2022-12-29
Payer: MEDICARE

## 2022-12-29 ENCOUNTER — LAB VISIT (OUTPATIENT)
Dept: LAB | Facility: HOSPITAL | Age: 77
End: 2022-12-29
Attending: INTERNAL MEDICINE
Payer: MEDICARE

## 2022-12-29 DIAGNOSIS — K55.20 ANGIODYSPLASIA OF CECUM: ICD-10-CM

## 2022-12-29 DIAGNOSIS — E83.19 IRON OVERLOAD SYNDROME: ICD-10-CM

## 2022-12-29 DIAGNOSIS — R94.6 ABNORMAL RESULTS OF THYROID FUNCTION STUDIES: ICD-10-CM

## 2022-12-29 DIAGNOSIS — E03.9 ACQUIRED HYPOTHYROIDISM: ICD-10-CM

## 2022-12-29 LAB
ALBUMIN SERPL BCP-MCNC: 4 G/DL (ref 3.5–5.2)
ALP SERPL-CCNC: 59 U/L (ref 55–135)
ALT SERPL W/O P-5'-P-CCNC: 13 U/L (ref 10–44)
ANION GAP SERPL CALC-SCNC: 9 MMOL/L (ref 8–16)
AST SERPL-CCNC: 17 U/L (ref 10–40)
BASOPHILS # BLD AUTO: 0.04 K/UL (ref 0–0.2)
BASOPHILS NFR BLD: 0.8 % (ref 0–1.9)
BILIRUB SERPL-MCNC: 0.6 MG/DL (ref 0.1–1)
BUN SERPL-MCNC: 17 MG/DL (ref 8–23)
CALCIUM SERPL-MCNC: 9.4 MG/DL (ref 8.7–10.5)
CHLORIDE SERPL-SCNC: 106 MMOL/L (ref 95–110)
CO2 SERPL-SCNC: 24 MMOL/L (ref 23–29)
CREAT SERPL-MCNC: 0.7 MG/DL (ref 0.5–1.4)
DIFFERENTIAL METHOD: ABNORMAL
EOSINOPHIL # BLD AUTO: 0.1 K/UL (ref 0–0.5)
EOSINOPHIL NFR BLD: 1.8 % (ref 0–8)
ERYTHROCYTE [DISTWIDTH] IN BLOOD BY AUTOMATED COUNT: 15.1 % (ref 11.5–14.5)
EST. GFR  (NO RACE VARIABLE): >60 ML/MIN/1.73 M^2
FERRITIN SERPL-MCNC: 24 NG/ML (ref 20–300)
GLUCOSE SERPL-MCNC: 97 MG/DL (ref 70–110)
HCT VFR BLD AUTO: 34.2 % (ref 37–48.5)
HGB BLD-MCNC: 10.8 G/DL (ref 12–16)
IMM GRANULOCYTES # BLD AUTO: 0 K/UL (ref 0–0.04)
IMM GRANULOCYTES NFR BLD AUTO: 0 % (ref 0–0.5)
IRON SERPL-MCNC: 32 UG/DL (ref 30–160)
LYMPHOCYTES # BLD AUTO: 1 K/UL (ref 1–4.8)
LYMPHOCYTES NFR BLD: 20.2 % (ref 18–48)
MCH RBC QN AUTO: 28.4 PG (ref 27–31)
MCHC RBC AUTO-ENTMCNC: 31.6 G/DL (ref 32–36)
MCV RBC AUTO: 90 FL (ref 82–98)
MONOCYTES # BLD AUTO: 0.4 K/UL (ref 0.3–1)
MONOCYTES NFR BLD: 7.6 % (ref 4–15)
NEUTROPHILS # BLD AUTO: 3.6 K/UL (ref 1.8–7.7)
NEUTROPHILS NFR BLD: 69.6 % (ref 38–73)
NRBC BLD-RTO: 0 /100 WBC
PLATELET # BLD AUTO: 304 K/UL (ref 150–450)
PMV BLD AUTO: 9.3 FL (ref 9.2–12.9)
POTASSIUM SERPL-SCNC: 4.2 MMOL/L (ref 3.5–5.1)
PROT SERPL-MCNC: 6.3 G/DL (ref 6–8.4)
RBC # BLD AUTO: 3.8 M/UL (ref 4–5.4)
SATURATED IRON: 10 % (ref 20–50)
SODIUM SERPL-SCNC: 139 MMOL/L (ref 136–145)
TOTAL IRON BINDING CAPACITY: 336 UG/DL (ref 250–450)
TRANSFERRIN SERPL-MCNC: 227 MG/DL (ref 200–375)
TSH SERPL DL<=0.005 MIU/L-ACNC: 1.88 UIU/ML (ref 0.4–4)
WBC # BLD AUTO: 5.11 K/UL (ref 3.9–12.7)

## 2022-12-29 PROCEDURE — 36415 COLL VENOUS BLD VENIPUNCTURE: CPT | Performed by: INTERNAL MEDICINE

## 2022-12-29 PROCEDURE — 82728 ASSAY OF FERRITIN: CPT | Performed by: INTERNAL MEDICINE

## 2022-12-29 PROCEDURE — 84443 ASSAY THYROID STIM HORMONE: CPT | Performed by: INTERNAL MEDICINE

## 2022-12-29 PROCEDURE — 85025 COMPLETE CBC W/AUTO DIFF WBC: CPT | Performed by: INTERNAL MEDICINE

## 2022-12-29 PROCEDURE — 84466 ASSAY OF TRANSFERRIN: CPT | Performed by: INTERNAL MEDICINE

## 2022-12-29 PROCEDURE — 80053 COMPREHEN METABOLIC PANEL: CPT | Performed by: INTERNAL MEDICINE

## 2023-01-03 ENCOUNTER — OFFICE VISIT (OUTPATIENT)
Dept: HEMATOLOGY/ONCOLOGY | Facility: CLINIC | Age: 78
End: 2023-01-03
Payer: MEDICARE

## 2023-01-03 VITALS
SYSTOLIC BLOOD PRESSURE: 110 MMHG | TEMPERATURE: 98 F | HEIGHT: 62 IN | HEART RATE: 76 BPM | RESPIRATION RATE: 20 BRPM | WEIGHT: 129.88 LBS | OXYGEN SATURATION: 96 % | BODY MASS INDEX: 23.9 KG/M2 | DIASTOLIC BLOOD PRESSURE: 73 MMHG

## 2023-01-03 DIAGNOSIS — T84.84XD PAIN DUE TO TOTAL LEFT KNEE REPLACEMENT, SUBSEQUENT ENCOUNTER: ICD-10-CM

## 2023-01-03 DIAGNOSIS — E83.118 OTHER HEMOCHROMATOSIS: Primary | ICD-10-CM

## 2023-01-03 DIAGNOSIS — R10.31 RIGHT LOWER QUADRANT ABDOMINAL PAIN: ICD-10-CM

## 2023-01-03 DIAGNOSIS — K55.20 ANGIODYSPLASIA OF CECUM: ICD-10-CM

## 2023-01-03 DIAGNOSIS — Z96.652 PAIN DUE TO TOTAL LEFT KNEE REPLACEMENT, SUBSEQUENT ENCOUNTER: ICD-10-CM

## 2023-01-03 DIAGNOSIS — R91.1 PULMONARY NODULE 1 CM OR GREATER IN DIAMETER: ICD-10-CM

## 2023-01-03 DIAGNOSIS — Z90.13 H/O BILATERAL MASTECTOMY: ICD-10-CM

## 2023-01-03 DIAGNOSIS — Z80.3 FAMILY HISTORY OF BREAST CANCER IN SISTER: ICD-10-CM

## 2023-01-03 DIAGNOSIS — R91.8 PULMONARY NODULES: ICD-10-CM

## 2023-01-03 DIAGNOSIS — R91.1 SOLITARY PULMONARY NODULE: ICD-10-CM

## 2023-01-03 PROBLEM — U07.1 COVID: Status: RESOLVED | Noted: 2022-05-19 | Resolved: 2023-01-03

## 2023-01-03 PROCEDURE — 99999 PR PBB SHADOW E&M-EST. PATIENT-LVL IV: CPT | Mod: PBBFAC,,, | Performed by: INTERNAL MEDICINE

## 2023-01-03 PROCEDURE — 99214 OFFICE O/P EST MOD 30 MIN: CPT | Mod: PBBFAC | Performed by: INTERNAL MEDICINE

## 2023-01-03 PROCEDURE — 99214 PR OFFICE/OUTPT VISIT, EST, LEVL IV, 30-39 MIN: ICD-10-PCS | Mod: S$PBB,,, | Performed by: INTERNAL MEDICINE

## 2023-01-03 PROCEDURE — 99999 PR PBB SHADOW E&M-EST. PATIENT-LVL IV: ICD-10-PCS | Mod: PBBFAC,,, | Performed by: INTERNAL MEDICINE

## 2023-01-03 PROCEDURE — 99214 OFFICE O/P EST MOD 30 MIN: CPT | Mod: S$PBB,,, | Performed by: INTERNAL MEDICINE

## 2023-01-03 NOTE — PROGRESS NOTES
Subjective:       Patient ID: Selam Botello is a 77 y.o. female.    Chief Complaint: Results and Pain    HPI:  77-year-old female history of recurrent iron overload.  Previous history of angiodysplasia of the cecum.  Patient had previous phlebotomies in returns now approximately 4 months status post knee replacement.  Patient returns for review    Past Medical History:   Diagnosis Date    Acid reflux     Allergy     Arthritis     Atrial tachycardia     Bladder cancer     Breast cyst     Cataract     Colon polyp     COVID 5/19/2022    Deep vein thrombosis     Degenerative disc disease     Encounter for blood transfusion     General anesthetics causing adverse effect in therapeutic use     GI bleed     Hematuria, gross     Inflammatory bowel disease     Liver disease     cyst    Osteoporosis     PONV (postoperative nausea and vomiting)     Retina disorder, left     Skin cancer     Thyroid disease     Urinary tract infection      Family History   Problem Relation Age of Onset    COPD Mother     Cancer Mother     COPD Father     Cancer Father     Cancer Sister      Social History     Socioeconomic History    Marital status:    Tobacco Use    Smoking status: Never    Smokeless tobacco: Never   Substance and Sexual Activity    Alcohol use: No    Drug use: No    Sexual activity: Yes     Partners: Male     Past Surgical History:   Procedure Laterality Date    ADENOIDECTOMY      BLADDER FULGURATION Right 9/12/2018    Procedure: FULGURATION, BLADDER;  Surgeon: Isaias Shah MD;  Location: [facility] OR;  Service: Urology;  Laterality: Right;    BLADDER FULGURATION N/A 4/24/2019    Procedure: FULGURATION, BLADDER;  Surgeon: Isaias Shah MD;  Location: [facility] OR;  Service: Urology;  Laterality: N/A;    BREAST SURGERY Bilateral     CATARACT EXTRACTION      CYSTOSCOPY      CYSTOSCOPY N/A 4/24/2019    Procedure: CYSTOSCOPY;  Surgeon: Isaias Shah MD;  Location: [facility] OR;  Service: Urology;  Laterality: N/A;     CYSTOSCOPY WITH URETEROSCOPY, RETROGRADE PYELOGRAPHY, AND INSERTION OF STENT Left 9/12/2018    Procedure: CYSTOSCOPY, WITH RETROGRADE PYELOGRAM AND URETERAL STENT INSERTION;  Surgeon: Isaias Shah MD;  Location: [facility] OR;  Service: Urology;  Laterality: Left;    EYE SURGERY Left 2017    retina pucker    INSTILLATION OF URINARY BLADDER N/A 9/12/2018    Procedure: INSTILLATION, URINARY BLADDER;  Surgeon: Isaias Shah MD;  Location: [facility] OR;  Service: Urology;  Laterality: N/A;  Chemo agent instillation     JOINT REPLACEMENT Bilateral     hip    JOINT REPLACEMENT Right     knee    MASTECTOMY Bilateral 1991    RETROGRADE PYELOGRAPHY Bilateral 10/31/2019    Procedure: PYELOGRAM, RETROGRADE;  Surgeon: Baldo Gates MD;  Location: [facility] OR;  Service: Urology;  Laterality: Bilateral;    TONSILLECTOMY      TURBT      TURBT, WITH BLUE LIGHT CYSTOSCOPY AND CYSVIEW Bilateral 10/31/2019    Procedure: TURBT,WITH BLUE LIGHT CYSTOSCOPY AND CYSVIEW;  Surgeon: Baldo Gates MD;  Location: [facility];  Service: Urology;  Laterality: Bilateral;    TYMPANOSTOMY TUBE PLACEMENT      VASCULAR SURGERY Right     popliteal stent    WISDOM TOOTH EXTRACTION         Labs:  Lab Results   Component Value Date    WBC 5.11 12/29/2022    HGB 10.8 (L) 12/29/2022    HCT 34.2 (L) 12/29/2022    MCV 90 12/29/2022     12/29/2022     BMP  Lab Results   Component Value Date     12/29/2022    K 4.2 12/29/2022     12/29/2022    CO2 24 12/29/2022    BUN 17 12/29/2022    CREATININE 0.7 12/29/2022    CALCIUM 9.4 12/29/2022    ANIONGAP 9 12/29/2022    ESTGFRAFRICA >60 06/08/2022    EGFRNONAA >60 06/08/2022     Lab Results   Component Value Date    ALT 13 12/29/2022    AST 17 12/29/2022    ALKPHOS 59 12/29/2022    BILITOT 0.6 12/29/2022       Lab Results   Component Value Date    IRON 32 12/29/2022    TIBC 336 12/29/2022    FERRITIN 24 12/29/2022     No results found for: [id number]  No results found for: FOLATE  Lab Results    Component Value Date    TSH 1.875 12/29/2022         Review of Systems   Constitutional:  Negative for activity change, appetite change, chills, diaphoresis, fatigue, fever and unexpected weight change.   HENT:  Negative for congestion, dental problem, drooling, ear discharge, ear pain, facial swelling, hearing loss, mouth sores, nosebleeds, postnasal drip, rhinorrhea, sinus pressure, sneezing, sore throat, tinnitus, trouble swallowing and voice change.    Eyes:  Negative for photophobia, pain, discharge, redness, itching and visual disturbance.   Respiratory:  Negative for cough, choking, chest tightness, shortness of breath, wheezing and stridor.    Cardiovascular:  Negative for chest pain, palpitations and leg swelling.   Gastrointestinal:  Positive for abdominal pain. Negative for abdominal distention, anal bleeding, blood in stool, constipation, diarrhea, nausea, rectal pain and vomiting.   Endocrine: Negative for cold intolerance, heat intolerance, polydipsia, polyphagia and polyuria.   Genitourinary:  Negative for decreased urine volume, difficulty urinating, dyspareunia, dysuria, enuresis, flank pain, frequency, genital sores, hematuria, menstrual problem, pelvic pain, urgency, vaginal bleeding, vaginal discharge and vaginal pain.   Musculoskeletal:  Negative for arthralgias, back pain, gait problem, joint swelling, myalgias, neck pain and neck stiffness.   Skin:  Negative for color change, pallor and rash.   Allergic/Immunologic: Negative for environmental allergies, food allergies and immunocompromised state.   Neurological:  Negative for dizziness, tremors, seizures, syncope, facial asymmetry, speech difficulty, weakness, light-headedness, numbness and headaches.   Hematological:  Negative for adenopathy. Does not bruise/bleed easily.   Psychiatric/Behavioral:  Negative for agitation, behavioral problems, confusion, decreased concentration, dysphoric mood, hallucinations, self-injury, sleep disturbance  and suicidal ideas. The patient is not nervous/anxious and is not hyperactive.      Objective:      Physical Exam  Vitals reviewed.   Constitutional:       General: She is not in acute distress.     Appearance: She is well-developed. She is not diaphoretic.   HENT:      Head: Normocephalic and atraumatic.      Right Ear: External ear normal.      Left Ear: External ear normal.      Nose: Nose normal.      Right Sinus: No maxillary sinus tenderness or frontal sinus tenderness.      Left Sinus: No maxillary sinus tenderness or frontal sinus tenderness.      Mouth/Throat:      Pharynx: No oropharyngeal exudate.   Eyes:      General: Lids are normal. No scleral icterus.        Right eye: No discharge.         Left eye: No discharge.      Conjunctiva/sclera: Conjunctivae normal.      Right eye: Right conjunctiva is not injected. No hemorrhage.     Left eye: Left conjunctiva is not injected. No hemorrhage.     Pupils: Pupils are equal, round, and reactive to light.   Neck:      Thyroid: No thyromegaly.      Vascular: No JVD.      Trachea: No tracheal deviation.   Cardiovascular:      Rate and Rhythm: Normal rate.   Pulmonary:      Effort: Pulmonary effort is normal. No respiratory distress.      Breath sounds: No stridor.   Chest:      Chest wall: No tenderness.   Abdominal:      General: Bowel sounds are normal. There is no distension.      Palpations: Abdomen is soft. There is no hepatomegaly, splenomegaly or mass.      Tenderness: There is no abdominal tenderness. There is no rebound.   Musculoskeletal:         General: No tenderness. Normal range of motion.      Cervical back: Normal range of motion and neck supple.   Lymphadenopathy:      Cervical: No cervical adenopathy.      Upper Body:      Right upper body: No supraclavicular adenopathy.      Left upper body: No supraclavicular adenopathy.   Skin:     General: Skin is dry.      Findings: No erythema or rash.   Neurological:      Mental Status: She is alert and  oriented to person, place, and time.      Cranial Nerves: No cranial nerve deficit.      Coordination: Coordination normal.   Psychiatric:         Behavior: Behavior normal.         Thought Content: Thought content normal.         Judgment: Judgment normal.           Assessment:      1. Other hemochromatosis    2. Right lower quadrant abdominal pain    3. Solitary pulmonary nodule    4. Pulmonary nodule 1 cm or greater in diameter    5. Angiodysplasia of cecum    6. H/O bilateral mastectomy    7. Family history of breast cancer in sister    8. Pulmonary nodules    9. Pain due to total left knee replacement, subsequent encounter           Plan:     Persistent iron deficiency.  Status post surgery will continue to observe no need for phlebotomy at present time patient return in 2-3 months with standing labs ordered.  Discussed implications with her.  At this time she is had hepatic cyst noted on previous imaging in granulomatous findings pulmonary nodules patient has standing orders if she wishes proceed with repeat ultrasound.  At some point in approximately 3-6 months will repeat CT chest to see if pulmonary nodules discussed implications answered questions.   appears to hear excellent response to medication monitoring with Kaylene Neri Jr, MD FACP

## 2023-01-22 ENCOUNTER — OFFICE VISIT (OUTPATIENT)
Dept: URGENT CARE | Facility: CLINIC | Age: 78
End: 2023-01-22
Payer: MEDICARE

## 2023-01-22 VITALS
WEIGHT: 129 LBS | SYSTOLIC BLOOD PRESSURE: 118 MMHG | OXYGEN SATURATION: 100 % | RESPIRATION RATE: 16 BRPM | TEMPERATURE: 98 F | DIASTOLIC BLOOD PRESSURE: 55 MMHG | HEART RATE: 72 BPM | HEIGHT: 62 IN | BODY MASS INDEX: 23.74 KG/M2

## 2023-01-22 DIAGNOSIS — R42 POSTURAL DIZZINESS: ICD-10-CM

## 2023-01-22 DIAGNOSIS — R51.9 ACUTE NONINTRACTABLE HEADACHE, UNSPECIFIED HEADACHE TYPE: Primary | ICD-10-CM

## 2023-01-22 DIAGNOSIS — Z11.52 ENCOUNTER FOR SCREENING FOR COVID-19: ICD-10-CM

## 2023-01-22 LAB
CTP QC/QA: YES
SARS-COV-2 AG RESP QL IA.RAPID: NEGATIVE

## 2023-01-22 PROCEDURE — 99213 PR OFFICE/OUTPT VISIT, EST, LEVL III, 20-29 MIN: ICD-10-PCS | Mod: S$GLB,,, | Performed by: PHYSICIAN ASSISTANT

## 2023-01-22 PROCEDURE — 87811 SARS CORONAVIRUS 2 ANTIGEN POCT, MANUAL READ: ICD-10-PCS | Mod: QW,CR,S$GLB, | Performed by: PHYSICIAN ASSISTANT

## 2023-01-22 PROCEDURE — 99213 OFFICE O/P EST LOW 20 MIN: CPT | Mod: S$GLB,,, | Performed by: PHYSICIAN ASSISTANT

## 2023-01-22 PROCEDURE — U0005 INFEC AGEN DETEC AMPLI PROBE: HCPCS | Performed by: PHYSICIAN ASSISTANT

## 2023-01-22 PROCEDURE — 87811 SARS-COV-2 COVID19 W/OPTIC: CPT | Mod: QW,CR,S$GLB, | Performed by: PHYSICIAN ASSISTANT

## 2023-01-22 NOTE — PROGRESS NOTES
"Subjective:       Patient ID: Selam Botello is a 77 y.o. female.    Vitals:  height is 5' 2" (1.575 m) and weight is 58.5 kg (129 lb). Her tympanic temperature is 98.2 °F (36.8 °C). Her blood pressure is 118/55 (abnormal) and her pulse is 72. Her respiration is 16 and oxygen saturation is 100%.     Chief Complaint: Dizziness (headache)    Pt presents to the clinic today with sinus headache and positional dizziness that began about 2 days ago.  Patient is concerned for COVID as she recently attended a wedding and  and was round a lot of people.  She denies any fever, congestion, sore throat, vision changes, nausea/vomiting, slurred speech, focal weakness, numbness/tingling.  She states that dizziness is only when she bends over or when she turns to 1 side while laying down.  Dizziness feels like and off balance or spinning and is very brief.  Resolves almost immediately with position change.  Patient has only had a few episodes of this dizziness over the past 2 days.  Also reports tinnitus in her left ear.  Denies any history of vertigo but is established with ENT for ear problems.    Dizziness:   Chronicity:  New  Onset:  In the past 7 days  Progression since onset:  Unchanged  Frequency - weeks/days included: When bending over.  Pain Scale:  2/10 (Headache)  Severity:  Mild  Dizziness characteristics:  Off-balance   Associated symptoms: headaches and tinnitus.no hearing loss, no ear pain, no weakness, no light-headedness, no palpitations, no panic, no facial weakness, no slurred speech, no numbness in extremities and no chest pain.  Aggravated by:  Position changes  Treatments tried: Advil on yesterday.   PMH includes: ear surgery and ear tubes.no strokes, no neurologic disease, no head trauma and no head trauma.    Constitution: Negative.   HENT:  Positive for tinnitus and sinus pressure. Negative for ear pain, ear discharge, hearing loss, congestion and sore throat.    Neck: neck negative. "   Cardiovascular:  Negative for chest pain, leg swelling and palpitations.   Eyes: Negative.    Respiratory: Negative.     Gastrointestinal: Negative.    Musculoskeletal: Negative.    Skin: Negative.    Neurological:  Positive for dizziness and headaches. Negative for history of vertigo, light-headedness, passing out, facial drooping, speech difficulty, numbness and tingling.     Objective:      Physical Exam   Constitutional: She is oriented to person, place, and time. She appears well-developed.  Non-toxic appearance. She does not appear ill. No distress. normal  HENT:   Head: Normocephalic and atraumatic.   Ears:   Right Ear: Tympanic membrane, external ear and ear canal normal.   Left Ear: External ear normal. No no drainage or tenderness. Tympanic membrane is perforated (pt reports this is chronic).   Nose: Nose normal.   Eyes: Conjunctivae and EOM are normal. Pupils are equal, round, and reactive to light. Extraocular movement intact   Neck: Neck supple.   Cardiovascular: Normal rate, regular rhythm and normal heart sounds.   Pulmonary/Chest: Effort normal and breath sounds normal.   Abdominal: Normal appearance.   Musculoskeletal: Normal range of motion.         General: Normal range of motion.      Right lower leg: No edema.      Left lower leg: No edema.   Neurological: no focal deficit. She is alert and oriented to person, place, and time. She displays no weakness and no dysarthria. No cranial nerve deficit. She shows no pronator drift. Coordination and gait normal. GCS eye subscore is 4. GCS verbal subscore is 5. GCS motor subscore is 6.   Skin: Skin is warm, dry, not diaphoretic, not pale and no rash.   Psychiatric: Her behavior is normal. Mood and thought content normal.       Results for orders placed or performed in visit on 01/22/23   SARS Coronavirus 2 Antigen, POCT Manual Read   Result Value Ref Range    SARS Coronavirus 2 Antigen Negative Negative     Acceptable Yes         Assessment:       1. Acute nonintractable headache, unspecified headache type    2. Postural dizziness    3. Encounter for screening for COVID-19          Plan:       VSS. Pt neurologically intact. Her main concern today is ruling out COVID. She is requesting PCR test, as she has had positive PCR in the past after a negative rapid test (per pt).  Dizziness seems to be related to chronic issues with left ear and sounds more like vertigo.  Discussed with patient medications that can be used to help with vertigo but patient states that she does not feel dizziness is severe enough at this time to take anything.  She says that she will plan to follow-up with her ENT if it continues.  PCR results should show up in patient's MyChart in the next day or to.  If positive then can discuss Paxlovid.  Acute nonintractable headache, unspecified headache type  -     SARS Coronavirus 2 Antigen, POCT Manual Read    Postural dizziness  -     SARS Coronavirus 2 Antigen, POCT Manual Read    Encounter for screening for COVID-19  -     SARS Coronavirus 2 Antigen, POCT Manual Read

## 2023-01-23 ENCOUNTER — TELEPHONE (OUTPATIENT)
Dept: URGENT CARE | Facility: CLINIC | Age: 78
End: 2023-01-23
Payer: MEDICARE

## 2023-01-23 ENCOUNTER — PATIENT MESSAGE (OUTPATIENT)
Dept: HEMATOLOGY/ONCOLOGY | Facility: CLINIC | Age: 78
End: 2023-01-23
Payer: MEDICARE

## 2023-01-23 LAB
SARS-COV-2 RNA RESP QL NAA+PROBE: NOT DETECTED
SARS-COV-2- CYCLE NUMBER: NORMAL

## 2023-02-01 ENCOUNTER — OFFICE VISIT (OUTPATIENT)
Dept: PRIMARY CARE CLINIC | Facility: CLINIC | Age: 78
End: 2023-02-01
Payer: MEDICARE

## 2023-02-01 VITALS
HEIGHT: 62 IN | SYSTOLIC BLOOD PRESSURE: 119 MMHG | TEMPERATURE: 98 F | OXYGEN SATURATION: 100 % | HEART RATE: 75 BPM | DIASTOLIC BLOOD PRESSURE: 58 MMHG | WEIGHT: 128 LBS | BODY MASS INDEX: 23.55 KG/M2

## 2023-02-01 DIAGNOSIS — K51.00 ULCERATIVE PANCOLITIS WITHOUT COMPLICATION: ICD-10-CM

## 2023-02-01 DIAGNOSIS — E83.110 HEREDITARY HEMOCHROMATOSIS: ICD-10-CM

## 2023-02-01 DIAGNOSIS — I47.19 ATRIAL TACHYCARDIA, PAROXYSMAL: ICD-10-CM

## 2023-02-01 DIAGNOSIS — M85.80 OSTEOPENIA, UNSPECIFIED LOCATION: Primary | ICD-10-CM

## 2023-02-01 DIAGNOSIS — M81.0 AGE-RELATED OSTEOPOROSIS WITHOUT CURRENT PATHOLOGICAL FRACTURE: ICD-10-CM

## 2023-02-01 DIAGNOSIS — E03.9 ACQUIRED HYPOTHYROIDISM: ICD-10-CM

## 2023-02-01 DIAGNOSIS — R91.8 PULMONARY NODULES: ICD-10-CM

## 2023-02-01 PROCEDURE — 99214 PR OFFICE/OUTPT VISIT, EST, LEVL IV, 30-39 MIN: ICD-10-PCS | Mod: S$PBB,,, | Performed by: FAMILY MEDICINE

## 2023-02-01 PROCEDURE — 99999 PR PBB SHADOW E&M-EST. PATIENT-LVL IV: CPT | Mod: PBBFAC,,, | Performed by: FAMILY MEDICINE

## 2023-02-01 PROCEDURE — 99214 OFFICE O/P EST MOD 30 MIN: CPT | Mod: PBBFAC,PN | Performed by: FAMILY MEDICINE

## 2023-02-01 PROCEDURE — 99999 PR PBB SHADOW E&M-EST. PATIENT-LVL IV: ICD-10-PCS | Mod: PBBFAC,,, | Performed by: FAMILY MEDICINE

## 2023-02-01 PROCEDURE — 99214 OFFICE O/P EST MOD 30 MIN: CPT | Mod: S$PBB,,, | Performed by: FAMILY MEDICINE

## 2023-02-01 NOTE — PATIENT INSTRUCTIONS
LMNT or liquid IV for fluid replacement may help improve your blood pressure.     Try pseudoephedrine 30 mg every 4-6 hours for dizziness.    Talk to your cardiologist about potentially stopping metoprolol since your heart rate is normal off of it today and your blood pressure runs low.     Shingles dose # 2 is due and available at the pharmacy.

## 2023-02-01 NOTE — PROGRESS NOTES
Subjective:       Patient ID: Selam Botello is a 77 y.o. female.    Chief Complaint: Follow-up (Six month follow. Pt has been recently diagnosed with vertigo. Pt had left knee surgery on 9/22)    HPI:     Ms. Botello is here for 6 month f/u of medical problems. She is doing well; has been diagnosed with BPV and is doing vestibular PT per recommendation of her ENT, Dr. Irving. Had recent knee replacement , still in PT but doing well. BRPT for knee rehab. Continues with Prolia q6 months with Cassandra Mejia at [facility]. No recent med changes. Hemochromatosis managed by Dr. Neri in heme/onc.      HM:  2/21 covid vaccines/booster, 11/17 [id number], 2/19 [id number], 2/22 TDaP, 4/21 BMD, no MMG, 2022 Cscope Dr. Bailey rep 3y, Cards Dr. Garcia, Eye exam. DEXA 4/22 with Cassandra Mejia 6 months ago on Prolia at [facility]    Objective:     Vitals:    02/01/23 0908   BP: (!) 119/58   Pulse: 75   Temp: 97.8 °F (36.6 °C)        Physical Exam  Vitals and nursing note reviewed.   Constitutional:       Appearance: She is well-developed.   HENT:      Head: Normocephalic and atraumatic.   Eyes:      Pupils: Pupils are equal, round, and reactive to light.   Cardiovascular:      Rate and Rhythm: Normal rate and regular rhythm.   Pulmonary:      Effort: Pulmonary effort is normal.      Breath sounds: Normal breath sounds.   Musculoskeletal:         General: No deformity. Normal range of motion.      Cervical back: Normal range of motion and neck supple.   Skin:     General: Skin is warm and dry.   Neurological:      Mental Status: She is alert and oriented to person, place, and time.      Gait: Gait abnormal (ambulates independently with only mild limp from recent TKA).   Psychiatric:         Behavior: Behavior normal.        Latest Reference Range & Units 12/29/22 11:43   WBC 3.90 - 12.70 K/uL 5.11   RBC 4.00 - 5.40 M/uL 3.80 (L)   Hemoglobin 12.0 - 16.0 g/dL 10.8 (L)   Hematocrit 37.0 - 48.5 % 34.2 (L)   MCV 82 - 98 fL 90   MCH 27.0 - 31.0 pg 28.4    MCHC 32.0 - 36.0 g/dL 31.6 (L)   RDW 11.5 - 14.5 % 15.1 (H)   Platelets 150 - 450 K/uL 304   MPV 9.2 - 12.9 fL 9.3   Gran % 38.0 - 73.0 % 69.6   Lymph % 18.0 - 48.0 % 20.2   Mono % 4.0 - 15.0 % 7.6   Eosinophil % 0.0 - 8.0 % 1.8   Basophil % 0.0 - 1.9 % 0.8   Immature Granulocytes 0.0 - 0.5 % 0.0   Gran # (ANC) 1.8 - 7.7 K/uL 3.6   Lymph # 1.0 - 4.8 K/uL 1.0   Mono # 0.3 - 1.0 K/uL 0.4   Eos # 0.0 - 0.5 K/uL 0.1   Baso # 0.00 - 0.20 K/uL 0.04   Immature Grans (Abs) 0.00 - 0.04 K/uL 0.00   nRBC 0 /100 WBC 0   Differential Method  Automated   Iron 30 - 160 ug/dL 32   TIBC 250 - 450 ug/dL 336   Saturated Iron 20 - 50 % 10 (L)   Transferrin 200 - 375 mg/dL 227   Ferritin 20.0 - 300.0 ng/mL 24   Sodium 136 - 145 mmol/L 139   Potassium 3.5 - 5.1 mmol/L 4.2   Chloride 95 - 110 mmol/L 106   CO2 23 - 29 mmol/L 24   Anion Gap 8 - 16 mmol/L 9   BUN 8 - 23 mg/dL 17   Creatinine 0.5 - 1.4 mg/dL 0.7   eGFR >60 mL/min/1.73 m^2 >60   Glucose 70 - 110 mg/dL 97   Calcium 8.7 - 10.5 mg/dL 9.4   Alkaline Phosphatase 55 - 135 U/L 59   PROTEIN TOTAL 6.0 - 8.4 g/dL 6.3   Albumin 3.5 - 5.2 g/dL 4.0   BILIRUBIN TOTAL 0.1 - 1.0 mg/dL 0.6   AST 10 - 40 U/L 17   ALT 10 - 44 U/L 13   TSH 0.400 - 4.000 uIU/mL 1.875   (L): Data is abnormally low  (H): Data is abnormally high    Assessment:       1. Osteopenia, unspecified location    2. Ulcerative pancolitis without complication    3. Atrial tachycardia, paroxysmal    4. Hereditary hemochromatosis    5. Age-related osteoporosis without current pathological fracture    6. Acquired hypothyroidism    7. Pulmonary nodules        Plan:           Problem List Items Addressed This Visit          Pulmonary    Pulmonary nodules    Overview     CT 8/22:      Impression:     1. No detrimental change of the abdomen/pelvis with limitations of pelvic evaluation noted.  2. Few tiny right lung pulmonary nodules with at least 1 calcified.  Findings likely represent granulomatous process given small calcified  mediastinal/hilar lymph nodes.  Likely endobronchial mucous plugging of the right upper and lower lobes.  Follow-up CT chest can be obtained to document stability and/or clearing.  3. Other findings as above.         Current Assessment & Plan     Repeat schedule 8/2023 per Dr. Neri            Cardiac/Vascular    Atrial tachycardia, paroxysmal    Overview     Overview:   · Beta-blockers alone proved inadequate for symptomatic relief.    · Propafenone 150 mg p.o. twice daily begun May 2018.  Dr. Rawls (EP)  subsequently increased to 3 times daily  · Subsequent event monitor shows only premature atrial and ventricular contractions.  · Metoprolol succinate 25 mg nightly added for symptomatic benefit June 2018    Last Assessment & Plan:   · Continue propafenone 75 mg by mouth twice daily for the next week.  As her Synthroid is being reduced she will then try discontinuing it to see if her palpitations are diminished.         Current Assessment & Plan     Continue metoprolol            Endocrine    Acquired hypothyroidism    Overview     Overview:   · Synthroid has been reduced from 100 mcg progressively to 50 mcg between December 2017 and May 2018 in an effort to reduce her symptoms of palpitations/atrial tachycardia  · I suspect that excessive supplementation has provoked her atrial arrhythmias  · It comes to light that the patient may not even be hypothyroid.  She was started on Synthroid during her teenage years for irregular periods.    Last Assessment & Plan:   · Reduce Synthroid to 25 mcg daily  · T4 and TSH with review appointment in 2 months.         Current Assessment & Plan     Lab Results   Component Value Date    TSH 1.875 12/29/2022               GI    Hereditary hemochromatosis-stable; followed by heme/onc    Ulcerative colitis    Overview     Around 2007            Orthopedic    Osteopenia - Primary (Chronic)    Relevant Medications    denosumab (PROLIA) 60 mg/mL Syrg    Age-related osteoporosis without  current pathological fracture    Overview     DEXA  3/2/15 S -0.8, Forearm 33% -2.4 Got Prolia 3/3  Will go on holiday         Current Assessment & Plan     Prolia q 6 months with Cass[facility] Me[facility]ia at [facility]          Has upcoming visit with Dr. Garcia (cardiology) to discuss metoprolol. Nl HR and having some low BP on medication. RTC 3 months

## 2023-02-07 PROBLEM — C67.2 MALIGNANT NEOPLASM OF LATERAL WALL OF URINARY BLADDER: Status: RESOLVED | Noted: 2019-10-31 | Resolved: 2023-02-07

## 2023-02-17 ENCOUNTER — LAB VISIT (OUTPATIENT)
Dept: LAB | Facility: HOSPITAL | Age: 78
End: 2023-02-17
Attending: INTERNAL MEDICINE
Payer: MEDICARE

## 2023-02-17 DIAGNOSIS — K55.20 ANGIODYSPLASIA OF CECUM: ICD-10-CM

## 2023-02-17 DIAGNOSIS — E03.9 ACQUIRED HYPOTHYROIDISM: ICD-10-CM

## 2023-02-17 DIAGNOSIS — E83.19 IRON OVERLOAD SYNDROME: ICD-10-CM

## 2023-02-17 DIAGNOSIS — R94.6 ABNORMAL RESULTS OF THYROID FUNCTION STUDIES: ICD-10-CM

## 2023-02-17 LAB
ALBUMIN SERPL BCP-MCNC: 4.4 G/DL (ref 3.5–5.2)
ALP SERPL-CCNC: 68 U/L (ref 55–135)
ALT SERPL W/O P-5'-P-CCNC: 15 U/L (ref 10–44)
ANION GAP SERPL CALC-SCNC: 10 MMOL/L (ref 8–16)
AST SERPL-CCNC: 20 U/L (ref 10–40)
BASOPHILS # BLD AUTO: 0.04 K/UL (ref 0–0.2)
BASOPHILS NFR BLD: 0.8 % (ref 0–1.9)
BILIRUB SERPL-MCNC: 0.6 MG/DL (ref 0.1–1)
BUN SERPL-MCNC: 18 MG/DL (ref 8–23)
CALCIUM SERPL-MCNC: 10.1 MG/DL (ref 8.7–10.5)
CHLORIDE SERPL-SCNC: 105 MMOL/L (ref 95–110)
CO2 SERPL-SCNC: 24 MMOL/L (ref 23–29)
CREAT SERPL-MCNC: 0.7 MG/DL (ref 0.5–1.4)
DIFFERENTIAL METHOD: ABNORMAL
EOSINOPHIL # BLD AUTO: 0.1 K/UL (ref 0–0.5)
EOSINOPHIL NFR BLD: 2.6 % (ref 0–8)
ERYTHROCYTE [DISTWIDTH] IN BLOOD BY AUTOMATED COUNT: 15.1 % (ref 11.5–14.5)
EST. GFR  (NO RACE VARIABLE): >60 ML/MIN/1.73 M^2
GLUCOSE SERPL-MCNC: 91 MG/DL (ref 70–110)
HCT VFR BLD AUTO: 34.8 % (ref 37–48.5)
HGB BLD-MCNC: 11.3 G/DL (ref 12–16)
IMM GRANULOCYTES # BLD AUTO: 0.01 K/UL (ref 0–0.04)
IMM GRANULOCYTES NFR BLD AUTO: 0.2 % (ref 0–0.5)
IRON SERPL-MCNC: 47 UG/DL (ref 30–160)
LYMPHOCYTES # BLD AUTO: 1.2 K/UL (ref 1–4.8)
LYMPHOCYTES NFR BLD: 24.4 % (ref 18–48)
MCH RBC QN AUTO: 28.3 PG (ref 27–31)
MCHC RBC AUTO-ENTMCNC: 32.5 G/DL (ref 32–36)
MCV RBC AUTO: 87 FL (ref 82–98)
MONOCYTES # BLD AUTO: 0.5 K/UL (ref 0.3–1)
MONOCYTES NFR BLD: 9.1 % (ref 4–15)
NEUTROPHILS # BLD AUTO: 3.2 K/UL (ref 1.8–7.7)
NEUTROPHILS NFR BLD: 62.9 % (ref 38–73)
NRBC BLD-RTO: 0 /100 WBC
PLATELET # BLD AUTO: 314 K/UL (ref 150–450)
PMV BLD AUTO: 9.4 FL (ref 9.2–12.9)
POTASSIUM SERPL-SCNC: 4.1 MMOL/L (ref 3.5–5.1)
PROT SERPL-MCNC: 6.6 G/DL (ref 6–8.4)
RBC # BLD AUTO: 4 M/UL (ref 4–5.4)
SATURATED IRON: 13 % (ref 20–50)
SODIUM SERPL-SCNC: 139 MMOL/L (ref 136–145)
TOTAL IRON BINDING CAPACITY: 376 UG/DL (ref 250–450)
TRANSFERRIN SERPL-MCNC: 254 MG/DL (ref 200–375)
TSH SERPL DL<=0.005 MIU/L-ACNC: 3.26 UIU/ML (ref 0.4–4)
WBC # BLD AUTO: 5.08 K/UL (ref 3.9–12.7)

## 2023-02-17 PROCEDURE — 80053 COMPREHEN METABOLIC PANEL: CPT | Performed by: INTERNAL MEDICINE

## 2023-02-17 PROCEDURE — 36415 COLL VENOUS BLD VENIPUNCTURE: CPT | Performed by: INTERNAL MEDICINE

## 2023-02-17 PROCEDURE — 84466 ASSAY OF TRANSFERRIN: CPT | Performed by: INTERNAL MEDICINE

## 2023-02-17 PROCEDURE — 82728 ASSAY OF FERRITIN: CPT | Performed by: INTERNAL MEDICINE

## 2023-02-17 PROCEDURE — 85025 COMPLETE CBC W/AUTO DIFF WBC: CPT | Performed by: INTERNAL MEDICINE

## 2023-02-17 PROCEDURE — 84443 ASSAY THYROID STIM HORMONE: CPT | Performed by: INTERNAL MEDICINE

## 2023-02-18 LAB — FERRITIN SERPL-MCNC: 27 NG/ML (ref 20–300)

## 2023-02-19 ENCOUNTER — PATIENT MESSAGE (OUTPATIENT)
Dept: HEMATOLOGY/ONCOLOGY | Facility: CLINIC | Age: 78
End: 2023-02-19
Payer: MEDICARE

## 2023-02-23 ENCOUNTER — OFFICE VISIT (OUTPATIENT)
Dept: HEMATOLOGY/ONCOLOGY | Facility: CLINIC | Age: 78
End: 2023-02-23
Payer: MEDICARE

## 2023-02-23 DIAGNOSIS — E83.110 HEREDITARY HEMOCHROMATOSIS: Primary | ICD-10-CM

## 2023-02-23 DIAGNOSIS — T84.84XD PAIN DUE TO TOTAL LEFT KNEE REPLACEMENT, SUBSEQUENT ENCOUNTER: ICD-10-CM

## 2023-02-23 DIAGNOSIS — Z90.13 H/O BILATERAL MASTECTOMY: ICD-10-CM

## 2023-02-23 DIAGNOSIS — K55.20 ANGIODYSPLASIA OF CECUM: ICD-10-CM

## 2023-02-23 DIAGNOSIS — Z96.652 PAIN DUE TO TOTAL LEFT KNEE REPLACEMENT, SUBSEQUENT ENCOUNTER: ICD-10-CM

## 2023-02-23 DIAGNOSIS — Z80.3 FAMILY HISTORY OF BREAST CANCER IN SISTER: ICD-10-CM

## 2023-02-23 DIAGNOSIS — E03.9 ACQUIRED HYPOTHYROIDISM: ICD-10-CM

## 2023-02-23 PROCEDURE — 99214 OFFICE O/P EST MOD 30 MIN: CPT | Mod: 95,,, | Performed by: INTERNAL MEDICINE

## 2023-02-23 PROCEDURE — 99214 PR OFFICE/OUTPT VISIT, EST, LEVL IV, 30-39 MIN: ICD-10-PCS | Mod: 95,,, | Performed by: INTERNAL MEDICINE

## 2023-02-23 NOTE — PROGRESS NOTES
Subjective:       Patient ID: Selam Botello is a 77 y.o. female.    Chief Complaint: Results and Anemia    HPI:  77-year-old female history of hemochromatosis patient has had a history of angiodysplasia of her cecum.  Patient seen in video visit follow-up for review laboratory studies recent knee replacement    Past Medical History:   Diagnosis Date    Acid reflux     Allergy     Arthritis     Atrial tachycardia     Bladder cancer     Breast cyst     Cataract     Colon polyp     COVID 5/19/2022    Deep vein thrombosis     Degenerative disc disease     Encounter for blood transfusion     General anesthetics causing adverse effect in therapeutic use     GI bleed     Hematuria, gross     Inflammatory bowel disease     Liver disease     cyst    Malignant neoplasm of lateral wall of urinary bladder 10/31/2019    Osteoporosis     PONV (postoperative nausea and vomiting)     Retina disorder, left     Skin cancer     Thyroid disease     Urinary tract infection      Family History   Problem Relation Age of Onset    COPD Mother     Cancer Mother     COPD Father     Cancer Father     Cancer Sister      Social History     Socioeconomic History    Marital status:    Tobacco Use    Smoking status: Never    Smokeless tobacco: Never   Substance and Sexual Activity    Alcohol use: No    Drug use: No    Sexual activity: Yes     Partners: Male     Past Surgical History:   Procedure Laterality Date    ADENOIDECTOMY      BLADDER FULGURATION Right 9/12/2018    Procedure: FULGURATION, BLADDER;  Surgeon: Isaias Shah MD;  Location: [facility] OR;  Service: Urology;  Laterality: Right;    BLADDER FULGURATION N/A 4/24/2019    Procedure: FULGURATION, BLADDER;  Surgeon: Isaias Shah MD;  Location: [facility] OR;  Service: Urology;  Laterality: N/A;    BREAST SURGERY Bilateral     CATARACT EXTRACTION      CYSTOSCOPY      CYSTOSCOPY N/A 4/24/2019    Procedure: CYSTOSCOPY;  Surgeon: Isaias MARK  MD Pablo;  Location: [facility];  Service: Urology;  Laterality: N/A;    CYSTOSCOPY WITH URETEROSCOPY, RETROGRADE PYELOGRAPHY, AND INSERTION OF STENT Left 9/12/2018    Procedure: CYSTOSCOPY, WITH RETROGRADE PYELOGRAM AND URETERAL STENT INSERTION;  Surgeon: Isaias Shah MD;  Location: [facility];  Service: Urology;  Laterality: Left;    EYE SURGERY Left 2017    retina pucker    INSTILLATION OF URINARY BLADDER N/A 9/12/2018    Procedure: INSTILLATION, URINARY BLADDER;  Surgeon: Isaias Shah MD;  Location: [facility];  Service: Urology;  Laterality: N/A;  Chemo agent instillation     JOINT REPLACEMENT Bilateral     hip    JOINT REPLACEMENT Right     knee    MASTECTOMY Bilateral 1991    RETROGRADE PYELOGRAPHY Bilateral 10/31/2019    Procedure: PYELOGRAM, RETROGRADE;  Surgeon: Baldo Gates MD;  Location: [facility];  Service: Urology;  Laterality: Bilateral;    TONSILLECTOMY      TURBT      TURBT, WITH BLUE LIGHT CYSTOSCOPY AND CYSVIEW Bilateral 10/31/2019    Procedure: TURBT,WITH BLUE LIGHT CYSTOSCOPY AND CYSVIEW;  Surgeon: Baldo Gates MD;  Location: [facility];  Service: Urology;  Laterality: Bilateral;    TYMPANOSTOMY TUBE PLACEMENT      VASCULAR SURGERY Right     popliteal stent    WISDOM TOOTH EXTRACTION         Labs:  Lab Results   Component Value Date    WBC 5.08 02/17/2023    HGB 11.3 (L) 02/17/2023    HCT 34.8 (L) 02/17/2023    MCV 87 02/17/2023     02/17/2023     BMP  Lab Results   Component Value Date     02/17/2023    K 4.1 02/17/2023     02/17/2023    CO2 24 02/17/2023    BUN 18 02/17/2023    CREATININE 0.7 02/17/2023    CALCIUM 10.1 02/17/2023    ANIONGAP 10 02/17/2023    ESTGFRAFRICA >60 06/08/2022    EGFRNONAA >60 06/08/2022     Lab Results   Component Value Date    ALT 15 02/17/2023    AST 20 02/17/2023    ALKPHOS 68 02/17/2023    BILITOT 0.6 02/17/2023       Lab Results   Component Value Date    IRON 47 02/17/2023    TIBC 376 02/17/2023    FERRITIN 27 02/17/2023      No results found for: [id number]  No results found for: FOLATE  Lab Results   Component Value Date    TSH 3.260 02/17/2023         Review of Systems   Constitutional:  Negative for activity change, appetite change, chills, diaphoresis, fatigue, fever and unexpected weight change.   HENT:  Negative for congestion, dental problem, drooling, ear discharge, ear pain, facial swelling, hearing loss, mouth sores, nosebleeds, postnasal drip, rhinorrhea, sinus pressure, sneezing, sore throat, tinnitus, trouble swallowing and voice change.    Eyes:  Negative for photophobia, pain, discharge, redness, itching and visual disturbance.   Respiratory:  Negative for cough, choking, chest tightness, shortness of breath, wheezing and stridor.    Cardiovascular:  Negative for chest pain, palpitations and leg swelling.   Gastrointestinal:  Negative for abdominal distention, abdominal pain, anal bleeding, blood in stool, constipation, diarrhea, nausea, rectal pain and vomiting.   Endocrine: Negative for cold intolerance, heat intolerance, polydipsia, polyphagia and polyuria.   Genitourinary:  Negative for decreased urine volume, difficulty urinating, dyspareunia, dysuria, enuresis, flank pain, frequency, genital sores, hematuria, menstrual problem, pelvic pain, urgency, vaginal bleeding, vaginal discharge and vaginal pain.   Musculoskeletal:  Positive for gait problem. Negative for arthralgias, back pain, joint swelling, myalgias, neck pain and neck stiffness.   Skin:  Negative for color change, pallor and rash.   Allergic/Immunologic: Negative for environmental allergies, food allergies and immunocompromised state.   Neurological:  Negative for dizziness, tremors, seizures, syncope, facial asymmetry, speech difficulty, weakness, light-headedness, numbness and headaches.   Hematological:  Negative for adenopathy. Does not bruise/bleed easily.   Psychiatric/Behavioral:  Negative for agitation, behavioral problems, confusion,  decreased concentration, dysphoric mood, hallucinations, self-injury, sleep disturbance and suicidal ideas. The patient is not nervous/anxious and is not hyperactive.      Objective:      Physical Exam  Constitutional:       Appearance: Normal appearance.           Assessment:      1. Hereditary hemochromatosis    2. Angiodysplasia of cecum    3. Acquired hypothyroidism    4. Family history of breast cancer in sister    5. H/O bilateral mastectomy    6. Pain due to total left knee replacement, subsequent encounter           Plan:     The patient location is:  Home  The chief complaint leading to consultation is:  Anemia    Visit type: audiovisual    Face to Face time with patient: 25 minutes of total time spent on the encounter, which includes face to face time and non-face to face time preparing to see the patient (eg, review of tests), Obtaining and/or reviewing separately obtained history, Documenting clinical information in the electronic or other health record, Independently interpreting results (not separately reported) and communicating results to the patient/family/caregiver, or Care coordination (not separately reported).         Each patient to whom he or she provides medical services by telemedicine is:  (1) informed of the relationship between the physician and patient and the respective role of any other health care provider with respect to management of the patient; and (2) notified that he or she may decline to receive medical services by telemedicine and may withdraw from such care at any time.    Notes:    Extensive conversation reviewed information with patient at this time would recommend that patient proceed with observation return in 1 month with standing labs.  Can be seen in video visit patient's movement and her knee is improved up to 119°.  No need for any phlebotomy at present time could be slight degree of GI blood loss with resulting control of hemochromatosis no active bleeding at  present      Med Onc Chart Routing      Follow up with physician 1 month. RTC with standing labs prior   Follow up with JASON    Infusion scheduling note    Injection scheduling note    Labs    Imaging    Pharmacy appointment    Other referrals       Adonay Neri Jr, MD FACP

## 2023-03-06 ENCOUNTER — TELEPHONE (OUTPATIENT)
Dept: UROLOGY | Facility: CLINIC | Age: 78
End: 2023-03-06
Payer: MEDICARE

## 2023-03-06 NOTE — TELEPHONE ENCOUNTER
Spoke with pt to explain why she has to come here for nurse visit. Pt's sample has been lost in [facility] And Dr. Gates would rather her come here. Pt agreed to a later time on the same day.

## 2023-03-20 ENCOUNTER — OFFICE VISIT (OUTPATIENT)
Dept: PRIMARY CARE CLINIC | Facility: CLINIC | Age: 78
End: 2023-03-20
Payer: MEDICARE

## 2023-03-20 VITALS
TEMPERATURE: 98 F | DIASTOLIC BLOOD PRESSURE: 70 MMHG | SYSTOLIC BLOOD PRESSURE: 124 MMHG | OXYGEN SATURATION: 98 % | WEIGHT: 131 LBS | HEIGHT: 62 IN | HEART RATE: 66 BPM | BODY MASS INDEX: 24.11 KG/M2

## 2023-03-20 DIAGNOSIS — L02.619 CELLULITIS AND ABSCESS OF TOE, UNSPECIFIED LATERALITY: ICD-10-CM

## 2023-03-20 DIAGNOSIS — L03.039 CELLULITIS AND ABSCESS OF TOE, UNSPECIFIED LATERALITY: ICD-10-CM

## 2023-03-20 PROCEDURE — 99214 OFFICE O/P EST MOD 30 MIN: CPT | Mod: PBBFAC,PN | Performed by: NURSE PRACTITIONER

## 2023-03-20 PROCEDURE — 99999 PR PBB SHADOW E&M-EST. PATIENT-LVL IV: ICD-10-PCS | Mod: PBBFAC,,, | Performed by: NURSE PRACTITIONER

## 2023-03-20 PROCEDURE — 99999 PR PBB SHADOW E&M-EST. PATIENT-LVL IV: CPT | Mod: PBBFAC,,, | Performed by: NURSE PRACTITIONER

## 2023-03-20 PROCEDURE — 99214 OFFICE O/P EST MOD 30 MIN: CPT | Mod: S$PBB,,, | Performed by: NURSE PRACTITIONER

## 2023-03-20 PROCEDURE — 99214 PR OFFICE/OUTPT VISIT, EST, LEVL IV, 30-39 MIN: ICD-10-PCS | Mod: S$PBB,,, | Performed by: NURSE PRACTITIONER

## 2023-03-20 RX ORDER — CEPHALEXIN 500 MG/1
500 CAPSULE ORAL EVERY 8 HOURS
Qty: 21 CAPSULE | Refills: 0 | Status: SHIPPED | OUTPATIENT
Start: 2023-03-20 | End: 2023-03-27

## 2023-03-20 NOTE — PROGRESS NOTES
Selam Botello  03/20/2023  [id number]    Gracie Cross MD  Patient Care Team:  Gracie Cross MD as PCP - General (Internal Medicine)  Isaias Shah MD (Inactive) as Consulting Physician (Urology)  Maria M Dutton RD (Inactive) as Dietitian (Nutrition)      [facility] Note      Chief Complaint:  Chief Complaint   Patient presents with    Follow-up     Pt has had swelling and redness on right great toe for about a month.       History of Present Illness:    Hx of toe fungus  Presents with reports of skin erythema to right great toe - new onset of yellowing, thick toenail with ridges.Used OTC toenail fungas med to right great toenail then noted toe erythema  Last night - tender when sheet touched the toe  Mostly tender to the lateral aspect of the right great toe nail  Denies pain at other times  No trauma  6 weeks -  - toenail became yellowish  Last 2 weeks great toe with erythema  Only notices discomfort when lying in bed at night  No area of pus, no fever, chills  No paresthesias to feet  Denies tenderness and pain to the 1 MTP joint of right great toe            Review of Systems   Constitutional:  Negative for chills, fever and malaise/fatigue.   HENT:  Negative for congestion, ear discharge, ear pain and sore throat.    Eyes: Negative.    Respiratory:  Negative for cough and shortness of breath.    Cardiovascular:  Negative for chest pain, palpitations and leg swelling.   Gastrointestinal:  Negative for abdominal pain, diarrhea, nausea and vomiting.   Genitourinary:  Negative for dysuria and frequency.   Musculoskeletal:  Negative for back pain and myalgias.   Skin:         Skin color change   Neurological:  Positive for headaches. Negative for dizziness and focal weakness.   Psychiatric/Behavioral:  Negative for depression. The patient is not nervous/anxious.        The following were reviewed: Active problem list, medication list, allergies, family history, social history, and Health  Maintenance.     History:  Past Medical History:   Diagnosis Date    Acid reflux     Allergy     Arthritis     Atrial tachycardia     Bladder cancer     Breast cyst     Cataract     Colon polyp     COVID 5/19/2022    Deep vein thrombosis     Degenerative disc disease     Encounter for blood transfusion     General anesthetics causing adverse effect in therapeutic use     GI bleed     Hematuria, gross     Inflammatory bowel disease     Liver disease     cyst    Malignant neoplasm of lateral wall of urinary bladder 10/31/2019    Osteoporosis     PONV (postoperative nausea and vomiting)     Retina disorder, left     Skin cancer     Thyroid disease     Urinary tract infection      Past Surgical History:   Procedure Laterality Date    ADENOIDECTOMY      BLADDER FULGURATION Right 9/12/2018    Procedure: FULGURATION, BLADDER;  Surgeon: Isaias Shah MD;  Location: [facility] OR;  Service: Urology;  Laterality: Right;    BLADDER FULGURATION N/A 4/24/2019    Procedure: FULGURATION, BLADDER;  Surgeon: Isaias Shah MD;  Location: [facility];  Service: Urology;  Laterality: N/A;    BREAST SURGERY Bilateral     CATARACT EXTRACTION      CYSTOSCOPY      CYSTOSCOPY N/A 4/24/2019    Procedure: CYSTOSCOPY;  Surgeon: Isaias Shah MD;  Location: [facility] OR;  Service: Urology;  Laterality: N/A;    CYSTOSCOPY WITH URETEROSCOPY, RETROGRADE PYELOGRAPHY, AND INSERTION OF STENT Left 9/12/2018    Procedure: CYSTOSCOPY, WITH RETROGRADE PYELOGRAM AND URETERAL STENT INSERTION;  Surgeon: Isaias Shah MD;  Location: [facility] OR;  Service: Urology;  Laterality: Left;    EYE SURGERY Left 2017    retina pucker    INSTILLATION OF URINARY BLADDER N/A 9/12/2018    Procedure: INSTILLATION, URINARY BLADDER;  Surgeon: Isaias Shah MD;  Location: [facility];  Service: Urology;  Laterality: N/A;  Chemo agent instillation     JOINT REPLACEMENT Bilateral     hip    JOINT REPLACEMENT Right     knee    MASTECTOMY Bilateral 1991    RETROGRADE PYELOGRAPHY  Bilateral 10/31/2019    Procedure: PYELOGRAM, RETROGRADE;  Surgeon: Baldo Gates MD;  Location: [facility];  Service: Urology;  Laterality: Bilateral;    TONSILLECTOMY      TURBT      TURBT, WITH BLUE LIGHT CYSTOSCOPY AND CYSVIEW Bilateral 10/31/2019    Procedure: TURBT,WITH BLUE LIGHT CYSTOSCOPY AND CYSVIEW;  Surgeon: Baldo Gates MD;  Location: [facility];  Service: Urology;  Laterality: Bilateral;    TYMPANOSTOMY TUBE PLACEMENT      VASCULAR SURGERY Right     popliteal stent    WISDOM TOOTH EXTRACTION       Family History   Problem Relation Age of Onset    COPD Mother     Cancer Mother     COPD Father     Cancer Father     Cancer Sister      Patient Active Problem List   Diagnosis    OA (osteoarthritis)    Age-related osteoporosis without current pathological fracture    Unspecified vitamin D deficiency    Osteoarthritis of hip    Osteopenia    Osteoarthritis of hand    Angiodysplasia of cecum    Medication monitoring encounter    Atrial tachycardia, paroxysmal    Chronic fatigue    H/O bilateral mastectomy    Heart palpitations    Acquired hypothyroidism    Tricuspid valve insufficiency, non-rheumatic    Venous insufficiency    Family history of breast cancer in sister    Hereditary hemochromatosis    Ulcerative colitis    Pulmonary nodules    Pain due to total left knee replacement    Cellulitis and abscess of toe     Review of patient's allergies indicates:   Allergen Reactions    Etomidate Other (See Comments), Palpitations and Shortness Of Breath    Latex Rash     Other reaction(s): mouth ulcers    Latex, natural rubber      Mouth ulcers    Nsaids (non-steroidal anti-inflammatory drug) Other (See Comments)     Bleeding of the colon-hospitalized    Propofol analogues Other (See Comments)     Cognitive function declines for approx 2 weeks  Loses memory function/ability to recall  Does not wake up easily after     Gabapentin      Causes dizziness      Levofloxacin      Other reaction(s): Joint pain     Tramadol Hives and Itching    Epinephrine Palpitations     Arms twitch      Sulfa (sulfonamide antibiotics) Rash       Medications:  Current Outpatient Medications on File Prior to Visit   Medication Sig Dispense Refill    cetirizine (ZYRTEC) 10 MG tablet Take 10 mg by mouth once daily.      cholecalciferol, vitamin D3, 1,250 mcg (50,000 unit) capsule Take 50,000 Units by mouth every 7 days.      denosumab (PROLIA) 60 mg/mL Syrg Inject 1 mL (60 mg total) into the skin every 6 (six) months. 2 mL 0    LIDODERM 5 % 1 patch a daily as needed for pain; Remove & Discard patch within 12 hours 30 patch 6    metoprolol succinate (TOPROL-XL) 25 MG 24 hr tablet Take 25 mg by mouth.      mupirocin (BACTROBAN) 2 % ointment APPLY A SMALL AMOUNT TO THE AFFECTED AREA BY TOPICAL ROUTE 2-3 TIMES PER DAY  0    SYNTHROID 50 mcg tablet TAKE 1 TABLET BY MOUTH EVERY DAY 90 tablet 3    CARBOXYMETHYLCELLULOSE SODIUM (REFRESH TEARS OPHT) Apply to eye 2 (two) times daily.        Current Facility-Administered Medications on File Prior to Visit   Medication Dose Route Frequency Provider Last Rate Last Admin    lactated ringers infusion   Intravenous Continuous Gloria Vences MD   New Bag at 10/31/19 1311    lidocaine (PF) 10 mg/ml (1%) injection 10 mg  1 mL Intradermal Once Gloria Vences MD           Medications have been reviewed and reconciled with patient at visit today.          Exam:  Vitals:    03/20/23 1409   BP: 124/70   Pulse: 66   Temp: 97.9 °F (36.6 °C)     Weight: 59.4 kg (131 lb)   Body mass index is 23.96 kg/m².      BP Readings from Last 3 Encounters:   03/20/23 124/70   02/01/23 (!) 119/58   01/22/23 (!) 118/55     Wt Readings from Last 3 Encounters:   03/20/23 1409 59.4 kg (131 lb)   02/01/23 0908 58.1 kg (128 lb)   01/22/23 0926 58.5 kg (129 lb)            Physical Exam  Constitutional:       Appearance: She is well-developed.   HENT:      Head: Normocephalic and atraumatic.      Nose: Nose normal.   Eyes:      General: No  scleral icterus.     Conjunctiva/sclera: Conjunctivae normal.      Pupils: Pupils are equal, round, and reactive to light.   Cardiovascular:      Rate and Rhythm: Normal rate and regular rhythm.      Pulses:           Posterior tibial pulses are 2+ on the right side.      Heart sounds: Normal heart sounds. No murmur heard.    No friction rub. No gallop.   Pulmonary:      Effort: Pulmonary effort is normal.      Breath sounds: Normal breath sounds.   Abdominal:      General: Bowel sounds are normal.      Palpations: Abdomen is soft.   Musculoskeletal:         General: No tenderness. Normal range of motion.      Cervical back: Normal range of motion and neck supple.      Comments: No obvious swelling of right great toe  No signs of wound or purulent drainage  Skin erythema to right great toe - and slightly tender to touch  Increased warmth  Yellow thick rigid right great toe nail  FROM   Skin:     General: Skin is warm and dry.   Neurological:      Mental Status: She is alert and oriented to person, place, and time.   Psychiatric:         Behavior: Behavior normal.         Laboratory Reviewed:   Lab Results   Component Value Date    WBC 5.08 02/17/2023    HGB 11.3 (L) 02/17/2023    HCT 34.8 (L) 02/17/2023     02/17/2023    CHOL 188 02/05/2019    TRIG 49 02/05/2019    HDL 85 (H) 02/05/2019    ALT 15 02/17/2023    AST 20 02/17/2023     02/17/2023    K 4.1 02/17/2023     02/17/2023    CREATININE 0.7 02/17/2023    BUN 18 02/17/2023    CO2 24 02/17/2023    TSH 3.260 02/17/2023           Health Maintenance  Health Maintenance Topics with due status: Not Due       Topic Last Completion Date    DEXA Scan 04/08/2021    Lipid Panel 08/26/2021    TETANUS VACCINE 02/11/2022     Health Maintenance Due   Topic Date Due    Shingles Vaccine (1 of 2) Never done    COVID-19 Vaccine (4 - Booster for Pfizer series) 11/13/2021       Assessment and Plan:  1. Cellulitis and abscess of toe, unspecified  laterality  Assessment & Plan:  Possibly developing a paronychia to lateral nailbed  Soak twice daily with warm, Epsom salt soaks  Pt states she is going out of town - if erythema has not improvement in 2 days - start antibiotic  May need referral to Podiatry    Orders:  -     cephALEXin (KEFLEX) 500 MG capsule; Take 1 capsule (500 mg total) by mouth every 8 (eight) hours. for 7 days  Dispense: 21 capsule; Refill: 0                 -Patient's lab results were reviewed and discussed with patient  -Treatment options and alternatives were discussed with the patient. Patient expressed understanding. Patient was given the opportunity to ask questions and be an active participant in their medical care. Patient had no further questions or concerns at this time.         Future Appointments   Date Time Provider Department Center   3/30/2023  9:00 AM [facility]2 [facility]   3/30/2023  9:50 AM LABORATORY, [facility] LAB [facility]   4/5/2023 10:20 AM Adonay Neri MD [facility] HEM ONC [facility]   6/30/2023 10:00 AM UROLOGY NURSE, [facility] UROLOGY [facility]   7/7/2023 11:00 AM Baldo Gates MD [facility] UROLOGY [facility]          After visit summary printed and given to patient upon discharge.  Patient goals and care plan are included in After visit summary.    Total medical decision making time was 30 min.    The following issues were discussed: The encounter diagnosis was Cellulitis and abscess of toe, unspecified laterality.    Health maintenance needs, recent test results and goals of care discussed with pt and questions answered.           Regina Scruggs, APRN, NP-C  [facility] [address] [place] [address]

## 2023-03-20 NOTE — ASSESSMENT & PLAN NOTE
Possibly developing a paronychia to lateral nailbed  Soak twice daily with warm, Epsom salt soaks  Pt states she is going out of town - if erythema has not improvement in 2 days - start antibiotic  May need referral to Podiatry

## 2023-03-27 ENCOUNTER — LAB VISIT (OUTPATIENT)
Dept: LAB | Facility: HOSPITAL | Age: 78
End: 2023-03-27
Attending: INTERNAL MEDICINE
Payer: MEDICARE

## 2023-03-27 DIAGNOSIS — R94.6 ABNORMAL RESULTS OF THYROID FUNCTION STUDIES: ICD-10-CM

## 2023-03-27 DIAGNOSIS — R74.01 ELEVATION OF LEVELS OF LIVER TRANSAMINASE LEVELS: ICD-10-CM

## 2023-03-27 DIAGNOSIS — K55.20 ANGIODYSPLASIA OF CECUM: ICD-10-CM

## 2023-03-27 DIAGNOSIS — E03.9 ACQUIRED HYPOTHYROIDISM: ICD-10-CM

## 2023-03-27 DIAGNOSIS — E83.19 IRON OVERLOAD SYNDROME: ICD-10-CM

## 2023-03-27 LAB
ALBUMIN SERPL BCP-MCNC: 4.3 G/DL (ref 3.5–5.2)
ALP SERPL-CCNC: 61 U/L (ref 55–135)
ALT SERPL W/O P-5'-P-CCNC: 10 U/L (ref 10–44)
ANION GAP SERPL CALC-SCNC: 10 MMOL/L (ref 8–16)
AST SERPL-CCNC: 16 U/L (ref 10–40)
BASOPHILS # BLD AUTO: 0.04 K/UL (ref 0–0.2)
BASOPHILS NFR BLD: 0.7 % (ref 0–1.9)
BILIRUB SERPL-MCNC: 0.5 MG/DL (ref 0.1–1)
BUN SERPL-MCNC: 18 MG/DL (ref 8–23)
CALCIUM SERPL-MCNC: 9.8 MG/DL (ref 8.7–10.5)
CHLORIDE SERPL-SCNC: 105 MMOL/L (ref 95–110)
CO2 SERPL-SCNC: 24 MMOL/L (ref 23–29)
CREAT SERPL-MCNC: 0.7 MG/DL (ref 0.5–1.4)
DIFFERENTIAL METHOD: ABNORMAL
EOSINOPHIL # BLD AUTO: 0.1 K/UL (ref 0–0.5)
EOSINOPHIL NFR BLD: 2.1 % (ref 0–8)
ERYTHROCYTE [DISTWIDTH] IN BLOOD BY AUTOMATED COUNT: 15.5 % (ref 11.5–14.5)
EST. GFR  (NO RACE VARIABLE): >60 ML/MIN/1.73 M^2
GLUCOSE SERPL-MCNC: 92 MG/DL (ref 70–110)
HCT VFR BLD AUTO: 36.2 % (ref 37–48.5)
HGB BLD-MCNC: 11.6 G/DL (ref 12–16)
IMM GRANULOCYTES # BLD AUTO: 0.02 K/UL (ref 0–0.04)
IMM GRANULOCYTES NFR BLD AUTO: 0.4 % (ref 0–0.5)
IRON SERPL-MCNC: 72 UG/DL (ref 30–160)
LYMPHOCYTES # BLD AUTO: 1.3 K/UL (ref 1–4.8)
LYMPHOCYTES NFR BLD: 22.7 % (ref 18–48)
MCH RBC QN AUTO: 28 PG (ref 27–31)
MCHC RBC AUTO-ENTMCNC: 32 G/DL (ref 32–36)
MCV RBC AUTO: 87 FL (ref 82–98)
MONOCYTES # BLD AUTO: 0.5 K/UL (ref 0.3–1)
MONOCYTES NFR BLD: 8.1 % (ref 4–15)
NEUTROPHILS # BLD AUTO: 3.8 K/UL (ref 1.8–7.7)
NEUTROPHILS NFR BLD: 66 % (ref 38–73)
NRBC BLD-RTO: 0 /100 WBC
PLATELET # BLD AUTO: 313 K/UL (ref 150–450)
PMV BLD AUTO: 9.3 FL (ref 9.2–12.9)
POTASSIUM SERPL-SCNC: 4.2 MMOL/L (ref 3.5–5.1)
PROT SERPL-MCNC: 6.8 G/DL (ref 6–8.4)
RBC # BLD AUTO: 4.14 M/UL (ref 4–5.4)
SATURATED IRON: 20 % (ref 20–50)
SODIUM SERPL-SCNC: 139 MMOL/L (ref 136–145)
TOTAL IRON BINDING CAPACITY: 366 UG/DL (ref 250–450)
TRANSFERRIN SERPL-MCNC: 247 MG/DL (ref 200–375)
WBC # BLD AUTO: 5.68 K/UL (ref 3.9–12.7)

## 2023-03-27 PROCEDURE — 82728 ASSAY OF FERRITIN: CPT | Performed by: INTERNAL MEDICINE

## 2023-03-27 PROCEDURE — 80053 COMPREHEN METABOLIC PANEL: CPT | Performed by: INTERNAL MEDICINE

## 2023-03-27 PROCEDURE — 84443 ASSAY THYROID STIM HORMONE: CPT | Performed by: INTERNAL MEDICINE

## 2023-03-27 PROCEDURE — 84466 ASSAY OF TRANSFERRIN: CPT | Performed by: INTERNAL MEDICINE

## 2023-03-27 PROCEDURE — 36415 COLL VENOUS BLD VENIPUNCTURE: CPT | Performed by: INTERNAL MEDICINE

## 2023-03-27 PROCEDURE — 85025 COMPLETE CBC W/AUTO DIFF WBC: CPT | Performed by: INTERNAL MEDICINE

## 2023-03-28 LAB
FERRITIN SERPL-MCNC: 22 NG/ML (ref 20–300)
TSH SERPL DL<=0.005 MIU/L-ACNC: 3.28 UIU/ML (ref 0.4–4)

## 2023-03-30 ENCOUNTER — HOSPITAL ENCOUNTER (OUTPATIENT)
Dept: RADIOLOGY | Facility: HOSPITAL | Age: 78
Discharge: HOME | End: 2023-03-30
Attending: INTERNAL MEDICINE
Payer: MEDICARE

## 2023-03-30 DIAGNOSIS — R10.31 RIGHT LOWER QUADRANT ABDOMINAL PAIN: ICD-10-CM

## 2023-03-30 PROCEDURE — 76700 US EXAM ABDOM COMPLETE: CPT | Mod: 26,,, | Performed by: RADIOLOGY

## 2023-03-30 PROCEDURE — 76700 US ABDOMEN COMPLETE: ICD-10-PCS | Mod: 26,,, | Performed by: RADIOLOGY

## 2023-03-30 PROCEDURE — 76700 US EXAM ABDOM COMPLETE: CPT | Mod: TC

## 2023-04-05 ENCOUNTER — OFFICE VISIT (OUTPATIENT)
Dept: HEMATOLOGY/ONCOLOGY | Facility: CLINIC | Age: 78
End: 2023-04-05
Payer: MEDICARE

## 2023-04-05 DIAGNOSIS — Z90.13 H/O BILATERAL MASTECTOMY: ICD-10-CM

## 2023-04-05 DIAGNOSIS — Z96.652 PAIN DUE TO TOTAL LEFT KNEE REPLACEMENT, SUBSEQUENT ENCOUNTER: ICD-10-CM

## 2023-04-05 DIAGNOSIS — K55.20 ANGIODYSPLASIA OF CECUM: ICD-10-CM

## 2023-04-05 DIAGNOSIS — Z80.3 FAMILY HISTORY OF BREAST CANCER IN SISTER: ICD-10-CM

## 2023-04-05 DIAGNOSIS — E83.110 HEREDITARY HEMOCHROMATOSIS: Primary | ICD-10-CM

## 2023-04-05 DIAGNOSIS — T84.84XD PAIN DUE TO TOTAL LEFT KNEE REPLACEMENT, SUBSEQUENT ENCOUNTER: ICD-10-CM

## 2023-04-05 PROCEDURE — 99213 PR OFFICE/OUTPT VISIT, EST, LEVL III, 20-29 MIN: ICD-10-PCS | Mod: 95,,, | Performed by: INTERNAL MEDICINE

## 2023-04-05 PROCEDURE — 99213 OFFICE O/P EST LOW 20 MIN: CPT | Mod: 95,,, | Performed by: INTERNAL MEDICINE

## 2023-04-05 NOTE — PROGRESS NOTES
Subjective:       Patient ID: Selam Boetllo is a 77 y.o. female.    Chief Complaint: Results    HPI:  77-year-old female history of recent knee replacement.  Patient has had a history of hemochromatosis.  Requiring intermittent phlebotomies none recently.  Seen in video visit    Past Medical History:   Diagnosis Date    Acid reflux     Allergy     Arthritis     Atrial tachycardia     Bladder cancer     Breast cyst     Cataract     Colon polyp     COVID 5/19/2022    Deep vein thrombosis     Degenerative disc disease     Encounter for blood transfusion     General anesthetics causing adverse effect in therapeutic use     GI bleed     Hematuria, gross     Inflammatory bowel disease     Liver disease     cyst    Malignant neoplasm of lateral wall of urinary bladder 10/31/2019    Osteoporosis     PONV (postoperative nausea and vomiting)     Retina disorder, left     Skin cancer     Thyroid disease     Urinary tract infection      Family History   Problem Relation Age of Onset    COPD Mother     Cancer Mother     COPD Father     Cancer Father     Cancer Sister      Social History     Socioeconomic History    Marital status:    Tobacco Use    Smoking status: Never    Smokeless tobacco: Never   Substance and Sexual Activity    Alcohol use: No    Drug use: No    Sexual activity: Yes     Partners: Male     Past Surgical History:   Procedure Laterality Date    ADENOIDECTOMY      BLADDER FULGURATION Right 9/12/2018    Procedure: FULGURATION, BLADDER;  Surgeon: Isaias Shah MD;  Location: [facility] OR;  Service: Urology;  Laterality: Right;    BLADDER FULGURATION N/A 4/24/2019    Procedure: FULGURATION, BLADDER;  Surgeon: Isaias Shah MD;  Location: [facility] OR;  Service: Urology;  Laterality: N/A;    BREAST SURGERY Bilateral     CATARACT EXTRACTION      CYSTOSCOPY      CYSTOSCOPY N/A 4/24/2019    Procedure: CYSTOSCOPY;  Surgeon: Isaias Shah MD;  Location: [facility] OR;  Service: Urology;  Laterality: N/A;     CYSTOSCOPY WITH URETEROSCOPY, RETROGRADE PYELOGRAPHY, AND INSERTION OF STENT Left 9/12/2018    Procedure: CYSTOSCOPY, WITH RETROGRADE PYELOGRAM AND URETERAL STENT INSERTION;  Surgeon: Isaias Shah MD;  Location: [facility] OR;  Service: Urology;  Laterality: Left;    EYE SURGERY Left 2017    retina pucker    INSTILLATION OF URINARY BLADDER N/A 9/12/2018    Procedure: INSTILLATION, URINARY BLADDER;  Surgeon: Isaias Shah MD;  Location: [facility] OR;  Service: Urology;  Laterality: N/A;  Chemo agent instillation     JOINT REPLACEMENT Bilateral     hip    JOINT REPLACEMENT Right     knee    MASTECTOMY Bilateral 1991    RETROGRADE PYELOGRAPHY Bilateral 10/31/2019    Procedure: PYELOGRAM, RETROGRADE;  Surgeon: Baldo Gates MD;  Location: [facility];  Service: Urology;  Laterality: Bilateral;    TONSILLECTOMY      TURBT      TURBT, WITH BLUE LIGHT CYSTOSCOPY AND CYSVIEW Bilateral 10/31/2019    Procedure: TURBT,WITH BLUE LIGHT CYSTOSCOPY AND CYSVIEW;  Surgeon: Baldo Gates MD;  Location: [facility];  Service: Urology;  Laterality: Bilateral;    TYMPANOSTOMY TUBE PLACEMENT      VASCULAR SURGERY Right     popliteal stent    WISDOM TOOTH EXTRACTION         Labs:  Lab Results   Component Value Date    WBC 5.68 03/27/2023    HGB 11.6 (L) 03/27/2023    HCT 36.2 (L) 03/27/2023    MCV 87 03/27/2023     03/27/2023     BMP  Lab Results   Component Value Date     03/27/2023    K 4.2 03/27/2023     03/27/2023    CO2 24 03/27/2023    BUN 18 03/27/2023    CREATININE 0.7 03/27/2023    CALCIUM 9.8 03/27/2023    ANIONGAP 10 03/27/2023    ESTGFRAFRICA >60 06/08/2022    EGFRNONAA >60 06/08/2022     Lab Results   Component Value Date    ALT 10 03/27/2023    AST 16 03/27/2023    ALKPHOS 61 03/27/2023    BILITOT 0.5 03/27/2023       Lab Results   Component Value Date    IRON 72 03/27/2023    TIBC 366 03/27/2023    FERRITIN 22 03/27/2023     No results found for: UIINCTVN08  No results found for: FOLATE  Lab Results    Component Value Date    TSH 3.284 03/27/2023         Review of Systems   Psychiatric/Behavioral:  Positive for dysphoric mood.      Objective:      Physical Exam  Constitutional:       Appearance: Normal appearance.           Assessment:      1. Hereditary hemochromatosis    2. Angiodysplasia of cecum    3. Family history of breast cancer in sister    4. H/O bilateral mastectomy    5. Pain due to total left knee replacement, subsequent encounter           Med Onc Chart Routing      Follow up with physician 2 months. Standing labs CBC iron status in TSH at patient's request prior to visit 2 months okay for video visit   Follow up with JASON    Infusion scheduling note    Injection scheduling note    Labs    Imaging    Pharmacy appointment    Other referrals            Plan:     The patient location is:  Home  The chief complaint leading to consultation is:  Hemochromatosis    Visit type: audiovisual    Face to Face time with patient: 15minutes of total time spent on the encounter, which includes face to face time and non-face to face time preparing to see the patient (eg, review of tests), Obtaining and/or reviewing separately obtained history, Documenting clinical information in the electronic or other health record, Independently interpreting results (not separately reported) and communicating results to the patient/family/caregiver, or Care coordination (not separately reported).         Each patient to whom he or she provides medical services by telemedicine is:  (1) informed of the relationship between the physician and patient and the respective role of any other health care provider with respect to management of the patient; and (2) notified that he or she may decline to receive medical services by telemedicine and may withdraw from such care at any time.    Notes:     Reviewed information with patient no need for phlebotomy at present return in 2 months with standing labs prior reviewed information with  's condition in recommendation for further follow-up with services    Adonay Neri Jr, MD FACP

## 2023-04-24 ENCOUNTER — PATIENT MESSAGE (OUTPATIENT)
Dept: HEMATOLOGY/ONCOLOGY | Facility: CLINIC | Age: 78
End: 2023-04-24
Payer: MEDICARE

## 2023-05-09 ENCOUNTER — TELEPHONE (OUTPATIENT)
Dept: PRIMARY CARE CLINIC | Facility: CLINIC | Age: 78
End: 2023-05-09
Payer: MEDICARE

## 2023-05-09 NOTE — TELEPHONE ENCOUNTER
LVM for patient in attempts to schedule Medicare annual wellness visit with Nurse Practitioner Regina Scruggs in Dr. Cross's office. Please give us a call at your earliest convenience.

## 2023-06-01 ENCOUNTER — LAB VISIT (OUTPATIENT)
Dept: LAB | Facility: HOSPITAL | Age: 78
End: 2023-06-01
Attending: INTERNAL MEDICINE
Payer: MEDICARE

## 2023-06-01 ENCOUNTER — PATIENT MESSAGE (OUTPATIENT)
Dept: HEMATOLOGY/ONCOLOGY | Facility: CLINIC | Age: 78
End: 2023-06-01
Payer: MEDICARE

## 2023-06-01 DIAGNOSIS — R94.6 ABNORMAL RESULTS OF THYROID FUNCTION STUDIES: ICD-10-CM

## 2023-06-01 DIAGNOSIS — E03.9 ACQUIRED HYPOTHYROIDISM: ICD-10-CM

## 2023-06-01 DIAGNOSIS — E83.19 IRON OVERLOAD SYNDROME: ICD-10-CM

## 2023-06-01 DIAGNOSIS — K55.20 ANGIODYSPLASIA OF CECUM: ICD-10-CM

## 2023-06-01 LAB
ALBUMIN SERPL BCP-MCNC: 4 G/DL (ref 3.5–5.2)
ALP SERPL-CCNC: 64 U/L (ref 55–135)
ALT SERPL W/O P-5'-P-CCNC: 10 U/L (ref 10–44)
ANION GAP SERPL CALC-SCNC: 9 MMOL/L (ref 8–16)
AST SERPL-CCNC: 19 U/L (ref 10–40)
BASOPHILS # BLD AUTO: 0.04 K/UL (ref 0–0.2)
BASOPHILS NFR BLD: 0.6 % (ref 0–1.9)
BILIRUB SERPL-MCNC: 0.5 MG/DL (ref 0.1–1)
BUN SERPL-MCNC: 21 MG/DL (ref 8–23)
CALCIUM SERPL-MCNC: 9.6 MG/DL (ref 8.7–10.5)
CHLORIDE SERPL-SCNC: 105 MMOL/L (ref 95–110)
CO2 SERPL-SCNC: 23 MMOL/L (ref 23–29)
CREAT SERPL-MCNC: 0.8 MG/DL (ref 0.5–1.4)
DIFFERENTIAL METHOD: ABNORMAL
EOSINOPHIL # BLD AUTO: 0.2 K/UL (ref 0–0.5)
EOSINOPHIL NFR BLD: 2.2 % (ref 0–8)
ERYTHROCYTE [DISTWIDTH] IN BLOOD BY AUTOMATED COUNT: 15.7 % (ref 11.5–14.5)
EST. GFR  (NO RACE VARIABLE): >60 ML/MIN/1.73 M^2
FERRITIN SERPL-MCNC: 29 NG/ML (ref 20–300)
GLUCOSE SERPL-MCNC: 117 MG/DL (ref 70–110)
HCT VFR BLD AUTO: 36.2 % (ref 37–48.5)
HGB BLD-MCNC: 11.9 G/DL (ref 12–16)
IMM GRANULOCYTES # BLD AUTO: 0.01 K/UL (ref 0–0.04)
IMM GRANULOCYTES NFR BLD AUTO: 0.1 % (ref 0–0.5)
IRON SERPL-MCNC: 52 UG/DL (ref 30–160)
LYMPHOCYTES # BLD AUTO: 1.4 K/UL (ref 1–4.8)
LYMPHOCYTES NFR BLD: 20.2 % (ref 18–48)
MCH RBC QN AUTO: 28.1 PG (ref 27–31)
MCHC RBC AUTO-ENTMCNC: 32.9 G/DL (ref 32–36)
MCV RBC AUTO: 85 FL (ref 82–98)
MONOCYTES # BLD AUTO: 0.4 K/UL (ref 0.3–1)
MONOCYTES NFR BLD: 6.4 % (ref 4–15)
NEUTROPHILS # BLD AUTO: 4.8 K/UL (ref 1.8–7.7)
NEUTROPHILS NFR BLD: 70.5 % (ref 38–73)
NRBC BLD-RTO: 0 /100 WBC
PLATELET # BLD AUTO: 309 K/UL (ref 150–450)
PMV BLD AUTO: 9.3 FL (ref 9.2–12.9)
POTASSIUM SERPL-SCNC: 4 MMOL/L (ref 3.5–5.1)
PROT SERPL-MCNC: 6.5 G/DL (ref 6–8.4)
RBC # BLD AUTO: 4.24 M/UL (ref 4–5.4)
SATURATED IRON: 16 % (ref 20–50)
SODIUM SERPL-SCNC: 137 MMOL/L (ref 136–145)
TOTAL IRON BINDING CAPACITY: 330 UG/DL (ref 250–450)
TRANSFERRIN SERPL-MCNC: 223 MG/DL (ref 200–375)
TSH SERPL DL<=0.005 MIU/L-ACNC: 2.35 UIU/ML (ref 0.4–4)
WBC # BLD AUTO: 6.83 K/UL (ref 3.9–12.7)

## 2023-06-01 PROCEDURE — 82728 ASSAY OF FERRITIN: CPT | Performed by: INTERNAL MEDICINE

## 2023-06-01 PROCEDURE — 36415 COLL VENOUS BLD VENIPUNCTURE: CPT | Performed by: INTERNAL MEDICINE

## 2023-06-01 PROCEDURE — 84443 ASSAY THYROID STIM HORMONE: CPT | Performed by: INTERNAL MEDICINE

## 2023-06-01 PROCEDURE — 80053 COMPREHEN METABOLIC PANEL: CPT | Performed by: INTERNAL MEDICINE

## 2023-06-01 PROCEDURE — 84466 ASSAY OF TRANSFERRIN: CPT | Performed by: INTERNAL MEDICINE

## 2023-06-01 PROCEDURE — 85025 COMPLETE CBC W/AUTO DIFF WBC: CPT | Performed by: INTERNAL MEDICINE

## 2023-06-12 ENCOUNTER — PATIENT MESSAGE (OUTPATIENT)
Dept: HEMATOLOGY/ONCOLOGY | Facility: CLINIC | Age: 78
End: 2023-06-12

## 2023-06-12 ENCOUNTER — OFFICE VISIT (OUTPATIENT)
Dept: HEMATOLOGY/ONCOLOGY | Facility: CLINIC | Age: 78
End: 2023-06-12
Payer: MEDICARE

## 2023-06-12 DIAGNOSIS — Z80.3 FAMILY HISTORY OF BREAST CANCER IN SISTER: ICD-10-CM

## 2023-06-12 DIAGNOSIS — E83.110 HEREDITARY HEMOCHROMATOSIS: Primary | ICD-10-CM

## 2023-06-12 DIAGNOSIS — K55.20 ANGIODYSPLASIA OF CECUM: ICD-10-CM

## 2023-06-12 DIAGNOSIS — E03.9 ACQUIRED HYPOTHYROIDISM: ICD-10-CM

## 2023-06-12 PROCEDURE — 99214 OFFICE O/P EST MOD 30 MIN: CPT | Mod: 95,,, | Performed by: INTERNAL MEDICINE

## 2023-06-12 PROCEDURE — 99214 PR OFFICE/OUTPT VISIT, EST, LEVL IV, 30-39 MIN: ICD-10-PCS | Mod: 95,,, | Performed by: INTERNAL MEDICINE

## 2023-06-12 NOTE — PROGRESS NOTES
Subjective:       Patient ID: Selam Botello is a 77 y.o. female.    Chief Complaint: Results and Anemia    HPI:  77-year-old female history of iron overload.  Patient has previous history of angiodysplasia cecum.  Returns with repeat CBC seen in video visit for discussion answered    Past Medical History:   Diagnosis Date    Acid reflux     Allergy     Arthritis     Atrial tachycardia     Bladder cancer     Breast cyst     Cataract     Colon polyp     COVID 5/19/2022    Deep vein thrombosis     Degenerative disc disease     Encounter for blood transfusion     General anesthetics causing adverse effect in therapeutic use     GI bleed     Hematuria, gross     Inflammatory bowel disease     Liver disease     cyst    Malignant neoplasm of lateral wall of urinary bladder 10/31/2019    Osteoporosis     PONV (postoperative nausea and vomiting)     Retina disorder, left     Skin cancer     Thyroid disease     Urinary tract infection      Family History   Problem Relation Age of Onset    COPD Mother     Cancer Mother     COPD Father     Cancer Father     Cancer Sister      Social History     Socioeconomic History    Marital status:    Tobacco Use    Smoking status: Never    Smokeless tobacco: Never   Substance and Sexual Activity    Alcohol use: No    Drug use: No    Sexual activity: Yes     Partners: Male     Past Surgical History:   Procedure Laterality Date    ADENOIDECTOMY      BLADDER FULGURATION Right 9/12/2018    Procedure: FULGURATION, BLADDER;  Surgeon: Isaias Shah MD;  Location: [facility] OR;  Service: Urology;  Laterality: Right;    BLADDER FULGURATION N/A 4/24/2019    Procedure: FULGURATION, BLADDER;  Surgeon: Isaias Shah MD;  Location: [facility] OR;  Service: Urology;  Laterality: N/A;    BREAST SURGERY Bilateral     CATARACT EXTRACTION      CYSTOSCOPY      CYSTOSCOPY N/A 4/24/2019    Procedure: CYSTOSCOPY;  Surgeon: Isaias Shah MD;  Location: [facility] OR;  Service: Urology;  Laterality: N/A;     CYSTOSCOPY WITH URETEROSCOPY, RETROGRADE PYELOGRAPHY, AND INSERTION OF STENT Left 9/12/2018    Procedure: CYSTOSCOPY, WITH RETROGRADE PYELOGRAM AND URETERAL STENT INSERTION;  Surgeon: Isaias Shah MD;  Location: [facility] OR;  Service: Urology;  Laterality: Left;    EYE SURGERY Left 2017    retina pucker    INSTILLATION OF URINARY BLADDER N/A 9/12/2018    Procedure: INSTILLATION, URINARY BLADDER;  Surgeon: Isaias Shah MD;  Location: [facility] OR;  Service: Urology;  Laterality: N/A;  Chemo agent instillation     JOINT REPLACEMENT Bilateral     hip    JOINT REPLACEMENT Right     knee    MASTECTOMY Bilateral 1991    RETROGRADE PYELOGRAPHY Bilateral 10/31/2019    Procedure: PYELOGRAM, RETROGRADE;  Surgeon: Baldo Gates MD;  Location: [facility];  Service: Urology;  Laterality: Bilateral;    TONSILLECTOMY      TURBT      TURBT, WITH BLUE LIGHT CYSTOSCOPY AND CYSVIEW Bilateral 10/31/2019    Procedure: TURBT,WITH BLUE LIGHT CYSTOSCOPY AND CYSVIEW;  Surgeon: Baldo Gates MD;  Location: [facility];  Service: Urology;  Laterality: Bilateral;    TYMPANOSTOMY TUBE PLACEMENT      VASCULAR SURGERY Right     popliteal stent    WISDOM TOOTH EXTRACTION         Labs:  Lab Results   Component Value Date    WBC 6.83 06/01/2023    HGB 11.9 (L) 06/01/2023    HCT 36.2 (L) 06/01/2023    MCV 85 06/01/2023     06/01/2023     BMP  Lab Results   Component Value Date     06/01/2023    K 4.0 06/01/2023     06/01/2023    CO2 23 06/01/2023    BUN 21 06/01/2023    CREATININE 0.8 06/01/2023    CALCIUM 9.6 06/01/2023    ANIONGAP 9 06/01/2023    ESTGFRAFRICA >60 06/08/2022    EGFRNONAA >60 06/08/2022     Lab Results   Component Value Date    ALT 10 06/01/2023    AST 19 06/01/2023    ALKPHOS 64 06/01/2023    BILITOT 0.5 06/01/2023       Lab Results   Component Value Date    IRON 52 06/01/2023    TIBC 330 06/01/2023    FERRITIN 29 06/01/2023     No results found for: [id number]  No results found for: FOLATE  Lab Results    Component Value Date    TSH 2.349 06/01/2023         Review of Systems   Constitutional:  Negative for activity change, appetite change, chills, diaphoresis, fatigue, fever and unexpected weight change.   HENT:  Negative for congestion, dental problem, drooling, ear discharge, ear pain, facial swelling, hearing loss, mouth sores, nosebleeds, postnasal drip, rhinorrhea, sinus pressure, sneezing, sore throat, tinnitus, trouble swallowing and voice change.    Eyes:  Negative for photophobia, pain, discharge, redness, itching and visual disturbance.   Respiratory:  Negative for cough, choking, chest tightness, shortness of breath, wheezing and stridor.    Cardiovascular:  Negative for chest pain, palpitations and leg swelling.   Gastrointestinal:  Negative for abdominal distention, abdominal pain, anal bleeding, blood in stool, constipation, diarrhea, nausea, rectal pain and vomiting.   Endocrine: Negative for cold intolerance, heat intolerance, polydipsia, polyphagia and polyuria.   Genitourinary:  Negative for decreased urine volume, difficulty urinating, dyspareunia, dysuria, enuresis, flank pain, frequency, genital sores, hematuria, menstrual problem, pelvic pain, urgency, vaginal bleeding, vaginal discharge and vaginal pain.   Musculoskeletal:  Negative for arthralgias, back pain, gait problem, joint swelling, myalgias, neck pain and neck stiffness.   Skin:  Negative for color change, pallor and rash.   Allergic/Immunologic: Negative for environmental allergies, food allergies and immunocompromised state.   Neurological:  Negative for dizziness, tremors, seizures, syncope, facial asymmetry, speech difficulty, weakness, light-headedness, numbness and headaches.   Hematological:  Negative for adenopathy. Does not bruise/bleed easily.   Psychiatric/Behavioral:  Negative for agitation, behavioral problems, confusion, decreased concentration, dysphoric mood, hallucinations, self-injury, sleep disturbance and suicidal  ideas. The patient is nervous/anxious. The patient is not hyperactive.      Objective:      Physical Exam  Constitutional:       Appearance: Normal appearance.           Assessment:      1. Hereditary hemochromatosis    2. Angiodysplasia of cecum    3. Acquired hypothyroidism    4. Family history of breast cancer in sister           Med Onc Chart Routing      Follow up with physician 2 months. Standing labs prior.  CBC CMP iron status TSH   Follow up with JASON    Infusion scheduling note    Injection scheduling note    Labs    Imaging    Pharmacy appointment    Other referrals             Plan:     The patient location is:  Home  The chief complaint leading to consultation is:  Anemia    Visit type: audiovisual    Face to Face time with patient: 15 minutes of total time spent on the encounter, which includes face to face time and non-face to face time preparing to see the patient (eg, review of tests), Obtaining and/or reviewing separately obtained history, Documenting clinical information in the electronic or other health record, Independently interpreting results (not separately reported) and communicating results to the patient/family/caregiver, or Care coordination (not separately reported).         Each patient to whom he or she provides medical services by telemedicine is:  (1) informed of the relationship between the physician and patient and the respective role of any other health care provider with respect to management of the patient; and (2) notified that he or she may decline to receive medical services by telemedicine and may withdraw from such care at any time.    Notes:     Extensive conversation reviewed information.  At this time patient's saturated iron is 16% continue to monitor every 2 months.  No evidence of need for phlebotomy at present time.  Discussed situation of  who has previous history of colon carcinoma B12 deficiency in anemia secondary to renal failure will be happy to  reassess.    Adonay Neri Jr, MD FACP

## 2023-06-30 ENCOUNTER — CLINICAL SUPPORT (OUTPATIENT)
Dept: UROLOGY | Facility: CLINIC | Age: 78
End: 2023-06-30
Payer: MEDICARE

## 2023-06-30 DIAGNOSIS — R30.0 DYSURIA: ICD-10-CM

## 2023-06-30 DIAGNOSIS — C67.9 MALIGNANT NEOPLASM OF URINARY BLADDER, UNSPECIFIED SITE: ICD-10-CM

## 2023-06-30 PROCEDURE — 88112 CYTOPATH CELL ENHANCE TECH: CPT | Performed by: PATHOLOGY

## 2023-06-30 PROCEDURE — 88112 CYTOPATH CELL ENHANCE TECH: CPT | Mod: 26,,, | Performed by: PATHOLOGY

## 2023-06-30 PROCEDURE — 88112 PR  CYTOPATH, CELL ENHANCE TECH: ICD-10-PCS | Mod: 26,,, | Performed by: PATHOLOGY

## 2023-07-03 LAB
FINAL PATHOLOGIC DIAGNOSIS: NORMAL
Lab: NORMAL

## 2023-07-07 ENCOUNTER — PROCEDURE VISIT (OUTPATIENT)
Dept: UROLOGY | Facility: CLINIC | Age: 78
End: 2023-07-07
Payer: MEDICARE

## 2023-07-07 VITALS
OXYGEN SATURATION: 99 % | HEART RATE: 70 BPM | DIASTOLIC BLOOD PRESSURE: 73 MMHG | WEIGHT: 131 LBS | BODY MASS INDEX: 24.11 KG/M2 | HEIGHT: 62 IN | RESPIRATION RATE: 16 BRPM | SYSTOLIC BLOOD PRESSURE: 136 MMHG

## 2023-07-07 DIAGNOSIS — C67.9 MALIGNANT NEOPLASM OF URINARY BLADDER, UNSPECIFIED SITE: ICD-10-CM

## 2023-07-07 DIAGNOSIS — R30.0 DYSURIA: ICD-10-CM

## 2023-07-07 PROCEDURE — 52000 CYSTOURETHROSCOPY: CPT | Mod: S$GLB,,, | Performed by: UROLOGY

## 2023-07-07 PROCEDURE — 52000 CYSTOSCOPY: ICD-10-PCS | Mod: S$GLB,,, | Performed by: UROLOGY

## 2023-07-07 RX ORDER — MECLIZINE HYDROCHLORIDE 25 MG/1
25 TABLET ORAL EVERY 8 HOURS
COMMUNITY
Start: 2023-01-26 | End: 2024-03-14

## 2023-07-07 RX ORDER — CICLOPIROX 80 MG/ML
SOLUTION TOPICAL
COMMUNITY
Start: 2023-04-18 | End: 2024-03-14

## 2023-07-07 NOTE — PROCEDURES
Cystoscopy    Date/Time: 7/7/2023 11:24 AM  Performed by: Baldo Gates MD  Authorized by: Baldo Gates MD     Consent Done?:  Yes (Written)  Timeout: prior to procedure the correct patient, procedure, and site was verified    Prep: patient was prepped and draped in usual sterile fashion    Local anesthesia used?: Yes    Anesthesia:  Lidocaine jelly  Indications: history bladder cancer    Position:  Dorsal lithotomy  Anesthesia:  Lidocaine jelly  Preparation: Patient was prepped and draped in usual sterile fashion    Scope type:  Flexible cystoscope  External exam normal: Yes    Urethra normal: Yes    Bladder neck normal: Yes    Bladder normal: Yes     patient tolerated the procedure well with no immediate complications  Comments:      Cytology neg    History of nonmuscle invasive bladder cancer no evidence of disease     She is very anxious wishes to continue cystoscopy yearly.  Return to clinic 1 year with cytology for cystoscopy

## 2023-07-11 ENCOUNTER — PATIENT MESSAGE (OUTPATIENT)
Dept: HEMATOLOGY/ONCOLOGY | Facility: CLINIC | Age: 78
End: 2023-07-11
Payer: MEDICARE

## 2023-07-12 ENCOUNTER — PATIENT MESSAGE (OUTPATIENT)
Dept: UROLOGY | Facility: CLINIC | Age: 78
End: 2023-07-12
Payer: MEDICARE

## 2023-08-02 ENCOUNTER — LAB VISIT (OUTPATIENT)
Dept: LAB | Facility: HOSPITAL | Age: 78
End: 2023-08-02
Attending: INTERNAL MEDICINE
Payer: MEDICARE

## 2023-08-02 DIAGNOSIS — E83.19 IRON OVERLOAD SYNDROME: ICD-10-CM

## 2023-08-02 DIAGNOSIS — K55.20 ANGIODYSPLASIA OF CECUM: ICD-10-CM

## 2023-08-02 DIAGNOSIS — R94.6 ABNORMAL RESULTS OF THYROID FUNCTION STUDIES: ICD-10-CM

## 2023-08-02 DIAGNOSIS — E03.9 ACQUIRED HYPOTHYROIDISM: ICD-10-CM

## 2023-08-02 LAB
ALBUMIN SERPL BCP-MCNC: 4 G/DL (ref 3.5–5.2)
ALP SERPL-CCNC: 66 U/L (ref 55–135)
ALT SERPL W/O P-5'-P-CCNC: 12 U/L (ref 10–44)
ANION GAP SERPL CALC-SCNC: 8 MMOL/L (ref 8–16)
AST SERPL-CCNC: 20 U/L (ref 10–40)
BASOPHILS # BLD AUTO: 0.04 K/UL (ref 0–0.2)
BASOPHILS NFR BLD: 0.8 % (ref 0–1.9)
BILIRUB SERPL-MCNC: 0.6 MG/DL (ref 0.1–1)
BUN SERPL-MCNC: 18 MG/DL (ref 8–23)
CALCIUM SERPL-MCNC: 9.7 MG/DL (ref 8.7–10.5)
CHLORIDE SERPL-SCNC: 107 MMOL/L (ref 95–110)
CO2 SERPL-SCNC: 25 MMOL/L (ref 23–29)
CREAT SERPL-MCNC: 0.7 MG/DL (ref 0.5–1.4)
DIFFERENTIAL METHOD: ABNORMAL
EOSINOPHIL # BLD AUTO: 0.1 K/UL (ref 0–0.5)
EOSINOPHIL NFR BLD: 2.3 % (ref 0–8)
ERYTHROCYTE [DISTWIDTH] IN BLOOD BY AUTOMATED COUNT: 16 % (ref 11.5–14.5)
EST. GFR  (NO RACE VARIABLE): >60 ML/MIN/1.73 M^2
FERRITIN SERPL-MCNC: 26 NG/ML (ref 20–300)
GLUCOSE SERPL-MCNC: 83 MG/DL (ref 70–110)
HCT VFR BLD AUTO: 35.1 % (ref 37–48.5)
HGB BLD-MCNC: 11.3 G/DL (ref 12–16)
IMM GRANULOCYTES # BLD AUTO: 0.01 K/UL (ref 0–0.04)
IMM GRANULOCYTES NFR BLD AUTO: 0.2 % (ref 0–0.5)
IRON SERPL-MCNC: 52 UG/DL (ref 30–160)
LYMPHOCYTES # BLD AUTO: 0.9 K/UL (ref 1–4.8)
LYMPHOCYTES NFR BLD: 17.5 % (ref 18–48)
MCH RBC QN AUTO: 28 PG (ref 27–31)
MCHC RBC AUTO-ENTMCNC: 32.2 G/DL (ref 32–36)
MCV RBC AUTO: 87 FL (ref 82–98)
MONOCYTES # BLD AUTO: 0.5 K/UL (ref 0.3–1)
MONOCYTES NFR BLD: 9.1 % (ref 4–15)
NEUTROPHILS # BLD AUTO: 3.6 K/UL (ref 1.8–7.7)
NEUTROPHILS NFR BLD: 70.1 % (ref 38–73)
NRBC BLD-RTO: 0 /100 WBC
PLATELET # BLD AUTO: 310 K/UL (ref 150–450)
PMV BLD AUTO: 9.2 FL (ref 9.2–12.9)
POTASSIUM SERPL-SCNC: 4.5 MMOL/L (ref 3.5–5.1)
PROT SERPL-MCNC: 6.3 G/DL (ref 6–8.4)
RBC # BLD AUTO: 4.04 M/UL (ref 4–5.4)
SATURATED IRON: 14 % (ref 20–50)
SODIUM SERPL-SCNC: 140 MMOL/L (ref 136–145)
TOTAL IRON BINDING CAPACITY: 370 UG/DL (ref 250–450)
TRANSFERRIN SERPL-MCNC: 250 MG/DL (ref 200–375)
TSH SERPL DL<=0.005 MIU/L-ACNC: 1.99 UIU/ML (ref 0.4–4)
WBC # BLD AUTO: 5.15 K/UL (ref 3.9–12.7)

## 2023-08-02 PROCEDURE — 84466 ASSAY OF TRANSFERRIN: CPT | Performed by: INTERNAL MEDICINE

## 2023-08-02 PROCEDURE — 84443 ASSAY THYROID STIM HORMONE: CPT | Performed by: INTERNAL MEDICINE

## 2023-08-02 PROCEDURE — 85025 COMPLETE CBC W/AUTO DIFF WBC: CPT | Performed by: INTERNAL MEDICINE

## 2023-08-02 PROCEDURE — 36415 COLL VENOUS BLD VENIPUNCTURE: CPT | Performed by: INTERNAL MEDICINE

## 2023-08-02 PROCEDURE — 82728 ASSAY OF FERRITIN: CPT | Performed by: INTERNAL MEDICINE

## 2023-08-02 PROCEDURE — 80053 COMPREHEN METABOLIC PANEL: CPT | Performed by: INTERNAL MEDICINE

## 2023-08-14 ENCOUNTER — OFFICE VISIT (OUTPATIENT)
Dept: HEMATOLOGY/ONCOLOGY | Facility: CLINIC | Age: 78
End: 2023-08-14
Payer: MEDICARE

## 2023-08-14 DIAGNOSIS — R79.9 ABNORMAL FINDING OF BLOOD CHEMISTRY, UNSPECIFIED: ICD-10-CM

## 2023-08-14 DIAGNOSIS — E03.9 ACQUIRED HYPOTHYROIDISM: ICD-10-CM

## 2023-08-14 DIAGNOSIS — E83.110 HEREDITARY HEMOCHROMATOSIS: ICD-10-CM

## 2023-08-14 DIAGNOSIS — K55.20 ANGIODYSPLASIA OF CECUM: Primary | ICD-10-CM

## 2023-08-14 PROCEDURE — 99214 PR OFFICE/OUTPT VISIT, EST, LEVL IV, 30-39 MIN: ICD-10-PCS | Mod: 95,,, | Performed by: INTERNAL MEDICINE

## 2023-08-14 PROCEDURE — 99214 OFFICE O/P EST MOD 30 MIN: CPT | Mod: 95,,, | Performed by: INTERNAL MEDICINE

## 2023-08-14 NOTE — PROGRESS NOTES
Subjective:       Patient ID: Selam Botello is a 78 y.o. female.    Chief Complaint: Results and Anemia    HPI:  78-year-old female history of iron overload.  Patient had previous angiodysplasias of cecum.  Patient has laboratory studies here for review in video visit    Past Medical History:   Diagnosis Date    Acid reflux     Allergy     Arthritis     Atrial tachycardia     Bladder cancer     Breast cyst     Cataract     Colon polyp     COVID 5/19/2022    Deep vein thrombosis     Degenerative disc disease     Encounter for blood transfusion     General anesthetics causing adverse effect in therapeutic use     GI bleed     Hematuria, gross     Inflammatory bowel disease     Liver disease     cyst    Malignant neoplasm of lateral wall of urinary bladder 10/31/2019    Osteoporosis     PONV (postoperative nausea and vomiting)     Retina disorder, left     Skin cancer     Thyroid disease     Urinary tract infection      Family History   Problem Relation Age of Onset    COPD Mother     Cancer Mother     COPD Father     Cancer Father     Cancer Sister      Social History     Socioeconomic History    Marital status:    Tobacco Use    Smoking status: Never    Smokeless tobacco: Never   Substance and Sexual Activity    Alcohol use: No    Drug use: No    Sexual activity: Yes     Partners: Male     Past Surgical History:   Procedure Laterality Date    ADENOIDECTOMY      BLADDER FULGURATION Right 9/12/2018    Procedure: FULGURATION, BLADDER;  Surgeon: Isaias Shah MD;  Location: [facility] OR;  Service: Urology;  Laterality: Right;    BLADDER FULGURATION N/A 4/24/2019    Procedure: FULGURATION, BLADDER;  Surgeon: Isaias Shah MD;  Location: [facility] OR;  Service: Urology;  Laterality: N/A;    BREAST SURGERY Bilateral     CATARACT EXTRACTION      CYSTOSCOPY      CYSTOSCOPY N/A 4/24/2019    Procedure: CYSTOSCOPY;  Surgeon: Isaias Shah MD;  Location: [facility] OR;  Service: Urology;  Laterality: N/A;    CYSTOSCOPY WITH  "URETEROSCOPY, RETROGRADE PYELOGRAPHY, AND INSERTION OF STENT Left 9/12/2018    Procedure: CYSTOSCOPY, WITH RETROGRADE PYELOGRAM AND URETERAL STENT INSERTION;  Surgeon: Isaias Shah MD;  Location: [facility] OR;  Service: Urology;  Laterality: Left;    EYE SURGERY Left 2017    retina pucker    INSTILLATION OF URINARY BLADDER N/A 9/12/2018    Procedure: INSTILLATION, URINARY BLADDER;  Surgeon: Isaias Shah MD;  Location: [facility] OR;  Service: Urology;  Laterality: N/A;  Chemo agent instillation     JOINT REPLACEMENT Bilateral     hip    JOINT REPLACEMENT Right     knee    MASTECTOMY Bilateral 1991    RETROGRADE PYELOGRAPHY Bilateral 10/31/2019    Procedure: PYELOGRAM, RETROGRADE;  Surgeon: Baldo Gates MD;  Location: [facility] OR;  Service: Urology;  Laterality: Bilateral;    TONSILLECTOMY      TURBT      TURBT, WITH BLUE LIGHT CYSTOSCOPY AND CYSVIEW Bilateral 10/31/2019    Procedure: TURBT,WITH BLUE LIGHT CYSTOSCOPY AND CYSVIEW;  Surgeon: Baldo Gates MD;  Location: [facility];  Service: Urology;  Laterality: Bilateral;    TYMPANOSTOMY TUBE PLACEMENT      VASCULAR SURGERY Right     popliteal stent    WISDOM TOOTH EXTRACTION         Labs:  Lab Results   Component Value Date    WBC 5.15 08/02/2023    HGB 11.3 (L) 08/02/2023    HCT 35.1 (L) 08/02/2023    MCV 87 08/02/2023     08/02/2023     BMP  Lab Results   Component Value Date     08/02/2023    K 4.5 08/02/2023     08/02/2023    CO2 25 08/02/2023    BUN 18 08/02/2023    CREATININE 0.7 08/02/2023    CALCIUM 9.7 08/02/2023    ANIONGAP 8 08/02/2023    ESTGFRAFRICA 91 08/16/2022    EGFRNONAA >60 06/08/2022     Lab Results   Component Value Date    ALT 12 08/02/2023    AST 20 08/02/2023    ALKPHOS 66 08/02/2023    BILITOT 0.6 08/02/2023       Lab Results   Component Value Date    IRON 52 08/02/2023    TIBC 370 08/02/2023    FERRITIN 26 08/02/2023     No results found for: "[id number]"  No results found for: "FOLATE"  Lab Results   Component Value Date "    TSH 1.994 08/02/2023         Review of Systems   Constitutional:  Negative for activity change, appetite change, chills, diaphoresis, fatigue, fever and unexpected weight change.   HENT:  Negative for congestion, dental problem, drooling, ear discharge, ear pain, facial swelling, hearing loss, mouth sores, nosebleeds, postnasal drip, rhinorrhea, sinus pressure, sneezing, sore throat, tinnitus, trouble swallowing and voice change.    Eyes:  Negative for photophobia, pain, discharge, redness, itching and visual disturbance.   Respiratory:  Negative for cough, choking, chest tightness, shortness of breath, wheezing and stridor.    Cardiovascular:  Negative for chest pain, palpitations and leg swelling.   Gastrointestinal:  Negative for abdominal distention, abdominal pain, anal bleeding, blood in stool, constipation, diarrhea, nausea, rectal pain and vomiting.   Endocrine: Negative for cold intolerance, heat intolerance, polydipsia, polyphagia and polyuria.   Genitourinary:  Negative for decreased urine volume, difficulty urinating, dyspareunia, dysuria, enuresis, flank pain, frequency, genital sores, hematuria, menstrual problem, pelvic pain, urgency, vaginal bleeding, vaginal discharge and vaginal pain.   Musculoskeletal:  Negative for arthralgias, back pain, gait problem, joint swelling, myalgias, neck pain and neck stiffness.   Skin:  Negative for color change, pallor and rash.   Allergic/Immunologic: Negative for environmental allergies, food allergies and immunocompromised state.   Neurological:  Negative for dizziness, tremors, seizures, syncope, facial asymmetry, speech difficulty, weakness, light-headedness, numbness and headaches.   Hematological:  Negative for adenopathy. Does not bruise/bleed easily.   Psychiatric/Behavioral:  Negative for agitation, behavioral problems, confusion, decreased concentration, dysphoric mood, hallucinations, self-injury, sleep disturbance and suicidal ideas. The patient is  nervous/anxious. The patient is not hyperactive.        Objective:      Physical Exam  Constitutional:       Appearance: Normal appearance.             Assessment:      1. Angiodysplasia of cecum    2. Acquired hypothyroidism    3. Abnormal finding of blood chemistry, unspecified    4. Hereditary hemochromatosis           Med Onc Chart Routing      Follow up with physician 2 months. RTC 2 months.  CBC CMP iron status TSH video visit okay   Follow up with JASON    Infusion scheduling note    Injection scheduling note    Labs    Imaging    Pharmacy appointment    Other referrals               Plan:     The patient location is:  Home  The chief complaint leading to consultation is:  Anemia    Visit type: audiovisual    Face to Face time with patient: 25 minutes of total time spent on the encounter, which includes face to face time and non-face to face time preparing to see the patient (eg, review of tests), Obtaining and/or reviewing separately obtained history, Documenting clinical information in the electronic or other health record, Independently interpreting results (not separately reported) and communicating results to the patient/family/caregiver, or Care coordination (not separately reported).         Each patient to whom he or she provides medical services by telemedicine is:  (1) informed of the relationship between the physician and patient and the respective role of any other health care provider with respect to management of the patient; and (2) notified that he or she may decline to receive medical services by telemedicine and may withdraw from such care at any time.    Notes:  Reviewed laboratory studies TSH normal.  Slight fall in hemoglobin and slight fall in saturated iron concern over possible recurrent angiodysplasias cecum versus benefits from iron overload.  At this time reassurance given if patient has increasing fatigue especially she Pica for ice please contact.  Standing labs for CBC CMP and iron  status in 2 months.  Discussed implications answered questions with her        Adonay Neri Jr, MD FACP

## 2023-09-26 ENCOUNTER — PATIENT MESSAGE (OUTPATIENT)
Dept: HEMATOLOGY/ONCOLOGY | Facility: CLINIC | Age: 78
End: 2023-09-26
Payer: MEDICARE

## 2023-10-12 ENCOUNTER — LAB VISIT (OUTPATIENT)
Dept: LAB | Facility: HOSPITAL | Age: 78
End: 2023-10-12
Attending: INTERNAL MEDICINE
Payer: MEDICARE

## 2023-10-12 DIAGNOSIS — K55.20 ANGIODYSPLASIA OF CECUM: ICD-10-CM

## 2023-10-12 DIAGNOSIS — E03.9 ACQUIRED HYPOTHYROIDISM: ICD-10-CM

## 2023-10-12 DIAGNOSIS — R79.9 ABNORMAL FINDING OF BLOOD CHEMISTRY, UNSPECIFIED: ICD-10-CM

## 2023-10-12 LAB
ALBUMIN SERPL BCP-MCNC: 4 G/DL (ref 3.5–5.2)
ALP SERPL-CCNC: 67 U/L (ref 55–135)
ALT SERPL W/O P-5'-P-CCNC: 13 U/L (ref 10–44)
ANION GAP SERPL CALC-SCNC: 10 MMOL/L (ref 8–16)
AST SERPL-CCNC: 17 U/L (ref 10–40)
BASOPHILS # BLD AUTO: 0.04 K/UL (ref 0–0.2)
BASOPHILS NFR BLD: 0.8 % (ref 0–1.9)
BILIRUB SERPL-MCNC: 0.6 MG/DL (ref 0.1–1)
BUN SERPL-MCNC: 19 MG/DL (ref 8–23)
CALCIUM SERPL-MCNC: 9.9 MG/DL (ref 8.7–10.5)
CHLORIDE SERPL-SCNC: 106 MMOL/L (ref 95–110)
CO2 SERPL-SCNC: 23 MMOL/L (ref 23–29)
CREAT SERPL-MCNC: 0.7 MG/DL (ref 0.5–1.4)
DIFFERENTIAL METHOD: ABNORMAL
EOSINOPHIL # BLD AUTO: 0.2 K/UL (ref 0–0.5)
EOSINOPHIL NFR BLD: 3.5 % (ref 0–8)
ERYTHROCYTE [DISTWIDTH] IN BLOOD BY AUTOMATED COUNT: 15.5 % (ref 11.5–14.5)
EST. GFR  (NO RACE VARIABLE): >60 ML/MIN/1.73 M^2
FERRITIN SERPL-MCNC: 18 NG/ML (ref 20–300)
GLUCOSE SERPL-MCNC: 92 MG/DL (ref 70–110)
HCT VFR BLD AUTO: 35.6 % (ref 37–48.5)
HGB BLD-MCNC: 11.3 G/DL (ref 12–16)
IMM GRANULOCYTES # BLD AUTO: 0.01 K/UL (ref 0–0.04)
IMM GRANULOCYTES NFR BLD AUTO: 0.2 % (ref 0–0.5)
IRON SERPL-MCNC: 72 UG/DL (ref 30–160)
LYMPHOCYTES # BLD AUTO: 0.9 K/UL (ref 1–4.8)
LYMPHOCYTES NFR BLD: 17.7 % (ref 18–48)
MCH RBC QN AUTO: 27.7 PG (ref 27–31)
MCHC RBC AUTO-ENTMCNC: 31.7 G/DL (ref 32–36)
MCV RBC AUTO: 87 FL (ref 82–98)
MONOCYTES # BLD AUTO: 0.4 K/UL (ref 0.3–1)
MONOCYTES NFR BLD: 8.3 % (ref 4–15)
NEUTROPHILS # BLD AUTO: 3.5 K/UL (ref 1.8–7.7)
NEUTROPHILS NFR BLD: 69.5 % (ref 38–73)
NRBC BLD-RTO: 0 /100 WBC
PLATELET # BLD AUTO: 330 K/UL (ref 150–450)
PMV BLD AUTO: 9.8 FL (ref 9.2–12.9)
POTASSIUM SERPL-SCNC: 4.8 MMOL/L (ref 3.5–5.1)
PROT SERPL-MCNC: 6.4 G/DL (ref 6–8.4)
RBC # BLD AUTO: 4.08 M/UL (ref 4–5.4)
SATURATED IRON: 19 % (ref 20–50)
SODIUM SERPL-SCNC: 139 MMOL/L (ref 136–145)
TOTAL IRON BINDING CAPACITY: 377 UG/DL (ref 250–450)
TRANSFERRIN SERPL-MCNC: 255 MG/DL (ref 200–375)
TSH SERPL DL<=0.005 MIU/L-ACNC: 2.63 UIU/ML (ref 0.4–4)
WBC # BLD AUTO: 5.08 K/UL (ref 3.9–12.7)

## 2023-10-12 PROCEDURE — 82728 ASSAY OF FERRITIN: CPT | Performed by: INTERNAL MEDICINE

## 2023-10-12 PROCEDURE — 85025 COMPLETE CBC W/AUTO DIFF WBC: CPT | Performed by: INTERNAL MEDICINE

## 2023-10-12 PROCEDURE — 80053 COMPREHEN METABOLIC PANEL: CPT | Performed by: INTERNAL MEDICINE

## 2023-10-12 PROCEDURE — 36415 COLL VENOUS BLD VENIPUNCTURE: CPT | Performed by: INTERNAL MEDICINE

## 2023-10-12 PROCEDURE — 83540 ASSAY OF IRON: CPT | Performed by: INTERNAL MEDICINE

## 2023-10-12 PROCEDURE — 84466 ASSAY OF TRANSFERRIN: CPT | Performed by: INTERNAL MEDICINE

## 2023-10-12 PROCEDURE — 84443 ASSAY THYROID STIM HORMONE: CPT | Performed by: INTERNAL MEDICINE

## 2023-10-17 ENCOUNTER — OFFICE VISIT (OUTPATIENT)
Dept: HEMATOLOGY/ONCOLOGY | Facility: CLINIC | Age: 78
End: 2023-10-17
Payer: MEDICARE

## 2023-10-17 VITALS
TEMPERATURE: 97 F | DIASTOLIC BLOOD PRESSURE: 65 MMHG | HEART RATE: 63 BPM | SYSTOLIC BLOOD PRESSURE: 117 MMHG | BODY MASS INDEX: 23.96 KG/M2 | OXYGEN SATURATION: 99 % | WEIGHT: 135.25 LBS | HEIGHT: 63 IN

## 2023-10-17 DIAGNOSIS — E83.110 HEREDITARY HEMOCHROMATOSIS: ICD-10-CM

## 2023-10-17 DIAGNOSIS — Z90.13 H/O BILATERAL MASTECTOMY: ICD-10-CM

## 2023-10-17 DIAGNOSIS — K55.20 ANGIODYSPLASIA OF CECUM: Primary | ICD-10-CM

## 2023-10-17 PROCEDURE — 99214 PR OFFICE/OUTPT VISIT, EST, LEVL IV, 30-39 MIN: ICD-10-PCS | Mod: S$PBB,,, | Performed by: INTERNAL MEDICINE

## 2023-10-17 PROCEDURE — 99999 PR PBB SHADOW E&M-EST. PATIENT-LVL IV: ICD-10-PCS | Mod: PBBFAC,,, | Performed by: INTERNAL MEDICINE

## 2023-10-17 PROCEDURE — 99999 PR PBB SHADOW E&M-EST. PATIENT-LVL IV: CPT | Mod: PBBFAC,,, | Performed by: INTERNAL MEDICINE

## 2023-10-17 PROCEDURE — 99214 OFFICE O/P EST MOD 30 MIN: CPT | Mod: PBBFAC | Performed by: INTERNAL MEDICINE

## 2023-10-17 PROCEDURE — 99214 OFFICE O/P EST MOD 30 MIN: CPT | Mod: S$PBB,,, | Performed by: INTERNAL MEDICINE

## 2023-10-17 NOTE — PROGRESS NOTES
Subjective:       Patient ID: Selam Botello is a 78 y.o. female.    Chief Complaint: Results and Anemia    HPI:  78-year-old female history of iron overload.  The patient at this time returns for clinical follow-up she is had recurrent angiodysplasias upper GI tract.  In [place] for follow-up    Past Medical History:   Diagnosis Date    Acid reflux     Allergy     Arthritis     Atrial tachycardia     Bladder cancer     Breast cyst     Cataract     Colon polyp     COVID 5/19/2022    Deep vein thrombosis     Degenerative disc disease     Encounter for blood transfusion     General anesthetics causing adverse effect in therapeutic use     GI bleed     Hematuria, gross     Inflammatory bowel disease     Liver disease     cyst    Malignant neoplasm of lateral wall of urinary bladder 10/31/2019    Osteoporosis     PONV (postoperative nausea and vomiting)     Retina disorder, left     Skin cancer     Thyroid disease     Urinary tract infection      Family History   Problem Relation Age of Onset    COPD Mother     Cancer Mother     COPD Father     Cancer Father     Cancer Sister      Social History     Socioeconomic History    Marital status:    Tobacco Use    Smoking status: Never    Smokeless tobacco: Never   Substance and Sexual Activity    Alcohol use: No    Drug use: No    Sexual activity: Yes     Partners: Male     Past Surgical History:   Procedure Laterality Date    ADENOIDECTOMY      BLADDER FULGURATION Right 9/12/2018    Procedure: FULGURATION, BLADDER;  Surgeon: Isaias Shah MD;  Location: [facility];  Service: Urology;  Laterality: Right;    BLADDER FULGURATION N/A 4/24/2019    Procedure: FULGURATION, BLADDER;  Surgeon: Isaias Shah MD;  Location: [facility];  Service: Urology;  Laterality: N/A;    BREAST SURGERY Bilateral     CATARACT EXTRACTION      CYSTOSCOPY      CYSTOSCOPY N/A 4/24/2019    Procedure: CYSTOSCOPY;  Surgeon: Isaias Shah MD;  Location:  [facility];  Service: Urology;  Laterality: N/A;    CYSTOSCOPY WITH URETEROSCOPY, RETROGRADE PYELOGRAPHY, AND INSERTION OF STENT Left 9/12/2018    Procedure: CYSTOSCOPY, WITH RETROGRADE PYELOGRAM AND URETERAL STENT INSERTION;  Surgeon: Isaias Shah MD;  Location: [facility];  Service: Urology;  Laterality: Left;    EYE SURGERY Left 2017    retina pucker    INSTILLATION OF URINARY BLADDER N/A 9/12/2018    Procedure: INSTILLATION, URINARY BLADDER;  Surgeon: Isaias Shah MD;  Location: [facility];  Service: Urology;  Laterality: N/A;  Chemo agent instillation     JOINT REPLACEMENT Bilateral     hip    JOINT REPLACEMENT Right     knee    MASTECTOMY Bilateral 1991    RETROGRADE PYELOGRAPHY Bilateral 10/31/2019    Procedure: PYELOGRAM, RETROGRADE;  Surgeon: Baldo Gates MD;  Location: [facility];  Service: Urology;  Laterality: Bilateral;    TONSILLECTOMY      TURBT      TURBT, WITH BLUE LIGHT CYSTOSCOPY AND CYSVIEW Bilateral 10/31/2019    Procedure: TURBT,WITH BLUE LIGHT CYSTOSCOPY AND CYSVIEW;  Surgeon: Baldo Gates MD;  Location: [facility];  Service: Urology;  Laterality: Bilateral;    TYMPANOSTOMY TUBE PLACEMENT      VASCULAR SURGERY Right     popliteal stent    WISDOM TOOTH EXTRACTION         Labs:  Lab Results   Component Value Date    WBC 5.08 10/12/2023    HGB 11.3 (L) 10/12/2023    HCT 35.6 (L) 10/12/2023    MCV 87 10/12/2023     10/12/2023     BMP  Lab Results   Component Value Date     10/12/2023    K 4.8 10/12/2023     10/12/2023    CO2 23 10/12/2023    BUN 19 10/12/2023    CREATININE 0.7 10/12/2023    CALCIUM 9.9 10/12/2023    ANIONGAP 10 10/12/2023    ESTGFRAFRICA 91 08/16/2022    EGFRNONAA >60 06/08/2022     Lab Results   Component Value Date    ALT 13 10/12/2023    AST 17 10/12/2023    ALKPHOS 67 10/12/2023    BILITOT 0.6 10/12/2023       Lab Results   Component Value Date    IRON 72 10/12/2023    TIBC 377 10/12/2023    FERRITIN 18 (L) 10/12/2023     No results found  "for: "RZHNUCRO34"  No results found for: "FOLATE"  Lab Results   Component Value Date    TSH 2.629 10/12/2023         Review of Systems   Constitutional:  Negative for activity change, appetite change, chills, diaphoresis, fatigue, fever and unexpected weight change.   HENT:  Negative for congestion, dental problem, drooling, ear discharge, ear pain, facial swelling, hearing loss, mouth sores, nosebleeds, postnasal drip, rhinorrhea, sinus pressure, sneezing, sore throat, tinnitus, trouble swallowing and voice change.    Eyes:  Negative for photophobia, pain, discharge, redness, itching and visual disturbance.   Respiratory:  Negative for cough, choking, chest tightness, shortness of breath, wheezing and stridor.    Cardiovascular:  Negative for chest pain, palpitations and leg swelling.   Gastrointestinal:  Negative for abdominal distention, abdominal pain, anal bleeding, blood in stool, constipation, diarrhea, nausea, rectal pain and vomiting.   Endocrine: Negative for cold intolerance, heat intolerance, polydipsia, polyphagia and polyuria.   Genitourinary:  Negative for decreased urine volume, difficulty urinating, dyspareunia, dysuria, enuresis, flank pain, frequency, genital sores, hematuria, menstrual problem, pelvic pain, urgency, vaginal bleeding, vaginal discharge and vaginal pain.   Musculoskeletal:  Negative for arthralgias, back pain, gait problem, joint swelling, myalgias, neck pain and neck stiffness.   Skin:  Negative for color change, pallor and rash.   Allergic/Immunologic: Negative for environmental allergies, food allergies and immunocompromised state.   Neurological:  Negative for dizziness, tremors, seizures, syncope, facial asymmetry, speech difficulty, weakness, light-headedness, numbness and headaches.   Hematological:  Negative for adenopathy. Does not bruise/bleed easily.   Psychiatric/Behavioral:  Positive for dysphoric mood. Negative for agitation, behavioral problems, confusion, decreased " concentration, hallucinations, self-injury, sleep disturbance and suicidal ideas. The patient is nervous/anxious. The patient is not hyperactive.        Objective:      Physical Exam  Vitals reviewed.   Constitutional:       General: She is not in acute distress.     Appearance: She is well-developed. She is not diaphoretic.   HENT:      Head: Normocephalic and atraumatic.      Right Ear: External ear normal.      Left Ear: External ear normal.      Nose: Nose normal.      Right Sinus: No maxillary sinus tenderness or frontal sinus tenderness.      Left Sinus: No maxillary sinus tenderness or frontal sinus tenderness.      Mouth/Throat:      Pharynx: No oropharyngeal exudate.   Eyes:      General: Lids are normal. No scleral icterus.        Right eye: No discharge.         Left eye: No discharge.      Conjunctiva/sclera: Conjunctivae normal.      Right eye: Right conjunctiva is not injected. No hemorrhage.     Left eye: Left conjunctiva is not injected. No hemorrhage.     Pupils: Pupils are equal, round, and reactive to light.   Neck:      Thyroid: No thyromegaly.      Vascular: No JVD.      Trachea: No tracheal deviation.   Cardiovascular:      Rate and Rhythm: Normal rate.   Pulmonary:      Effort: Pulmonary effort is normal. No respiratory distress.      Breath sounds: No stridor.   Chest:      Chest wall: No tenderness.   Abdominal:      General: Bowel sounds are normal. There is no distension.      Palpations: Abdomen is soft. There is no hepatomegaly, splenomegaly or mass.      Tenderness: There is no abdominal tenderness. There is no rebound.   Musculoskeletal:         General: No tenderness. Normal range of motion.      Cervical back: Normal range of motion and neck supple.   Lymphadenopathy:      Cervical: No cervical adenopathy.      Upper Body:      Right upper body: No supraclavicular adenopathy.      Left upper body: No supraclavicular adenopathy.   Skin:     General: Skin is dry.      Findings: No  erythema or rash.   Neurological:      Mental Status: She is alert and oriented to person, place, and time.      Cranial Nerves: No cranial nerve deficit.      Coordination: Coordination normal.   Psychiatric:         Behavior: Behavior normal.         Thought Content: Thought content normal.         Judgment: Judgment normal.           Assessment:      1. Angiodysplasia of cecum    2. Hereditary hemochromatosis    3. H/O bilateral mastectomy           Med Onc Chart Routing      Follow up with physician 2 months. Follow-up in see me in 2 months virtual visit acceptable with standing labs of CBC CMP iron status TSH prior   Follow up with JASON    Infusion scheduling note    Injection scheduling note    Labs    Imaging    Pharmacy appointment    Other referrals                 Plan:      reviewed information with her reviewed immunization scheduled for this fall.  And rationale for doing so in addition would recommend that patient have standing labs completed in 2 months.  Can be seen by either in a physical a virtual visit.  Stable at present time        Adonay Neri Jr, MD FACP

## 2023-12-29 ENCOUNTER — LAB VISIT (OUTPATIENT)
Dept: LAB | Facility: HOSPITAL | Age: 78
End: 2023-12-29
Attending: INTERNAL MEDICINE
Payer: MEDICARE

## 2023-12-29 DIAGNOSIS — E03.9 ACQUIRED HYPOTHYROIDISM: ICD-10-CM

## 2023-12-29 DIAGNOSIS — R79.9 ABNORMAL FINDING OF BLOOD CHEMISTRY, UNSPECIFIED: ICD-10-CM

## 2023-12-29 DIAGNOSIS — K55.20 ANGIODYSPLASIA OF CECUM: ICD-10-CM

## 2023-12-29 LAB
ALBUMIN SERPL BCP-MCNC: 3.9 G/DL (ref 3.5–5.2)
ALP SERPL-CCNC: 87 U/L (ref 55–135)
ALT SERPL W/O P-5'-P-CCNC: 14 U/L (ref 10–44)
ANION GAP SERPL CALC-SCNC: 9 MMOL/L (ref 8–16)
AST SERPL-CCNC: 18 U/L (ref 10–40)
BASOPHILS # BLD AUTO: 0.05 K/UL (ref 0–0.2)
BASOPHILS NFR BLD: 1 % (ref 0–1.9)
BILIRUB SERPL-MCNC: 0.3 MG/DL (ref 0.1–1)
BUN SERPL-MCNC: 14 MG/DL (ref 8–23)
CALCIUM SERPL-MCNC: 9.1 MG/DL (ref 8.7–10.5)
CHLORIDE SERPL-SCNC: 110 MMOL/L (ref 95–110)
CO2 SERPL-SCNC: 24 MMOL/L (ref 23–29)
CREAT SERPL-MCNC: 0.7 MG/DL (ref 0.5–1.4)
DIFFERENTIAL METHOD BLD: ABNORMAL
EOSINOPHIL # BLD AUTO: 0.2 K/UL (ref 0–0.5)
EOSINOPHIL NFR BLD: 4.1 % (ref 0–8)
ERYTHROCYTE [DISTWIDTH] IN BLOOD BY AUTOMATED COUNT: 17 % (ref 11.5–14.5)
EST. GFR  (NO RACE VARIABLE): >60 ML/MIN/1.73 M^2
FERRITIN SERPL-MCNC: 18 NG/ML (ref 20–300)
GLUCOSE SERPL-MCNC: 93 MG/DL (ref 70–110)
HCT VFR BLD AUTO: 32.8 % (ref 37–48.5)
HGB BLD-MCNC: 10.4 G/DL (ref 12–16)
IMM GRANULOCYTES # BLD AUTO: 0 K/UL (ref 0–0.04)
IMM GRANULOCYTES NFR BLD AUTO: 0 % (ref 0–0.5)
IRON SERPL-MCNC: 20 UG/DL (ref 30–160)
LYMPHOCYTES # BLD AUTO: 1.1 K/UL (ref 1–4.8)
LYMPHOCYTES NFR BLD: 21.9 % (ref 18–48)
MCH RBC QN AUTO: 27.4 PG (ref 27–31)
MCHC RBC AUTO-ENTMCNC: 31.7 G/DL (ref 32–36)
MCV RBC AUTO: 87 FL (ref 82–98)
MONOCYTES # BLD AUTO: 0.4 K/UL (ref 0.3–1)
MONOCYTES NFR BLD: 8.1 % (ref 4–15)
NEUTROPHILS # BLD AUTO: 3.2 K/UL (ref 1.8–7.7)
NEUTROPHILS NFR BLD: 64.9 % (ref 38–73)
NRBC BLD-RTO: 0 /100 WBC
PLATELET # BLD AUTO: 276 K/UL (ref 150–450)
PMV BLD AUTO: 9.2 FL (ref 9.2–12.9)
POTASSIUM SERPL-SCNC: 4 MMOL/L (ref 3.5–5.1)
PROT SERPL-MCNC: 6.4 G/DL (ref 6–8.4)
RBC # BLD AUTO: 3.79 M/UL (ref 4–5.4)
SATURATED IRON: 6 % (ref 20–50)
SODIUM SERPL-SCNC: 143 MMOL/L (ref 136–145)
TOTAL IRON BINDING CAPACITY: 352 UG/DL (ref 250–450)
TRANSFERRIN SERPL-MCNC: 238 MG/DL (ref 200–375)
TSH SERPL DL<=0.005 MIU/L-ACNC: 2.35 UIU/ML (ref 0.4–4)
WBC # BLD AUTO: 4.93 K/UL (ref 3.9–12.7)

## 2023-12-29 PROCEDURE — 82728 ASSAY OF FERRITIN: CPT | Performed by: INTERNAL MEDICINE

## 2023-12-29 PROCEDURE — 80053 COMPREHEN METABOLIC PANEL: CPT | Performed by: INTERNAL MEDICINE

## 2023-12-29 PROCEDURE — 84443 ASSAY THYROID STIM HORMONE: CPT | Performed by: INTERNAL MEDICINE

## 2023-12-29 PROCEDURE — 36415 COLL VENOUS BLD VENIPUNCTURE: CPT | Performed by: INTERNAL MEDICINE

## 2023-12-29 PROCEDURE — 83540 ASSAY OF IRON: CPT | Performed by: INTERNAL MEDICINE

## 2023-12-29 PROCEDURE — 85025 COMPLETE CBC W/AUTO DIFF WBC: CPT | Performed by: INTERNAL MEDICINE

## 2024-01-02 ENCOUNTER — OFFICE VISIT (OUTPATIENT)
Dept: HEMATOLOGY/ONCOLOGY | Facility: CLINIC | Age: 79
End: 2024-01-02
Payer: MEDICARE

## 2024-01-02 DIAGNOSIS — E83.110 HEREDITARY HEMOCHROMATOSIS: ICD-10-CM

## 2024-01-02 DIAGNOSIS — K55.20 ANGIODYSPLASIA OF CECUM: Primary | ICD-10-CM

## 2024-01-02 DIAGNOSIS — Z90.13 H/O BILATERAL MASTECTOMY: ICD-10-CM

## 2024-01-02 DIAGNOSIS — C67.9 MALIGNANT NEOPLASM OF URINARY BLADDER, UNSPECIFIED SITE: ICD-10-CM

## 2024-01-02 PROCEDURE — 99214 OFFICE O/P EST MOD 30 MIN: CPT | Mod: 95,,, | Performed by: INTERNAL MEDICINE

## 2024-01-02 RX ORDER — FERROUS SULFATE 325(65) MG
325 TABLET ORAL
Qty: 30 TABLET | Refills: 11 | Status: SHIPPED | OUTPATIENT
Start: 2024-01-02

## 2024-01-02 NOTE — PROGRESS NOTES
Subjective:       Patient ID: Selam Botello is a 78 y.o. female.    Chief Complaint: Results and Anemia    HPI:  78-year-old female history of iron overload.  Previous history of angiodysplasias of cecum.  Patient was found to be fatigue week went to emergency room at [facility] do not have access to records there.  Did have an MRI done [facility] which revealed brain atrophy.  Patient is found to be iron deficient more so than previously after intermittent phlebotomies.  Previous diagnosis of angiodysplasias of cecum    Past Medical History:   Diagnosis Date    Acid reflux     Allergy     Arthritis     Atrial tachycardia     Bladder cancer     Breast cyst     Cataract     Colon polyp     COVID 5/19/2022    Deep vein thrombosis     Degenerative disc disease     Encounter for blood transfusion     General anesthetics causing adverse effect in therapeutic use     GI bleed     Hematuria, gross     Inflammatory bowel disease     Liver disease     cyst    Malignant neoplasm of lateral wall of urinary bladder 10/31/2019    Osteoporosis     PONV (postoperative nausea and vomiting)     Retina disorder, left     Skin cancer     Thyroid disease     Urinary tract infection      Family History   Problem Relation Age of Onset    COPD Mother     Cancer Mother     COPD Father     Cancer Father     Cancer Sister      Social History     Socioeconomic History    Marital status:    Tobacco Use    Smoking status: Never    Smokeless tobacco: Never   Substance and Sexual Activity    Alcohol use: No    Drug use: No    Sexual activity: Yes     Partners: Male     Social Determinants of Health     Financial Resource Strain: Low Risk  (1/2/2024)    Overall Financial Resource Strain (CARDIA)     Difficulty of Paying Living Expenses: Not hard at all   Food Insecurity: No Food Insecurity (1/2/2024)    Hunger Vital Sign     Worried About Running Out of Food in the Last Year: Never true     Ran Out of Food in the Last  Year: Never true   Transportation Needs: No Transportation Needs (1/2/2024)    PRAPARE - Transportation     Lack of Transportation (Medical): No     Lack of Transportation (Non-Medical): No   Physical Activity: Insufficiently Active (1/2/2024)    Exercise Vital Sign     Days of Exercise per Week: 3 days     Minutes of Exercise per Session: 40 min   Stress: Stress Concern Present (1/2/2024)    [employer] - Occupational Stress Questionnaire     Feeling of Stress : Very much   Social Connections: Unknown (1/2/2024)    Social Connection and Isolation Panel [NHANES]     Frequency of Communication with Friends and Family: More than three times a week     Frequency of Social Gatherings with Friends and Family: Twice a week     Active Member of Clubs or Organizations: Yes     Attends Club or Organization Meetings: More than 4 times per year     Marital Status:    Housing Stability: Low Risk  (1/2/2024)    Housing Stability Vital Sign     Unable to Pay for Housing in the Last Year: No     Number of Places Lived in the Last Year: 1     Unstable Housing in the Last Year: No     Past Surgical History:   Procedure Laterality Date    ADENOIDECTOMY      BLADDER FULGURATION Right 9/12/2018    Procedure: FULGURATION, BLADDER;  Surgeon: Isaias Shah MD;  Location: [facility];  Service: Urology;  Laterality: Right;    BLADDER FULGURATION N/A 4/24/2019    Procedure: FULGURATION, BLADDER;  Surgeon: Isaias Shah MD;  Location: [facility] OR;  Service: Urology;  Laterality: N/A;    BREAST SURGERY Bilateral     CATARACT EXTRACTION      CYSTOSCOPY      CYSTOSCOPY N/A 4/24/2019    Procedure: CYSTOSCOPY;  Surgeon: Isaias Shah MD;  Location: [facility];  Service: Urology;  Laterality: N/A;    CYSTOSCOPY WITH URETEROSCOPY, RETROGRADE PYELOGRAPHY, AND INSERTION OF STENT Left 9/12/2018    Procedure: CYSTOSCOPY, WITH RETROGRADE PYELOGRAM AND URETERAL STENT INSERTION;  Surgeon: Isaias Shah MD;   "Location: [facility] OR;  Service: Urology;  Laterality: Left;    EYE SURGERY Left 2017    retina pucker    INSTILLATION OF URINARY BLADDER N/A 9/12/2018    Procedure: INSTILLATION, URINARY BLADDER;  Surgeon: Isaias Shah MD;  Location: [facility];  Service: Urology;  Laterality: N/A;  Chemo agent instillation     JOINT REPLACEMENT Bilateral     hip    JOINT REPLACEMENT Right     knee    MASTECTOMY Bilateral 1991    RETROGRADE PYELOGRAPHY Bilateral 10/31/2019    Procedure: PYELOGRAM, RETROGRADE;  Surgeon: Baldo Gates MD;  Location: [facility];  Service: Urology;  Laterality: Bilateral;    TONSILLECTOMY      TURBT      TURBT, WITH BLUE LIGHT CYSTOSCOPY AND CYSVIEW Bilateral 10/31/2019    Procedure: TURBT,WITH BLUE LIGHT CYSTOSCOPY AND CYSVIEW;  Surgeon: Baldo Gates MD;  Location: [facility];  Service: Urology;  Laterality: Bilateral;    TYMPANOSTOMY TUBE PLACEMENT      VASCULAR SURGERY Right     popliteal stent    WISDOM TOOTH EXTRACTION         Labs:  Lab Results   Component Value Date    WBC 4.93 12/29/2023    HGB 10.4 (L) 12/29/2023    HCT 32.8 (L) 12/29/2023    MCV 87 12/29/2023     12/29/2023     BMP  Lab Results   Component Value Date     12/29/2023    K 4.0 12/29/2023     12/29/2023    CO2 24 12/29/2023    BUN 14 12/29/2023    CREATININE 0.7 12/29/2023    CALCIUM 9.1 12/29/2023    ANIONGAP 9 12/29/2023    ESTGFRAFRICA 91 08/16/2022    EGFRNONAA >60 06/08/2022     Lab Results   Component Value Date    ALT 14 12/29/2023    AST 18 12/29/2023    ALKPHOS 87 12/29/2023    BILITOT 0.3 12/29/2023       Lab Results   Component Value Date    IRON 20 (L) 12/29/2023    TIBC 352 12/29/2023    FERRITIN 18 (L) 12/29/2023     No results found for: "DAHIWRHS96"  No results found for: "FOLATE"  Lab Results   Component Value Date    TSH 2.354 12/29/2023         Review of Systems   Constitutional:  Positive for fatigue. Negative for activity change, appetite change, chills, diaphoresis, fever and unexpected " weight change.   HENT:  Negative for congestion, dental problem, drooling, ear discharge, ear pain, facial swelling, hearing loss, mouth sores, nosebleeds, postnasal drip, rhinorrhea, sinus pressure, sneezing, sore throat, tinnitus, trouble swallowing and voice change.    Eyes:  Negative for photophobia, pain, discharge, redness, itching and visual disturbance.   Respiratory:  Negative for cough, choking, chest tightness, shortness of breath, wheezing and stridor.    Cardiovascular:  Negative for chest pain, palpitations and leg swelling.   Gastrointestinal:  Negative for abdominal distention, abdominal pain, anal bleeding, blood in stool, constipation, diarrhea, nausea, rectal pain and vomiting.   Endocrine: Negative for cold intolerance, heat intolerance, polydipsia, polyphagia and polyuria.   Genitourinary:  Negative for decreased urine volume, difficulty urinating, dyspareunia, dysuria, enuresis, flank pain, frequency, genital sores, hematuria, menstrual problem, pelvic pain, urgency, vaginal bleeding, vaginal discharge and vaginal pain.   Musculoskeletal:  Negative for arthralgias, back pain, gait problem, joint swelling, myalgias, neck pain and neck stiffness.   Skin:  Negative for color change, pallor and rash.   Allergic/Immunologic: Negative for environmental allergies, food allergies and immunocompromised state.   Neurological:  Positive for weakness. Negative for dizziness, tremors, seizures, syncope, facial asymmetry, speech difficulty, light-headedness, numbness and headaches.   Hematological:  Negative for adenopathy. Does not bruise/bleed easily.   Psychiatric/Behavioral:  Negative for agitation, behavioral problems, confusion, decreased concentration, dysphoric mood, hallucinations, self-injury, sleep disturbance and suicidal ideas. The patient is not nervous/anxious and is not hyperactive.        Objective:      Physical Exam  Constitutional:       Appearance: Normal appearance.              Assessment:      1. Angiodysplasia of cecum    2. H/O bilateral mastectomy    3. Hereditary hemochromatosis    4. Malignant neoplasm of urinary bladder, unspecified site           Med Onc Chart Routing      Follow up with physician . Return in 1 month CBC, serum iron TIBC and ferritin TSH prior   Follow up with JASON    Infusion scheduling note    Injection scheduling note    Labs    Imaging    Pharmacy appointment    Other referrals                   Plan:     The patient location is:  Home  The chief complaint leading to consultation is:  Anemia    Visit type: audiovisual    Face to Face time with patient: 25 minutes of total time spent on the encounter, which includes face to face time and non-face to face time preparing to see the patient (eg, review of tests), Obtaining and/or reviewing separately obtained history, Documenting clinical information in the electronic or other health record, Independently interpreting results (not separately reported) and communicating results to the patient/family/caregiver, or Care coordination (not separately reported).         Each patient to whom he or she provides medical services by telemedicine is:  (1) informed of the relationship between the physician and patient and the respective role of any other health care provider with respect to management of the patient; and (2) notified that he or she may decline to receive medical services by telemedicine and may withdraw from such care at any time.    Notes:  Reviewed information with patient.  At this time documented iron deficiency will start on oral iron supplementation.  She is as requested and will be seen by her gastroenterologist Dr. Vance Bailey..  Would recommend that patient consider upper lower endoscopies possible video capsule.  Will follow-up in 1 month.  Reluctant to give intravenous iron would previous history of iron overload.  But may need to do so if nonresponsive are not able to tolerate oral iron  supplementation.        Adonay Neri Jr, MD FACP

## 2024-02-06 ENCOUNTER — PATIENT MESSAGE (OUTPATIENT)
Dept: HEMATOLOGY/ONCOLOGY | Facility: CLINIC | Age: 79
End: 2024-02-06
Payer: MEDICARE

## 2024-02-08 ENCOUNTER — TELEPHONE (OUTPATIENT)
Dept: HEMATOLOGY/ONCOLOGY | Facility: CLINIC | Age: 79
End: 2024-02-08
Payer: MEDICARE

## 2024-02-08 NOTE — TELEPHONE ENCOUNTER
----- Message from Mary Causey sent at 2/8/2024 11:57 AM CST -----  Contact: Niharika/Dr Serena Bundy is calling in regards to getting a medical dixon for surgery on 02/09.please fax to [phone]          Thanks  [facility]

## 2024-02-27 DIAGNOSIS — Z00.00 ENCOUNTER FOR MEDICARE ANNUAL WELLNESS EXAM: ICD-10-CM

## 2024-03-05 ENCOUNTER — LAB VISIT (OUTPATIENT)
Dept: LAB | Facility: HOSPITAL | Age: 79
End: 2024-03-05
Attending: INTERNAL MEDICINE
Payer: MEDICARE

## 2024-03-05 DIAGNOSIS — K55.20 ANGIODYSPLASIA OF CECUM: ICD-10-CM

## 2024-03-05 DIAGNOSIS — E03.9 ACQUIRED HYPOTHYROIDISM: ICD-10-CM

## 2024-03-05 DIAGNOSIS — R79.9 ABNORMAL FINDING OF BLOOD CHEMISTRY, UNSPECIFIED: ICD-10-CM

## 2024-03-05 LAB
ALBUMIN SERPL BCP-MCNC: 4.1 G/DL (ref 3.5–5.2)
ALP SERPL-CCNC: 67 U/L (ref 55–135)
ALT SERPL W/O P-5'-P-CCNC: 15 U/L (ref 10–44)
ANION GAP SERPL CALC-SCNC: 7 MMOL/L (ref 8–16)
AST SERPL-CCNC: 15 U/L (ref 10–40)
BASOPHILS # BLD AUTO: 0.03 K/UL (ref 0–0.2)
BASOPHILS NFR BLD: 0.5 % (ref 0–1.9)
BILIRUB SERPL-MCNC: 0.4 MG/DL (ref 0.1–1)
BUN SERPL-MCNC: 17 MG/DL (ref 8–23)
CALCIUM SERPL-MCNC: 9.8 MG/DL (ref 8.7–10.5)
CHLORIDE SERPL-SCNC: 107 MMOL/L (ref 95–110)
CO2 SERPL-SCNC: 27 MMOL/L (ref 23–29)
CREAT SERPL-MCNC: 0.8 MG/DL (ref 0.5–1.4)
DIFFERENTIAL METHOD BLD: ABNORMAL
EOSINOPHIL # BLD AUTO: 0.1 K/UL (ref 0–0.5)
EOSINOPHIL NFR BLD: 1.6 % (ref 0–8)
ERYTHROCYTE [DISTWIDTH] IN BLOOD BY AUTOMATED COUNT: 16 % (ref 11.5–14.5)
EST. GFR  (NO RACE VARIABLE): >60 ML/MIN/1.73 M^2
FERRITIN SERPL-MCNC: 29 NG/ML (ref 20–300)
GLUCOSE SERPL-MCNC: 92 MG/DL (ref 70–110)
HCT VFR BLD AUTO: 34.4 % (ref 37–48.5)
HGB BLD-MCNC: 10.8 G/DL (ref 12–16)
IMM GRANULOCYTES # BLD AUTO: 0.02 K/UL (ref 0–0.04)
IMM GRANULOCYTES NFR BLD AUTO: 0.3 % (ref 0–0.5)
IRON SERPL-MCNC: 34 UG/DL (ref 30–160)
LYMPHOCYTES # BLD AUTO: 1.4 K/UL (ref 1–4.8)
LYMPHOCYTES NFR BLD: 21.8 % (ref 18–48)
MCH RBC QN AUTO: 26.7 PG (ref 27–31)
MCHC RBC AUTO-ENTMCNC: 31.4 G/DL (ref 32–36)
MCV RBC AUTO: 85 FL (ref 82–98)
MONOCYTES # BLD AUTO: 0.5 K/UL (ref 0.3–1)
MONOCYTES NFR BLD: 7.6 % (ref 4–15)
NEUTROPHILS # BLD AUTO: 4.3 K/UL (ref 1.8–7.7)
NEUTROPHILS NFR BLD: 68.2 % (ref 38–73)
NRBC BLD-RTO: 0 /100 WBC
PLATELET # BLD AUTO: 321 K/UL (ref 150–450)
PMV BLD AUTO: 9.7 FL (ref 9.2–12.9)
POTASSIUM SERPL-SCNC: 3.9 MMOL/L (ref 3.5–5.1)
PROT SERPL-MCNC: 6.6 G/DL (ref 6–8.4)
RBC # BLD AUTO: 4.04 M/UL (ref 4–5.4)
SATURATED IRON: 10 % (ref 20–50)
SODIUM SERPL-SCNC: 141 MMOL/L (ref 136–145)
TOTAL IRON BINDING CAPACITY: 349 UG/DL (ref 250–450)
TRANSFERRIN SERPL-MCNC: 236 MG/DL (ref 200–375)
TSH SERPL DL<=0.005 MIU/L-ACNC: 3.56 UIU/ML (ref 0.4–4)
WBC # BLD AUTO: 6.29 K/UL (ref 3.9–12.7)

## 2024-03-05 PROCEDURE — 36415 COLL VENOUS BLD VENIPUNCTURE: CPT | Performed by: INTERNAL MEDICINE

## 2024-03-05 PROCEDURE — 83540 ASSAY OF IRON: CPT | Performed by: INTERNAL MEDICINE

## 2024-03-05 PROCEDURE — 84443 ASSAY THYROID STIM HORMONE: CPT | Performed by: INTERNAL MEDICINE

## 2024-03-05 PROCEDURE — 85025 COMPLETE CBC W/AUTO DIFF WBC: CPT | Performed by: INTERNAL MEDICINE

## 2024-03-05 PROCEDURE — 80053 COMPREHEN METABOLIC PANEL: CPT | Performed by: INTERNAL MEDICINE

## 2024-03-05 PROCEDURE — 82728 ASSAY OF FERRITIN: CPT | Performed by: INTERNAL MEDICINE

## 2024-03-06 DIAGNOSIS — R94.6 ABNORMAL RESULTS OF THYROID FUNCTION STUDIES: ICD-10-CM

## 2024-03-06 RX ORDER — LEVOTHYROXINE SODIUM 50 UG/1
TABLET ORAL
Qty: 90 TABLET | Refills: 3 | Status: SHIPPED | OUTPATIENT
Start: 2024-03-06 | End: 2024-05-27 | Stop reason: SDUPTHER

## 2024-03-07 ENCOUNTER — OFFICE VISIT (OUTPATIENT)
Dept: HEMATOLOGY/ONCOLOGY | Facility: CLINIC | Age: 79
End: 2024-03-07
Payer: MEDICARE

## 2024-03-07 VITALS
HEART RATE: 61 BPM | WEIGHT: 132.81 LBS | SYSTOLIC BLOOD PRESSURE: 118 MMHG | TEMPERATURE: 98 F | OXYGEN SATURATION: 96 % | BODY MASS INDEX: 24.44 KG/M2 | HEIGHT: 62 IN | DIASTOLIC BLOOD PRESSURE: 76 MMHG

## 2024-03-07 DIAGNOSIS — E83.110 HEREDITARY HEMOCHROMATOSIS: Primary | ICD-10-CM

## 2024-03-07 DIAGNOSIS — M79.671 PAIN IN BOTH FEET: ICD-10-CM

## 2024-03-07 DIAGNOSIS — Z90.13 H/O BILATERAL MASTECTOMY: ICD-10-CM

## 2024-03-07 DIAGNOSIS — K55.20 ANGIODYSPLASIA OF CECUM: ICD-10-CM

## 2024-03-07 DIAGNOSIS — D50.0 IRON DEFICIENCY ANEMIA DUE TO CHRONIC BLOOD LOSS: ICD-10-CM

## 2024-03-07 DIAGNOSIS — M79.672 PAIN IN BOTH FEET: ICD-10-CM

## 2024-03-07 DIAGNOSIS — E03.9 ACQUIRED HYPOTHYROIDISM: ICD-10-CM

## 2024-03-07 DIAGNOSIS — Z80.3 FAMILY HISTORY OF BREAST CANCER IN SISTER: ICD-10-CM

## 2024-03-07 PROCEDURE — 99999 PR PBB SHADOW E&M-EST. PATIENT-LVL IV: CPT | Mod: PBBFAC,,, | Performed by: INTERNAL MEDICINE

## 2024-03-07 PROCEDURE — 99214 OFFICE O/P EST MOD 30 MIN: CPT | Mod: S$PBB,,, | Performed by: INTERNAL MEDICINE

## 2024-03-07 PROCEDURE — 99214 OFFICE O/P EST MOD 30 MIN: CPT | Mod: PBBFAC | Performed by: INTERNAL MEDICINE

## 2024-03-07 NOTE — PROGRESS NOTES
Subjective:       Patient ID: Selam Botello is a 78 y.o. female.    Chief Complaint: Results and Anemia    HPI:  78-year-old female history of iron overload patient has angiodysplasias of cecum in his currently being followed with laboratory studies on a 6-8 week basis.  Patient is found to be slightly iron deficient [facility]up [facility]    Past Medical History:   Diagnosis Date    Acid reflux     Allergy     Arthritis     Atrial tachycardia     Bladder cancer     Breast cyst     Cataract     Colon polyp     COVID 5/19/2022    Deep vein thrombosis     Degenerative disc disease     Encounter for blood transfusion     General anesthetics causing adverse effect in therapeutic use     GI bleed     Hematuria, gross     Inflammatory bowel disease     Liver disease     cyst    Malignant neoplasm of lateral wall of urinary bladder 10/31/2019    Osteoporosis     PONV (postoperative nausea and vomiting)     Retina disorder, left     Skin cancer     Thyroid disease     Urinary tract infection      Family History   Problem Relation Age of Onset    COPD Mother     Cancer Mother     COPD Father     Cancer Father     Cancer Sister      Social History     Socioeconomic History    Marital status:    Tobacco Use    Smoking status: Never    Smokeless tobacco: Never   Substance and Sexual Activity    Alcohol use: No    Drug use: No    Sexual activity: Yes     Partners: Male     Social Determinants of Health     Financial Resource Strain: Low Risk  (1/2/2024)    Overall Financial Resource Strain (CARDIA)     Difficulty of Paying Living Expenses: Not hard at all   Food Insecurity: No Food Insecurity (1/2/2024)    Hunger Vital Sign     Worried About Running Out of Food in the Last Year: Never true     Ran Out of Food in the Last Year: Never true   Transportation Needs: No Transportation Needs (1/2/2024)    PRAPARE - Transportation     Lack of Transportation (Medical): No     Lack of Transportation  (Non-Medical): No   Physical Activity: Insufficiently Active (1/2/2024)    Exercise Vital Sign     Days of Exercise per Week: 3 days     Minutes of Exercise per Session: 40 min   Stress: Stress Concern Present (1/2/2024)    [employer] [place] [employer] - Occupational Stress Questionnaire     Feeling of Stress : Very much   Social Connections: Unknown (1/2/2024)    Social Connection and Isolation Panel [NHANES]     Frequency of Communication with Friends and Family: More than three times a week     Frequency of Social Gatherings with Friends and Family: Twice a week     Active Member of Clubs or Organizations: Yes     Attends Club or Organization Meetings: More than 4 times per year     Marital Status:    Housing Stability: Low Risk  (1/2/2024)    Housing Stability Vital Sign     Unable to Pay for Housing in the Last Year: No     Number of Places Lived in the Last Year: 1     Unstable Housing in the Last Year: No     Past Surgical History:   Procedure Laterality Date    ADENOIDECTOMY      BLADDER FULGURATION Right 9/12/2018    Procedure: FULGURATION, BLADDER;  Surgeon: Isaias Shah MD;  Location: [facility] OR;  Service: Urology;  Laterality: Right;    BLADDER FULGURATION N/A 4/24/2019    Procedure: FULGURATION, BLADDER;  Surgeon: Isaias Shah MD;  Location: [facility] OR;  Service: Urology;  Laterality: N/A;    BREAST SURGERY Bilateral     CATARACT EXTRACTION      CYSTOSCOPY      CYSTOSCOPY N/A 4/24/2019    Procedure: CYSTOSCOPY;  Surgeon: Isaias Shah MD;  Location: [facility] OR;  Service: Urology;  Laterality: N/A;    CYSTOSCOPY WITH URETEROSCOPY, RETROGRADE PYELOGRAPHY, AND INSERTION OF STENT Left 9/12/2018    Procedure: CYSTOSCOPY, WITH RETROGRADE PYELOGRAM AND URETERAL STENT INSERTION;  Surgeon: Isaias Shah MD;  Location: [facility] OR;  Service: Urology;  Laterality: Left;    EYE SURGERY Left 2017    retina pucker    INSTILLATION OF URINARY BLADDER N/A 9/12/2018    Procedure: INSTILLATION,  "URINARY BLADDER;  Surgeon: Isaias Shah MD;  Location: [facility];  Service: Urology;  Laterality: N/A;  Chemo agent instillation     JOINT REPLACEMENT Bilateral     hip    JOINT REPLACEMENT Right     knee    MASTECTOMY Bilateral 1991    RETROGRADE PYELOGRAPHY Bilateral 10/31/2019    Procedure: PYELOGRAM, RETROGRADE;  Surgeon: Baldo Gates MD;  Location: [facility];  Service: Urology;  Laterality: Bilateral;    TONSILLECTOMY      TURBT      TURBT, WITH BLUE LIGHT CYSTOSCOPY AND CYSVIEW Bilateral 10/31/2019    Procedure: TURBT,WITH BLUE LIGHT CYSTOSCOPY AND CYSVIEW;  Surgeon: Baldo Gates MD;  Location: [facility];  Service: Urology;  Laterality: Bilateral;    TYMPANOSTOMY TUBE PLACEMENT      VASCULAR SURGERY Right     popliteal stent    WISDOM TOOTH EXTRACTION         Labs:  Lab Results   Component Value Date    WBC 6.29 03/05/2024    HGB 10.8 (L) 03/05/2024    HCT 34.4 (L) 03/05/2024    MCV 85 03/05/2024     03/05/2024     BMP  Lab Results   Component Value Date     03/05/2024    K 3.9 03/05/2024     03/05/2024    CO2 27 03/05/2024    BUN 17 03/05/2024    CREATININE 0.8 03/05/2024    CALCIUM 9.8 03/05/2024    ANIONGAP 7 (L) 03/05/2024    ESTGFRAFRICA 91 08/16/2022    EGFRNONAA >60 06/08/2022     Lab Results   Component Value Date    ALT 15 03/05/2024    AST 15 03/05/2024    ALKPHOS 67 03/05/2024    BILITOT 0.4 03/05/2024       Lab Results   Component Value Date    IRON 34 03/05/2024    TIBC 349 03/05/2024    FERRITIN 29 03/05/2024     No results found for: "RUAQRRVD12"  No results found for: "FOLATE"  Lab Results   Component Value Date    TSH 3.565 03/05/2024         Review of Systems   Constitutional:  Negative for activity change, appetite change, chills, diaphoresis, fatigue, fever and unexpected weight change.   HENT:  Negative for congestion, dental problem, drooling, ear discharge, ear pain, facial swelling, hearing loss, mouth sores, nosebleeds, postnasal drip, rhinorrhea, sinus " pressure, sneezing, sore throat, tinnitus, trouble swallowing and voice change.    Eyes:  Negative for photophobia, pain, discharge, redness, itching and visual disturbance.   Respiratory:  Negative for cough, choking, chest tightness, shortness of breath, wheezing and stridor.    Cardiovascular:  Negative for chest pain, palpitations and leg swelling.   Gastrointestinal:  Negative for abdominal distention, abdominal pain, anal bleeding, blood in stool, constipation, diarrhea, nausea, rectal pain and vomiting.   Endocrine: Negative for cold intolerance, heat intolerance, polydipsia, polyphagia and polyuria.   Genitourinary:  Negative for decreased urine volume, difficulty urinating, dyspareunia, dysuria, enuresis, flank pain, frequency, genital sores, hematuria, menstrual problem, pelvic pain, urgency, vaginal bleeding, vaginal discharge and vaginal pain.   Musculoskeletal:  Positive for gait problem. Negative for arthralgias, back pain, joint swelling, myalgias, neck pain and neck stiffness.        Recent fracture surgery of left wrist   Skin:  Negative for color change, pallor and rash.   Allergic/Immunologic: Negative for environmental allergies, food allergies and immunocompromised state.   Neurological:  Negative for dizziness, tremors, seizures, syncope, facial asymmetry, speech difficulty, weakness, light-headedness, numbness and headaches.   Hematological:  Negative for adenopathy. Does not bruise/bleed easily.   Psychiatric/Behavioral:  Negative for agitation, behavioral problems, confusion, decreased concentration, dysphoric mood, hallucinations, self-injury, sleep disturbance and suicidal ideas. The patient is not nervous/anxious and is not hyperactive.        Objective:      Physical Exam  Vitals reviewed.   Constitutional:       General: She is not in acute distress.     Appearance: She is well-developed. She is not diaphoretic.   HENT:      Head: Normocephalic and atraumatic.      Right Ear: External  ear normal.      Left Ear: External ear normal.      Nose: Nose normal.      Right Sinus: No maxillary sinus tenderness or frontal sinus tenderness.      Left Sinus: No maxillary sinus tenderness or frontal sinus tenderness.      Mouth/Throat:      Pharynx: No oropharyngeal exudate.   Eyes:      General: Lids are normal. No scleral icterus.        Right eye: No discharge.         Left eye: No discharge.      Conjunctiva/sclera: Conjunctivae normal.      Right eye: Right conjunctiva is not injected. No hemorrhage.     Left eye: Left conjunctiva is not injected. No hemorrhage.     Pupils: Pupils are equal, round, and reactive to light.   Neck:      Thyroid: No thyromegaly.      Vascular: No JVD.      Trachea: No tracheal deviation.   Cardiovascular:      Rate and Rhythm: Normal rate.   Pulmonary:      Effort: Pulmonary effort is normal. No respiratory distress.      Breath sounds: No stridor.   Chest:      Chest wall: No tenderness.   Abdominal:      General: Bowel sounds are normal. There is no distension.      Palpations: Abdomen is soft. There is no hepatomegaly, splenomegaly or mass.      Tenderness: There is no abdominal tenderness. There is no rebound.   Musculoskeletal:         General: No tenderness. Normal range of motion.      Cervical back: Normal range of motion and neck supple.   Lymphadenopathy:      Cervical: No cervical adenopathy.      Upper Body:      Right upper body: No supraclavicular adenopathy.      Left upper body: No supraclavicular adenopathy.   Skin:     General: Skin is dry.      Findings: No erythema or rash.   Neurological:      Mental Status: She is alert and oriented to person, place, and time.      Cranial Nerves: No cranial nerve deficit.      Coordination: Coordination normal.      Gait: Gait abnormal.   Psychiatric:         Behavior: Behavior normal.         Thought Content: Thought content normal.         Judgment: Judgment normal.             Assessment:      1. Hereditary  hemochromatosis    2. Pain in both feet    3. Angiodysplasia of cecum    4. Acquired hypothyroidism    5. Iron deficiency anemia due to chronic blood loss    6. Family history of breast cancer in sister    7. H/O bilateral mastectomy           Med Onc Chart Routing      Follow up with physician 2 months. Continue with follow-up every 6-8 weeks with CBC CMP iron TIBC ferritin and TSH   Follow up with JASON    Infusion scheduling note    Injection scheduling note    Labs    Imaging    Pharmacy appointment    Other referrals         Referral podiatry foot pain              Plan:     CBC stable.  Continue follow-up every 6-8 weeks with CBC CMP iron TIBC ferritin.  And TSH patient is continues GI workup is scheduled to have video capsule by [facility].  In addition patient is requesting podiatry referral referral made        Adonay Neri Jr, MD FACP

## 2024-03-11 ENCOUNTER — PATIENT MESSAGE (OUTPATIENT)
Dept: ADMINISTRATIVE | Facility: CLINIC | Age: 79
End: 2024-03-11
Payer: MEDICARE

## 2024-03-14 ENCOUNTER — OFFICE VISIT (OUTPATIENT)
Dept: URGENT CARE | Facility: CLINIC | Age: 79
End: 2024-03-14
Payer: MEDICARE

## 2024-03-14 VITALS
RESPIRATION RATE: 13 BRPM | DIASTOLIC BLOOD PRESSURE: 67 MMHG | TEMPERATURE: 101 F | WEIGHT: 132 LBS | HEART RATE: 89 BPM | BODY MASS INDEX: 24.29 KG/M2 | OXYGEN SATURATION: 96 % | HEIGHT: 62 IN | SYSTOLIC BLOOD PRESSURE: 135 MMHG

## 2024-03-14 DIAGNOSIS — J02.9 PHARYNGITIS, UNSPECIFIED ETIOLOGY: ICD-10-CM

## 2024-03-14 DIAGNOSIS — U07.1 COVID-19: Primary | ICD-10-CM

## 2024-03-14 DIAGNOSIS — J02.9 SORE THROAT: ICD-10-CM

## 2024-03-14 DIAGNOSIS — U07.1 COVID-19 VIRUS DETECTED: ICD-10-CM

## 2024-03-14 DIAGNOSIS — U07.1 COVID: Primary | ICD-10-CM

## 2024-03-14 LAB
CTP QC/QA: YES
MOLECULAR STREP A: NEGATIVE
POC MOLECULAR INFLUENZA A AGN: NEGATIVE
POC MOLECULAR INFLUENZA B AGN: NEGATIVE
SARS-COV-2 AG RESP QL IA.RAPID: POSITIVE

## 2024-03-14 PROCEDURE — 87651 STREP A DNA AMP PROBE: CPT | Mod: QW,S$GLB,, | Performed by: NURSE PRACTITIONER

## 2024-03-14 PROCEDURE — 87502 INFLUENZA DNA AMP PROBE: CPT | Mod: QW,S$GLB,, | Performed by: NURSE PRACTITIONER

## 2024-03-14 PROCEDURE — 99214 OFFICE O/P EST MOD 30 MIN: CPT | Mod: 25,S$GLB,, | Performed by: NURSE PRACTITIONER

## 2024-03-14 PROCEDURE — 87811 SARS-COV-2 COVID19 W/OPTIC: CPT | Mod: QW,S$GLB,, | Performed by: NURSE PRACTITIONER

## 2024-03-14 PROCEDURE — 96372 THER/PROPH/DIAG INJ SC/IM: CPT | Mod: ,,, | Performed by: NURSE PRACTITIONER

## 2024-03-14 RX ORDER — PREDNISONE 20 MG/1
20 TABLET ORAL
Status: COMPLETED | OUTPATIENT
Start: 2024-03-14 | End: 2024-03-14

## 2024-03-14 RX ORDER — METOPROLOL SUCCINATE 25 MG/1
12.5 TABLET, EXTENDED RELEASE ORAL
COMMUNITY
Start: 2024-01-29

## 2024-03-14 RX ADMIN — PREDNISONE 20 MG: 20 TABLET ORAL at 10:03

## 2024-03-14 NOTE — PROGRESS NOTES
"Subjective:      Patient ID: Selam Botello is a 78 y.o. female.    Vitals:  height is 5' 2" (1.575 m) and weight is 59.9 kg (132 lb). Her tympanic temperature is 100.7 °F (38.2 °C) (abnormal). Her blood pressure is 135/67 and her pulse is 89. Her respiration is 13 and oxygen saturation is 96%.     Chief Complaint: Sore Throat    Selam Botello is a 78 y.o. female who presents for evaluation of a sore throat. Associated symptoms include dry cough, headache, pain while swallowing, post nasal drip, sinus and nasal congestion. Patient reports her throat is extremely painful. Onset of symptoms was 3 days ago, gradually worsening since onset per patient.  Patient reports she is staying well hydrated. Patient states she has tried mucinex pain spray, zyrtec, and otc cough suppressant with no relief from symptoms. Patient denies any known sick contacts.         Sore Throat   This is a new problem. The current episode started in the past 7 days. The problem has been gradually worsening. Neither side of throat is experiencing more pain than the other. There has been no fever. The pain is at a severity of 10/10. The pain is severe. Associated symptoms include congestion, coughing and headaches. Pertinent negatives include no abdominal pain, diarrhea, drooling, ear discharge, ear pain, hoarse voice, plugged ear sensation, neck pain, shortness of breath, stridor, swollen glands, trouble swallowing or vomiting. She has had no exposure to strep or mono. Treatments tried: mucinex pain spray, zyrtec, and otc cough medication. The treatment provided no relief.       HENT:  Positive for congestion and sore throat. Negative for ear pain, ear discharge, drooling and trouble swallowing.    Neck: Negative for neck pain.   Respiratory:  Positive for cough. Negative for shortness of breath and stridor.    Gastrointestinal:  Negative for abdominal pain, vomiting and diarrhea.   Neurological:  Positive for headaches.      Objective: "     Physical Exam   Constitutional: She is oriented to person, place, and time. She appears well-developed. She is cooperative.   HENT:   Head: Normocephalic and atraumatic.   Ears:   Right Ear: Hearing, tympanic membrane, external ear and ear canal normal.   Left Ear: Hearing, tympanic membrane, external ear and ear canal normal.   Nose: Congestion present. No mucosal edema or nasal deformity. No epistaxis. Right sinus exhibits no maxillary sinus tenderness and no frontal sinus tenderness. Left sinus exhibits no maxillary sinus tenderness and no frontal sinus tenderness.   Mouth/Throat: Uvula is midline and mucous membranes are normal. No trismus in the jaw. Normal dentition. No uvula swelling. Posterior oropharyngeal erythema present.   Eyes: Conjunctivae and lids are normal. Pupils are equal, round, and reactive to light. Extraocular movement intact   Neck: Trachea normal and phonation normal. Neck supple.   Cardiovascular: Normal rate, regular rhythm, normal heart sounds and normal pulses.   Pulmonary/Chest: Effort normal and breath sounds normal.   Abdominal: Normal appearance and bowel sounds are normal. Soft.   Musculoskeletal: Normal range of motion.         General: Normal range of motion.   Neurological: no focal deficit. She is alert, oriented to person, place, and time and at baseline. She exhibits normal muscle tone.   Skin: Skin is warm, dry and intact. Capillary refill takes less than 2 seconds.   Psychiatric: Her speech is normal and behavior is normal. Judgment and thought content normal.   Nursing note and vitals reviewed.    Results for orders placed or performed in visit on 03/14/24   SARS Coronavirus 2 Antigen, POCT Manual Read   Result Value Ref Range    SARS Coronavirus 2 Antigen Positive (A) Negative     Acceptable Yes    POCT Influenza A/B MOLECULAR   Result Value Ref Range    POC Molecular Influenza A Ag Negative Negative, Not Reported    POC Molecular Influenza B Ag Negative  Negative, Not Reported     Acceptable Yes    POCT Strep A, Molecular   Result Value Ref Range    Molecular Strep A, POC Negative Negative     Acceptable Yes          Assessment:     1. COVID-19    2. Sore throat    3. Pharyngitis, unspecified etiology        Plan:       COVID-19  -     dexchlorphen-phenylephrine-DM 1-5-10 mg/5 mL Syrp; Take 10 mLs by mouth every 4 (four) hours as needed (cough/congestion).  Dispense: 120 mL; Refill: 0    Sore throat  -     SARS Coronavirus 2 Antigen, POCT Manual Read  -     POCT Influenza A/B MOLECULAR  -     POCT Strep A, Molecular    Pharyngitis, unspecified etiology  -     predniSONE tablet 20 mg    Other orders  -     nirmatrelvir-ritonavir 300 mg (150 mg x 2)-100 mg copackaged tablets (EUA); Take 3 tablets by mouth 2 (two) times daily for 5 days. Each dose contains 2 nirmatrelvir (pink tablets) and 1 ritonavir (white tablet). Take all 3 tablets together  Dispense: 30 tablet; Refill: 0          Medical Decision Making:   Differential Diagnosis:   Bronchitis, COPD/Asthma exacerbation, Viral upper respiratory infection, Bacterial upper respiratory infection, Pneumonia, covid19, influenza,bacterial vs viral sinusitis.     Urgent Care Management:  COVID-19 Risk Score = 2    Isolation precautions given. Advised patient to stay home and self quarantine until she is fever free for 24 hours without any antipyretic medications and symptoms have improved. . Patient verbalizes understanding.  Discussed risk and benefits of Paxlovid therapy. Paxlovid offered. Patient would like to proceed with antiviral therapy Educated patient on drug interactions and discussed discontinuation/alterations of necessary medications.     Previous encounters and labs were independently reviewed. Discussed positive COVID-19 results with patient. Advised patient to begin symptomatic treatment. OTC symptomatic treatment options were discussed. Patient was instructed to  Get plenty of  rest and drink plenty of fluids. Patient requested steroid injection. Discussed risks & possible side effects of steroid injection. Discussed benefits of oral prednisone to help decrease inflammation to the throat. Patient verbalized understanding.  Advised patient to go to the Emergency Department for any concerns or worsening of condition. Advised patient to take tylenol (acetominophen) for fever ( that is intolerable), chills or body aches every 4 hours but not to exceed 4000 mg/ day. Patient vitals stable. Patient has no questions or concerns at this time, all questions were answered before discharge. Patient given handout with discharge instructions. Patient exits exam room in no acute distress.           Patient Instructions   You have tested positive for COVID-19 today.    Poly-Tussin DM is a combination cough and cold medication it contains dexbrompheniramine (an antihistamine), dextromethorphan (a cough suppressant), and phenylephrine (a decongestant). Pleas avoid any other OTC medications that contains these active ingredients or are in the same categories as the medications listed above.    Uses of Poly-Tussin DM:  It is used to treat nose stuffiness.  It is used to ease allergy signs.  It is used to relieve coughing.     You have been prescribed Paxlovid, which is an antiviral medication.     Oral antiviral treatment may help your body fight COVID-19 by stopping the SARS-CoV-2 virus (the virus that causes COVID-19) from multiplying in your body, lowering the amount of the virus within your body, or helping your immune system. By getting treatment, you could have less serious symptoms and may lower the chances of your illness getting worse and needing care in the hospital.    To be eligible for Paxlovid, you must be at least 12 years of age and weigh at least 88 pounds.    What is PAXLOVID?    PAXLOVID is an investigational medicine used to treat mild-to-moderate COVID-19 in  adults and children [12 years  of age and older weighing at least 88 pounds (40 kg)] with  positive results of direct SARS-CoV-2 viral testing, and who are at high risk for  progression to severe COVID-19, including hospitalization or death. PAXLOVID is  investigational because it is still being studied. There is limited information about the  safety and effectiveness of using PAXLOVID to treat people with mild-to-moderate  COVID-19.  The FDA has authorized the emergency use of PAXLOVID for the treatment of mild-tomoderate COVID-19 in adults and children [12 years of age and older weighing at least  88 pounds (40 kg)] with a positive test for the virus that causes COVID-19, and who are  at high risk for progression to severe COVID-19, including hospitalization or death,  under an EUA.    Tell your healthcare provider if you:  ? Have any allergies  ? Have liver or kidney disease  ? Are pregnant or plan to become pregnant  ? Are breastfeeding a child  ? Have any serious illnesses  Tell your healthcare provider about all the medicines you take, including  prescription and over-the-counter medicines, vitamins, and herbal supplements.  Some medicines may interact with PAXLOVID and may cause serious side effects.  Keep a list of your medicines to show your healthcare provider and pharmacist when  you get a new medicine.  You can ask your healthcare provider or pharmacist for a list of medicines that interact  with PAXLOVID. Do not start taking a new medicine without telling your  healthcare provider. Your healthcare provider can tell you if it is safe to take  PAXLOVID with other medicines    How do I take PAXLOVID?  ? PAXLOVID consists of 2 medicines: nirmatrelvir and ritonavir.  Take 2 pink tablets of nirmatrelvir with 1 white tablet of ritonavir by mouth  2 times each day (in the morning and in the evening) for 5 days. For each  dose, take all 3 tablets at the same time.  If you have kidney disease, talk to your healthcare provider. You  may need a  different dose.  Swallow the tablets whole. Do not chew, break, or crush the tablets.  Take PAXLOVID with or without food.  Do not stop taking PAXLOVID without talking to your healthcare provider, even if  you feel better.  If you miss a dose of PAXLOVID within 8 hours of the time it is usually taken,  take it as soon as you remember. If you miss a dose by more than 8 hours, skip  the missed dose and take the next dose at your regular time. Do not take  2 doses of PAXLOVID at the same time.  If you take too much PAXLOVID, call your healthcare provider or go to the  nearest hospital emergency room right away.  If you are taking a ritonavir- or cobicistat-containing medicine to treat hepatitis C  or Human Immunodeficiency Virus (HIV), you should continue to take your  medicine as prescribed by your healthcare provider    Possible side effects of PAXLOVID are:  ? Allergic Reactions. Allergic reactions can happen in people taking  PAXLOVID, even after only 1 dose. Stop taking PAXLOVID and call your  healthcare provider right away if you get any of the following symptoms of an  allergic reaction:  hives  trouble swallowing or breathing  swelling of the mouth, lips, or face  throat tightness  hoarseness  skin rash  ? Liver Problems. Tell your healthcare provider right away if you have any of  these signs and symptoms of liver problems: loss of appetite, yellowing of your  skin and the whites of eyes (jaundice), dark-colored urine, pale colored stools  and itchy skin, stomach area (abdominal) pain.  ? Resistance to HIV Medicines: If you have untreated HIV infection, PAXLOVID  may lead to some HIV medicines not working as well in the future.  ? Other possible side effects include:  altered sense of taste  diarrhea  high blood pressure  muscle aches  These are not all the possible side effects of PAXLOVID. Not many people have taken  PAXLOVID. Serious and unexpected side effects may happen. PAXLOVID is still being  studied,  so it is possible that all of the risks are not known at this time.    Recent case reports document that some patients with normal immune response who have completed a 5-day course of Paxlovid for laboratory-confirmed infection and have recovered can experience recurrent illness 2 to 8 days later, including patients who have been vaccinated and/or boosted.    COVID-19 rebound is characterized by a recurrence of symptoms or a new positive viral test after having tested negative. People with COVID-19 rebound should follow CDC recommendations regarding isolation of infected patients regardless of treatment with an antiviral agent and/or previous isolation after the initial infection.     People with recurrence of COVID-19 symptoms or a new positive viral test after having tested negative should restart isolation and isolate again for at least 5 days. Per CDC guidance, they can end their re-isolation period after 5 full days if fever has resolved for 24 hours (without the use of feverreducing medication) and symptoms are improving. The individual should wear a mask for a total of 10 days after rebound symptoms started. Some people continue to test positive after day 10 but are considerably less likely to shed infectious virus.       Please note that patients who test positive for COVID-19 are required by the CDC to undergo isolation for 5 days after their symptoms first began.   - If you tested positive and do not have symptoms, you must isolate for 5 days starting on the day of the positive test.   - If you tested positive and have symptoms, you must isolate for 5 days starting on the day of the first symptoms,  not the day of the positive test.    This is the most important part, both the CDC and the [facility] emphasize that you do not test out of isolation.  In fact, we do not retest if you were positive in the last 90 days.    After 5 days, if your symptoms have improved and you have not had fever on day 5, you can return  to the community on day 6- NO TESTING REQUIRED!     After your 5 days of isolation are completed, the CDC recommends strict mask use for the first 5 days that you come out of isolation.     During quarantine:   Separate yourself from other people and animals in your home.  Call ahead before visiting your doctor.  Wear a facemask.  Cover your coughs and sneezes.  Wash your hands often with soap and water; hand  can be used, too.  Avoid sharing personal household items.  Wipe down surfaces used daily.  Monitor your symptoms. Seek prompt medical attention if your illness is worsening (e.g., difficulty breathing).   Before seeking care, call your healthcare provider.  If you have a medical emergency and need to call 911, notify the dispatch personnel that you have, or are being evaluated for COVID-19. If possible, put on a facemask before emergency medical services arrive.         CDC Testing and Quarantine Guidelines for household members, intimate partners, and caregivers in a home setting of a known-positive COVID-19 person:    ·     A 'close exposure' is defined as anyone who has had an exposure (masked or unmasked) to a known COVID -19 positive person within 6 feet of someone for a cumulative total of 15 minutes or more over a 24-hour period.    ·     Vaccinated: patients who have been boosted or completed the primary series of Pfizer or Moderna vaccine within the last 6 months or completed the primary series of J&J vaccine within the last 2 months and/or had a positive test within 90 days   - do NOT need to quarantine after contact with someone who had COVID-19 unless they have symptoms.   - should get tested 3-5 days after their exposure (if they have not had a positive test within the last 90 days), even if they don't have symptoms and wear a mask indoors in public for 10 days following exposure or until their test result is negative on day 5.  - If you develop symptoms test and quarantine.    ·      Unvaccinated, or are more than six months out from their second mRNA dose (or more than 2 months after the J&J vaccine) and not yet boosted,  and/or NOT had a positive test within 90 days and meet 'close exposure'  - you are required by CDC guidelines to quarantine for at least 5 days from time of exposure followed by 5 days of strict masking. It is recommended, but not required to test after 5 days, unless you develop symptoms, in which case you should test at that time.  - If you do decide to test at 5 days and are asymptomatic, the risk is that if you test without symptoms (on Day 5 for example) and you are positive, your 5 day isolation begins on that day, and you turned your 5 day quarantine into 10 days.  - If your exposure does not meet the above definition, you can return to your normal daily activities to include social distancing, wearing a mask and frequent handwashing.       Close contacts should also follow these recommendations:  Make sure that you understand and can help the patient follow their provider's instructions for medication(s) and care. You should help the patient with basic needs in the home and provide support for getting groceries, prescriptions, and other personal needs.  Monitor the patient's symptoms. If the patient is getting sicker, call his or her healthcare provider and tell them that the patient has laboratory-confirmed COVID-19. If the patient has a medical emergency and you need to call 911, notify the dispatch personnel that the patient has, or is being evaluated for COVID-19.  Household members should stay in another room or be  from the patient. Household members should use a separate bedroom and bathroom, if available.  Prohibit visitors.  Household members should care for any pets in the home.  Make sure that shared spaces in the home have good air flow, such as by an air conditioner or an opened window, weather permitting.  Perform hand hygiene frequently. Wash  your hands often with soap and water for at least 20 seconds or use an alcohol-based hand  (that contains > 60% alcohol) covering all surfaces of your hands and rubbing them together until they feel dry. Soap and water should be used preferentially.  Avoid touching your eyes, nose, and mouth.  The patient should wear a facemask. If the patient is not able to wear a facemask (for example, because it causes trouble breathing), caregivers should wear a mask when they are in the same room as the patient.  Wear a disposable facemask and gloves when you touch or have contact with the patient's blood, stool, or body fluids, such as saliva, sputum, nasal mucus, vomit, urine.  Throw out disposable facemasks and gloves after using them. Do not reuse.  When removing personal protective equipment, first remove and dispose of gloves. Then, immediately clean your hands with soap and water or alcohol-based hand . Next, remove and dispose of facemask, and immediately clean your hands again with soap and water or alcohol-based hand .  You should not share dishes, drinking glasses, cups, eating utensils, towels, bedding, or other items with the patient. After the patient uses these items, you should wash them thoroughly (see below Wash laundry thoroughly).  Clean all high-touch surfaces, such as counters, tabletops, doorknobs, bathroom fixtures, toilets, phones, keyboards, tablets, and bedside tables, every day. Also, clean any surfaces that may have blood, stool, or body fluids on them.  Use a household cleaning spray or wipe, according to the label instructions. Labels contain instructions for safe and effective use of the cleaning product including precautions you should take when applying the product, such as wearing gloves and making sure you have good ventilation during use of the product.  Wash laundry thoroughly.  Immediately remove and wash clothes or bedding that have blood, stool, or body  fluids on them.  Wear disposable gloves while handling soiled items and keep soiled items away from your body. Clean your hands (with soap and water or an alcohol-based hand ) immediately after removing your gloves.  Read and follow directions on labels of laundry or clothing items and detergent. In general, using a normal laundry detergent according to washing machine instructions and dry thoroughly using the warmest temperatures recommended on the clothing label.  Place all used disposable gloves, facemasks, and other contaminated items in a lined container before disposing of them with other household waste. Clean your hands (with soap and water or an alcohol-based hand ) immediately after handling these items. Soap and water should be used preferentially if hands are visibly dirty.  Discuss any additional questions with your state or local health department or healthcare provider. Check available hours when contacting your local health department.     You must understand that you've received an Urgent Care treatment only and that you may be released before all your medical problems are known or treated. You, the patient, will arrange for follow up care as instructed. Follow up with your PCP or specialty clinic as directed within 2-5 days if not improved or as needed.  You can call [phone] to schedule an appointment with the appropriate provider.  If your condition worsens we recommend that you receive another evaluation at the emergency room immediately or contact your primary medical clinics after hours call service to discuss your concerns.  Please return here or go to the Emergency Department for any concerns or worsening of condition.       If we discussed the COVID surveillance/home monitoring program, you will also get a call from [facility] pharmacy at the [place] or [facility] location to get a pulse oximeter to monitor your blood oxygen level.  This will be followed by a COVID surveillance  team daily through [employer] (available on computer or through mobile [email or web address]).

## 2024-03-14 NOTE — PATIENT INSTRUCTIONS
You have tested positive for COVID-19 today.    Poly-Tussin DM is a combination cough and cold medication it contains dexbrompheniramine (an antihistamine), dextromethorphan (a cough suppressant), and phenylephrine (a decongestant). Pleas avoid any other OTC medications that contains these active ingredients or are in the same categories as the medications listed above.    Uses of Poly-Tussin DM:  It is used to treat nose stuffiness.  It is used to ease allergy signs.  It is used to relieve coughing.     You have been prescribed Paxlovid, which is an antiviral medication.     Oral antiviral treatment may help your body fight COVID-19 by stopping the SARS-CoV-2 virus (the virus that causes COVID-19) from multiplying in your body, lowering the amount of the virus within your body, or helping your immune system. By getting treatment, you could have less serious symptoms and may lower the chances of your illness getting worse and needing care in the hospital.    To be eligible for Paxlovid, you must be at least 12 years of age and weigh at least 88 pounds.    What is PAXLOVID?    PAXLOVID is an investigational medicine used to treat mild-to-moderate COVID-19 in  adults and children [12 years of age and older weighing at least 88 pounds (40 kg)] with  positive results of direct SARS-CoV-2 viral testing, and who are at high risk for  progression to severe COVID-19, including hospitalization or death. PAXLOVID is  investigational because it is still being studied. There is limited information about the  safety and effectiveness of using PAXLOVID to treat people with mild-to-moderate  COVID-19.  The FDA has authorized the emergency use of PAXLOVID for the treatment of mild-tomoderate COVID-19 in adults and children [12 years of age and older weighing at least  88 pounds (40 kg)] with a positive test for the virus that causes COVID-19, and who are  at high risk for progression to severe COVID-19, including hospitalization or  death,  under an EUA.    Tell your healthcare provider if you:  ? Have any allergies  ? Have liver or kidney disease  ? Are pregnant or plan to become pregnant  ? Are breastfeeding a child  ? Have any serious illnesses  Tell your healthcare provider about all the medicines you take, including  prescription and over-the-counter medicines, vitamins, and herbal supplements.  Some medicines may interact with PAXLOVID and may cause serious side effects.  Keep a list of your medicines to show your healthcare provider and pharmacist when  you get a new medicine.  You can ask your healthcare provider or pharmacist for a list of medicines that interact  with PAXLOVID. Do not start taking a new medicine without telling your  healthcare provider. Your healthcare provider can tell you if it is safe to take  PAXLOVID with other medicines    How do I take PAXLOVID?  ? PAXLOVID consists of 2 medicines: nirmatrelvir and ritonavir.  Take 2 pink tablets of nirmatrelvir with 1 white tablet of ritonavir by mouth  2 times each day (in the morning and in the evening) for 5 days. For each  dose, take all 3 tablets at the same time.  If you have kidney disease, talk to your healthcare provider. You  may need a different dose.  Swallow the tablets whole. Do not chew, break, or crush the tablets.  Take PAXLOVID with or without food.  Do not stop taking PAXLOVID without talking to your healthcare provider, even if  you feel better.  If you miss a dose of PAXLOVID within 8 hours of the time it is usually taken,  take it as soon as you remember. If you miss a dose by more than 8 hours, skip  the missed dose and take the next dose at your regular time. Do not take  2 doses of PAXLOVID at the same time.  If you take too much PAXLOVID, call your healthcare provider or go to the  nearest hospital emergency room right away.  If you are taking a ritonavir- or cobicistat-containing medicine to treat hepatitis C  or Human Immunodeficiency Virus (HIV),  you should continue to take your  medicine as prescribed by your healthcare provider    Possible side effects of PAXLOVID are:  ? Allergic Reactions. Allergic reactions can happen in people taking  PAXLOVID, even after only 1 dose. Stop taking PAXLOVID and call your  healthcare provider right away if you get any of the following symptoms of an  allergic reaction:  hives  trouble swallowing or breathing  swelling of the mouth, lips, or face  throat tightness  hoarseness  skin rash  ? Liver Problems. Tell your healthcare provider right away if you have any of  these signs and symptoms of liver problems: loss of appetite, yellowing of your  skin and the whites of eyes (jaundice), dark-colored urine, pale colored stools  and itchy skin, stomach area (abdominal) pain.  ? Resistance to HIV Medicines: If you have untreated HIV infection, PAXLOVID  may lead to some HIV medicines not working as well in the future.  ? Other possible side effects include:  altered sense of taste  diarrhea  high blood pressure  muscle aches  These are not all the possible side effects of PAXLOVID. Not many people have taken  PAXLOVID. Serious and unexpected side effects may happen. PAXLOVID is still being  studied, so it is possible that all of the risks are not known at this time.    Recent case reports document that some patients with normal immune response who have completed a 5-day course of Paxlovid for laboratory-confirmed infection and have recovered can experience recurrent illness 2 to 8 days later, including patients who have been vaccinated and/or boosted.    COVID-19 rebound is characterized by a recurrence of symptoms or a new positive viral test after having tested negative. People with COVID-19 rebound should follow CDC recommendations regarding isolation of infected patients regardless of treatment with an antiviral agent and/or previous isolation after the initial infection.     People with recurrence of COVID-19 symptoms or a  new positive viral test after having tested negative should restart isolation and isolate again for at least 5 days. Per CDC guidance, they can end their re-isolation period after 5 full days if fever has resolved for 24 hours (without the use of feverreducing medication) and symptoms are improving. The individual should wear a mask for a total of 10 days after rebound symptoms started. Some people continue to test positive after day 10 but are considerably less likely to shed infectious virus.       Please note that patients who test positive for COVID-19 are required by the CDC to undergo isolation for 5 days after their symptoms first began.   - If you tested positive and do not have symptoms, you must isolate for 5 days starting on the day of the positive test.   - If you tested positive and have symptoms, you must isolate for 5 days starting on the day of the first symptoms,  not the day of the positive test.    This is the most important part, both the CDC and the [facility] emphasize that you do not test out of isolation.  In fact, we do not retest if you were positive in the last 90 days.    After 5 days, if your symptoms have improved and you have not had fever on day 5, you can return to the community on day 6- NO TESTING REQUIRED!     After your 5 days of isolation are completed, the CDC recommends strict mask use for the first 5 days that you come out of isolation.     During quarantine:   Separate yourself from other people and animals in your home.  Call ahead before visiting your doctor.  Wear a facemask.  Cover your coughs and sneezes.  Wash your hands often with soap and water; hand  can be used, too.  Avoid sharing personal household items.  Wipe down surfaces used daily.  Monitor your symptoms. Seek prompt medical attention if your illness is worsening (e.g., difficulty breathing).   Before seeking care, call your healthcare provider.  If you have a medical emergency and need to call 911, notify  the dispatch personnel that you have, or are being evaluated for COVID-19. If possible, put on a facemask before emergency medical services arrive.         CDC Testing and Quarantine Guidelines for household members, intimate partners, and caregivers in a home setting of a known-positive COVID-19 person:    ·     A 'close exposure' is defined as anyone who has had an exposure (masked or unmasked) to a known COVID -19 positive person within 6 feet of someone for a cumulative total of 15 minutes or more over a 24-hour period.    ·     Vaccinated: patients who have been boosted or completed the primary series of Pfizer or Moderna vaccine within the last 6 months or completed the primary series of J&J vaccine within the last 2 months and/or had a positive test within 90 days   - do NOT need to quarantine after contact with someone who had COVID-19 unless they have symptoms.   - should get tested 3-5 days after their exposure (if they have not had a positive test within the last 90 days), even if they don't have symptoms and wear a mask indoors in public for 10 days following exposure or until their test result is negative on day 5.  - If you develop symptoms test and quarantine.    ·     Unvaccinated, or are more than six months out from their second mRNA dose (or more than 2 months after the J&J vaccine) and not yet boosted,  and/or NOT had a positive test within 90 days and meet 'close exposure'  - you are required by CDC guidelines to quarantine for at least 5 days from time of exposure followed by 5 days of strict masking. It is recommended, but not required to test after 5 days, unless you develop symptoms, in which case you should test at that time.  - If you do decide to test at 5 days and are asymptomatic, the risk is that if you test without symptoms (on Day 5 for example) and you are positive, your 5 day isolation begins on that day, and you turned your 5 day quarantine into 10 days.  - If your exposure does  not meet the above definition, you can return to your normal daily activities to include social distancing, wearing a mask and frequent handwashing.       Close contacts should also follow these recommendations:  Make sure that you understand and can help the patient follow their provider's instructions for medication(s) and care. You should help the patient with basic needs in the home and provide support for getting groceries, prescriptions, and other personal needs.  Monitor the patient's symptoms. If the patient is getting sicker, call his or her healthcare provider and tell them that the patient has laboratory-confirmed COVID-19. If the patient has a medical emergency and you need to call 911, notify the dispatch personnel that the patient has, or is being evaluated for COVID-19.  Household members should stay in another room or be  from the patient. Household members should use a separate bedroom and bathroom, if available.  Prohibit visitors.  Household members should care for any pets in the home.  Make sure that shared spaces in the home have good air flow, such as by an air conditioner or an opened window, weather permitting.  Perform hand hygiene frequently. Wash your hands often with soap and water for at least 20 seconds or use an alcohol-based hand  (that contains > 60% alcohol) covering all surfaces of your hands and rubbing them together until they feel dry. Soap and water should be used preferentially.  Avoid touching your eyes, nose, and mouth.  The patient should wear a facemask. If the patient is not able to wear a facemask (for example, because it causes trouble breathing), caregivers should wear a mask when they are in the same room as the patient.  Wear a disposable facemask and gloves when you touch or have contact with the patient's blood, stool, or body fluids, such as saliva, sputum, nasal mucus, vomit, urine.  Throw out disposable facemasks and gloves after using them.  Do not reuse.  When removing personal protective equipment, first remove and dispose of gloves. Then, immediately clean your hands with soap and water or alcohol-based hand . Next, remove and dispose of facemask, and immediately clean your hands again with soap and water or alcohol-based hand .  You should not share dishes, drinking glasses, cups, eating utensils, towels, bedding, or other items with the patient. After the patient uses these items, you should wash them thoroughly (see below Wash laundry thoroughly).  Clean all high-touch surfaces, such as counters, tabletops, doorknobs, bathroom fixtures, toilets, phones, keyboards, tablets, and bedside tables, every day. Also, clean any surfaces that may have blood, stool, or body fluids on them.  Use a household cleaning spray or wipe, according to the label instructions. Labels contain instructions for safe and effective use of the cleaning product including precautions you should take when applying the product, such as wearing gloves and making sure you have good ventilation during use of the product.  Wash laundry thoroughly.  Immediately remove and wash clothes or bedding that have blood, stool, or body fluids on them.  Wear disposable gloves while handling soiled items and keep soiled items away from your body. Clean your hands (with soap and water or an alcohol-based hand ) immediately after removing your gloves.  Read and follow directions on labels of laundry or clothing items and detergent. In general, using a normal laundry detergent according to washing machine instructions and dry thoroughly using the warmest temperatures recommended on the clothing label.  Place all used disposable gloves, facemasks, and other contaminated items in a lined container before disposing of them with other household waste. Clean your hands (with soap and water or an alcohol-based hand ) immediately after handling these items. Soap  and water should be used preferentially if hands are visibly dirty.  Discuss any additional questions with your state or local health department or healthcare provider. Check available hours when contacting your local health department.     You must understand that you've received an Urgent Care treatment only and that you may be released before all your medical problems are known or treated. You, the patient, will arrange for follow up care as instructed. Follow up with your PCP or specialty clinic as directed within 2-5 days if not improved or as needed.  You can call [phone] to schedule an appointment with the appropriate provider.  If your condition worsens we recommend that you receive another evaluation at the emergency room immediately or contact your primary medical clinics after hours call service to discuss your concerns.  Please return here or go to the Emergency Department for any concerns or worsening of condition.       If we discussed the COVID surveillance/home monitoring program, you will also get a call from [facility] at the [place] or [facility] location to get a pulse oximeter to monitor your blood oxygen level.  This will be followed by a COVID surveillance team daily through [employer] (available on computer or through mobile [email or web address]).

## 2024-03-20 ENCOUNTER — NURSE TRIAGE (OUTPATIENT)
Dept: ADMINISTRATIVE | Facility: CLINIC | Age: 79
End: 2024-03-20
Payer: MEDICARE

## 2024-03-22 ENCOUNTER — NURSE TRIAGE (OUTPATIENT)
Dept: ADMINISTRATIVE | Facility: CLINIC | Age: 79
End: 2024-03-22
Payer: MEDICARE

## 2024-03-24 ENCOUNTER — NURSE TRIAGE (OUTPATIENT)
Dept: ADMINISTRATIVE | Facility: CLINIC | Age: 79
End: 2024-03-24
Payer: MEDICARE

## 2024-03-24 NOTE — TELEPHONE ENCOUNTER
Selam reports she is not calling for self, calling for , Don. Chart opened in error.   Reason for Disposition   Nursing judgment    Protocols used: No Guideline or Reference [id number]

## 2024-05-09 ENCOUNTER — LAB VISIT (OUTPATIENT)
Dept: LAB | Facility: HOSPITAL | Age: 79
End: 2024-05-09
Attending: INTERNAL MEDICINE
Payer: MEDICARE

## 2024-05-09 DIAGNOSIS — E03.9 ACQUIRED HYPOTHYROIDISM: ICD-10-CM

## 2024-05-09 DIAGNOSIS — K55.20 ANGIODYSPLASIA OF CECUM: ICD-10-CM

## 2024-05-09 DIAGNOSIS — R79.9 ABNORMAL FINDING OF BLOOD CHEMISTRY, UNSPECIFIED: ICD-10-CM

## 2024-05-09 LAB
ALBUMIN SERPL BCP-MCNC: 3.8 G/DL (ref 3.5–5.2)
ALP SERPL-CCNC: 59 U/L (ref 55–135)
ALT SERPL W/O P-5'-P-CCNC: 10 U/L (ref 10–44)
ANION GAP SERPL CALC-SCNC: 7 MMOL/L (ref 8–16)
AST SERPL-CCNC: 18 U/L (ref 10–40)
BASOPHILS # BLD AUTO: 0.03 K/UL (ref 0–0.2)
BASOPHILS NFR BLD: 0.6 % (ref 0–1.9)
BILIRUB SERPL-MCNC: 0.5 MG/DL (ref 0.1–1)
BUN SERPL-MCNC: 16 MG/DL (ref 8–23)
CALCIUM SERPL-MCNC: 9.8 MG/DL (ref 8.7–10.5)
CHLORIDE SERPL-SCNC: 107 MMOL/L (ref 95–110)
CO2 SERPL-SCNC: 26 MMOL/L (ref 23–29)
CREAT SERPL-MCNC: 0.7 MG/DL (ref 0.5–1.4)
DIFFERENTIAL METHOD BLD: ABNORMAL
EOSINOPHIL # BLD AUTO: 0.1 K/UL (ref 0–0.5)
EOSINOPHIL NFR BLD: 2.7 % (ref 0–8)
ERYTHROCYTE [DISTWIDTH] IN BLOOD BY AUTOMATED COUNT: 16.7 % (ref 11.5–14.5)
EST. GFR  (NO RACE VARIABLE): >60 ML/MIN/1.73 M^2
FERRITIN SERPL-MCNC: 16 NG/ML (ref 20–300)
GLUCOSE SERPL-MCNC: 96 MG/DL (ref 70–110)
HCT VFR BLD AUTO: 31.3 % (ref 37–48.5)
HGB BLD-MCNC: 9.9 G/DL (ref 12–16)
IMM GRANULOCYTES # BLD AUTO: 0.01 K/UL (ref 0–0.04)
IMM GRANULOCYTES NFR BLD AUTO: 0.2 % (ref 0–0.5)
IRON SERPL-MCNC: 39 UG/DL (ref 30–160)
LYMPHOCYTES # BLD AUTO: 1.1 K/UL (ref 1–4.8)
LYMPHOCYTES NFR BLD: 20.9 % (ref 18–48)
MCH RBC QN AUTO: 27 PG (ref 27–31)
MCHC RBC AUTO-ENTMCNC: 31.6 G/DL (ref 32–36)
MCV RBC AUTO: 85 FL (ref 82–98)
MONOCYTES # BLD AUTO: 0.4 K/UL (ref 0.3–1)
MONOCYTES NFR BLD: 8.4 % (ref 4–15)
NEUTROPHILS # BLD AUTO: 3.4 K/UL (ref 1.8–7.7)
NEUTROPHILS NFR BLD: 67.2 % (ref 38–73)
NRBC BLD-RTO: 0 /100 WBC
PLATELET # BLD AUTO: 274 K/UL (ref 150–450)
PMV BLD AUTO: 9.4 FL (ref 9.2–12.9)
POTASSIUM SERPL-SCNC: 4 MMOL/L (ref 3.5–5.1)
PROT SERPL-MCNC: 6.1 G/DL (ref 6–8.4)
RBC # BLD AUTO: 3.67 M/UL (ref 4–5.4)
SATURATED IRON: 11 % (ref 20–50)
SODIUM SERPL-SCNC: 140 MMOL/L (ref 136–145)
TOTAL IRON BINDING CAPACITY: 360 UG/DL (ref 250–450)
TRANSFERRIN SERPL-MCNC: 243 MG/DL (ref 200–375)
TSH SERPL DL<=0.005 MIU/L-ACNC: 2.37 UIU/ML (ref 0.4–4)
WBC # BLD AUTO: 5.12 K/UL (ref 3.9–12.7)

## 2024-05-09 PROCEDURE — 83540 ASSAY OF IRON: CPT | Performed by: INTERNAL MEDICINE

## 2024-05-09 PROCEDURE — 82728 ASSAY OF FERRITIN: CPT | Performed by: INTERNAL MEDICINE

## 2024-05-09 PROCEDURE — 36415 COLL VENOUS BLD VENIPUNCTURE: CPT | Performed by: INTERNAL MEDICINE

## 2024-05-09 PROCEDURE — 84443 ASSAY THYROID STIM HORMONE: CPT | Performed by: INTERNAL MEDICINE

## 2024-05-09 PROCEDURE — 80053 COMPREHEN METABOLIC PANEL: CPT | Performed by: INTERNAL MEDICINE

## 2024-05-09 PROCEDURE — 85025 COMPLETE CBC W/AUTO DIFF WBC: CPT | Performed by: INTERNAL MEDICINE

## 2024-05-20 ENCOUNTER — OFFICE VISIT (OUTPATIENT)
Dept: HEMATOLOGY/ONCOLOGY | Facility: CLINIC | Age: 79
End: 2024-05-20
Payer: MEDICARE

## 2024-05-20 VITALS
SYSTOLIC BLOOD PRESSURE: 126 MMHG | WEIGHT: 134.5 LBS | TEMPERATURE: 98 F | RESPIRATION RATE: 20 BRPM | HEART RATE: 71 BPM | BODY MASS INDEX: 24.75 KG/M2 | HEIGHT: 62 IN | OXYGEN SATURATION: 98 % | DIASTOLIC BLOOD PRESSURE: 66 MMHG

## 2024-05-20 DIAGNOSIS — D50.0 IRON DEFICIENCY ANEMIA DUE TO CHRONIC BLOOD LOSS: Primary | ICD-10-CM

## 2024-05-20 DIAGNOSIS — R94.6 ABNORMAL RESULTS OF THYROID FUNCTION STUDIES: ICD-10-CM

## 2024-05-20 DIAGNOSIS — K55.20 ANGIODYSPLASIA OF CECUM: ICD-10-CM

## 2024-05-20 DIAGNOSIS — E83.110 HEREDITARY HEMOCHROMATOSIS: ICD-10-CM

## 2024-05-20 PROCEDURE — 99999 PR PBB SHADOW E&M-EST. PATIENT-LVL IV: CPT | Mod: PBBFAC,,, | Performed by: INTERNAL MEDICINE

## 2024-05-20 PROCEDURE — 99214 OFFICE O/P EST MOD 30 MIN: CPT | Mod: PBBFAC | Performed by: INTERNAL MEDICINE

## 2024-05-20 PROCEDURE — 99214 OFFICE O/P EST MOD 30 MIN: CPT | Mod: S$PBB,,, | Performed by: INTERNAL MEDICINE

## 2024-05-20 RX ORDER — SODIUM CHLORIDE 0.9 % (FLUSH) 0.9 %
10 SYRINGE (ML) INJECTION
Status: CANCELLED | OUTPATIENT
Start: 2024-05-20

## 2024-05-20 RX ORDER — DIPHENHYDRAMINE HYDROCHLORIDE 50 MG/ML
50 INJECTION INTRAMUSCULAR; INTRAVENOUS ONCE AS NEEDED
OUTPATIENT
Start: 2024-07-11

## 2024-05-20 RX ORDER — EPINEPHRINE 0.3 MG/.3ML
0.3 INJECTION SUBCUTANEOUS ONCE AS NEEDED
Status: CANCELLED | OUTPATIENT
Start: 2024-05-20

## 2024-05-20 RX ORDER — DIPHENHYDRAMINE HYDROCHLORIDE 50 MG/ML
50 INJECTION INTRAMUSCULAR; INTRAVENOUS ONCE AS NEEDED
OUTPATIENT
Start: 2024-06-20

## 2024-05-20 RX ORDER — EPINEPHRINE 0.3 MG/.3ML
0.3 INJECTION SUBCUTANEOUS ONCE AS NEEDED
OUTPATIENT
Start: 2024-07-25

## 2024-05-20 RX ORDER — DIPHENHYDRAMINE HYDROCHLORIDE 50 MG/ML
50 INJECTION INTRAMUSCULAR; INTRAVENOUS ONCE AS NEEDED
OUTPATIENT
Start: 2024-06-27

## 2024-05-20 RX ORDER — EPINEPHRINE 0.3 MG/.3ML
0.3 INJECTION SUBCUTANEOUS ONCE AS NEEDED
OUTPATIENT
Start: 2024-07-18

## 2024-05-20 RX ORDER — SODIUM CHLORIDE 0.9 % (FLUSH) 0.9 %
10 SYRINGE (ML) INJECTION
OUTPATIENT
Start: 2024-07-25

## 2024-05-20 RX ORDER — HEPARIN 100 UNIT/ML
500 SYRINGE INTRAVENOUS
OUTPATIENT
Start: 2024-07-18

## 2024-05-20 RX ORDER — HEPARIN 100 UNIT/ML
500 SYRINGE INTRAVENOUS
OUTPATIENT
Start: 2024-06-13

## 2024-05-20 RX ORDER — EPINEPHRINE 0.3 MG/.3ML
0.3 INJECTION SUBCUTANEOUS ONCE AS NEEDED
OUTPATIENT
Start: 2024-06-27

## 2024-05-20 RX ORDER — SODIUM CHLORIDE 0.9 % (FLUSH) 0.9 %
10 SYRINGE (ML) INJECTION
OUTPATIENT
Start: 2024-06-13

## 2024-05-20 RX ORDER — EPINEPHRINE 0.3 MG/.3ML
0.3 INJECTION SUBCUTANEOUS ONCE AS NEEDED
OUTPATIENT
Start: 2024-07-04

## 2024-05-20 RX ORDER — HEPARIN 100 UNIT/ML
500 SYRINGE INTRAVENOUS
OUTPATIENT
Start: 2024-06-27

## 2024-05-20 RX ORDER — EPINEPHRINE 0.3 MG/.3ML
0.3 INJECTION SUBCUTANEOUS ONCE AS NEEDED
OUTPATIENT
Start: 2024-06-20

## 2024-05-20 RX ORDER — DIPHENHYDRAMINE HYDROCHLORIDE 50 MG/ML
50 INJECTION INTRAMUSCULAR; INTRAVENOUS ONCE AS NEEDED
OUTPATIENT
Start: 2024-07-18

## 2024-05-20 RX ORDER — DIPHENHYDRAMINE HYDROCHLORIDE 50 MG/ML
50 INJECTION INTRAMUSCULAR; INTRAVENOUS ONCE AS NEEDED
Status: CANCELLED | OUTPATIENT
Start: 2024-05-20

## 2024-05-20 RX ORDER — SODIUM CHLORIDE 0.9 % (FLUSH) 0.9 %
10 SYRINGE (ML) INJECTION
OUTPATIENT
Start: 2024-06-20

## 2024-05-20 RX ORDER — SODIUM CHLORIDE 0.9 % (FLUSH) 0.9 %
10 SYRINGE (ML) INJECTION
OUTPATIENT
Start: 2024-07-11

## 2024-05-20 RX ORDER — HEPARIN 100 UNIT/ML
500 SYRINGE INTRAVENOUS
OUTPATIENT
Start: 2024-07-25

## 2024-05-20 RX ORDER — SODIUM CHLORIDE 0.9 % (FLUSH) 0.9 %
10 SYRINGE (ML) INJECTION
OUTPATIENT
Start: 2024-07-18

## 2024-05-20 RX ORDER — HEPARIN 100 UNIT/ML
500 SYRINGE INTRAVENOUS
Status: CANCELLED | OUTPATIENT
Start: 2024-05-20

## 2024-05-20 RX ORDER — DIPHENHYDRAMINE HYDROCHLORIDE 50 MG/ML
50 INJECTION INTRAMUSCULAR; INTRAVENOUS ONCE AS NEEDED
OUTPATIENT
Start: 2024-07-25

## 2024-05-20 RX ORDER — HEPARIN 100 UNIT/ML
500 SYRINGE INTRAVENOUS
OUTPATIENT
Start: 2024-07-11

## 2024-05-20 RX ORDER — EPINEPHRINE 0.3 MG/.3ML
0.3 INJECTION SUBCUTANEOUS ONCE AS NEEDED
OUTPATIENT
Start: 2024-07-11

## 2024-05-20 RX ORDER — SODIUM CHLORIDE 0.9 % (FLUSH) 0.9 %
10 SYRINGE (ML) INJECTION
OUTPATIENT
Start: 2024-06-27

## 2024-05-20 RX ORDER — DIPHENHYDRAMINE HYDROCHLORIDE 50 MG/ML
50 INJECTION INTRAMUSCULAR; INTRAVENOUS ONCE AS NEEDED
OUTPATIENT
Start: 2024-06-13

## 2024-05-20 RX ORDER — HEPARIN 100 UNIT/ML
500 SYRINGE INTRAVENOUS
OUTPATIENT
Start: 2024-07-04

## 2024-05-20 RX ORDER — HEPARIN 100 UNIT/ML
500 SYRINGE INTRAVENOUS
OUTPATIENT
Start: 2024-06-20

## 2024-05-20 RX ORDER — DIPHENHYDRAMINE HYDROCHLORIDE 50 MG/ML
50 INJECTION INTRAMUSCULAR; INTRAVENOUS ONCE AS NEEDED
OUTPATIENT
Start: 2024-07-04

## 2024-05-20 RX ORDER — EPINEPHRINE 0.3 MG/.3ML
0.3 INJECTION SUBCUTANEOUS ONCE AS NEEDED
OUTPATIENT
Start: 2024-06-13

## 2024-05-20 RX ORDER — SODIUM CHLORIDE 0.9 % (FLUSH) 0.9 %
10 SYRINGE (ML) INJECTION
OUTPATIENT
Start: 2024-07-04

## 2024-05-20 NOTE — PROGRESS NOTES
Subjective:       Patient ID: Selam Botello is a 78 y.o. female.    Chief Complaint: Results and Anemia    HPI:  78-year-old female history of hemochromatosis patient has recurrent iron deficiency anemia intolerant of oral iron.  [facility]  Returns with increasing fatigue and weakness    Past Medical History:   Diagnosis Date    Acid reflux     Allergy     Arthritis     Atrial tachycardia     Bladder cancer     Breast cyst     Cataract     Colon polyp     COVID 5/19/2022    Deep vein thrombosis     Degenerative disc disease     Encounter for blood transfusion     General anesthetics causing adverse effect in therapeutic use     GI bleed     Hematuria, gross     Inflammatory bowel disease     Liver disease     cyst    Malignant neoplasm of lateral wall of urinary bladder 10/31/2019    Osteoporosis     PONV (postoperative nausea and vomiting)     Retina disorder, left     Skin cancer     Thyroid disease     Urinary tract infection      Family History   Problem Relation Name Age of Onset    COPD Mother skin     Cancer Mother skin     COPD Father prostate/skin     Cancer Father prostate/skin     Cancer Sister       Social History     Socioeconomic History    Marital status:    Tobacco Use    Smoking status: Never     Passive exposure: Past    Smokeless tobacco: Never   Substance and Sexual Activity    Alcohol use: No    Drug use: No    Sexual activity: Yes     Partners: Male     Social Determinants of Health     Financial Resource Strain: Low Risk  (1/2/2024)    Overall Financial Resource Strain (CARDIA)     Difficulty of Paying Living Expenses: Not hard at all   Food Insecurity: No Food Insecurity (1/2/2024)    Hunger Vital Sign     Worried About Running Out of Food in the Last Year: Never true     Ran Out of Food in the Last Year: Never true   Transportation Needs: No Transportation Needs (1/2/2024)    PRAPARE - Transportation     Lack of Transportation (Medical):  No     Lack of Transportation (Non-Medical): No   Physical Activity: Insufficiently Active (1/2/2024)    Exercise Vital Sign     Days of Exercise per Week: 3 days     Minutes of Exercise per Session: 40 min   Stress: Stress Concern Present (1/2/2024)    [employer] - Occupational Stress Questionnaire     Feeling of Stress : Very much   Housing Stability: Low Risk  (1/2/2024)    Housing Stability Vital Sign     Unable to Pay for Housing in the Last Year: No     Number of Places Lived in the Last Year: 1     Unstable Housing in the Last Year: No     Past Surgical History:   Procedure Laterality Date    ADENOIDECTOMY      BLADDER FULGURATION Right 9/12/2018    Procedure: FULGURATION, BLADDER;  Surgeon: Isaias Shah MD;  Location: [facility];  Service: Urology;  Laterality: Right;    BLADDER FULGURATION N/A 4/24/2019    Procedure: FULGURATION, BLADDER;  Surgeon: Isaias Shah MD;  Location: [facility];  Service: Urology;  Laterality: N/A;    BREAST SURGERY Bilateral     CATARACT EXTRACTION      CYSTOSCOPY      CYSTOSCOPY N/A 4/24/2019    Procedure: CYSTOSCOPY;  Surgeon: Isaias Shah MD;  Location: [facility];  Service: Urology;  Laterality: N/A;    CYSTOSCOPY WITH URETEROSCOPY, RETROGRADE PYELOGRAPHY, AND INSERTION OF STENT Left 9/12/2018    Procedure: CYSTOSCOPY, WITH RETROGRADE PYELOGRAM AND URETERAL STENT INSERTION;  Surgeon: Isaias Shah MD;  Location: [facility];  Service: Urology;  Laterality: Left;    EYE SURGERY Left 2017    retina pucker    INSTILLATION OF URINARY BLADDER N/A 9/12/2018    Procedure: INSTILLATION, URINARY BLADDER;  Surgeon: Isaias Shah MD;  Location: [facility];  Service: Urology;  Laterality: N/A;  Chemo agent instillation     JOINT REPLACEMENT Bilateral     hip    JOINT REPLACEMENT Right     knee    MASTECTOMY Bilateral 1991    RETROGRADE PYELOGRAPHY Bilateral 10/31/2019    Procedure: PYELOGRAM, RETROGRADE;  Surgeon: Baldo Gates MD;  Location: [facility]  "OR;  Service: Urology;  Laterality: Bilateral;    TONSILLECTOMY      TURBT      TURBT, WITH BLUE LIGHT CYSTOSCOPY AND CYSVIEW Bilateral 10/31/2019    Procedure: TURBT,WITH BLUE LIGHT CYSTOSCOPY AND CYSVIEW;  Surgeon: Baldo Gates MD;  Location: [facility] OR;  Service: Urology;  Laterality: Bilateral;    TYMPANOSTOMY TUBE PLACEMENT      VASCULAR SURGERY Right     popliteal stent    WISDOM TOOTH EXTRACTION         Labs:  Lab Results   Component Value Date    WBC 5.12 05/09/2024    HGB 9.9 (L) 05/09/2024    HCT 31.3 (L) 05/09/2024    MCV 85 05/09/2024     05/09/2024     BMP  Lab Results   Component Value Date     05/09/2024    K 4.0 05/09/2024     05/09/2024    CO2 26 05/09/2024    BUN 16 05/09/2024    CREATININE 0.7 05/09/2024    CALCIUM 9.8 05/09/2024    ANIONGAP 7 (L) 05/09/2024    ESTGFRAFRICA 91 08/16/2022    EGFRNONAA >60 06/08/2022     Lab Results   Component Value Date    ALT 10 05/09/2024    AST 18 05/09/2024    ALKPHOS 59 05/09/2024    BILITOT 0.5 05/09/2024       Lab Results   Component Value Date    IRON 39 05/09/2024    TIBC 360 05/09/2024    FERRITIN 16 (L) 05/09/2024     No results found for: "FYHLPUZW84"  No results found for: "FOLATE"  Lab Results   Component Value Date    TSH 2.367 05/09/2024         Review of Systems   Constitutional:  Positive for fatigue. Negative for activity change, appetite change, chills, diaphoresis, fever and unexpected weight change.   HENT:  Negative for congestion, dental problem, drooling, ear discharge, ear pain, facial swelling, hearing loss, mouth sores, nosebleeds, postnasal drip, rhinorrhea, sinus pressure, sneezing, sore throat, tinnitus, trouble swallowing and voice change.    Eyes:  Negative for photophobia, pain, discharge, redness, itching and visual disturbance.   Respiratory:  Negative for cough, choking, chest tightness, shortness of breath, wheezing and stridor.    Cardiovascular:  Negative for chest pain, palpitations and leg swelling. "   Gastrointestinal:  Negative for abdominal distention, abdominal pain, anal bleeding, blood in stool, constipation, diarrhea, nausea, rectal pain and vomiting.   Endocrine: Negative for cold intolerance, heat intolerance, polydipsia, polyphagia and polyuria.   Genitourinary:  Negative for decreased urine volume, difficulty urinating, dyspareunia, dysuria, enuresis, flank pain, frequency, genital sores, hematuria, menstrual problem, pelvic pain, urgency, vaginal bleeding, vaginal discharge and vaginal pain.   Musculoskeletal:  Negative for arthralgias, back pain, gait problem, joint swelling, myalgias, neck pain and neck stiffness.   Skin:  Negative for color change, pallor and rash.   Allergic/Immunologic: Negative for environmental allergies, food allergies and immunocompromised state.   Neurological:  Positive for weakness. Negative for dizziness, tremors, seizures, syncope, facial asymmetry, speech difficulty, light-headedness, numbness and headaches.   Hematological:  Negative for adenopathy. Does not bruise/bleed easily.   Psychiatric/Behavioral:  Negative for agitation, behavioral problems, confusion, decreased concentration, dysphoric mood, hallucinations, self-injury, sleep disturbance and suicidal ideas. The patient is not nervous/anxious and is not hyperactive.        Objective:      Physical Exam  Vitals reviewed.   Constitutional:       General: She is not in acute distress.     Appearance: She is well-developed. She is not diaphoretic.   HENT:      Head: Normocephalic and atraumatic.      Right Ear: External ear normal.      Left Ear: External ear normal.      Nose: Nose normal.      Right Sinus: No maxillary sinus tenderness or frontal sinus tenderness.      Left Sinus: No maxillary sinus tenderness or frontal sinus tenderness.      Mouth/Throat:      Pharynx: No oropharyngeal exudate.   Eyes:      General: Lids are normal. No scleral icterus.        Right eye: No discharge.         Left eye: No  discharge.      Conjunctiva/sclera: Conjunctivae normal.      Right eye: Right conjunctiva is not injected. No hemorrhage.     Left eye: Left conjunctiva is not injected. No hemorrhage.     Pupils: Pupils are equal, round, and reactive to light.   Neck:      Thyroid: No thyromegaly.      Vascular: No JVD.      Trachea: No tracheal deviation.   Cardiovascular:      Rate and Rhythm: Normal rate.   Pulmonary:      Effort: Pulmonary effort is normal. No respiratory distress.      Breath sounds: No stridor.   Chest:      Chest wall: No tenderness.   Abdominal:      General: Bowel sounds are normal. There is no distension.      Palpations: Abdomen is soft. There is no hepatomegaly, splenomegaly or mass.      Tenderness: There is no abdominal tenderness. There is no rebound.   Musculoskeletal:         General: No tenderness. Normal range of motion.      Cervical back: Normal range of motion and neck supple.   Lymphadenopathy:      Cervical: No cervical adenopathy.      Upper Body:      Right upper body: No supraclavicular adenopathy.      Left upper body: No supraclavicular adenopathy.   Skin:     General: Skin is dry.      Findings: No erythema or rash.   Neurological:      Mental Status: She is alert and oriented to person, place, and time.      Cranial Nerves: No cranial nerve deficit.      Motor: Weakness present.      Coordination: Coordination normal.      Gait: Gait abnormal.   Psychiatric:         Behavior: Behavior normal.         Thought Content: Thought content normal.         Judgment: Judgment normal.             Assessment:      1. Iron deficiency anemia due to chronic blood loss    2. Angiodysplasia of cecum    3. Hereditary hemochromatosis           Med Onc Chart Routing      Follow up with physician . Return 2 months CBC CMP serum iron TIBC ferritin TSH   Follow up with JAS[facility]    Infusion scheduling note    Injection scheduling note 2 doses of IV Venofer 200 mg each   Labs    Imaging    Pharmacy appointment     Other referrals                   Plan:     Will set up for 2 doses of IV Venofer 200 mg each.  Previous history of iron overload.  Would recommend treatment recent knee surgery in continue with follow-up with me GI [facility] orders written reviewed        Adonay Neri Jr, MD FACP

## 2024-05-26 ENCOUNTER — PATIENT MESSAGE (OUTPATIENT)
Dept: HEMATOLOGY/ONCOLOGY | Facility: CLINIC | Age: 79
End: 2024-05-26
Payer: MEDICARE

## 2024-05-26 DIAGNOSIS — R94.6 ABNORMAL RESULTS OF THYROID FUNCTION STUDIES: ICD-10-CM

## 2024-05-27 RX ORDER — LEVOTHYROXINE SODIUM 50 UG/1
50 TABLET ORAL DAILY
Qty: 90 TABLET | Refills: 3 | Status: SHIPPED | OUTPATIENT
Start: 2024-05-27

## 2024-06-03 ENCOUNTER — TELEPHONE (OUTPATIENT)
Dept: INFUSION THERAPY | Facility: HOSPITAL | Age: 79
End: 2024-06-03
Payer: MEDICARE

## 2024-06-03 ENCOUNTER — PATIENT MESSAGE (OUTPATIENT)
Dept: HEMATOLOGY/ONCOLOGY | Facility: CLINIC | Age: 79
End: 2024-06-03
Payer: MEDICARE

## 2024-06-03 NOTE — TELEPHONE ENCOUNTER
----- Message from Kushal Go sent at 6/3/2024 12:36 PM CDT -----  Contact: [phone]  Patient called in to reschedule her appointment , to the 19th please call back [phone]

## 2024-06-04 ENCOUNTER — TELEPHONE (OUTPATIENT)
Dept: UROLOGY | Facility: CLINIC | Age: 79
End: 2024-06-04
Payer: MEDICARE

## 2024-06-04 DIAGNOSIS — C67.9 MALIGNANT NEOPLASM OF URINARY BLADDER, UNSPECIFIED SITE: Primary | ICD-10-CM

## 2024-06-04 NOTE — TELEPHONE ENCOUNTER
----- Message from Annalee Deleon sent at 6/4/2024 11:12 AM CDT -----  Regarding: FW: Appointment    ----- Message -----  From: Mireya Horvath  Sent: 6/4/2024  10:51 AM CDT  To: [facility] Staff  Subject: Appointment                                      Name of Who is Calling:  Patient          What is the request in detail:  Please contact patient she would like to speak with someone about her appointment been canceled             Can the clinic reply by [facility]: No            What Number to Call Back if not in [facility]: [phone]

## 2024-06-12 ENCOUNTER — INFUSION (OUTPATIENT)
Dept: INFUSION THERAPY | Facility: HOSPITAL | Age: 79
End: 2024-06-12
Attending: INTERNAL MEDICINE
Payer: MEDICARE

## 2024-06-12 VITALS
SYSTOLIC BLOOD PRESSURE: 128 MMHG | HEART RATE: 64 BPM | DIASTOLIC BLOOD PRESSURE: 74 MMHG | OXYGEN SATURATION: 97 % | TEMPERATURE: 98 F | RESPIRATION RATE: 16 BRPM

## 2024-06-12 DIAGNOSIS — D50.0 IRON DEFICIENCY ANEMIA DUE TO CHRONIC BLOOD LOSS: Primary | ICD-10-CM

## 2024-06-12 PROCEDURE — 96374 THER/PROPH/DIAG INJ IV PUSH: CPT

## 2024-06-12 PROCEDURE — 63600175 PHARM REV CODE 636 W HCPCS: Performed by: INTERNAL MEDICINE

## 2024-06-12 RX ADMIN — IRON SUCROSE 200 MG: 20 INJECTION, SOLUTION INTRAVENOUS at 11:06

## 2024-06-12 NOTE — PLAN OF CARE
Patient tolerated venofer well today; no adverse reaction noted.  POC reviewed with pt.  NAD noted upon discharge.   Has f/u appt(s) scheduled per MD request.    Problem: Adult Inpatient Plan of Care  Goal: Plan of Care Review  Outcome: Progressing  Goal: Patient-Specific Goal (Individualized)  Outcome: Progressing  Goal: Absence of Hospital-Acquired Illness or Injury  Outcome: Progressing  Intervention: Identify and Manage Fall Risk  Flowsheets (Taken 6/12/2024 1106)  Safety Promotion/Fall Prevention: in recliner, wheels locked  Goal: Optimal Comfort and Wellbeing  Outcome: Progressing  Intervention: Provide Person-Centered Care  Flowsheets (Taken 6/12/2024 1106)  Trust Relationship/Rapport:   care explained   reassurance provided   choices provided   thoughts/feelings acknowledged   emotional support provided   empathic listening provided   questions answered   questions encouraged

## 2024-06-12 NOTE — DISCHARGE INSTRUCTIONS
Thank you for letting me take care of you today!  - Althea MARK RN      [facility]  [address] [address]  [phone] phone     [phone] fax  Hours of Operation: Monday- Friday 8:00am- 5:00pm  After hours phone  [phone]  Hematology / Oncology Physicians on call    Dr. Daron Lee, RAFI Kessler, EVA Baker, EVA Aldana, NP      Please call with any concerns regarding your appointment today.      Venofer (Iron Sucrose):    Common side effects:  Pain, itching, burning, swelling, or a lump at injection site  Muscle cramps  Diarrhea, nausea, vomiting  Headache    This medicine could lower your blood pressure too much and cause you to feel dizzy or lightheaded.   Stand or sit up slowly if you are dizzy.    Call your doctor right away if you have:  Chest pain  Passing out  Difficulty breathing  Excessive sweating  Throat tightness

## 2024-06-15 NOTE — PROGRESS NOTES
Subjective:       Patient ID: Selam Botello is a 78 y.o. female.    Chief Complaint: Anemia    HPI: Ms. Botello is a pleasant 78 year old female who is following up for her iron def anemia. She is intolerant to oral iron. She does have hx of iron overload previously.   Pmhx: osteoporosis. Currently on prolia. History of bladder cancer treated with resection. Recurrence in 2018 requiring another resection. vitamin D deficiency taking 50,000 units weekly. hemochromatosis treated with phlebotomy when needed. osteoarthritis in her hands. History of right TKA and left BROCK. She has right knee limited range of motion after complications postoperatively. Unable to use anti-inflammatories due to GI bleed history. New L TKA done 9.2022. L wrist fx due to a fall. ORIF done. Peripheral vascular disease follows with vascular.     Today: She had 1 dose of IV iron venofer, on 6/12/24 without adverse reaction that day. She states the infusion made her have pain on the right side of her abdomen and itchy all over. She id use apple cider vinegar spray which did help with the itching at times. She states the pain in her abdomen is better but not fully gone. She sees gastro on Thursday for previous bleed, planning for VCE. She would like to monitor iron instead of proceeding with second infusion.     Social History     Socioeconomic History    Marital status:    Tobacco Use    Smoking status: Never     Passive exposure: Past    Smokeless tobacco: Never   Substance and Sexual Activity    Alcohol use: No    Drug use: No    Sexual activity: Yes     Partners: Male     Social Determinants of Health     Financial Resource Strain: Low Risk  (1/2/2024)    Overall Financial Resource Strain (CARDIA)     Difficulty of Paying Living Expenses: Not hard at all   Food Insecurity: No Food Insecurity (1/2/2024)    Hunger Vital Sign     Worried About Running Out of Food in the Last Year: Never true     Ran Out of Food in the Last Year: Never true    Transportation Needs: No Transportation Needs (1/2/2024)    PRAPARE - Transportation     Lack of Transportation (Medical): No     Lack of Transportation (Non-Medical): No   Physical Activity: Insufficiently Active (1/2/2024)    Exercise Vital Sign     Days of Exercise per Week: 3 days     Minutes of Exercise per Session: 40 min   Stress: Stress Concern Present (1/2/2024)    [employer] - Occupational Stress Questionnaire     Feeling of Stress : Very much   Housing Stability: Low Risk  (1/2/2024)    Housing Stability Vital Sign     Unable to Pay for Housing in the Last Year: No     Number of Places Lived in the Last Year: 1     Unstable Housing in the Last Year: No       Past Medical History:   Diagnosis Date    Acid reflux     Allergy     Arthritis     Atrial tachycardia     Bladder cancer     Breast cyst     Cataract     Colon polyp     COVID 5/19/2022    Deep vein thrombosis     Degenerative disc disease     Encounter for blood transfusion     General anesthetics causing adverse effect in therapeutic use     GI bleed     Hematuria, gross     Inflammatory bowel disease     Liver disease     cyst    Malignant neoplasm of lateral wall of urinary bladder 10/31/2019    Osteoporosis     PONV (postoperative nausea and vomiting)     Retina disorder, left     Skin cancer     Thyroid disease     Urinary tract infection        Family History   Problem Relation Name Age of Onset    COPD Mother skin     Cancer Mother skin     COPD Father prostate/skin     Cancer Father prostate/skin     Cancer Sister         Past Surgical History:   Procedure Laterality Date    ADENOIDECTOMY      BLADDER FULGURATION Right 9/12/2018    Procedure: FULGURATION, BLADDER;  Surgeon: Isaias Shah MD;  Location: [facility] OR;  Service: Urology;  Laterality: Right;    BLADDER FULGURATION N/A 4/24/2019    Procedure: FULGURATION, BLADDER;  Surgeon: Isaias Shah MD;  Location: [facility] OR;  Service: Urology;  Laterality:  N/A;    BREAST SURGERY Bilateral     CATARACT EXTRACTION      CYSTOSCOPY      CYSTOSCOPY N/A 4/24/2019    Procedure: CYSTOSCOPY;  Surgeon: Isaias Shah MD;  Location: [facility] OR;  Service: Urology;  Laterality: N/A;    CYSTOSCOPY WITH URETEROSCOPY, RETROGRADE PYELOGRAPHY, AND INSERTION OF STENT Left 9/12/2018    Procedure: CYSTOSCOPY, WITH RETROGRADE PYELOGRAM AND URETERAL STENT INSERTION;  Surgeon: Isaias Shah MD;  Location: [facility] OR;  Service: Urology;  Laterality: Left;    EYE SURGERY Left 2017    retina pucker    INSTILLATION OF URINARY BLADDER N/A 9/12/2018    Procedure: INSTILLATION, URINARY BLADDER;  Surgeon: Isaias Shah MD;  Location: [facility] OR;  Service: Urology;  Laterality: N/A;  Chemo agent instillation     JOINT REPLACEMENT Bilateral     hip    JOINT REPLACEMENT Right     knee    MASTECTOMY Bilateral 1991    RETROGRADE PYELOGRAPHY Bilateral 10/31/2019    Procedure: PYELOGRAM, RETROGRADE;  Surgeon: Baldo Gates MD;  Location: [facility];  Service: Urology;  Laterality: Bilateral;    TONSILLECTOMY      TURBT      TURBT, WITH BLUE LIGHT CYSTOSCOPY AND CYSVIEW Bilateral 10/31/2019    Procedure: TURBT,WITH BLUE LIGHT CYSTOSCOPY AND CYSVIEW;  Surgeon: Baldo Gates MD;  Location: [facility];  Service: Urology;  Laterality: Bilateral;    TYMPANOSTOMY TUBE PLACEMENT      VASCULAR SURGERY Right     popliteal stent    WISDOM TOOTH EXTRACTION         Review of Systems   Constitutional:  Negative for activity change, appetite change, chills, diaphoresis, fatigue, fever and unexpected weight change.   HENT:  Negative for nosebleeds.    Respiratory:  Negative for cough and shortness of breath.    Cardiovascular:  Negative for chest pain and leg swelling.   Gastrointestinal:  Positive for abdominal pain. Negative for anal bleeding, blood in stool, constipation, diarrhea, nausea and vomiting.   Genitourinary:  Negative for hematuria.   Skin:  Negative for color change and pallor.        Occasional  generalized pruritus   Neurological:  Negative for headaches.         Medication List with Changes/Refills   Current Medications    CARBOXYMETHYLCELLULOSE SODIUM (REFRESH TEARS OPHT)    Apply to eye 2 (two) times daily.     CETIRIZINE (ZYRTEC) 10 MG TABLET    Take 10 mg by mouth once daily.    CHOLECALCIFEROL, VITAMIN D3, 1,250 MCG (50,000 UNIT) CAPSULE    Take 50,000 Units by mouth every 7 days.    DENOSUMAB (PROLIA) 60 MG/ML SYRG    Inject 1 mL (60 mg total) into the skin every 6 (six) months.    DEXCHLORPHEN-PHENYLEPHRINE-DM 1-5-10 MG/5 ML SYRP    Take 10 mLs by mouth every 4 (four) hours as needed (cough/congestion).    FERROUS SULFATE (FEOSOL) 325 MG (65 MG IRON) TAB TABLET    Take 1 tablet (325 mg total) by mouth daily with breakfast.    LIDODERM 5 %    1 patch a daily as needed for pain; Remove & Discard patch within 12 hours    METOPROLOL SUCCINATE (TOPROL-XL) 25 MG 24 HR TABLET    Take 12.5 mg by mouth.    MUPIROCIN (BACTROBAN) 2 % OINTMENT    APPLY A SMALL AMOUNT TO THE AFFECTED AREA BY TOPICAL ROUTE 2-3 TIMES PER DAY    SYNTHROID 50 MCG TABLET    Take 1 tablet (50 mcg total) by mouth once daily.     Objective:     Vitals:    06/17/24 1030   BP: (!) 107/56   Pulse: 64   Temp: 98.2 °F (36.8 °C)       Physical Exam  Vitals reviewed.   Constitutional:       General: She is not in acute distress.     Appearance: She is not ill-appearing, toxic-appearing or diaphoretic.   HENT:      Head: Normocephalic and atraumatic.   Cardiovascular:      Rate and Rhythm: Normal rate.   Musculoskeletal:      Right lower leg: No edema.      Left lower leg: No edema.   Skin:     General: Skin is warm.      Coloration: Skin is not jaundiced or pale.      Findings: No bruising, erythema or rash.   Neurological:      Mental Status: She is alert.      Motor: No weakness.      Gait: Gait normal.   Psychiatric:         Mood and Affect: Mood normal.         Behavior: Behavior normal.            Labs/Results:  Lab Results   Component  Value Date    WBC 5.12 05/09/2024    RBC 3.67 (L) 05/09/2024    HGB 9.9 (L) 05/09/2024    HCT 31.3 (L) 05/09/2024    MCV 85 05/09/2024    MCH 27.0 05/09/2024    MCHC 31.6 (L) 05/09/2024    RDW 16.7 (H) 05/09/2024     05/09/2024    MPV 9.4 05/09/2024    GRAN 3.4 05/09/2024    GRAN 67.2 05/09/2024    LYMPH 1.1 05/09/2024    LYMPH 20.9 05/09/2024    MONO 0.4 05/09/2024    MONO 8.4 05/09/2024    EOS 0.1 05/09/2024    BASO 0.03 05/09/2024    EOSINOPHIL 2.7 05/09/2024    BASOPHIL 0.6 05/09/2024        Latest Reference Range & Units 05/09/24 13:09   Iron 30 - 160 ug/dL 39   TIBC 250 - 450 ug/dL 360   Saturated Iron 20 - 50 % 11 (L)   Transferrin 200 - 375 mg/dL 243   Ferritin 20.0 - 300.0 ng/mL 16 (L)     CMP  Sodium   Date Value Ref Range Status   05/09/2024 140 136 - 145 mmol/L Final     Potassium   Date Value Ref Range Status   05/09/2024 4.0 3.5 - 5.1 mmol/L Final     Chloride   Date Value Ref Range Status   05/09/2024 107 95 - 110 mmol/L Final     CO2   Date Value Ref Range Status   05/09/2024 26 23 - 29 mmol/L Final     Glucose   Date Value Ref Range Status   05/09/2024 96 70 - 110 mg/dL Final     BUN   Date Value Ref Range Status   05/09/2024 16 8 - 23 mg/dL Final     Creatinine   Date Value Ref Range Status   05/09/2024 0.7 0.5 - 1.4 mg/dL Final     Calcium   Date Value Ref Range Status   05/09/2024 9.8 8.7 - 10.5 mg/dL Final     Total Protein   Date Value Ref Range Status   05/09/2024 6.1 6.0 - 8.4 g/dL Final     Albumin   Date Value Ref Range Status   05/09/2024 3.8 3.5 - 5.2 g/dL Final     Total Bilirubin   Date Value Ref Range Status   05/09/2024 0.5 0.1 - 1.0 mg/dL Final     Comment:     For infants and newborns, interpretation of results should be based  on gestational age, weight and in agreement with clinical  observations.    Premature Infant recommended reference ranges:  Up to 24 hours.............<8.0 mg/dL  Up to 48 hours............<12.0 mg/dL  3-5 days..................<15.0 mg/dL  6-29  days.................<15.0 mg/dL       Alkaline Phosphatase   Date Value Ref Range Status   05/09/2024 59 55 - 135 U/L Final     AST   Date Value Ref Range Status   05/09/2024 18 10 - 40 U/L Final     ALT   Date Value Ref Range Status   05/09/2024 10 10 - 44 U/L Final     Anion Gap   Date Value Ref Range Status   05/09/2024 7 (L) 8 - 16 mmol/L Final     eGFR   Date Value Ref Range Status   05/09/2024 >60 >60 mL/min/1.73 m^2 Final      Latest Reference Range & Units 05/09/24 13:09   TSH 0.400 - 4.000 uIU/mL 2.367     Assessment:     Problem List Items Addressed This Visit          Renal/    H/O bilateral mastectomy       Oncology    Iron deficiency anemia due to chronic blood loss - Primary    Relevant Orders    US Abdomen Complete       Endocrine    Age-related osteoporosis without current pathological fracture       GI    Angiodysplasia of cecum    Hereditary hemochromatosis    Relevant Orders    US Abdomen Complete     Plan:     Iron deficiency anemia due to chronic blood loss  --S/p iv iron, first dose 6/12/24.   --multiple allergies  --give solumedrol and tylenol prior   --could switch formularies due to intolerance  --hgn: 9.9g/dL  --iron: 39, sat: 11%, ferritin: 16-iron def  --canceling second iv iron infusion. Would like to hold off and monitor labs    Hereditary hemochromatosis  --previously iron overloaded, treated with phlebotomies as needed  --will obtain u/s abdomen due to abdominal pain. Last one was 3/2023.     Angiodysplasia of cecum  --contributing to iron def  --continues to follow with gi outside of [facility].   --seeing them Thursday for VCE.    Follow-Up: u/s abdomen. 2 months with cbc cmp iron/tibc ferritin tsh prior -as scheduled    Lizbeth Lee PA-C  Hematology Oncology    Route Chart for Scheduling    Med Onc Chart Routing      Follow up with physician . 2 months with cbc cmp iron/tibc ferritin tsh prior   Follow up with [facility]    Infusion scheduling note    Injection scheduling note    Labs  CBC, CMP, ferritin, iron and TIBC and TSH   Scheduling:  Preferred lab:  Lab interval:  as scheudled.   Imaging   U/s abdomen when available   Pharmacy appointment    Other referrals                    Supportive Plan Information  OP IRON SUCROSE IVP QW   Adonay Neri MD   Upcoming Treatment Dates - OP IRON SUCROSE IVP QW    6/13/2024       Medications       iron sucrose injection 200 mg  6/20/2024       Medications       iron sucrose injection 200 mg  6/27/2024       Medications       iron sucrose injection 200 mg  7/4/2024       Medications       iron sucrose injection 200 mg    Therapy Plan Information  5. Medications  denosumab (PROLIA) injection 60 mg  60 mg, Subcutaneous, Every visit

## 2024-06-17 ENCOUNTER — OFFICE VISIT (OUTPATIENT)
Dept: HEMATOLOGY/ONCOLOGY | Facility: CLINIC | Age: 79
End: 2024-06-17
Payer: MEDICARE

## 2024-06-17 ENCOUNTER — TELEPHONE (OUTPATIENT)
Dept: INFUSION THERAPY | Facility: HOSPITAL | Age: 79
End: 2024-06-17
Payer: MEDICARE

## 2024-06-17 VITALS
HEART RATE: 64 BPM | DIASTOLIC BLOOD PRESSURE: 56 MMHG | TEMPERATURE: 98 F | SYSTOLIC BLOOD PRESSURE: 107 MMHG | OXYGEN SATURATION: 98 % | BODY MASS INDEX: 24.65 KG/M2 | WEIGHT: 133.94 LBS | HEIGHT: 62 IN

## 2024-06-17 DIAGNOSIS — M81.0 AGE-RELATED OSTEOPOROSIS WITHOUT CURRENT PATHOLOGICAL FRACTURE: ICD-10-CM

## 2024-06-17 DIAGNOSIS — D50.0 IRON DEFICIENCY ANEMIA DUE TO CHRONIC BLOOD LOSS: Primary | ICD-10-CM

## 2024-06-17 DIAGNOSIS — E83.110 HEREDITARY HEMOCHROMATOSIS: ICD-10-CM

## 2024-06-17 DIAGNOSIS — K55.20 ANGIODYSPLASIA OF CECUM: ICD-10-CM

## 2024-06-17 DIAGNOSIS — Z90.13 H/O BILATERAL MASTECTOMY: ICD-10-CM

## 2024-06-17 PROCEDURE — 99999 PR PBB SHADOW E&M-EST. PATIENT-LVL IV: CPT | Mod: PBBFAC,,,

## 2024-06-17 PROCEDURE — 99214 OFFICE O/P EST MOD 30 MIN: CPT | Mod: PBBFAC

## 2024-06-17 NOTE — TELEPHONE ENCOUNTER
----- Message from Oanh Greer [facility]?   Best Call Back Number:  [phone]    Additional Information:  call to be seen thanks.   Called in response to covid home symptom monitoring for an escalation of symptoms. States  is the pt - both pt escalated to same number. See triage MICA Botello    Reason for Disposition   Caller has cancelled the call before the first contact     Escalated at the same time as  - states  is patient. No triage.    Additional Information   Negative: Caller has already spoken with the PCP (or office), and has no further questions   Negative: Caller has already spoken with another triager and has no further questions   Negative: Caller has already spoken with another triager or PCP (or office), and has further questions and triager able to answer questions.    Protocols used: No Contact or Duplicate Contact Call-A-OH

## 2024-06-19 ENCOUNTER — HOSPITAL ENCOUNTER (OUTPATIENT)
Dept: RADIOLOGY | Facility: HOSPITAL | Age: 79
Discharge: HOME | End: 2024-06-19
Payer: MEDICARE

## 2024-06-19 ENCOUNTER — PATIENT MESSAGE (OUTPATIENT)
Dept: HEMATOLOGY/ONCOLOGY | Facility: CLINIC | Age: 79
End: 2024-06-19
Payer: MEDICARE

## 2024-06-19 DIAGNOSIS — E83.110 HEREDITARY HEMOCHROMATOSIS: ICD-10-CM

## 2024-06-19 DIAGNOSIS — D50.0 IRON DEFICIENCY ANEMIA DUE TO CHRONIC BLOOD LOSS: ICD-10-CM

## 2024-06-19 PROCEDURE — 76700 US EXAM ABDOM COMPLETE: CPT | Mod: TC

## 2024-06-19 PROCEDURE — 76700 US EXAM ABDOM COMPLETE: CPT | Mod: 26,,, | Performed by: RADIOLOGY

## 2024-06-21 ENCOUNTER — OFFICE VISIT (OUTPATIENT)
Dept: HEMATOLOGY/ONCOLOGY | Facility: CLINIC | Age: 79
End: 2024-06-21
Payer: MEDICARE

## 2024-06-21 DIAGNOSIS — Q45.3 PANCREATIC DUCTAL ABNORMALITY: Primary | ICD-10-CM

## 2024-06-21 NOTE — PROGRESS NOTES
Subjective:       Patient ID: Selam Botello is a 78 y.o. female.    Chief Complaint: Results    HPI:  78-year-old female history of ultrasound of abdomen with mild pancreatic ductal ectasia.  Patient is concerned over results    Past Medical History:   Diagnosis Date    Acid reflux     Allergy     Arthritis     Atrial tachycardia     Bladder cancer     Breast cyst     Cataract     Colon polyp     COVID 5/19/2022    Deep vein thrombosis     Degenerative disc disease     Encounter for blood transfusion     General anesthetics causing adverse effect in therapeutic use     GI bleed     Hematuria, gross     Inflammatory bowel disease     Liver disease     cyst    Malignant neoplasm of lateral wall of urinary bladder 10/31/2019    Osteoporosis     PONV (postoperative nausea and vomiting)     Retina disorder, left     Skin cancer     Thyroid disease     Urinary tract infection      Family History   Problem Relation Name Age of Onset    COPD Mother skin     Cancer Mother skin     COPD Father prostate/skin     Cancer Father prostate/skin     Cancer Sister       Social History     Socioeconomic History    Marital status:    Tobacco Use    Smoking status: Never     Passive exposure: Past    Smokeless tobacco: Never   Substance and Sexual Activity    Alcohol use: No    Drug use: No    Sexual activity: Yes     Partners: Male     Social Determinants of Health     Financial Resource Strain: Low Risk  (1/2/2024)    Overall Financial Resource Strain (CARDIA)     Difficulty of Paying Living Expenses: Not hard at all   Food Insecurity: No Food Insecurity (1/2/2024)    Hunger Vital Sign     Worried About Running Out of Food in the Last Year: Never true     Ran Out of Food in the Last Year: Never true   Transportation Needs: No Transportation Needs (1/2/2024)    PRAPARE - Transportation     Lack of Transportation (Medical): No     Lack of Transportation (Non-Medical): No   Physical Activity: Insufficiently Active (1/2/2024)     Exercise Vital Sign     Days of Exercise per Week: 3 days     Minutes of Exercise per Session: 40 min   Stress: Stress Concern Present (1/2/2024)    [employer] - Occupational Stress Questionnaire     Feeling of Stress : Very much   Housing Stability: Low Risk  (1/2/2024)    Housing Stability Vital Sign     Unable to Pay for Housing in the Last Year: No     Number of Places Lived in the Last Year: 1     Unstable Housing in the Last Year: No     Past Surgical History:   Procedure Laterality Date    ADENOIDECTOMY      BLADDER FULGURATION Right 9/12/2018    Procedure: FULGURATION, BLADDER;  Surgeon: Isaias Shah MD;  Location: [facility];  Service: Urology;  Laterality: Right;    BLADDER FULGURATION N/A 4/24/2019    Procedure: FULGURATION, BLADDER;  Surgeon: Isaias Shah MD;  Location: [facility];  Service: Urology;  Laterality: N/A;    BREAST SURGERY Bilateral     CATARACT EXTRACTION      CYSTOSCOPY      CYSTOSCOPY N/A 4/24/2019    Procedure: CYSTOSCOPY;  Surgeon: Isaias Shah MD;  Location: [facility];  Service: Urology;  Laterality: N/A;    CYSTOSCOPY WITH URETEROSCOPY, RETROGRADE PYELOGRAPHY, AND INSERTION OF STENT Left 9/12/2018    Procedure: CYSTOSCOPY, WITH RETROGRADE PYELOGRAM AND URETERAL STENT INSERTION;  Surgeon: Isaias Shah MD;  Location: [facility];  Service: Urology;  Laterality: Left;    EYE SURGERY Left 2017    retina pucker    INSTILLATION OF URINARY BLADDER N/A 9/12/2018    Procedure: INSTILLATION, URINARY BLADDER;  Surgeon: Isaias Shah MD;  Location: [facility];  Service: Urology;  Laterality: N/A;  Chemo agent instillation     JOINT REPLACEMENT Bilateral     hip    JOINT REPLACEMENT Right     knee    MASTECTOMY Bilateral 1991    RETROGRADE PYELOGRAPHY Bilateral 10/31/2019    Procedure: PYELOGRAM, RETROGRADE;  Surgeon: Baldo Gates MD;  Location: [facility];  Service: Urology;  Laterality: Bilateral;    TONSILLECTOMY      TURBT      TURBT, WITH BLUE LIGHT  "CYSTOSCOPY AND CYSVIEW Bilateral 10/31/2019    Procedure: TURBT,WITH BLUE LIGHT CYSTOSCOPY AND CYSVIEW;  Surgeon: Baldo Gates MD;  Location: [facility];  Service: Urology;  Laterality: Bilateral;    TYMPANOSTOMY TUBE PLACEMENT      VASCULAR SURGERY Right     popliteal stent    WISDOM TOOTH EXTRACTION         Labs:  Lab Results   Component Value Date    WBC 5.12 05/09/2024    HGB 9.9 (L) 05/09/2024    HCT 31.3 (L) 05/09/2024    MCV 85 05/09/2024     05/09/2024     BMP  Lab Results   Component Value Date     05/09/2024    K 4.0 05/09/2024     05/09/2024    CO2 26 05/09/2024    BUN 16 05/09/2024    CREATININE 0.7 05/09/2024    CALCIUM 9.8 05/09/2024    ANIONGAP 7 (L) 05/09/2024    ESTGFRAFRICA 91 08/16/2022    EGFRNONAA >60 06/08/2022     Lab Results   Component Value Date    ALT 10 05/09/2024    AST 18 05/09/2024    ALKPHOS 59 05/09/2024    BILITOT 0.5 05/09/2024       Lab Results   Component Value Date    IRON 39 05/09/2024    TIBC 360 05/09/2024    FERRITIN 16 (L) 05/09/2024     No results found for: "[id number]"  No results found for: "FOLATE"  Lab Results   Component Value Date    TSH 2.367 05/09/2024         Review of Systems   Constitutional:  Negative for activity change, appetite change, chills, diaphoresis, fatigue, fever and unexpected weight change.   HENT:  Negative for congestion, dental problem, drooling, ear discharge, ear pain, facial swelling, hearing loss, mouth sores, nosebleeds, postnasal drip, rhinorrhea, sinus pressure, sneezing, sore throat, tinnitus, trouble swallowing and voice change.    Eyes:  Negative for photophobia, pain, discharge, redness, itching and visual disturbance.   Respiratory:  Negative for cough, choking, chest tightness, shortness of breath, wheezing and stridor.    Cardiovascular:  Negative for chest pain, palpitations and leg swelling.   Gastrointestinal:  Positive for abdominal pain. Negative for abdominal distention, anal bleeding, blood in stool, " constipation, diarrhea, nausea, rectal pain and vomiting.   Endocrine: Negative for cold intolerance, heat intolerance, polydipsia, polyphagia and polyuria.   Genitourinary:  Negative for decreased urine volume, difficulty urinating, dyspareunia, dysuria, enuresis, flank pain, frequency, genital sores, hematuria, menstrual problem, pelvic pain, urgency, vaginal bleeding, vaginal discharge and vaginal pain.   Musculoskeletal:  Negative for arthralgias, back pain, gait problem, joint swelling, myalgias, neck pain and neck stiffness.   Skin:  Negative for color change, pallor and rash.   Allergic/Immunologic: Negative for environmental allergies, food allergies and immunocompromised state.   Neurological:  Negative for dizziness, tremors, seizures, syncope, facial asymmetry, speech difficulty, weakness, light-headedness, numbness and headaches.   Hematological:  Negative for adenopathy. Does not bruise/bleed easily.   Psychiatric/Behavioral:  Negative for agitation, behavioral problems, confusion, decreased concentration, dysphoric mood, hallucinations, self-injury, sleep disturbance and suicidal ideas. The patient is not nervous/anxious and is not hyperactive.        Objective:      Physical Exam  Constitutional:       Appearance: Normal appearance.             Assessment:      1. Pancreatic ductal abnormality           Med Onc Chart Routing      Follow up with physician    Follow up with [facility]AS[facility]    Infusion scheduling note    Injection scheduling note    Labs    Imaging    Pharmacy appointment    Other referrals         Referral [facility]              Plan:     The patient location is:  Home  The chief complaint leading to consultation is:  Results    Visit type: audiovisual    Face to Face time with patient: 25 minutes of total time spent on the encounter, which includes face to face time and non-face to face time preparing to see the patient (eg, review of tests), Obtaining and/or reviewing  separately obtained history, Documenting clinical information in the electronic or other health record, Independently interpreting results (not separately reported) and communicating results to the patient/family/caregiver, or Care coordination (not separately reported).         Each patient to whom he or she provides medical services by telemedicine is:  (1) informed of the relationship between the physician and patient and the respective role of any other health care provider with respect to management of the patient; and (2) notified that he or she may decline to receive medical services by telemedicine and may withdraw from such care at any time.    Notes:  Reviewed results of ultrasound with mild pancreatic ductal ectasia.  Do not feel any further workup is warranted but I will have patient be seen by Surgical Oncology for further evaluation discussed implications with patient        Adonay Neri Jr, MD FACP

## 2024-06-25 ENCOUNTER — TELEPHONE (OUTPATIENT)
Dept: SURGICAL ONCOLOGY | Facility: CLINIC | Age: 79
End: 2024-06-25
Payer: MEDICARE

## 2024-06-25 NOTE — TELEPHONE ENCOUNTER
Spoke with patient to schedule appointment with Dr. Rivera from referral from Dr. Neri. Appointment scheduled for tomorrow 6/26 at [facility]. Patient verbalized understanding with no further questions at this time.

## 2024-06-26 ENCOUNTER — OFFICE VISIT (OUTPATIENT)
Dept: SURGICAL ONCOLOGY | Facility: CLINIC | Age: 79
End: 2024-06-26
Payer: MEDICARE

## 2024-06-26 VITALS
HEIGHT: 62 IN | BODY MASS INDEX: 24.2 KG/M2 | TEMPERATURE: 98 F | SYSTOLIC BLOOD PRESSURE: 100 MMHG | WEIGHT: 131.5 LBS | DIASTOLIC BLOOD PRESSURE: 67 MMHG | HEART RATE: 59 BPM

## 2024-06-26 DIAGNOSIS — Q45.3 PANCREATIC DUCTAL ABNORMALITY: ICD-10-CM

## 2024-06-26 PROCEDURE — 99999 PR PBB SHADOW E&M-EST. PATIENT-LVL III: CPT | Mod: PBBFAC,,, | Performed by: SURGERY

## 2024-06-26 PROCEDURE — 99205 OFFICE O/P NEW HI 60 MIN: CPT | Mod: S$PBB,,, | Performed by: SURGERY

## 2024-06-26 PROCEDURE — 99213 OFFICE O/P EST LOW 20 MIN: CPT | Mod: PBBFAC | Performed by: SURGERY

## 2024-06-26 NOTE — PROGRESS NOTES
Surgical Oncology Clinic Note      Referring Provider: Dr. Adonay Neri   PCP: No, Primary Doctor    Reason For Visit: Pancreas: duct ectasia      Oncology History   Malignant neoplasm of lateral wall of urinary bladder (Resolved)   10/31/2019 Initial Diagnosis    Malignant neoplasm of lateral wall of urinary bladder      Genetic Testing    Patient has genetic testing done for MY RISK.                                              Results revealed patient has the following mutation(s):  No clinically significant mutation identified the date 09/01/2020 #[id number]       4/12/2021 Cancer Staged    Staging form: Urinary Bladder, AJCC 8th Edition  - Clinical stage from 4/12/2021: Stage I (cT1, cN0, cM0)       HISTORY OF PRESENT ILLNESS       Selam Botello is a 78 y.o. 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  She follows with heme Onc for her iron deficiency anemia.  When she was seen most recently she was having some right upper quadrant pain for which an ultrasound of the abdomen was ordered which showed some pancreatic duct ectasia.  Due to her concerns about this ductal abnormality she was referred to me for evaluation and discussion.  She has had multiple images of her abdomen done over the last 4 years for various reasons.  This includes an MRI of the abdomen from August of 2020 which showed mild pancreatic ductal prominence which was unchanged from prior CT in 2019 and .  This MRI was done during workup for hemochromatosis.  Subsequent MRI the following your was unchanged.  Ultrasound of the abdomen for right lower quadrant pain in March of 2023 similarly shows a pancreatic duct measuring 3.2 mm, similar to prior.    She does note some intermittent right upper quadrant pain that is periodic in nature.  LFTs done around the same time as her most recent ultrasound a month ago were all within normal limits.  CBC  done at that same time did show a drop in her hemoglobin to 9.9.  [facility] for capsule endoscopy to evaluate her GI tract in light of her history of GI bleed.  Otherwise she feels well.    Family history is significant for skin cancer in her mother and prostate cancer in her father, as well as breast cancer in her sister (which is what prompted her bilateral mastectomies)    Past Medical History:   Diagnosis Date    Acid reflux     Allergy     Arthritis     Atrial tachycardia     Bladder cancer     Breast cyst     Cataract     Colon polyp     COVID 5/19/2022    Deep vein thrombosis     Degenerative disc disease     Encounter for blood transfusion     General anesthetics causing adverse effect in therapeutic use     GI bleed     Hematuria, gross     Inflammatory bowel disease     Liver disease     cyst    Malignant neoplasm of lateral wall of urinary bladder 10/31/2019    Osteoporosis     PONV (postoperative nausea and vomiting)     Retina disorder, left     Skin cancer     Thyroid disease     Urinary tract infection        Past Surgical History:   Procedure Laterality Date    ADENOIDECTOMY      BLADDER FULGURATION Right 9/12/2018    Procedure: FULGURATION, BLADDER;  Surgeon: Isaias Shah MD;  Location: [facility] OR;  Service: Urology;  Laterality: Right;    BLADDER FULGURATION N/A 4/24/2019    Procedure: FULGURATION, BLADDER;  Surgeon: Isaias Shah MD;  Location: [facility] OR;  Service: Urology;  Laterality: N/A;    BREAST SURGERY Bilateral     CATARACT EXTRACTION      CYSTOSCOPY      CYSTOSCOPY N/A 4/24/2019    Procedure: CYSTOSCOPY;  Surgeon: Isaias Shah MD;  Location: [facility] OR;  Service: Urology;  Laterality: N/A;    CYSTOSCOPY WITH URETEROSCOPY, RETROGRADE PYELOGRAPHY, AND INSERTION OF STENT Left 9/12/2018    Procedure: CYSTOSCOPY, WITH RETROGRADE PYELOGRAM AND URETERAL STENT INSERTION;  Surgeon: Isaias Shah MD;  Location: [facility] OR;  Service: Urology;  Laterality: Left;     EYE SURGERY Left 2017    retina pucker    INSTILLATION OF URINARY BLADDER N/A 9/12/2018    Procedure: INSTILLATION, URINARY BLADDER;  Surgeon: Isaias Shah MD;  Location: [facility];  Service: Urology;  Laterality: N/A;  Chemo agent instillation     JOINT REPLACEMENT Bilateral     hip    JOINT REPLACEMENT Right     knee    MASTECTOMY Bilateral 1991    RETROGRADE PYELOGRAPHY Bilateral 10/31/2019    Procedure: PYELOGRAM, RETROGRADE;  Surgeon: Baldo Gates MD;  Location: [facility];  Service: Urology;  Laterality: Bilateral;    TONSILLECTOMY      TURBT      TURBT, WITH BLUE LIGHT CYSTOSCOPY AND CYSVIEW Bilateral 10/31/2019    Procedure: TURBT,WITH BLUE LIGHT CYSTOSCOPY AND CYSVIEW;  Surgeon: Baldo Gates MD;  Location: [facility];  Service: Urology;  Laterality: Bilateral;    TYMPANOSTOMY TUBE PLACEMENT      VASCULAR SURGERY Right     popliteal stent    WISDOM TOOTH EXTRACTION         Family History   Problem Relation Name Age of Onset    COPD Mother skin     Cancer Mother skin     COPD Father prostate/skin     Cancer Father prostate/skin     Cancer Sister         Social History     Socioeconomic History    Marital status:    Tobacco Use    Smoking status: Never     Passive exposure: Past    Smokeless tobacco: Never   Substance and Sexual Activity    Alcohol use: No    Drug use: No    Sexual activity: Yes     Partners: Male     Social Determinants of Health     Financial Resource Strain: Low Risk  (1/2/2024)    Overall Financial Resource Strain (CARDIA)     Difficulty of Paying Living Expenses: Not hard at all   Food Insecurity: No Food Insecurity (1/2/2024)    Hunger Vital Sign     Worried About Running Out of Food in the Last Year: Never true     Ran Out of Food in the Last Year: Never true   Transportation Needs: No Transportation Needs (1/2/2024)    PRAPARE - Transportation     Lack of Transportation (Medical): No     Lack of Transportation (Non-Medical): No   Physical Activity: Insufficiently Active  (1/2/2024)    Exercise Vital Sign     Days of Exercise per Week: 3 days     Minutes of Exercise per Session: 40 min   Stress: Stress Concern Present (1/2/2024)    [employer] - Occupational Stress Questionnaire     Feeling of Stress : Very much   Housing Stability: Low Risk  (1/2/2024)    Housing Stability Vital Sign     Unable to Pay for Housing in the Last Year: No     Number of Places Lived in the Last Year: 1     Unstable Housing in the Last Year: No          Medication List            Accurate as of June 26, 2024  9:36 AM. If you have any questions, ask your nurse or doctor.                CONTINUE taking these medications      cetirizine 10 MG tablet  Commonly known as: ZYRTEC     cholecalciferol (vitamin D3) 1,250 mcg (50,000 unit) capsule     denosumab 60 mg/mL Syrg  Commonly known as: PROLIA  Inject 1 mL (60 mg total) into the skin every 6 (six) months.     dexchlorphen-phenylephrine-DM 1-5-10 mg/5 mL Syrp  Take 10 mLs by mouth every 4 (four) hours as needed (cough/congestion).     ferrous sulfate 325 mg (65 mg iron) Tab tablet  Commonly known as: FEOSOL  Take 1 tablet (325 mg total) by mouth daily with breakfast.     LIDODERM 5 %  Generic drug: LIDOcaine  1 patch a daily as needed for pain; Remove & Discard patch within 12 hours     metoprolol succinate 25 MG 24 hr tablet  Commonly known as: TOPROL-XL     mupirocin 2 % ointment  Commonly known as: BACTROBAN     REFRESH TEARS OPHT     SYNTHROID 50 mcg tablet  Generic drug: levothyroxine  Take 1 tablet (50 mcg total) by mouth once daily.              Review of patient's allergies indicates:   Allergen Reactions    Etomidate Other (See Comments), Palpitations and Shortness Of Breath    Latex Rash     Other reaction(s): mouth ulcers    Latex, natural rubber      Mouth ulcers    Nsaids (non-steroidal anti-inflammatory drug) Other (See Comments)     Bleeding of the colon-hospitalized    Propofol analogues Other (See Comments)      Cognitive function declines for approx 2 weeks  Loses memory function/ability to recall  Does not wake up easily after     Gabapentin      Causes dizziness      Levofloxacin      Other reaction(s): Joint pain    Tramadol Hives and Itching    Epinephrine Palpitations     Arms twitch      Sulfa (sulfonamide antibiotics) Rash       Review of Systems   Constitutional:  Negative for chills, fever, malaise/fatigue and weight loss.   Respiratory:  Negative for cough, shortness of breath and wheezing.    Cardiovascular:  Negative for chest pain and palpitations.   Gastrointestinal:  Negative for abdominal pain, constipation, diarrhea, nausea and vomiting.   Musculoskeletal:  Negative for back pain, falls and joint pain.   Neurological:  Negative for dizziness, sensory change, speech change, focal weakness, seizures, loss of consciousness and weakness.   Psychiatric/Behavioral:  Negative for depression and hallucinations. The patient is not nervous/anxious.          Vitals:    06/26/24 0933   BP: 100/67   Pulse: (!) 59   Temp: 98.1 °F (36.7 °C)     Body mass index is 24.05 kg/m².  ECOG SCORE             PHYSICAL EXAM       Physical Exam  Vitals reviewed.   Constitutional:       General: She is not in acute distress.     Appearance: Normal appearance.   HENT:      Head: Normocephalic and atraumatic.      Mouth/Throat:      Mouth: Mucous membranes are moist.   Eyes:      General: No scleral icterus.     Extraocular Movements: Extraocular movements intact.      Conjunctiva/sclera: Conjunctivae normal.   Pulmonary:      Effort: Pulmonary effort is normal.   Abdominal:      General: There is no distension.      Palpations: Abdomen is soft. There is no mass.      Tenderness: There is no abdominal tenderness.   Musculoskeletal:         General: No swelling. Normal range of motion.   Skin:     General: Skin is warm and dry.   Neurological:      General: No focal deficit present.      Mental Status: She is alert.   Psychiatric:        "  Mood and Affect: Mood normal.         Behavior: Behavior normal.         Thought Content: Thought content normal.         Judgment: Judgment normal.          DATA REVIEW:  I personally reviewed the following records for this visit: lab work from prior visit, notes from other physicians, magnetic resonance/MR imaging, computed tomography/CT imaging, radiographic study evaluation, and laboratory results done by primary care physician    LABS       Lab Results   Component Value Date    WBC 5.12 05/09/2024    HGB 9.9 (L) 05/09/2024    HCT 31.3 (L) 05/09/2024     05/09/2024     Lab Results   Component Value Date    GLU 96 05/09/2024    CALCIUM 9.8 05/09/2024    ALBUMIN 3.8 05/09/2024    PROT 6.1 05/09/2024     05/09/2024    K 4.0 05/09/2024    CO2 26 05/09/2024     05/09/2024    BUN 16 05/09/2024    CREATININE 0.7 05/09/2024    ALKPHOS 59 05/09/2024    ALT 10 05/09/2024    AST 18 05/09/2024    BILITOT 0.5 05/09/2024       Nutritional:  No results found for: "PREALBUMIN"    Tumor Markers:  Lab Results   Component Value Date    AMYLASE 83 10/24/2008    AFP 3.9 02/11/2021     No results found for: ""    PATHOLOGY     None    IMAGING     01/08/2019:  CT Urogram  Report states that pancreas is normal.  Upon my review of the imaging her pancreatic duct does appear prominent and measures approximately 3.2 mm    08/26/2020:  MRI Abdomen W WO contrast  Spleen unremarkable.  Gallbladder unremarkable.  No biliary dilatation.  No focal pancreatic abnormality.  Mild pancreatic ductal prominence unchanged from prior CT.  Adrenal glands and kidneys are unremarkable.  Stomach demonstrates a stable posterior diverticulum near the GE junction.  Visualized bowel unremarkable.   Impression:   No convincing MR evidence of significant hemochromatosis.  No acute abnormality with incidental findings as above.  Abdomen overall similar appearance to 2019 CT exam.    05/11/2021:  MRI Abdomen W WO  Impression:   1. " Qualitatively there is no evidence for significant iron deposition within the liver.  2. Stable appearance of the benign appearing scattered simple cysts in the liver.  3. No cholelithiasis or biliary ductal dilatation.     03/28/2022  US Abdomen Complete  Complete abdominal ultrasound performed. The liver measures 12.6 cm in length, normal in size and is homogeneous in echogenicity.  Multiple cysts are seen scattered throughout the liver the largest within the left hepatic lobe measures 2 cm and demonstrates a thin internal septation.  The largest within the right hepatic lobe measures up to 1.5 cm and also contains a thin internal septation.  Common bile duct is within normal limits at 0.13 cm.  No gallstones, gallbladder wall thickening, or focal tenderness over the gallbladder.  The visualized portions of the pancreas, IVC and abdominal aorta are within normal limits.   The right kidney measures 10.2 cm. The left kidney measures 9.4 cm. No hydronephrosis or renal masses identified.  The spleen measures 7.5 x 4.0 cm.     Impression:   1. Multiple hepatic cysts noted otherwise essentially unremarkable ultrasound.    09/19/2022  CT Chest Abdomen Pelvis  Within the abdomen, liver demonstrates multiple cysts. Gallbladder unremarkable. No biliary dilatation. Pancreas and spleen are unchanged. Adrenal glands unremarkable.     03/30/2023  US Abdomen Complete  The liver again demonstrates multiple liver cysts.  Complex right hepatic lobe cyst measures 1.5 x 1.5 x 1.5 cm.  Additional simple right hepatic lobe cyst measures 0.8 x 0.7 x 0.9 cm and 1.8 x 1.2 x 1.5 cm.  The liver measures 12.7 cm in length. The gallbladder demonstrates no shadowing gallstones or wall thickening.  Sonographic Arzate's sign is negative The common bile duct is nondilated at 4.4 mm. The portal vein shows a normal direction of flow and waveform. Pancreatic duct measures up to 3.2 mm, similar to the prior.  Visualized portions of the pancreas  otherwise appear within normal limits. The right kidney is normal in appearance. It measures 10.3 cm.  The left kidney is normal in appearance. It measures 10.0 cm. The spleen is not enlarged measuring up to 8.3 cm.     06/19/2024  US Abdomen Complete  The pancreas is normal in size and echotexture.  Mild pancreatic ductal ectasia.  No free fluid is identified within the abdomen.   No evidence of aortic aneurysm.  IVC appears patent.   The kidneys are normal in size, position, and appearance. The right kidney measures 10.7 cm and the left kidney measures 9.8 cm in length. There is no evidence of renal mass, hydronephrosis, urolithiasis, or perinephric fluid.      Impression:   Stable, chronic pancreatic ductal dilatation likely benign.   Benign hepatic cysts.   Normal sonographic appearance of kidneys.    ASSESSMENT & PLAN     1. Pancreatic ductal abnormality       Selam Botello is a 78 y.o. female with mild pancreatic ductal dilatation.    We had a lengthy discussion today regarding the different forms of imaging and their comparisons as well as the different etiologies of pancreatic ductal dilatation.  I reviewed her imaging with her showing her that there certainly are areas of her duct that are a little bit wider than others but that this appears to have been constant over the last 5 years at least.  We also discussed that dilatation resulting from malignancy we will result in complete ductal dilatation not just focal segments.  Furthermore any area that has a mild amount of dilatation a but that is unchanged over 5 years is far less worrisome.  Additionally she has had no dilatation of her common bile duct or elevation in her transaminases which would be more suggestive of a mass or an obstructing lesion in the pancreatic head.  Although there is always a possibility for malignancy my index of suspicion for that in someone who has not had any change in ductal dilatation or pancreatic echotexture over the last  5 years is exceedingly low.  I discussed with her that I do not think any additional follow-up or workup needs to be done at this point in time however if she has worsening abdominal pain, jaundice or other changes in her abdominal symptoms that I certainly think it merits additional imaging and workup.      Ms. Botello expressed understanding in regards to our discussion today. Many good questions were asked on today's visit, all of which were answered to their satisfaction.    Follow-up:  No need for follow up at this time however I am always happy to see her should any issues or new concerns arise                  Moraima Rivera MD MS              Surgical Oncology              [facility] [place]              Office: [phone]     Communications: 75 minutes were spent on today's visit in face-to-face and non face-to-face time with the patient. This patient was recently diagnosed with pancreatic duct ectasia and the time was required to provide counseling and guidance regarding their new diagnosis. Time was spent reviewing all outside records and information pertaining to their work-up and formulating a treatment plan in line with standardized guidelines. Additional time was spent communicating with referring physicians and facilities to facilitate the efficient exchange of previous healthcare records and radiographic imaging pertinent to the diagnosis and disease management.       No orders of the defined types were placed in this encounter.

## 2024-06-30 ENCOUNTER — PATIENT MESSAGE (OUTPATIENT)
Dept: SURGICAL ONCOLOGY | Facility: CLINIC | Age: 79
End: 2024-06-30
Payer: MEDICARE

## 2024-06-30 ENCOUNTER — PATIENT MESSAGE (OUTPATIENT)
Dept: HEMATOLOGY/ONCOLOGY | Facility: CLINIC | Age: 79
End: 2024-06-30
Payer: MEDICARE

## 2024-07-01 ENCOUNTER — LAB VISIT (OUTPATIENT)
Dept: LAB | Facility: HOSPITAL | Age: 79
End: 2024-07-01
Payer: MEDICARE

## 2024-07-01 DIAGNOSIS — C67.9 MALIGNANT NEOPLASM OF URINARY BLADDER, UNSPECIFIED SITE: ICD-10-CM

## 2024-07-01 PROCEDURE — 88112 CYTOPATH CELL ENHANCE TECH: CPT | Performed by: PATHOLOGY

## 2024-07-02 ENCOUNTER — PATIENT MESSAGE (OUTPATIENT)
Dept: HEMATOLOGY/ONCOLOGY | Facility: CLINIC | Age: 79
End: 2024-07-02
Payer: MEDICARE

## 2024-07-02 LAB
FINAL PATHOLOGIC DIAGNOSIS: ABNORMAL
Lab: ABNORMAL

## 2024-07-03 ENCOUNTER — TELEPHONE (OUTPATIENT)
Dept: UROLOGY | Facility: CLINIC | Age: 79
End: 2024-07-03
Payer: MEDICARE

## 2024-07-03 NOTE — TELEPHONE ENCOUNTER
Nurse Rivero spoke to pt about upcoming visit with  as well as gave reassurance about treatment and results.

## 2024-07-04 ENCOUNTER — PATIENT MESSAGE (OUTPATIENT)
Dept: UROLOGY | Facility: CLINIC | Age: 79
End: 2024-07-04
Payer: MEDICARE

## 2024-07-19 ENCOUNTER — PROCEDURE VISIT (OUTPATIENT)
Dept: UROLOGY | Facility: CLINIC | Age: 79
End: 2024-07-19
Payer: MEDICARE

## 2024-07-19 VITALS
DIASTOLIC BLOOD PRESSURE: 68 MMHG | HEART RATE: 54 BPM | WEIGHT: 131.38 LBS | HEIGHT: 62 IN | SYSTOLIC BLOOD PRESSURE: 156 MMHG | BODY MASS INDEX: 24.18 KG/M2 | RESPIRATION RATE: 18 BRPM | OXYGEN SATURATION: 95 %

## 2024-07-19 DIAGNOSIS — C67.9 MALIGNANT NEOPLASM OF URINARY BLADDER, UNSPECIFIED SITE: ICD-10-CM

## 2024-07-19 LAB
BILIRUB SERPL-MCNC: NORMAL MG/DL
BLOOD URINE, POC: NORMAL
CLARITY, POC UA: CLEAR
COLOR, POC UA: YELLOW
GLUCOSE UR QL STRIP: NORMAL
KETONES UR QL STRIP: NORMAL
LEUKOCYTE ESTERASE URINE, POC: NORMAL
NITRITE, POC UA: NORMAL
PH, POC UA: 5
PROTEIN, POC: NORMAL
SPECIFIC GRAVITY, POC UA: 1.02
UROBILINOGEN, POC UA: NORMAL

## 2024-07-19 PROCEDURE — 52000 CYSTOURETHROSCOPY: CPT | Mod: S$GLB,,, | Performed by: UROLOGY

## 2024-07-19 PROCEDURE — 81002 URINALYSIS NONAUTO W/O SCOPE: CPT | Mod: S$GLB,,, | Performed by: UROLOGY

## 2024-07-19 RX ORDER — LIDOCAINE HYDROCHLORIDE 20 MG/ML
JELLY TOPICAL
Status: SHIPPED | OUTPATIENT
Start: 2024-07-19

## 2024-07-19 NOTE — PROCEDURES
Cystoscopy    Date/Time: 7/19/2024 9:35 AM    Performed by: Baldo Gates MD  Authorized by: Baldo Gates MD    Consent Done?:  Yes (Written)  Timeout: prior to procedure the correct patient, procedure, and site was verified    Prep: patient was prepped and draped in usual sterile fashion    Anesthesia:  Lidocaine jelly  Indications: history bladder cancer    Position:  Supine  Anesthesia:  Lidocaine jelly  Patient sedated?: No    Preparation: Patient was prepped and draped in usual sterile fashion    Scope type:  Flexible cystoscope  Urethra normal: Yes    Bladder neck normal: Yes    Bladder normal: Yes     patient tolerated the procedure well with no immediate complications  Comments:      Cytology atypical      PUNLMP 10/31/2019  Low grade Ta 9/12/2018  Low grade Ta 10/16/2017    Low grade Ta bladder ca RAUL*6 years    She is anxious and would like to continue cysto yearly    RTC 1 yr with cytology for cysto

## 2024-07-22 ENCOUNTER — LAB VISIT (OUTPATIENT)
Dept: LAB | Facility: HOSPITAL | Age: 79
End: 2024-07-22
Payer: MEDICARE

## 2024-07-22 ENCOUNTER — TELEPHONE (OUTPATIENT)
Dept: UROLOGY | Facility: CLINIC | Age: 79
End: 2024-07-22
Payer: MEDICARE

## 2024-07-22 ENCOUNTER — PATIENT MESSAGE (OUTPATIENT)
Dept: UROLOGY | Facility: CLINIC | Age: 79
End: 2024-07-22
Payer: MEDICARE

## 2024-07-22 DIAGNOSIS — R30.0 DYSURIA: Primary | ICD-10-CM

## 2024-07-22 DIAGNOSIS — R30.0 DYSURIA: ICD-10-CM

## 2024-07-22 DIAGNOSIS — C67.9 MALIGNANT NEOPLASM OF URINARY BLADDER, UNSPECIFIED SITE: ICD-10-CM

## 2024-07-22 PROCEDURE — 87088 URINE BACTERIA CULTURE: CPT | Performed by: UROLOGY

## 2024-07-22 PROCEDURE — 87086 URINE CULTURE/COLONY COUNT: CPT | Performed by: UROLOGY

## 2024-07-22 PROCEDURE — 88112 CYTOPATH CELL ENHANCE TECH: CPT | Performed by: PATHOLOGY

## 2024-07-22 PROCEDURE — 87186 SC STD MICRODIL/AGAR DIL: CPT | Performed by: UROLOGY

## 2024-07-22 NOTE — TELEPHONE ENCOUNTER
Staff called pt to discuss symptoms she's having with urination after cysto and was told to bring urine sample to any [facility] lab for testing.

## 2024-07-24 ENCOUNTER — PATIENT MESSAGE (OUTPATIENT)
Dept: UROLOGY | Facility: CLINIC | Age: 79
End: 2024-07-24
Payer: MEDICARE

## 2024-07-24 DIAGNOSIS — N30.00 ACUTE CYSTITIS WITHOUT HEMATURIA: Primary | ICD-10-CM

## 2024-07-24 LAB — BACTERIA UR CULT: ABNORMAL

## 2024-07-24 RX ORDER — CEPHALEXIN 500 MG/1
500 CAPSULE ORAL EVERY 12 HOURS
Qty: 14 CAPSULE | Refills: 0 | Status: SHIPPED | OUTPATIENT
Start: 2024-07-24 | End: 2024-07-31

## 2024-07-25 ENCOUNTER — TELEPHONE (OUTPATIENT)
Dept: UROLOGY | Facility: CLINIC | Age: 79
End: 2024-07-25
Payer: MEDICARE

## 2024-07-25 LAB
FINAL PATHOLOGIC DIAGNOSIS: NORMAL
Lab: NORMAL

## 2024-07-25 NOTE — TELEPHONE ENCOUNTER
Returned call to pt; she was requesting an appt with Dr. Avila; however, appt was already scheduled; pt did not need a sooner appt.

## 2024-07-26 ENCOUNTER — HOSPITAL ENCOUNTER (EMERGENCY)
Facility: HOSPITAL | Age: 79
Discharge: HOME | End: 2024-07-26
Attending: FAMILY MEDICINE
Payer: MEDICARE

## 2024-07-26 VITALS
TEMPERATURE: 99 F | HEART RATE: 82 BPM | OXYGEN SATURATION: 97 % | RESPIRATION RATE: 18 BRPM | SYSTOLIC BLOOD PRESSURE: 112 MMHG | DIASTOLIC BLOOD PRESSURE: 58 MMHG

## 2024-07-26 DIAGNOSIS — R30.0 DYSURIA: Primary | ICD-10-CM

## 2024-07-26 DIAGNOSIS — R10.9 BILATERAL FLANK PAIN: ICD-10-CM

## 2024-07-26 DIAGNOSIS — N32.89 BLADDER SPASM: ICD-10-CM

## 2024-07-26 LAB
ALBUMIN SERPL BCP-MCNC: 3.8 G/DL (ref 3.5–5.2)
ALP SERPL-CCNC: 81 U/L (ref 55–135)
ALT SERPL W/O P-5'-P-CCNC: 12 U/L (ref 10–44)
ANION GAP SERPL CALC-SCNC: 8 MMOL/L (ref 8–16)
AST SERPL-CCNC: 17 U/L (ref 10–40)
BASOPHILS # BLD AUTO: 0.02 K/UL (ref 0–0.2)
BASOPHILS NFR BLD: 0.3 % (ref 0–1.9)
BILIRUB SERPL-MCNC: 0.7 MG/DL (ref 0.1–1)
BILIRUB UR QL STRIP: NEGATIVE
BUN SERPL-MCNC: 17 MG/DL (ref 8–23)
CALCIUM SERPL-MCNC: 9.6 MG/DL (ref 8.7–10.5)
CHLORIDE SERPL-SCNC: 107 MMOL/L (ref 95–110)
CLARITY UR: CLEAR
CO2 SERPL-SCNC: 22 MMOL/L (ref 23–29)
COLOR UR: YELLOW
CREAT SERPL-MCNC: 0.7 MG/DL (ref 0.5–1.4)
DIFFERENTIAL METHOD BLD: ABNORMAL
EOSINOPHIL # BLD AUTO: 0 K/UL (ref 0–0.5)
EOSINOPHIL NFR BLD: 0.3 % (ref 0–8)
ERYTHROCYTE [DISTWIDTH] IN BLOOD BY AUTOMATED COUNT: 17.4 % (ref 11.5–14.5)
EST. GFR  (NO RACE VARIABLE): >60 ML/MIN/1.73 M^2
GLUCOSE SERPL-MCNC: 98 MG/DL (ref 70–110)
GLUCOSE UR QL STRIP: NEGATIVE
HCT VFR BLD AUTO: 36.9 % (ref 37–48.5)
HCV AB SERPL QL IA: NEGATIVE
HEP C VIRUS HOLD SPECIMEN: NORMAL
HGB BLD-MCNC: 11.9 G/DL (ref 12–16)
HGB UR QL STRIP: ABNORMAL
HIV 1+2 AB+HIV1 P24 AG SERPL QL IA: NEGATIVE
IMM GRANULOCYTES # BLD AUTO: 0.01 K/UL (ref 0–0.04)
IMM GRANULOCYTES NFR BLD AUTO: 0.1 % (ref 0–0.5)
KETONES UR QL STRIP: ABNORMAL
LACTATE SERPL-SCNC: 0.9 MMOL/L (ref 0.5–2.2)
LEUKOCYTE ESTERASE UR QL STRIP: ABNORMAL
LYMPHOCYTES # BLD AUTO: 0.9 K/UL (ref 1–4.8)
LYMPHOCYTES NFR BLD: 11.3 % (ref 18–48)
MCH RBC QN AUTO: 28.2 PG (ref 27–31)
MCHC RBC AUTO-ENTMCNC: 32.2 G/DL (ref 32–36)
MCV RBC AUTO: 87 FL (ref 82–98)
MICROSCOPIC COMMENT: ABNORMAL
MONOCYTES # BLD AUTO: 0.7 K/UL (ref 0.3–1)
MONOCYTES NFR BLD: 9.6 % (ref 4–15)
NEUTROPHILS # BLD AUTO: 6 K/UL (ref 1.8–7.7)
NEUTROPHILS NFR BLD: 78.4 % (ref 38–73)
NITRITE UR QL STRIP: NEGATIVE
NRBC BLD-RTO: 0 /100 WBC
PH UR STRIP: 7 [PH] (ref 5–8)
PLATELET # BLD AUTO: 293 K/UL (ref 150–450)
PMV BLD AUTO: 9.9 FL (ref 9.2–12.9)
POTASSIUM SERPL-SCNC: 4 MMOL/L (ref 3.5–5.1)
PROCALCITONIN SERPL IA-MCNC: 0.08 NG/ML
PROT SERPL-MCNC: 6.7 G/DL (ref 6–8.4)
PROT UR QL STRIP: NEGATIVE
RBC # BLD AUTO: 4.22 M/UL (ref 4–5.4)
RBC #/AREA URNS HPF: 8 /HPF (ref 0–4)
SODIUM SERPL-SCNC: 137 MMOL/L (ref 136–145)
SP GR UR STRIP: 1.02 (ref 1–1.03)
SQUAMOUS #/AREA URNS HPF: 2 /HPF
URN SPEC COLLECT METH UR: ABNORMAL
UROBILINOGEN UR STRIP-ACNC: NEGATIVE EU/DL
WBC # BLD AUTO: 7.69 K/UL (ref 3.9–12.7)
WBC #/AREA URNS HPF: 5 /HPF (ref 0–5)

## 2024-07-26 PROCEDURE — 96374 THER/PROPH/DIAG INJ IV PUSH: CPT

## 2024-07-26 PROCEDURE — 87040 BLOOD CULTURE FOR BACTERIA: CPT | Performed by: EMERGENCY MEDICINE

## 2024-07-26 PROCEDURE — 81000 URINALYSIS NONAUTO W/SCOPE: CPT | Performed by: EMERGENCY MEDICINE

## 2024-07-26 PROCEDURE — 86803 HEPATITIS C AB TEST: CPT | Performed by: EMERGENCY MEDICINE

## 2024-07-26 PROCEDURE — 63600175 PHARM REV CODE 636 W HCPCS: Performed by: FAMILY MEDICINE

## 2024-07-26 PROCEDURE — 85025 COMPLETE CBC W/AUTO DIFF WBC: CPT | Performed by: EMERGENCY MEDICINE

## 2024-07-26 PROCEDURE — 83605 ASSAY OF LACTIC ACID: CPT | Performed by: EMERGENCY MEDICINE

## 2024-07-26 PROCEDURE — 84145 PROCALCITONIN (PCT): CPT | Performed by: EMERGENCY MEDICINE

## 2024-07-26 PROCEDURE — 99285 EMERGENCY DEPT VISIT HI MDM: CPT | Mod: 25

## 2024-07-26 PROCEDURE — 80053 COMPREHEN METABOLIC PANEL: CPT | Performed by: EMERGENCY MEDICINE

## 2024-07-26 PROCEDURE — 87389 HIV-1 AG W/HIV-1&-2 AB AG IA: CPT | Performed by: EMERGENCY MEDICINE

## 2024-07-26 RX ORDER — OXYBUTYNIN CHLORIDE 5 MG/1
5 TABLET ORAL 3 TIMES DAILY
Qty: 30 TABLET | Refills: 0 | Status: SHIPPED | OUTPATIENT
Start: 2024-07-26 | End: 2025-07-26

## 2024-07-26 RX ORDER — METOCLOPRAMIDE HYDROCHLORIDE 5 MG/ML
10 INJECTION INTRAMUSCULAR; INTRAVENOUS
Status: COMPLETED | OUTPATIENT
Start: 2024-07-26 | End: 2024-07-26

## 2024-07-26 RX ADMIN — METOCLOPRAMIDE 10 MG: 5 INJECTION, SOLUTION INTRAMUSCULAR; INTRAVENOUS at 05:07

## 2024-07-26 NOTE — ED PROVIDER NOTES
SCRIBE #1 NOTE: I, Margaret Zepeda, am scribing for, and in the presence of, Lisa Arteaga MD. I have scribed the entire note.       History     Chief Complaint   Patient presents with    Dysuria     UTI on antibiotics, fevers at home with rita flank pain      Review of patient's allergies indicates:   Allergen Reactions    Etomidate Other (See Comments), Palpitations and Shortness Of Breath    Latex Rash     Other reaction(s): mouth ulcers    Latex, natural rubber      Mouth ulcers    Nsaids (non-steroidal anti-inflammatory drug) Other (See Comments)     Bleeding of the colon-hospitalized    Propofol analogues Other (See Comments)     Cognitive function declines for approx 2 weeks  Loses memory function/ability to recall  Does not wake up easily after     Gabapentin      Causes dizziness      Levofloxacin      Other reaction(s): Joint pain    Tramadol Hives and Itching    Epinephrine Palpitations     Arms twitch      Sulfa (sulfonamide antibiotics) Rash         History of Present Illness     [facility]    7/26/2024, 4:40 PM  History obtained from the patient      History of Present Illness: Selam Botello is a 78 y.o. female patient with a PMHx of bladder cancer, DVT, skin cancer, IBS, arthritis, acid reflux who presents to the Emergency Department for evaluation of dysuria. Pt also complains of L and R sided flank pain and a fever (99.9 degrees F at home). Pt states that she has been abx for treatment of a UTI since 7/24/24, but is not feeling any better. Symptoms are constant and moderate in severity. Patient denies any chills, n/v/d, headaches, CP, SOB, cough, congestion, hematuria, and all other sxs at this time. No further complaints or concerns at this time.       Arrival mode: EMS    PCP: No, Primary Doctor        Past Medical History:  Past Medical History:   Diagnosis Date    Acid reflux     Allergy     Arthritis     Atrial tachycardia     Bladder cancer     Breast cyst     Cataract     Colon polyp      COVID 5/19/2022    Deep vein thrombosis     Degenerative disc disease     Encounter for blood transfusion     General anesthetics causing adverse effect in therapeutic use     GI bleed     Hematuria, gross     Inflammatory bowel disease     Liver disease     cyst    Malignant neoplasm of lateral wall of urinary bladder 10/31/2019    Osteoporosis     PONV (postoperative nausea and vomiting)     Retina disorder, left     Skin cancer     Thyroid disease     Urinary tract infection        Past Surgical History:  Past Surgical History:   Procedure Laterality Date    ADENOIDECTOMY      BLADDER FULGURATION Right 9/12/2018    Procedure: FULGURATION, BLADDER;  Surgeon: Isaias Shah MD;  Location: [facility];  Service: Urology;  Laterality: Right;    BLADDER FULGURATION N/A 4/24/2019    Procedure: FULGURATION, BLADDER;  Surgeon: Isaias Shah MD;  Location: [facility];  Service: Urology;  Laterality: N/A;    BREAST SURGERY Bilateral     CATARACT EXTRACTION      CYSTOSCOPY      CYSTOSCOPY N/A 4/24/2019    Procedure: CYSTOSCOPY;  Surgeon: Isaias Shah MD;  Location: [facility];  Service: Urology;  Laterality: N/A;    CYSTOSCOPY WITH URETEROSCOPY, RETROGRADE PYELOGRAPHY, AND INSERTION OF STENT Left 9/12/2018    Procedure: CYSTOSCOPY, WITH RETROGRADE PYELOGRAM AND URETERAL STENT INSERTION;  Surgeon: Isaias Shah MD;  Location: [facility] OR;  Service: Urology;  Laterality: Left;    EYE SURGERY Left 2017    retina pucker    INSTILLATION OF URINARY BLADDER N/A 9/12/2018    Procedure: INSTILLATION, URINARY BLADDER;  Surgeon: Isaias Shah MD;  Location: [facility];  Service: Urology;  Laterality: N/A;  Chemo agent instillation     JOINT REPLACEMENT Bilateral     hip    JOINT REPLACEMENT Right     knee    MASTECTOMY Bilateral 1991    RETROGRADE PYELOGRAPHY Bilateral 10/31/2019    Procedure: PYELOGRAM, RETROGRADE;  Surgeon: Baldo Gates MD;  Location: [facility];  Service: Urology;  Laterality: Bilateral;    TONSILLECTOMY       TURBT      TURBT, WITH BLUE LIGHT CYSTOSCOPY AND CYSVIEW Bilateral 10/31/2019    Procedure: TURBT,WITH BLUE LIGHT CYSTOSCOPY AND CYSVIEW;  Surgeon: Baldo Gates MD;  Location: [facility];  Service: Urology;  Laterality: Bilateral;    TYMPANOSTOMY TUBE PLACEMENT      VASCULAR SURGERY Right     popliteal stent    WISDOM TOOTH EXTRACTION           Family History:  Family History   Problem Relation Name Age of Onset    COPD Mother skin     Cancer Mother skin     COPD Father prostate/skin     Cancer Father prostate/skin     Cancer Sister         Social History:  Social History     Tobacco Use    Smoking status: Never     Passive exposure: Past    Smokeless tobacco: Never   Substance and Sexual Activity    Alcohol use: No    Drug use: No    Sexual activity: Yes     Partners: Male        Review of Systems     Review of Systems   Constitutional:  Positive for fever. Negative for chills.   HENT:  Negative for congestion and sore throat.    Respiratory:  Negative for cough and shortness of breath.    Cardiovascular:  Negative for chest pain.   Gastrointestinal:  Negative for diarrhea, nausea and vomiting.   Genitourinary:  Positive for dysuria and flank pain. Negative for hematuria.   Musculoskeletal:  Negative for back pain.   Skin:  Negative for rash.   Neurological:  Negative for weakness and headaches.   Hematological:  Does not bruise/bleed easily.   All other systems reviewed and are negative.     Physical Exam     Initial Vitals [07/26/24 1206]   BP Pulse Resp Temp SpO2   (!) 112/58 82 18 98.7 °F (37.1 °C) 97 %      MAP       --          Physical Exam  Nursing Notes and Vital Signs Reviewed.  Constitutional: Patient is in no acute distress. Well-developed and well-nourished.  Head: Atraumatic. Normocephalic.  Eyes: PERRL. EOM intact. Conjunctivae are not pale. No scleral icterus.  ENT: Mucous membranes are moist. Oropharynx is clear and symmetric.    Neck: Supple. Full ROM. No lymphadenopathy.  Cardiovascular: Regular  rate. Regular rhythm. No murmurs, rubs, or gallops. Distal pulses are 2+ and symmetric.  Pulmonary/Chest: No respiratory distress. Clear to auscultation bilaterally. No wheezing or rales.  Abdominal: Soft and non-distended.  There is no tenderness.  No rebound, guarding, or rigidity. Good bowel sounds.  Genitourinary: There is L CVA tenderness  Musculoskeletal: Moves all extremities. No obvious deformities. No edema. No calf tenderness.  Skin: Warm and dry.  Neurological:  Alert, awake, and appropriate.  Normal speech.  No acute focal neurological deficits are appreciated.  Psychiatric: Normal affect. Good eye contact. Appropriate in content.     ED Course   Procedures  ED Vital Signs:  Vitals:    07/26/24 1206   BP: (!) 112/58   Pulse: 82   Resp: 18   Temp: 98.7 °F (37.1 °C)   TempSrc: Oral   SpO2: 97%       Abnormal Lab Results:  Labs Reviewed   CBC W/ AUTO DIFFERENTIAL - Abnormal       Result Value    WBC 7.69      RBC 4.22      Hemoglobin 11.9 (*)     Hematocrit 36.9 (*)     MCV 87      MCH 28.2      MCHC 32.2      RDW 17.4 (*)     Platelets 293      MPV 9.9      Immature Granulocytes 0.1      Gran # (ANC) 6.0      Immature Grans (Abs) 0.01      Lymph # 0.9 (*)     Mono # 0.7      Eos # 0.0      Baso # 0.02      nRBC 0      Gran % 78.4 (*)     Lymph % 11.3 (*)     Mono % 9.6      Eosinophil % 0.3      Basophil % 0.3      Differential Method Automated     COMPREHENSIVE METABOLIC PANEL - Abnormal    Sodium 137      Potassium 4.0      Chloride 107      CO2 22 (*)     Glucose 98      BUN 17      Creatinine 0.7      Calcium 9.6      Total Protein 6.7      Albumin 3.8      Total Bilirubin 0.7      Alkaline Phosphatase 81      AST 17      ALT 12      eGFR >60      Anion Gap 8     URINALYSIS, REFLEX TO URINE CULTURE - Abnormal    Specimen UA Urine, Clean Catch      Color, UA Yellow      Appearance, UA Clear      pH, UA 7.0      Specific Gravity, UA 1.020      Protein, UA Negative      Glucose, UA Negative      Ketones,  UA 1+ (*)     Bilirubin (UA) Negative      Occult Blood UA 1+ (*)     Nitrite, UA Negative      Urobilinogen, UA Negative      Leukocytes, UA 1+ (*)     Narrative:     Specimen Source->Urine   URINALYSIS MICROSCOPIC - Abnormal    RBC, UA 8 (*)     WBC, UA 5      Squam Epithel, UA 2      Microscopic Comment SEE COMMENT      Narrative:     Specimen Source->Urine   CULTURE, BLOOD    Blood Culture, Routine No Growth to date      Narrative:     Aerobic and anaerobic   CULTURE, BLOOD    Blood Culture, Routine No Growth to date      Narrative:     Aerobic and anaerobic   HIV 1 / 2 ANTIBODY    HIV 1/2 Ag/Ab Negative      Narrative:     Release to patient->Immediate   HEPATITIS C ANTIBODY    Hepatitis C Ab Negative      Narrative:     Release to patient->Immediate   HEP C VIRUS HOLD SPECIMEN    HEP C Virus Hold Specimen Hold for HCV sendout      Narrative:     Release to patient->Immediate   LACTIC ACID, PLASMA    Lactate (Lactic Acid) 0.9     PROCALCITONIN    Procalcitonin 0.08          All Lab Results:  Results for orders placed or performed during the hospital encounter of 07/26/24   Blood culture x two cultures. Draw prior to antibiotics.    Specimen: Peripheral, Forearm, Right; Blood   Result Value Ref Range    Blood Culture, Routine No Growth to date    Blood culture x two cultures. Draw prior to antibiotics.    Specimen: Peripheral, Antecubital, Right; Blood   Result Value Ref Range    Blood Culture, Routine No Growth to date    HIV 1/2 Ag/Ab (4th Gen)   Result Value Ref Range    HIV 1/2 Ag/Ab Negative Negative   Hepatitis C Antibody   Result Value Ref Range    Hepatitis C Ab Negative Negative   HCV Virus Hold Specimen   Result Value Ref Range    HEP C Virus Hold Specimen Hold for HCV sendout    CBC auto differential   Result Value Ref Range    WBC 7.69 3.90 - 12.70 K/uL    RBC 4.22 4.00 - 5.40 M/uL    Hemoglobin 11.9 (L) 12.0 - 16.0 g/dL    Hematocrit 36.9 (L) 37.0 - 48.5 %    MCV 87 82 - 98 fL    MCH 28.2 27.0 -  31.0 pg    MCHC 32.2 32.0 - 36.0 g/dL    RDW 17.4 (H) 11.5 - 14.5 %    Platelets 293 150 - 450 K/uL    MPV 9.9 9.2 - 12.9 fL    Immature Granulocytes 0.1 0.0 - 0.5 %    Gran # (ANC) 6.0 1.8 - 7.7 K/uL    Immature Grans (Abs) 0.01 0.00 - 0.04 K/uL    Lymph # 0.9 (L) 1.0 - 4.8 K/uL    Mono # 0.7 0.3 - 1.0 K/uL    Eos # 0.0 0.0 - 0.5 K/uL    Baso # 0.02 0.00 - 0.20 K/uL    nRBC 0 0 /100 WBC    Gran % 78.4 (H) 38.0 - 73.0 %    Lymph % 11.3 (L) 18.0 - 48.0 %    Mono % 9.6 4.0 - 15.0 %    Eosinophil % 0.3 0.0 - 8.0 %    Basophil % 0.3 0.0 - 1.9 %    Differential Method Automated    Comprehensive metabolic panel   Result Value Ref Range    Sodium 137 136 - 145 mmol/L    Potassium 4.0 3.5 - 5.1 mmol/L    Chloride 107 95 - 110 mmol/L    CO2 22 (L) 23 - 29 mmol/L    Glucose 98 70 - 110 mg/dL    BUN 17 8 - 23 mg/dL    Creatinine 0.7 0.5 - 1.4 mg/dL    Calcium 9.6 8.7 - 10.5 mg/dL    Total Protein 6.7 6.0 - 8.4 g/dL    Albumin 3.8 3.5 - 5.2 g/dL    Total Bilirubin 0.7 0.1 - 1.0 mg/dL    Alkaline Phosphatase 81 55 - 135 U/L    AST 17 10 - 40 U/L    ALT 12 10 - 44 U/L    eGFR >60 >60 mL/min/1.73 m^2    Anion Gap 8 8 - 16 mmol/L   Lactic acid, plasma #1   Result Value Ref Range    Lactate (Lactic Acid) 0.9 0.5 - 2.2 mmol/L   Urinalysis, Reflex to Urine Culture Urine, Clean Catch    Specimen: Urine   Result Value Ref Range    Specimen UA Urine, Clean Catch     Color, UA Yellow Yellow, Straw, Mimi    Appearance, UA Clear Clear    pH, UA 7.0 5.0 - 8.0    Specific Gravity, UA 1.020 1.005 - 1.030    Protein, UA Negative Negative    Glucose, UA Negative Negative    Ketones, UA 1+ (A) Negative    Bilirubin (UA) Negative Negative    Occult Blood UA 1+ (A) Negative    Nitrite, UA Negative Negative    Urobilinogen, UA Negative <2.0 EU/dL    Leukocytes, UA 1+ (A) Negative   Procalcitonin   Result Value Ref Range    Procalcitonin 0.08 <0.25 ng/mL   Urinalysis Microscopic   Result Value Ref Range    RBC, UA 8 (H) 0 - 4 /hpf    WBC, UA 5 0 - 5  /hpf    Squam Epithel, UA 2 /hpf    Microscopic Comment SEE COMMENT      *Note: Due to a large number of results and/or encounters for the requested time period, some results have not been displayed. A complete set of results can be found in Results Review.        Imaging Results:  Imaging Results              CT Renal Stone Study ABD Pelvis WO (Final result)  Result time 07/26/24 15:51:33      Final result by Dom Everett MD (07/26/24 15:51:33)                   Impression:      No acute intra-abdominal abnormality is identified.      Electronically signed by: Dom Everett  Date:    07/26/2024  Time:    15:51               Narrative:    EXAMINATION:  CT RENAL STONE STUDY ABD PELVIS WO    CLINICAL HISTORY:  Unspecified abdominal painBilateral flank pain;    TECHNIQUE:  Noncontrast CT scan of the abdomen and pelvis.    COMPARISON:  09/19/2022    FINDINGS:  There are several cyst in the liver.  The gallbladder is unremarkable.  Calcified granulomas in the spleen.  No significant pancreatic abnormality is identified.  There are no adrenal masses.  No obstructing kidney stones or hydronephrosis.  No perinephric edema is identified.  Patchy aortic and branch structure atherosclerosis.  No acute bowel abnormality or CT evidence of appendicitis.  Urinary bladder partially obscured from artifact from patient's bilateral hip replacements.  No adenopathy or abnormal fluid collections.  No acute osseous abnormality is identified.  Grade 1 anterolisthesis at L4-5 with multilevel disc space narrowing.                                              The Emergency Provider reviewed the vital signs and test results, which are outlined above.     ED Discussion     4:50 PM: Reassessed pt at this time. Discussed with pt all pertinent ED information and results. Discussed pt dx and plan of tx. Gave pt all f/u and return to the ED instructions. All questions and concerns were addressed at this time. Pt expresses understanding of  information and instructions, and is comfortable with plan to discharge. Pt is stable for discharge.    I discussed with patient and/or family/caretaker that evaluation in the ED does not suggest any emergent or life threatening medical conditions requiring immediate intervention beyond what was provided in the ED, and I believe patient is safe for discharge.  Regardless, an unremarkable evaluation in the ED does not preclude the development or presence of a serious of life threatening condition. As such, patient was instructed to return immediately for any worsening or change in current symptoms.        Medical Decision Making  Risk  Prescription drug management.                ED Medication(s):  Medications   metoclopramide injection 10 mg (10 mg Intravenous Given 7/26/24 1700)       Discharge Medication List as of 7/26/2024  4:40 PM        START taking these medications    Details   oxybutynin (DITROPAN) 5 MG Tab Take 1 tablet (5 mg total) by mouth 3 (three) times daily., Starting Fri 7/26/2024, Until Sat 7/26/2025, Print                     Scribe Attestation:   Scribe #1: I performed the above scribed service and the documentation accurately describes the services I performed. I attest to the accuracy of the note.     Attending:   Physician Attestation Statement for Scribe #1: I, Lisa Arteaga MD, personally performed the services described in this documentation, as scribed by Margaret Zepeda, in my presence, and it is both accurate and complete.           Clinical Impression       ICD-10-CM ICD-9-CM   1. Dysuria  R30.0 788.1   2. Bilateral flank pain  R10.9 789.09   3. Bladder spasm  N32.89 596.89       Disposition:   Disposition: Discharged  Condition: Stable        Lisa Arteaga MD  07/27/24 1023

## 2024-07-29 ENCOUNTER — NURSE TRIAGE (OUTPATIENT)
Dept: ADMINISTRATIVE | Facility: CLINIC | Age: 79
End: 2024-07-29
Payer: MEDICARE

## 2024-07-29 NOTE — TELEPHONE ENCOUNTER
Pt calling with medication questions and ED visit that she was looking into her my chart. Pt said that she thought they gave her an infusion prior to leaving the ED and she was given something IV and wanted to tell her MD what she got. Pt said that her visit was the worst ever and looked up the medication to tell her and she will discuss with urology when she has appt in the next week. Pt given the number to Pt relations and that she will reach out to them for questions. Pt told to call us back if any other questions or concerns. No PCP with [facility] just Hem/onc and urology               Reason for Disposition   Health information question, no triage required and triager able to answer question    Protocols used: Information Only Call - No Triage-A-[place]

## 2024-07-30 ENCOUNTER — PATIENT MESSAGE (OUTPATIENT)
Dept: HEMATOLOGY/ONCOLOGY | Facility: CLINIC | Age: 79
End: 2024-07-30
Payer: MEDICARE

## 2024-07-30 ENCOUNTER — PATIENT MESSAGE (OUTPATIENT)
Dept: UROLOGY | Facility: CLINIC | Age: 79
End: 2024-07-30
Payer: MEDICARE

## 2024-07-31 ENCOUNTER — OFFICE VISIT (OUTPATIENT)
Dept: UROLOGY | Facility: CLINIC | Age: 79
End: 2024-07-31
Payer: MEDICARE

## 2024-07-31 ENCOUNTER — PATIENT MESSAGE (OUTPATIENT)
Dept: RESEARCH | Facility: HOSPITAL | Age: 79
End: 2024-07-31
Payer: MEDICARE

## 2024-07-31 VITALS
HEIGHT: 62 IN | SYSTOLIC BLOOD PRESSURE: 121 MMHG | HEART RATE: 90 BPM | RESPIRATION RATE: 16 BRPM | BODY MASS INDEX: 24.18 KG/M2 | WEIGHT: 131.38 LBS | DIASTOLIC BLOOD PRESSURE: 78 MMHG

## 2024-07-31 DIAGNOSIS — N30.00 ACUTE CYSTITIS WITHOUT HEMATURIA: Primary | ICD-10-CM

## 2024-07-31 DIAGNOSIS — Z85.51 PERSONAL HISTORY OF MALIGNANT NEOPLASM OF BLADDER: ICD-10-CM

## 2024-07-31 DIAGNOSIS — R30.0 DYSURIA: ICD-10-CM

## 2024-07-31 LAB
BILIRUB UR QL STRIP: NEGATIVE
GLUCOSE UR QL STRIP: NEGATIVE
KETONES UR QL STRIP: NEGATIVE
LEUKOCYTE ESTERASE UR QL STRIP: POSITIVE
PH, POC UA: 7
POC BLOOD, URINE: NEGATIVE
POC NITRATES, URINE: NEGATIVE
PROT UR QL STRIP: NEGATIVE
SP GR UR STRIP: 1.01 (ref 1–1.03)
UROBILINOGEN UR STRIP-ACNC: 1 (ref 0.1–1.1)

## 2024-07-31 PROCEDURE — 99214 OFFICE O/P EST MOD 30 MIN: CPT | Mod: S$PBB,,, | Performed by: UROLOGY

## 2024-07-31 PROCEDURE — 87086 URINE CULTURE/COLONY COUNT: CPT | Performed by: UROLOGY

## 2024-07-31 PROCEDURE — 87186 SC STD MICRODIL/AGAR DIL: CPT | Performed by: UROLOGY

## 2024-07-31 PROCEDURE — 87088 URINE BACTERIA CULTURE: CPT | Performed by: UROLOGY

## 2024-07-31 PROCEDURE — 99999 PR PBB SHADOW E&M-EST. PATIENT-LVL IV: CPT | Mod: PBBFAC,,, | Performed by: UROLOGY

## 2024-07-31 PROCEDURE — 99214 OFFICE O/P EST MOD 30 MIN: CPT | Mod: PBBFAC | Performed by: UROLOGY

## 2024-07-31 PROCEDURE — 99999PBSHW POCT URINALYSIS, DIPSTICK, AUTOMATED, W/O SCOPE: Mod: PBBFAC,,,

## 2024-07-31 PROCEDURE — 81003 URINALYSIS AUTO W/O SCOPE: CPT | Mod: PBBFAC | Performed by: UROLOGY

## 2024-07-31 RX ORDER — AMOXICILLIN AND CLAVULANATE POTASSIUM 875; 125 MG/1; MG/1
1 TABLET, FILM COATED ORAL 2 TIMES DAILY
Qty: 14 TABLET | Refills: 0 | Status: SHIPPED | OUTPATIENT
Start: 2024-07-31

## 2024-07-31 NOTE — PROGRESS NOTES
Subjective:       Patient ID: Selam Botello is a 78 y.o. female.    Chief Complaint: Initial Visit      History of Present Illness:     Ms Botello was diagnosed with a UTI on 7-22-24.  Her urine culture on 7-22-24 grew proteus that was pan sensitive.  She was treated with cephalexin for 7 days which she says she finished.  She says her dysuria resolved while she was taking the cephalexin but she says that she says she had some dysuria today.  She had back pain last week when she was diagnosed with a UTI but none currently.  No gross hematuria.  No current urgency or frequency.  No F/C.  She says that she is going to [place], [place] tomorrow and she would like an antibiotic prescribed for her in case her UTI returns while she is out of town.    She has a history of bladder cancer.  She underwent a surveillance cystoscopy on 7-19-24 by Dr Gates that was normal.  Per Dr Baldo Gates records she had PUNLMP on 10/31/2019, low grade Ta on 9/12/2018, and low grade Ta 10/16/2017.           Past Medical History:   Diagnosis Date    Acid reflux     Allergy     Arthritis     Atrial tachycardia     Bladder cancer     Breast cyst     Cataract     Colon polyp     COVID 5/19/2022    Deep vein thrombosis     Degenerative disc disease     Encounter for blood transfusion     General anesthetics causing adverse effect in therapeutic use     GI bleed     Hematuria, gross     Inflammatory bowel disease     Liver disease     cyst    Malignant neoplasm of lateral wall of urinary bladder 10/31/2019    Osteoporosis     PONV (postoperative nausea and vomiting)     Retina disorder, left     Skin cancer     Thyroid disease     Urinary tract infection      Family History   Problem Relation Name Age of Onset    COPD Mother skin     Cancer Mother skin     COPD Father prostate/skin     Cancer Father prostate/skin     Cancer Sister       Social History     Socioeconomic History    Marital status:    Tobacco Use    Smoking status: Never      Passive exposure: Past    Smokeless tobacco: Never   Substance and Sexual Activity    Alcohol use: No    Drug use: No    Sexual activity: Yes     Partners: Male     Social Determinants of Health     Financial Resource Strain: Low Risk  (1/2/2024)    Overall Financial Resource Strain (CARDIA)     Difficulty of Paying Living Expenses: Not hard at all   Food Insecurity: No Food Insecurity (1/2/2024)    Hunger Vital Sign     Worried About Running Out of Food in the Last Year: Never true     Ran Out of Food in the Last Year: Never true   Transportation Needs: No Transportation Needs (1/2/2024)    PRAPARE - Transportation     Lack of Transportation (Medical): No     Lack of Transportation (Non-Medical): No   Physical Activity: Insufficiently Active (1/2/2024)    Exercise Vital Sign     Days of Exercise per Week: 3 days     Minutes of Exercise per Session: 40 min   Stress: Stress Concern Present (1/2/2024)    [place] [facility] [employer] - Occupational Stress Questionnaire     Feeling of Stress : Very much   Housing Stability: Low Risk  (1/2/2024)    Housing Stability Vital Sign     Unable to Pay for Housing in the Last Year: No     Number of Places Lived in the Last Year: 1     Unstable Housing in the Last Year: No     Outpatient Encounter Medications as of 7/31/2024   Medication Sig Dispense Refill    CARBOXYMETHYLCELLULOSE SODIUM (REFRESH TEARS OPHT) Apply to eye 2 (two) times daily.       cephALEXin (KEFLEX) 500 MG capsule Take 1 capsule (500 mg total) by mouth every 12 (twelve) hours. for 7 days 14 capsule 0    cetirizine (ZYRTEC) 10 MG tablet Take 10 mg by mouth once daily.      cholecalciferol, vitamin D3, 1,250 mcg (50,000 unit) capsule Take 50,000 Units by mouth every 7 days.      LIDODERM 5 % 1 patch a daily as needed for pain; Remove & Discard patch within 12 hours 30 patch 6    metoprolol succinate (TOPROL-XL) 25 MG 24 hr tablet Take 12.5 mg by mouth.      mupirocin (BACTROBAN) 2 % ointment APPLY A  "SMALL AMOUNT TO THE AFFECTED AREA BY TOPICAL ROUTE 2-3 TIMES PER DAY  0    oxybutynin (DITROPAN) 5 MG Tab Take 1 tablet (5 mg total) by mouth 3 (three) times daily. 30 tablet 0    SYNTHROID 50 mcg tablet Take 1 tablet (50 mcg total) by mouth once daily. 90 tablet 3    amoxicillin-clavulanate 875-125mg (AUGMENTIN) 875-125 mg per tablet Take 1 tablet by mouth 2 (two) times daily. 14 tablet 0    amoxicillin-clavulanate 875-125mg (AUGMENTIN) 875-125 mg per tablet Take 1 tablet by mouth 2 (two) times daily. 14 tablet 0    denosumab (PROLIA) 60 mg/mL Syrg Inject 1 mL (60 mg total) into the skin every 6 (six) months. 2 mL 0     Facility-Administered Encounter Medications as of 7/31/2024   Medication Dose Route Frequency Provider Last Rate Last Admin    lactated ringers infusion   Intravenous Continuous Gloria Vences MD   New Bag at 10/31/19 1311    lidocaine (PF) 10 mg/ml (1%) injection 10 mg  1 mL Intradermal Once Gloria Vences MD        LIDOcaine HCl 2% urojet   Urethral 1 time in Clinic/HOD             Review of Systems   Constitutional:  Negative for chills and fever.   Respiratory:  Negative for shortness of breath.    Cardiovascular:  Negative for chest pain.   Gastrointestinal:  Negative for nausea and vomiting.   Genitourinary:  Positive for dysuria. Negative for difficulty urinating, flank pain, frequency, hematuria and urgency.   Musculoskeletal:  Negative for back pain.   Skin:  Negative for rash.   Neurological:  Negative for dizziness.   Psychiatric/Behavioral:  Negative for agitation.        Objective:     /78 (BP Location: Left arm, Patient Position: Sitting)   Pulse 90   Resp 16   Ht 5' 2" (1.575 m)   Wt 59.6 kg (131 lb 6.3 oz)   BMI 24.03 kg/m²     Physical Exam  Constitutional:       General: She is not in acute distress.  Pulmonary:      Effort: Pulmonary effort is normal.   Abdominal:      General: There is no distension.      Palpations: Abdomen is soft.   Neurological:      Mental " Status: She is alert and oriented to person, place, and time.         Office Visit on 07/31/2024   Component Date Value Ref Range Status    POC Blood, Urine 07/31/2024 Negative  Negative Final    POC Bilirubin, Urine 07/31/2024 Negative  Negative Final    POC Urobilinogen, Urine 07/31/2024 1.0  0.1 - 1.1 Final    POC Ketones, Urine 07/31/2024 Negative  Negative Final    POC Protein, Urine 07/31/2024 Negative  Negative Final    POC Nitrates, Urine 07/31/2024 Negative  Negative Final    POC Glucose, Urine 07/31/2024 Negative  Negative Final    pH, UA 07/31/2024 7.0   Final    POC Specific Gravity, Urine 07/31/2024 1.015  1.003 - 1.029 Final    POC Leukocytes, Urine 07/31/2024 Positive (A)  Negative Final        No results found. However, due to the size of the patient record, not all encounters were searched. Please check Results Review for a complete set of results.     Assessment:       1. Acute cystitis without hematuria    2. Dysuria    3. Personal history of malignant neoplasm of bladder      Plan:     Orders Placed This Encounter    CULTURE, URINE    POCT Urinalysis, Dipstick, Automated, W/O Scope    amoxicillin-clavulanate 875-125mg (AUGMENTIN) 875-125 mg per tablet    amoxicillin-clavulanate 875-125mg (AUGMENTIN) 875-125 mg per tablet          7-26-24  CT abdomen/pelvis without IV contrast.  See report.  No obstructing kidney stones or hydronephrosis.  Urinary bladder partially obscured from artifact from patient's bilateral hip replacements.     I reviewed the above imaging results.         7-26-24  Cr 0.7.  GFR >60.    7-22-24  Urine culture.  Proteus pan sensitive.  She was treated with cephalexin for 7 days.    I reviewed the above lab results.         Assessment:  - Cystitis.  Urine culture on 7-22-24 grew proteus that was pan sensitive.  She was treated with cephalexin for 7 days which she says she finished.  She says she had some dysuria today.  She says that she is going to [place], [place] tomorrow and she  would like an antibiotic prescribed for her in case her UTI returns while she is out of town.  - History of bladder cancer.  Surveillance cystoscopy on 7-19-24 by Dr Gates was normal.  Per Dr Baldo Gates records she had PUNLMP on 10/31/2019, low grade Ta on 9/12/2018, and low grade Ta 10/16/2017.      Plan:  - Urine culture today.  - I prescribed her augmentin 875 mg 1 PO BID X 7 days to have in case she develops symptoms of a UTI while she is out of town per the patient's request.  - I discussed the option of her taking Azo OTC 1 PO q 8 hours PRN dysuria.  - I discussed dietary modifications with her today and I recommended she drink mostly water during the day.   - RTC in July 2025 for a surveillance cystoscopy.  She says she would like to do her surveillance cystoscopies locally at this point closer to her house.  She is happy with her care with Dr Gates.

## 2024-08-01 LAB
BACTERIA BLD CULT: NORMAL
BACTERIA BLD CULT: NORMAL

## 2024-08-02 LAB — BACTERIA UR CULT: ABNORMAL

## 2024-08-05 ENCOUNTER — TELEPHONE (OUTPATIENT)
Dept: UROLOGY | Facility: CLINIC | Age: 79
End: 2024-08-05
Payer: MEDICARE

## 2024-08-05 RX ORDER — CIPROFLOXACIN 500 MG/1
500 TABLET ORAL 2 TIMES DAILY
Qty: 20 TABLET | Refills: 0 | Status: SHIPPED | OUTPATIENT
Start: 2024-08-05

## 2024-08-15 ENCOUNTER — PATIENT MESSAGE (OUTPATIENT)
Dept: UROLOGY | Facility: CLINIC | Age: 79
End: 2024-08-15
Payer: MEDICARE

## 2024-08-16 ENCOUNTER — LAB VISIT (OUTPATIENT)
Dept: LAB | Facility: HOSPITAL | Age: 79
End: 2024-08-16
Attending: UROLOGY
Payer: MEDICARE

## 2024-08-16 ENCOUNTER — TELEPHONE (OUTPATIENT)
Dept: UROLOGY | Facility: CLINIC | Age: 79
End: 2024-08-16
Payer: MEDICARE

## 2024-08-16 DIAGNOSIS — N30.00 ACUTE CYSTITIS WITHOUT HEMATURIA: ICD-10-CM

## 2024-08-16 DIAGNOSIS — N30.00 ACUTE CYSTITIS WITHOUT HEMATURIA: Primary | ICD-10-CM

## 2024-08-16 PROCEDURE — 87086 URINE CULTURE/COLONY COUNT: CPT | Performed by: UROLOGY

## 2024-08-18 LAB — BACTERIA UR CULT: NO GROWTH

## 2024-08-26 ENCOUNTER — LAB VISIT (OUTPATIENT)
Dept: LAB | Facility: HOSPITAL | Age: 79
End: 2024-08-26
Attending: INTERNAL MEDICINE
Payer: MEDICARE

## 2024-08-26 DIAGNOSIS — K55.20 ANGIODYSPLASIA OF CECUM: ICD-10-CM

## 2024-08-26 DIAGNOSIS — E83.110 HEREDITARY HEMOCHROMATOSIS: ICD-10-CM

## 2024-08-26 DIAGNOSIS — D50.0 IRON DEFICIENCY ANEMIA DUE TO CHRONIC BLOOD LOSS: ICD-10-CM

## 2024-08-26 DIAGNOSIS — R94.6 ABNORMAL RESULTS OF THYROID FUNCTION STUDIES: ICD-10-CM

## 2024-08-26 LAB
ALBUMIN SERPL BCP-MCNC: 4.1 G/DL (ref 3.5–5.2)
ALP SERPL-CCNC: 69 U/L (ref 55–135)
ALT SERPL W/O P-5'-P-CCNC: 13 U/L (ref 10–44)
ANION GAP SERPL CALC-SCNC: 9 MMOL/L (ref 8–16)
AST SERPL-CCNC: 19 U/L (ref 10–40)
BASOPHILS # BLD AUTO: 0.04 K/UL (ref 0–0.2)
BASOPHILS NFR BLD: 0.7 % (ref 0–1.9)
BILIRUB SERPL-MCNC: 0.5 MG/DL (ref 0.1–1)
BUN SERPL-MCNC: 14 MG/DL (ref 8–23)
CALCIUM SERPL-MCNC: 9.8 MG/DL (ref 8.7–10.5)
CHLORIDE SERPL-SCNC: 109 MMOL/L (ref 95–110)
CO2 SERPL-SCNC: 24 MMOL/L (ref 23–29)
CREAT SERPL-MCNC: 0.8 MG/DL (ref 0.5–1.4)
DIFFERENTIAL METHOD BLD: ABNORMAL
EOSINOPHIL # BLD AUTO: 0.2 K/UL (ref 0–0.5)
EOSINOPHIL NFR BLD: 2.9 % (ref 0–8)
ERYTHROCYTE [DISTWIDTH] IN BLOOD BY AUTOMATED COUNT: 16.8 % (ref 11.5–14.5)
EST. GFR  (NO RACE VARIABLE): >60 ML/MIN/1.73 M^2
FERRITIN SERPL-MCNC: 22 NG/ML (ref 20–300)
GLUCOSE SERPL-MCNC: 100 MG/DL (ref 70–110)
HCT VFR BLD AUTO: 39.4 % (ref 37–48.5)
HGB BLD-MCNC: 12.8 G/DL (ref 12–16)
IMM GRANULOCYTES # BLD AUTO: 0.01 K/UL (ref 0–0.04)
IMM GRANULOCYTES NFR BLD AUTO: 0.2 % (ref 0–0.5)
IRON SERPL-MCNC: 49 UG/DL (ref 30–160)
LYMPHOCYTES # BLD AUTO: 1.7 K/UL (ref 1–4.8)
LYMPHOCYTES NFR BLD: 28.4 % (ref 18–48)
MCH RBC QN AUTO: 28.6 PG (ref 27–31)
MCHC RBC AUTO-ENTMCNC: 32.5 G/DL (ref 32–36)
MCV RBC AUTO: 88 FL (ref 82–98)
MONOCYTES # BLD AUTO: 0.5 K/UL (ref 0.3–1)
MONOCYTES NFR BLD: 8.7 % (ref 4–15)
NEUTROPHILS # BLD AUTO: 3.5 K/UL (ref 1.8–7.7)
NEUTROPHILS NFR BLD: 59.1 % (ref 38–73)
NRBC BLD-RTO: 0 /100 WBC
PLATELET # BLD AUTO: 292 K/UL (ref 150–450)
PMV BLD AUTO: 9.7 FL (ref 9.2–12.9)
POTASSIUM SERPL-SCNC: 4 MMOL/L (ref 3.5–5.1)
PROT SERPL-MCNC: 6.7 G/DL (ref 6–8.4)
RBC # BLD AUTO: 4.47 M/UL (ref 4–5.4)
SATURATED IRON: 14 % (ref 20–50)
SODIUM SERPL-SCNC: 142 MMOL/L (ref 136–145)
TOTAL IRON BINDING CAPACITY: 345 UG/DL (ref 250–450)
TRANSFERRIN SERPL-MCNC: 233 MG/DL (ref 200–375)
TSH SERPL DL<=0.005 MIU/L-ACNC: 2.3 UIU/ML (ref 0.4–4)
WBC # BLD AUTO: 5.87 K/UL (ref 3.9–12.7)

## 2024-08-26 PROCEDURE — 83540 ASSAY OF IRON: CPT | Performed by: INTERNAL MEDICINE

## 2024-08-26 PROCEDURE — 80053 COMPREHEN METABOLIC PANEL: CPT | Performed by: INTERNAL MEDICINE

## 2024-08-26 PROCEDURE — 82728 ASSAY OF FERRITIN: CPT | Performed by: INTERNAL MEDICINE

## 2024-08-26 PROCEDURE — 84443 ASSAY THYROID STIM HORMONE: CPT | Performed by: INTERNAL MEDICINE

## 2024-08-26 PROCEDURE — 36415 COLL VENOUS BLD VENIPUNCTURE: CPT | Performed by: INTERNAL MEDICINE

## 2024-08-26 PROCEDURE — 85025 COMPLETE CBC W/AUTO DIFF WBC: CPT | Performed by: INTERNAL MEDICINE

## 2024-08-28 ENCOUNTER — OFFICE VISIT (OUTPATIENT)
Dept: HEMATOLOGY/ONCOLOGY | Facility: CLINIC | Age: 79
End: 2024-08-28
Payer: MEDICARE

## 2024-08-28 VITALS
TEMPERATURE: 97 F | HEIGHT: 62 IN | WEIGHT: 132.25 LBS | DIASTOLIC BLOOD PRESSURE: 66 MMHG | SYSTOLIC BLOOD PRESSURE: 106 MMHG | OXYGEN SATURATION: 98 % | BODY MASS INDEX: 24.34 KG/M2 | HEART RATE: 90 BPM

## 2024-08-28 DIAGNOSIS — E83.110 HEREDITARY HEMOCHROMATOSIS: ICD-10-CM

## 2024-08-28 DIAGNOSIS — D50.0 IRON DEFICIENCY ANEMIA DUE TO CHRONIC BLOOD LOSS: ICD-10-CM

## 2024-08-28 DIAGNOSIS — K55.20 ANGIODYSPLASIA OF CECUM: ICD-10-CM

## 2024-08-28 DIAGNOSIS — Z90.13 H/O BILATERAL MASTECTOMY: ICD-10-CM

## 2024-08-28 DIAGNOSIS — N30.00 ACUTE CYSTITIS WITHOUT HEMATURIA: Primary | ICD-10-CM

## 2024-08-28 DIAGNOSIS — E03.9 ACQUIRED HYPOTHYROIDISM: ICD-10-CM

## 2024-08-28 PROBLEM — L02.619 CELLULITIS AND ABSCESS OF TOE: Status: RESOLVED | Noted: 2023-03-20 | Resolved: 2024-08-28

## 2024-08-28 PROBLEM — L03.039 CELLULITIS AND ABSCESS OF TOE: Status: RESOLVED | Noted: 2023-03-20 | Resolved: 2024-08-28

## 2024-08-28 PROCEDURE — 99214 OFFICE O/P EST MOD 30 MIN: CPT | Mod: PBBFAC | Performed by: INTERNAL MEDICINE

## 2024-08-28 PROCEDURE — 87086 URINE CULTURE/COLONY COUNT: CPT | Performed by: INTERNAL MEDICINE

## 2024-08-28 PROCEDURE — 99999 PR PBB SHADOW E&M-EST. PATIENT-LVL IV: CPT | Mod: PBBFAC,,, | Performed by: INTERNAL MEDICINE

## 2024-08-28 PROCEDURE — 99214 OFFICE O/P EST MOD 30 MIN: CPT | Mod: S$PBB,,, | Performed by: INTERNAL MEDICINE

## 2024-08-28 RX ORDER — METOPROLOL SUCCINATE 25 MG/1
0.5 TABLET, EXTENDED RELEASE ORAL NIGHTLY
COMMUNITY
Start: 2024-08-09

## 2024-08-28 NOTE — PROGRESS NOTES
Subjective:       Patient ID: Selam Botello is a 79 y.o. female.    Chief Complaint: Results    HPI:  79-year-old female recent cystoscopies underwent had developed urinary tract infections Cipro request repeat urinalysis culture negative 5 weeks ago recurrent iron deficiency with angiodysplasias of cecum.  Patient has history of iron returns for clinical follow-up    Past Medical History:   Diagnosis Date    Acid reflux     Allergy     Arthritis     Atrial tachycardia     Bladder cancer     Breast cyst     Cataract     Colon polyp     COVID 5/19/2022    Deep vein thrombosis     Degenerative disc disease     Encounter for blood transfusion     General anesthetics causing adverse effect in therapeutic use     GI bleed     Hematuria, gross     Inflammatory bowel disease     Liver disease     cyst    Malignant neoplasm of lateral wall of urinary bladder 10/31/2019    Osteoporosis     PONV (postoperative nausea and vomiting)     Retina disorder, left     Skin cancer     Thyroid disease     Urinary tract infection      Family History   Problem Relation Name Age of Onset    COPD Mother skin     Cancer Mother skin     COPD Father prostate/skin     Cancer Father prostate/skin     Cancer Sister       Social History     Socioeconomic History    Marital status:    Tobacco Use    Smoking status: Never     Passive exposure: Past    Smokeless tobacco: Never   Substance and Sexual Activity    Alcohol use: No    Drug use: No    Sexual activity: Yes     Partners: Male     Social Determinants of Health     Financial Resource Strain: Low Risk  (1/2/2024)    Overall Financial Resource Strain (CARDIA)     Difficulty of Paying Living Expenses: Not hard at all   Food Insecurity: No Food Insecurity (1/2/2024)    Hunger Vital Sign     Worried About Running Out of Food in the Last Year: Never true     Ran Out of Food in the Last Year: Never true   Transportation Needs: No Transportation Needs (1/2/2024)    PRAPARE - Transportation      Lack of Transportation (Medical): No     Lack of Transportation (Non-Medical): No   Physical Activity: Insufficiently Active (1/2/2024)    Exercise Vital Sign     Days of Exercise per Week: 3 days     Minutes of Exercise per Session: 40 min   Stress: Stress Concern Present (1/2/2024)    [facility] of Occupational Health - Occupational Stress Questionnaire     Feeling of Stress : Very much   Housing Stability: Low Risk  (1/2/2024)    Housing Stability Vital Sign     Unable to Pay for Housing in the Last Year: No     Number of Places Lived in the Last Year: 1     Unstable Housing in the Last Year: No     Past Surgical History:   Procedure Laterality Date    ADENOIDECTOMY      BLADDER FULGURATION Right 9/12/2018    Procedure: FULGURATION, BLADDER;  Surgeon: Isaias Shah MD;  Location: [facility] OR;  Service: Urology;  Laterality: Right;    BLADDER FULGURATION N/A 4/24/2019    Procedure: FULGURATION, BLADDER;  Surgeon: Isaias Shah MD;  Location: [facility] OR;  Service: Urology;  Laterality: N/A;    BREAST SURGERY Bilateral     CATARACT EXTRACTION      CYSTOSCOPY      CYSTOSCOPY N/A 4/24/2019    Procedure: CYSTOSCOPY;  Surgeon: Isaias Shah MD;  Location: [facility] OR;  Service: Urology;  Laterality: N/A;    CYSTOSCOPY WITH URETEROSCOPY, RETROGRADE PYELOGRAPHY, AND INSERTION OF STENT Left 9/12/2018    Procedure: CYSTOSCOPY, WITH RETROGRADE PYELOGRAM AND URETERAL STENT INSERTION;  Surgeon: Isaias Shah MD;  Location: [facility] OR;  Service: Urology;  Laterality: Left;    EYE SURGERY Left 2017    retina pucker    INSTILLATION OF URINARY BLADDER N/A 9/12/2018    Procedure: INSTILLATION, URINARY BLADDER;  Surgeon: Isaias Shah MD;  Location: [facility] OR;  Service: Urology;  Laterality: N/A;  Chemo agent instillation     JOINT REPLACEMENT Bilateral     hip    JOINT REPLACEMENT Right     knee    MASTECTOMY Bilateral 1991    RETROGRADE PYELOGRAPHY Bilateral 10/31/2019    Procedure: PYELOGRAM, RETROGRADE;  Surgeon:  "Baldo Gates MD;  Location: [facility];  Service: Urology;  Laterality: Bilateral;    TONSILLECTOMY      TURBT      TURBT, WITH BLUE LIGHT CYSTOSCOPY AND CYSVIEW Bilateral 10/31/2019    Procedure: TURBT,WITH BLUE LIGHT CYSTOSCOPY AND CYSVIEW;  Surgeon: Baldo Gates MD;  Location: [facility];  Service: Urology;  Laterality: Bilateral;    TYMPANOSTOMY TUBE PLACEMENT      VASCULAR SURGERY Right     popliteal stent    WISDOM TOOTH EXTRACTION         Labs:  Lab Results   Component Value Date    WBC 5.87 08/26/2024    HGB 12.8 08/26/2024    HCT 39.4 08/26/2024    MCV 88 08/26/2024     08/26/2024     BMP  Lab Results   Component Value Date     08/26/2024    K 4.0 08/26/2024     08/26/2024    CO2 24 08/26/2024    BUN 14 08/26/2024    CREATININE 0.8 08/26/2024    CALCIUM 9.8 08/26/2024    ANIONGAP 9 08/26/2024    ESTGFRAFRICA 91 08/16/2022    EGFRNONAA >60 06/08/2022     Lab Results   Component Value Date    ALT 13 08/26/2024    AST 19 08/26/2024    ALKPHOS 69 08/26/2024    BILITOT 0.5 08/26/2024       Lab Results   Component Value Date    IRON 49 08/26/2024    TIBC 345 08/26/2024    FERRITIN 22 08/26/2024     No results found for: "OTEOBOTN31"  No results found for: "FOLATE"  Lab Results   Component Value Date    TSH 2.300 08/26/2024         Review of Systems   Constitutional:  Negative for activity change, appetite change, chills, diaphoresis, fatigue, fever and unexpected weight change.   HENT:  Negative for congestion, dental problem, drooling, ear discharge, ear pain, facial swelling, hearing loss, mouth sores, nosebleeds, postnasal drip, rhinorrhea, sinus pressure, sneezing, sore throat, tinnitus, trouble swallowing and voice change.    Eyes:  Negative for photophobia, pain, discharge, redness, itching and visual disturbance.   Respiratory:  Negative for cough, choking, chest tightness, shortness of breath, wheezing and stridor.    Cardiovascular:  Negative for chest pain, palpitations and leg " swelling.   Gastrointestinal:  Negative for abdominal distention, abdominal pain, anal bleeding, blood in stool, constipation, diarrhea, nausea, rectal pain and vomiting.   Endocrine: Negative for cold intolerance, heat intolerance, polydipsia, polyphagia and polyuria.   Genitourinary:  Positive for frequency and urgency. Negative for decreased urine volume, difficulty urinating, dyspareunia, dysuria, enuresis, flank pain, genital sores, hematuria, menstrual problem, pelvic pain, vaginal bleeding, vaginal discharge and vaginal pain.   Musculoskeletal:  Negative for arthralgias, back pain, gait problem, joint swelling, myalgias, neck pain and neck stiffness.   Skin:  Negative for color change, pallor and rash.   Allergic/Immunologic: Negative for environmental allergies, food allergies and immunocompromised state.   Neurological:  Negative for dizziness, tremors, seizures, syncope, facial asymmetry, speech difficulty, weakness, light-headedness, numbness and headaches.   Hematological:  Negative for adenopathy. Does not bruise/bleed easily.   Psychiatric/Behavioral:  Negative for agitation, behavioral problems, confusion, decreased concentration, dysphoric mood, hallucinations, self-injury, sleep disturbance and suicidal ideas. The patient is not nervous/anxious and is not hyperactive.        Objective:      Physical Exam  Vitals reviewed.   Constitutional:       General: She is not in acute distress.     Appearance: She is well-developed. She is not diaphoretic.   HENT:      Head: Normocephalic and atraumatic.      Right Ear: External ear normal.      Left Ear: External ear normal.      Nose: Nose normal.      Right Sinus: No maxillary sinus tenderness or frontal sinus tenderness.      Left Sinus: No maxillary sinus tenderness or frontal sinus tenderness.      Mouth/Throat:      Pharynx: No oropharyngeal exudate.   Eyes:      General: Lids are normal. No scleral icterus.        Right eye: No discharge.         Left  eye: No discharge.      Conjunctiva/sclera: Conjunctivae normal.      Right eye: Right conjunctiva is not injected. No hemorrhage.     Left eye: Left conjunctiva is not injected. No hemorrhage.     Pupils: Pupils are equal, round, and reactive to light.   Neck:      Thyroid: No thyromegaly.      Vascular: No JVD.      Trachea: No tracheal deviation.   Cardiovascular:      Rate and Rhythm: Normal rate.   Pulmonary:      Effort: Pulmonary effort is normal. No respiratory distress.      Breath sounds: No stridor.   Chest:      Chest wall: No tenderness.   Abdominal:      General: Bowel sounds are normal. There is no distension.      Palpations: Abdomen is soft. There is no hepatomegaly, splenomegaly or mass.      Tenderness: There is no abdominal tenderness. There is no rebound.   Musculoskeletal:         General: No tenderness. Normal range of motion.      Cervical back: Normal range of motion and neck supple.   Lymphadenopathy:      Cervical: No cervical adenopathy.      Upper Body:      Right upper body: No supraclavicular adenopathy.      Left upper body: No supraclavicular adenopathy.   Skin:     General: Skin is dry.      Findings: No erythema or rash.   Neurological:      Mental Status: She is alert and oriented to person, place, and time.      Cranial Nerves: No cranial nerve deficit.      Coordination: Coordination normal.   Psychiatric:         Behavior: Behavior normal.         Thought Content: Thought content normal.         Judgment: Judgment normal.             Assessment:      1. Acute cystitis without hematuria    2. Hereditary hemochromatosis    3. Angiodysplasia of cecum    4. Iron deficiency anemia due to chronic blood loss    5. Acquired hypothyroidism    6. H/O bilateral mastectomy           Med Onc Chart Routing      Follow up with physician . Return to clinic in 2 months with CBC CMP P serum iron TIBC ferritin and TSH prior   Follow up with JASON    Infusion scheduling note    Injection scheduling  note    Labs   Scheduling:  Preferred lab:  Lab interval:  Urine culture today   Imaging    Pharmacy appointment    Other referrals                   Plan:     Reviewed recent events laboratory studies ordered repeat urine culture today communicate results through portal discussed implications of answered questions with her steady state between recurrent iron angiodysplasias in iron overload        Adonay Neri Jr, MD FACP

## 2024-08-30 LAB — BACTERIA UR CULT: NORMAL

## 2024-09-03 PROBLEM — J84.10 LUNG GRANULOMA: Status: ACTIVE | Noted: 2024-09-03

## 2024-09-03 PROBLEM — Z51.81 MEDICATION MONITORING ENCOUNTER: Chronic | Status: RESOLVED | Noted: 2017-04-18 | Resolved: 2024-09-03

## 2024-09-05 ENCOUNTER — OFFICE VISIT (OUTPATIENT)
Dept: INTERNAL MEDICINE | Facility: CLINIC | Age: 79
End: 2024-09-05
Payer: MEDICARE

## 2024-09-05 VITALS
WEIGHT: 135.38 LBS | BODY MASS INDEX: 24.91 KG/M2 | RESPIRATION RATE: 20 BRPM | HEART RATE: 68 BPM | SYSTOLIC BLOOD PRESSURE: 122 MMHG | DIASTOLIC BLOOD PRESSURE: 70 MMHG | HEIGHT: 62 IN

## 2024-09-05 DIAGNOSIS — Z90.13 H/O BILATERAL MASTECTOMY: ICD-10-CM

## 2024-09-05 DIAGNOSIS — E55.9 VITAMIN D DEFICIENCY: ICD-10-CM

## 2024-09-05 DIAGNOSIS — I47.19 ATRIAL TACHYCARDIA, PAROXYSMAL: ICD-10-CM

## 2024-09-05 DIAGNOSIS — R91.8 PULMONARY NODULES: ICD-10-CM

## 2024-09-05 DIAGNOSIS — K51.00 ULCERATIVE PANCOLITIS WITHOUT COMPLICATION: ICD-10-CM

## 2024-09-05 DIAGNOSIS — M81.0 AGE-RELATED OSTEOPOROSIS WITHOUT CURRENT PATHOLOGICAL FRACTURE: ICD-10-CM

## 2024-09-05 DIAGNOSIS — M16.9 OSTEOARTHRITIS OF HIP, UNSPECIFIED LATERALITY, UNSPECIFIED OSTEOARTHRITIS TYPE: Chronic | ICD-10-CM

## 2024-09-05 DIAGNOSIS — I83.93 VARICOSE VEINS OF BOTH LOWER EXTREMITIES, UNSPECIFIED WHETHER COMPLICATED: ICD-10-CM

## 2024-09-05 DIAGNOSIS — Z96.652 PAIN DUE TO TOTAL LEFT KNEE REPLACEMENT, SEQUELA: ICD-10-CM

## 2024-09-05 DIAGNOSIS — T84.84XS PAIN DUE TO TOTAL LEFT KNEE REPLACEMENT, SEQUELA: ICD-10-CM

## 2024-09-05 DIAGNOSIS — I36.1 TRICUSPID VALVE INSUFFICIENCY, NON-RHEUMATIC: ICD-10-CM

## 2024-09-05 DIAGNOSIS — R79.9 ABNORMAL FINDING OF BLOOD CHEMISTRY, UNSPECIFIED: ICD-10-CM

## 2024-09-05 DIAGNOSIS — Z00.00 ENCOUNTER FOR MEDICARE ANNUAL WELLNESS EXAM: Primary | ICD-10-CM

## 2024-09-05 DIAGNOSIS — K55.20 ANGIODYSPLASIA OF CECUM: ICD-10-CM

## 2024-09-05 DIAGNOSIS — D50.0 IRON DEFICIENCY ANEMIA DUE TO CHRONIC BLOOD LOSS: ICD-10-CM

## 2024-09-05 DIAGNOSIS — M19.049 OSTEOARTHRITIS OF HAND, UNSPECIFIED LATERALITY, UNSPECIFIED OSTEOARTHRITIS TYPE: Chronic | ICD-10-CM

## 2024-09-05 DIAGNOSIS — E83.110 HEREDITARY HEMOCHROMATOSIS: ICD-10-CM

## 2024-09-05 DIAGNOSIS — I87.2 VENOUS INSUFFICIENCY: ICD-10-CM

## 2024-09-05 DIAGNOSIS — M85.80 OSTEOPENIA, UNSPECIFIED LOCATION: Chronic | ICD-10-CM

## 2024-09-05 DIAGNOSIS — C67.9 MALIGNANT NEOPLASM OF URINARY BLADDER, UNSPECIFIED SITE: ICD-10-CM

## 2024-09-05 DIAGNOSIS — E03.9 ACQUIRED HYPOTHYROIDISM: ICD-10-CM

## 2024-09-05 DIAGNOSIS — Z63.6 CAREGIVER BURDEN: ICD-10-CM

## 2024-09-05 DIAGNOSIS — Z13.1 DIABETES MELLITUS SCREENING: ICD-10-CM

## 2024-09-05 DIAGNOSIS — J84.10 LUNG GRANULOMA: ICD-10-CM

## 2024-09-05 DIAGNOSIS — R53.82 CHRONIC FATIGUE: ICD-10-CM

## 2024-09-05 DIAGNOSIS — Z00.00 ENCOUNTER FOR PREVENTIVE HEALTH EXAMINATION: ICD-10-CM

## 2024-09-05 PROBLEM — R00.2 HEART PALPITATIONS: Status: RESOLVED | Noted: 2018-01-08 | Resolved: 2024-09-05

## 2024-09-05 PROCEDURE — 99214 OFFICE O/P EST MOD 30 MIN: CPT | Mod: PBBFAC | Performed by: NURSE PRACTITIONER

## 2024-09-05 PROCEDURE — 99999 PR PBB SHADOW E&M-EST. PATIENT-LVL IV: CPT | Mod: PBBFAC,,, | Performed by: NURSE PRACTITIONER

## 2024-09-05 SDOH — SOCIAL DETERMINANTS OF HEALTH (SDOH): DEPENDENT RELATIVE NEEDING CARE AT HOME: Z63.6

## 2024-09-05 NOTE — PATIENT INSTRUCTIONS
Counseling and Referral of Other Preventative  (Italic type indicates deductible and co-insurance are waived)    Patient Name: Selam Botello  Today's Date: 9/5/2024    Health Maintenance       Date Due Completion Date    Shingles Vaccine (1 of 2) Never done ---    RSV Vaccine (Age 60+ and Pregnant patients) (1 - 1-dose 60+ series) Never done ---    Influenza Vaccine (1) 09/01/2024 10/20/2023    COVID-19 Vaccine (4 - 2023-24 season) 09/01/2024 9/18/2021    DEXA Scan 04/11/2025 4/11/2022    Lipid Panel 01/30/2029 1/30/2024    TETANUS VACCINE 02/11/2032 2/11/2022        No orders of the defined types were placed in this encounter.    The following information is provided to all patients.  This information is to help you find resources for any of the problems found today that may be affecting your health:                  Living healthy guide: www.[facility].[facility]      Understanding Diabetes: www.diabetes.org      Eating healthy: www.cdc.gov/healthyweight      [facility] home safety checklist: www.cdc.gov/steadi/patient.html      Agency on Aging: www.goea.[place].[facility]      Alcoholics anonymous (AA): www.aa.org      Physical Activity: www.maryanne.nih.gov/jk7balh      Tobacco use: www.quitwithusla.org

## 2024-09-05 NOTE — PROGRESS NOTES
"  Selam Botello presented for a  Medicare AWV and comprehensive Health Risk Assessment today. The following components were reviewed and updated:    Medical history  Family History  Social history  Allergies and Current Medications  Health Risk Assessment  Health Maintenance  Care Team         ** See Completed Assessments for Annual Wellness Visit within the encounter summary.**         The following assessments were completed:  Living Situation  CAGE  Depression Screening  Timed Get Up and Go  Whisper Test  Cognitive Function Screening  Nutrition Screening  ADL Screening  PAQ Screening      Opioid documentation:      Patient does not have a current opioid prescription.        Vitals:    09/05/24 0920   BP: 122/70   BP Location: Left arm   Patient Position: Sitting   Pulse: 68   Resp: 20   Weight: 61.4 kg (135 lb 5.8 oz)   Height:    Pain scale 5' 2" (1.575 m)    0     Body mass index is 24.76 kg/m².  Physical Exam  Constitutional:       General: She is not in acute distress.     Appearance: She is not ill-appearing or diaphoretic.   HENT:      Mouth/Throat:      Mouth: Mucous membranes are moist.   Eyes:      General:         Right eye: No discharge.         Left eye: No discharge.      Conjunctiva/sclera: Conjunctivae normal.   Cardiovascular:      Rate and Rhythm: Normal rate and regular rhythm.   Pulmonary:      Effort: Pulmonary effort is normal. No respiratory distress.      Breath sounds: Normal breath sounds.   Skin:     General: Skin is warm and dry.   Neurological:      Mental Status: She is alert and oriented to person, place, and time. Mental status is at baseline.   Psychiatric:         Mood and Affect: Mood normal.         Behavior: Behavior normal.         Thought Content: Thought content normal.         Judgment: Judgment normal.     Diagnoses and health risks identified today and associated recommendations/orders:    1. Encounter for Medicare annual wellness exam, Encounter for preventive health " examination  Review for opioid screening: Patient does not have Rx for Opioids  Review for substance use disorder: Patient does not use substance per chart    Patient states she does not drink alcohol    I offered to discuss advanced care planning, including how to pick a person who would make decisions for you if you were unable to make them for yourself, called a health care power of , and what kind of decisions you might make such as use of life sustaining treatments such as ventilators and tube feeding when faced with a life limiting illness recorded on a living will that they will need to know. (How you want to be cared for as you near the end of your natural life)     X  Patient has advanced directives on file, which we reviewed, and they do not wish to make changes.  - HEMOGLOBIN A1C; Future    2. Hx Malignant neoplasm of urinary bladder,  H/O bilateral mastectomy  Monitor  Follow up with Oncology as directed    3. Hx Ulcerative pancolitis without complication, Angiodysplasia of cecum   Monitor  Follow up as needed, directed    4. Hereditary hemochromatosis, Iron deficiency anemia due to chronic blood loss   Monitor  Follow up with Hematology/ Oncology as directed    5. Atrial tachycardia, paroxysmal  Monitor  Follow up with Cardiology as directed   Continue home toprol xl    6. Pulmonary nodules  Monitor  Follow up as directed    7. Lung granuloma  4/22/2021 CTA- Calcified mediastinal and hilar lymph nodes and a right lower lobe calcified pulmonary granuloma are present   Monitor  Follow up with PCP   Dx discussed with patient    8. Tricuspid valve insufficiency, non-rheumatic  Monitor  Follow up with Cardiology as directed      9. Venous insufficiency  Monitor  Follow up as directed      10. Acquired hypothyroidism  Monitor  Stable  Continue home synthroid    11. Age-related osteoporosis without current pathological fracture, Unspecified vitamin D deficiency, Osteopenia, unspecified  location  Monitor  Follow up with PCP, Rheumatology as directed  Continue home Vitamin D, prolia    12. Osteoarthritis of hand, unspecified laterality, unspecified osteoarthritis type, Osteoarthritis of hip, unspecified laterality, unspecified osteoarthritis type, Pain due to total left knee replacement   Monitor  Follow up as needed, directed      Continue home lidoderm patch    13. Chronic fatigue  Monitor  Follow up as needed, directed       14. Caregiver burden- Takes care of  who has dementia  Monitor  Noted    15. Varicose veins of both lower extremities, unspecified whether complicated  Monitor  Follow up as needed, directed       16. Diabetes mellitus screening, Abnormal finding of blood chemistry, unspecified  Health maintenance screening  - HEMOGLOBIN A1C; Future                             Provided Selam with a 5-10 year written screening schedule and personal prevention plan. Recommendations were developed using the USPSTF age appropriate recommendations. Education, counseling, and referrals were provided as needed. After Visit Summary printed and given to patient which includes a list of additional screenings\tests needed.    Follow up in about 1 year (around 9/5/2025) for Annual wellness visit.    BC Shahid

## 2024-10-17 ENCOUNTER — LAB VISIT (OUTPATIENT)
Dept: LAB | Facility: HOSPITAL | Age: 79
End: 2024-10-17
Attending: INTERNAL MEDICINE
Payer: MEDICARE

## 2024-10-17 DIAGNOSIS — D50.0 IRON DEFICIENCY ANEMIA DUE TO CHRONIC BLOOD LOSS: ICD-10-CM

## 2024-10-17 DIAGNOSIS — E83.110 HEREDITARY HEMOCHROMATOSIS: ICD-10-CM

## 2024-10-17 DIAGNOSIS — K55.20 ANGIODYSPLASIA OF CECUM: ICD-10-CM

## 2024-10-17 DIAGNOSIS — R94.6 ABNORMAL RESULTS OF THYROID FUNCTION STUDIES: ICD-10-CM

## 2024-10-17 LAB
ALBUMIN SERPL BCP-MCNC: 3.9 G/DL (ref 3.5–5.2)
ALP SERPL-CCNC: 78 U/L (ref 40–150)
ALT SERPL W/O P-5'-P-CCNC: 10 U/L (ref 10–44)
ANION GAP SERPL CALC-SCNC: 9 MMOL/L (ref 8–16)
AST SERPL-CCNC: 18 U/L (ref 10–40)
BASOPHILS # BLD AUTO: 0.04 K/UL (ref 0–0.2)
BASOPHILS NFR BLD: 0.7 % (ref 0–1.9)
BILIRUB SERPL-MCNC: 0.5 MG/DL (ref 0.1–1)
BUN SERPL-MCNC: 13 MG/DL (ref 8–23)
CALCIUM SERPL-MCNC: 10.1 MG/DL (ref 8.7–10.5)
CHLORIDE SERPL-SCNC: 108 MMOL/L (ref 95–110)
CO2 SERPL-SCNC: 24 MMOL/L (ref 23–29)
CREAT SERPL-MCNC: 0.8 MG/DL (ref 0.5–1.4)
DIFFERENTIAL METHOD BLD: ABNORMAL
EOSINOPHIL # BLD AUTO: 0.1 K/UL (ref 0–0.5)
EOSINOPHIL NFR BLD: 1.8 % (ref 0–8)
ERYTHROCYTE [DISTWIDTH] IN BLOOD BY AUTOMATED COUNT: 16.1 % (ref 11.5–14.5)
EST. GFR  (NO RACE VARIABLE): >60 ML/MIN/1.73 M^2
GLUCOSE SERPL-MCNC: 111 MG/DL (ref 70–110)
HCT VFR BLD AUTO: 41.3 % (ref 37–48.5)
HGB BLD-MCNC: 13.1 G/DL (ref 12–16)
IMM GRANULOCYTES # BLD AUTO: 0 K/UL (ref 0–0.04)
IMM GRANULOCYTES NFR BLD AUTO: 0 % (ref 0–0.5)
LYMPHOCYTES # BLD AUTO: 1.4 K/UL (ref 1–4.8)
LYMPHOCYTES NFR BLD: 24.5 % (ref 18–48)
MCH RBC QN AUTO: 28.3 PG (ref 27–31)
MCHC RBC AUTO-ENTMCNC: 31.7 G/DL (ref 32–36)
MCV RBC AUTO: 89 FL (ref 82–98)
MONOCYTES # BLD AUTO: 0.4 K/UL (ref 0.3–1)
MONOCYTES NFR BLD: 7.1 % (ref 4–15)
NEUTROPHILS # BLD AUTO: 3.7 K/UL (ref 1.8–7.7)
NEUTROPHILS NFR BLD: 65.9 % (ref 38–73)
NRBC BLD-RTO: 0 /100 WBC
PLATELET # BLD AUTO: 329 K/UL (ref 150–450)
PMV BLD AUTO: 9.9 FL (ref 9.2–12.9)
POTASSIUM SERPL-SCNC: 4.7 MMOL/L (ref 3.5–5.1)
PROT SERPL-MCNC: 6.8 G/DL (ref 6–8.4)
RBC # BLD AUTO: 4.63 M/UL (ref 4–5.4)
SODIUM SERPL-SCNC: 141 MMOL/L (ref 136–145)
TSH SERPL DL<=0.005 MIU/L-ACNC: 2.34 UIU/ML (ref 0.4–4)
WBC # BLD AUTO: 5.6 K/UL (ref 3.9–12.7)

## 2024-10-17 PROCEDURE — 80053 COMPREHEN METABOLIC PANEL: CPT | Performed by: INTERNAL MEDICINE

## 2024-10-17 PROCEDURE — 84443 ASSAY THYROID STIM HORMONE: CPT | Performed by: INTERNAL MEDICINE

## 2024-10-17 PROCEDURE — 82728 ASSAY OF FERRITIN: CPT | Performed by: INTERNAL MEDICINE

## 2024-10-17 PROCEDURE — 83540 ASSAY OF IRON: CPT | Performed by: INTERNAL MEDICINE

## 2024-10-17 PROCEDURE — 85025 COMPLETE CBC W/AUTO DIFF WBC: CPT | Performed by: INTERNAL MEDICINE

## 2024-10-17 PROCEDURE — 36415 COLL VENOUS BLD VENIPUNCTURE: CPT | Performed by: INTERNAL MEDICINE

## 2024-10-18 LAB
FERRITIN SERPL-MCNC: 20 NG/ML (ref 20–300)
IRON SERPL-MCNC: 102 UG/DL (ref 30–160)
SATURATED IRON: 28 % (ref 20–50)
TOTAL IRON BINDING CAPACITY: 363 UG/DL (ref 250–450)
TRANSFERRIN SERPL-MCNC: 245 MG/DL (ref 200–375)

## 2024-10-22 ENCOUNTER — OFFICE VISIT (OUTPATIENT)
Dept: HEMATOLOGY/ONCOLOGY | Facility: CLINIC | Age: 79
End: 2024-10-22
Payer: MEDICARE

## 2024-10-22 VITALS
OXYGEN SATURATION: 99 % | WEIGHT: 133.38 LBS | DIASTOLIC BLOOD PRESSURE: 72 MMHG | BODY MASS INDEX: 24.54 KG/M2 | HEART RATE: 70 BPM | TEMPERATURE: 98 F | HEIGHT: 62 IN | SYSTOLIC BLOOD PRESSURE: 120 MMHG

## 2024-10-22 DIAGNOSIS — K55.20 ANGIODYSPLASIA OF CECUM: ICD-10-CM

## 2024-10-22 DIAGNOSIS — E03.9 ACQUIRED HYPOTHYROIDISM: ICD-10-CM

## 2024-10-22 DIAGNOSIS — N30.00 ACUTE CYSTITIS WITHOUT HEMATURIA: Primary | ICD-10-CM

## 2024-10-22 DIAGNOSIS — Z90.13 H/O BILATERAL MASTECTOMY: ICD-10-CM

## 2024-10-22 DIAGNOSIS — Z80.3 FAMILY HISTORY OF BREAST CANCER IN SISTER: ICD-10-CM

## 2024-10-22 DIAGNOSIS — E83.110 HEREDITARY HEMOCHROMATOSIS: ICD-10-CM

## 2024-10-22 PROCEDURE — 99214 OFFICE O/P EST MOD 30 MIN: CPT | Mod: PBBFAC | Performed by: INTERNAL MEDICINE

## 2024-10-22 PROCEDURE — 99214 OFFICE O/P EST MOD 30 MIN: CPT | Mod: S$PBB,,, | Performed by: INTERNAL MEDICINE

## 2024-10-22 PROCEDURE — 99999 PR PBB SHADOW E&M-EST. PATIENT-LVL IV: CPT | Mod: PBBFAC,,, | Performed by: INTERNAL MEDICINE

## 2024-10-22 NOTE — PROGRESS NOTES
Subjective:       Patient ID: Selam Botello is a 79 y.o. female.    Chief Complaint: Results    HPI:  79-year-old female returns history of recurrent iron deficiency Heme-Onc fibromatosis with iron overload.  Hypothyroidism.   is having declining mental function at present time in his here for discussion    Past Medical History:   Diagnosis Date    Acid reflux     Allergy     Arthritis     Atrial tachycardia     Bladder cancer     Breast cyst     Cataract     Colon polyp     COVID 5/19/2022    Deep vein thrombosis     Degenerative disc disease     Encounter for blood transfusion     General anesthetics causing adverse effect in therapeutic use     GI bleed     Hematuria, gross     Inflammatory bowel disease     Liver disease     cyst    Malignant neoplasm of lateral wall of urinary bladder 10/31/2019    Osteoporosis     PONV (postoperative nausea and vomiting)     Retina disorder, left     Skin cancer     Thyroid disease     Urinary tract infection      Family History   Problem Relation Name Age of Onset    COPD Mother skin     Cancer Mother skin     COPD Father prostate/skin     Cancer Father prostate/skin     Cancer Sister       Social History     Socioeconomic History    Marital status:    Tobacco Use    Smoking status: Never     Passive exposure: Past    Smokeless tobacco: Never   Substance and Sexual Activity    Alcohol use: No    Drug use: No    Sexual activity: Yes     Partners: Male     Social Drivers of Health     Financial Resource Strain: Low Risk  (9/5/2024)    Overall Financial Resource Strain (CARDIA)     Difficulty of Paying Living Expenses: Not hard at all   Food Insecurity: No Food Insecurity (9/5/2024)    Hunger Vital Sign     Worried About Running Out of Food in the Last Year: Never true     Ran Out of Food in the Last Year: Never true   Transportation Needs: No Transportation Needs (9/5/2024)    PRAPARE - Transportation     Lack of Transportation (Medical): No     Lack of  Transportation (Non-Medical): No   Physical Activity: Insufficiently Active (9/5/2024)    Exercise Vital Sign     Days of Exercise per Week: 3 days     Minutes of Exercise per Session: 40 min   Stress: Stress Concern Present (9/5/2024)    [employer] - Occupational Stress Questionnaire     Feeling of Stress : To some extent   Housing Stability: Low Risk  (9/5/2024)    Housing Stability Vital Sign     Unable to Pay for Housing in the Last Year: No     Homeless in the Last Year: No     Past Surgical History:   Procedure Laterality Date    ADENOIDECTOMY      BLADDER FULGURATION Right 9/12/2018    Procedure: FULGURATION, BLADDER;  Surgeon: Isaias Shah MD;  Location: [facility];  Service: Urology;  Laterality: Right;    BLADDER FULGURATION N/A 4/24/2019    Procedure: FULGURATION, BLADDER;  Surgeon: Isaias Shah MD;  Location: [facility];  Service: Urology;  Laterality: N/A;    BREAST SURGERY Bilateral     CATARACT EXTRACTION      CYSTOSCOPY      CYSTOSCOPY N/A 4/24/2019    Procedure: CYSTOSCOPY;  Surgeon: Isaias Shah MD;  Location: [facility];  Service: Urology;  Laterality: N/A;    CYSTOSCOPY WITH URETEROSCOPY, RETROGRADE PYELOGRAPHY, AND INSERTION OF STENT Left 9/12/2018    Procedure: CYSTOSCOPY, WITH RETROGRADE PYELOGRAM AND URETERAL STENT INSERTION;  Surgeon: Isaias Shah MD;  Location: [facility];  Service: Urology;  Laterality: Left;    EYE SURGERY Left 2017    retina pucker    INSTILLATION OF URINARY BLADDER N/A 9/12/2018    Procedure: INSTILLATION, URINARY BLADDER;  Surgeon: Isaias Shah MD;  Location: [facility];  Service: Urology;  Laterality: N/A;  Chemo agent instillation     JOINT REPLACEMENT Bilateral     hip    JOINT REPLACEMENT Right     knee    MASTECTOMY Bilateral 1991    RETROGRADE PYELOGRAPHY Bilateral 10/31/2019    Procedure: PYELOGRAM, RETROGRADE;  Surgeon: Baldo Gates MD;  Location: [facility];  Service: Urology;  Laterality: Bilateral;    TONSILLECTOMY    "   TURBT      TURBT, WITH BLUE LIGHT CYSTOSCOPY AND CYSVIEW Bilateral 10/31/2019    Procedure: TURBT,WITH BLUE LIGHT CYSTOSCOPY AND CYSVIEW;  Surgeon: Baldo Gates MD;  Location: [facility];  Service: Urology;  Laterality: Bilateral;    TYMPANOSTOMY TUBE PLACEMENT      VASCULAR SURGERY Right     popliteal stent    WISDOM TOOTH EXTRACTION         Labs:  Lab Results   Component Value Date    WBC 5.60 10/17/2024    HGB 13.1 10/17/2024    HCT 41.3 10/17/2024    MCV 89 10/17/2024     10/17/2024     BMP  Lab Results   Component Value Date     10/17/2024    K 4.7 10/17/2024     10/17/2024    CO2 24 10/17/2024    BUN 13 10/17/2024    CREATININE 0.8 10/17/2024    CALCIUM 10.1 10/17/2024    ANIONGAP 9 10/17/2024    ESTGFRAFRICA 91 08/16/2022    EGFRNONAA >60 06/08/2022     Lab Results   Component Value Date    ALT 10 10/17/2024    AST 18 10/17/2024    ALKPHOS 78 10/17/2024    BILITOT 0.5 10/17/2024       Lab Results   Component Value Date    IRON 102 10/17/2024    TIBC 363 10/17/2024    FERRITIN 20 10/17/2024     No results found for: "PGZMURKQ55"  No results found for: "FOLATE"  Lab Results   Component Value Date    TSH 2.336 10/17/2024         Review of Systems   Constitutional:  Positive for fatigue. Negative for activity change, appetite change, chills, diaphoresis, fever and unexpected weight change.   HENT:  Negative for congestion, dental problem, drooling, ear discharge, ear pain, facial swelling, hearing loss, mouth sores, nosebleeds, postnasal drip, rhinorrhea, sinus pressure, sneezing, sore throat, tinnitus, trouble swallowing and voice change.    Eyes:  Negative for photophobia, pain, discharge, redness, itching and visual disturbance.   Respiratory:  Negative for cough, choking, chest tightness, shortness of breath, wheezing and stridor.    Cardiovascular:  Negative for chest pain, palpitations and leg swelling.   Gastrointestinal:  Negative for abdominal distention, abdominal pain, anal " bleeding, blood in stool, constipation, diarrhea, nausea, rectal pain and vomiting.   Endocrine: Negative for cold intolerance, heat intolerance, polydipsia, polyphagia and polyuria.   Genitourinary:  Negative for decreased urine volume, difficulty urinating, dyspareunia, dysuria, enuresis, flank pain, frequency, genital sores, hematuria, menstrual problem, pelvic pain, urgency, vaginal bleeding, vaginal discharge and vaginal pain.   Musculoskeletal:  Negative for arthralgias, back pain, gait problem, joint swelling, myalgias, neck pain and neck stiffness.   Skin:  Negative for color change, pallor and rash.   Allergic/Immunologic: Negative for environmental allergies, food allergies and immunocompromised state.   Neurological:  Positive for weakness. Negative for dizziness, tremors, seizures, syncope, facial asymmetry, speech difficulty, light-headedness, numbness and headaches.   Hematological:  Negative for adenopathy. Does not bruise/bleed easily.   Psychiatric/Behavioral:  Positive for dysphoric mood. Negative for agitation, behavioral problems, confusion, decreased concentration, hallucinations, self-injury, sleep disturbance and suicidal ideas. The patient is nervous/anxious. The patient is not hyperactive.        Objective:      Physical Exam  Vitals reviewed.   Constitutional:       General: She is not in acute distress.     Appearance: She is well-developed. She is not diaphoretic.   HENT:      Head: Normocephalic and atraumatic.      Right Ear: External ear normal.      Left Ear: External ear normal.      Nose: Nose normal.      Right Sinus: No maxillary sinus tenderness or frontal sinus tenderness.      Left Sinus: No maxillary sinus tenderness or frontal sinus tenderness.      Mouth/Throat:      Pharynx: No oropharyngeal exudate.   Eyes:      General: Lids are normal. No scleral icterus.        Right eye: No discharge.         Left eye: No discharge.      Conjunctiva/sclera: Conjunctivae normal.       Right eye: Right conjunctiva is not injected. No hemorrhage.     Left eye: Left conjunctiva is not injected. No hemorrhage.     Pupils: Pupils are equal, round, and reactive to light.   Neck:      Thyroid: No thyromegaly.      Vascular: No JVD.      Trachea: No tracheal deviation.   Cardiovascular:      Rate and Rhythm: Normal rate.   Pulmonary:      Effort: Pulmonary effort is normal. No respiratory distress.      Breath sounds: No stridor.   Chest:      Chest wall: No tenderness.   Abdominal:      General: Bowel sounds are normal. There is no distension.      Palpations: Abdomen is soft. There is no hepatomegaly, splenomegaly or mass.      Tenderness: There is no abdominal tenderness. There is no rebound.   Musculoskeletal:         General: No tenderness. Normal range of motion.      Cervical back: Normal range of motion and neck supple.   Lymphadenopathy:      Cervical: No cervical adenopathy.      Upper Body:      Right upper body: No supraclavicular adenopathy.      Left upper body: No supraclavicular adenopathy.   Skin:     General: Skin is dry.      Findings: No erythema or rash.   Neurological:      Mental Status: She is alert and oriented to person, place, and time.      Cranial Nerves: No cranial nerve deficit.      Motor: Weakness present.      Coordination: Coordination normal.      Gait: Gait abnormal.   Psychiatric:         Behavior: Behavior normal.         Thought Content: Thought content normal.         Judgment: Judgment normal.             Assessment:      1. Acute cystitis without hematuria    2. H/O bilateral mastectomy    3. Family history of breast cancer in sister    4. Angiodysplasia of cecum    5. Hereditary hemochromatosis    6. Acquired hypothyroidism           Med Onc Chart Routing      Follow up with physician . Return 2 months CBC serum iron TIBC ferritin TSH prior   Follow up with JASON    Infusion scheduling note    Injection scheduling note    Labs    Imaging    Pharmacy appointment     Other referrals                   Plan:     Extensive conversation discuss 's situation consoled in reference to placement in facility.  Labs stable no further workup warranted at present return in 2 months with standing labs        Adonay Neri Jr, MD FACP

## 2024-10-28 ENCOUNTER — TELEPHONE (OUTPATIENT)
Dept: UROLOGY | Facility: CLINIC | Age: 79
End: 2024-10-28
Payer: MEDICARE

## 2024-10-28 DIAGNOSIS — N30.00 ACUTE CYSTITIS WITHOUT HEMATURIA: Primary | ICD-10-CM

## 2024-10-29 ENCOUNTER — LAB VISIT (OUTPATIENT)
Dept: LAB | Facility: HOSPITAL | Age: 79
End: 2024-10-29
Attending: UROLOGY
Payer: MEDICARE

## 2024-10-29 DIAGNOSIS — N30.00 ACUTE CYSTITIS WITHOUT HEMATURIA: ICD-10-CM

## 2024-10-29 LAB
MICROSCOPIC COMMENT: NORMAL
SQUAMOUS #/AREA URNS HPF: 1 /HPF
WBC #/AREA URNS HPF: 1 /HPF (ref 0–5)

## 2024-10-29 PROCEDURE — 81000 URINALYSIS NONAUTO W/SCOPE: CPT | Performed by: UROLOGY

## 2024-10-29 PROCEDURE — 87086 URINE CULTURE/COLONY COUNT: CPT | Performed by: UROLOGY

## 2024-10-31 ENCOUNTER — PATIENT MESSAGE (OUTPATIENT)
Dept: LAB | Facility: HOSPITAL | Age: 79
End: 2024-10-31
Payer: MEDICARE

## 2024-10-31 LAB — BACTERIA UR CULT: NORMAL

## 2024-11-28 NOTE — DISCHARGE SUMMARY
[facility]  Urology  Discharge Summary      Patient Name: Selam Botello  MRN: [id number]  Admission Date: 10/31/2019  Hospital Length of Stay: 0 days  Discharge Date and Time: 11/1/2019 10:50 AM  Attending Physician: Baldo Gates MD  Discharging Provider: Dom Ferrer MD  Primary Care Physician: Kelli Gavin MD    HPI:   74 y.o. female with a history of low grade Ta bladder cancer with a recent surveillance cystoscopy showing small tumor adjacent to the left UO who underwent TURBT on 10/31/19.    Procedure(s) (LRB):  TURBT,WITH BLUE LIGHT CYSTOSCOPY AND CYSVIEW (Bilateral)  PYELOGRAM, RETROGRADE (Bilateral)     Indwelling Lines/Drains at time of discharge:   Lines/Drains/Airways     None                 Hospital Course (synopsis of major diagnoses, care, treatment, and services provided during the course of the hospital stay): Patient underwent TURBT with instillation of mitomycin C on 10/31/19. She tolerated the procedure well and was transferred to the floor from PACU following her surgery. Her pain was controlled and she tolerated a diet with no N/V. There were no issues with her villalobos drainage and she had good UOP. Her villalobos was removed the AM of 11/1 and she passed a voiding trial. She was deemed safe for discharge home on 11/1.    Consults: See chart review    Significant Diagnostic Studies: See chart review    Pending Diagnostic Studies:     None          Final Active Diagnoses:    Diagnosis Date Noted POA    PRINCIPAL PROBLEM:  Malignant neoplasm of lateral wall of urinary bladder [C67.2] 10/31/2019 Yes      Problems Resolved During this Admission:    Diagnosis Date Noted Date Resolved POA    Malignant neoplasm of lateral wall of urinary bladder [C67.2] 10/03/2017 10/31/2019 Yes         Discharged Condition: good    Disposition: Home or Self Care    Follow Up:  Follow-up Information     Baldo Gates MD In 2 weeks.    Specialty:  Urology  Contact information:  [id number]  [facility] [address]  [facility] [address]  [phone]                 Patient Instructions:      Diet general     Call MD for:  temperature >100.4     Call MD for:  persistent nausea and vomiting     Call MD for:  severe uncontrolled pain     Call MD for:  difficulty breathing, headache or visual disturbances     Call MD for:  redness, tenderness, or signs of infection (pain, swelling, redness, odor or green/yellow discharge around incision site)     Call MD for:  hives     Call MD for:  persistent dizziness or light-headedness     Call MD for:  extreme fatigue     Call MD for:   Order Comments: Call MD or go to the nearest ER if you are unable to urinate.     Activity as tolerated     Medications:  Reconciled Home Medications:      Medication List      CONTINUE taking these medications    amoxicillin-clavulanate 875-125mg 875-125 mg per tablet  Commonly known as:  AUGMENTIN  Take 1 tablet by mouth 2 (two) times daily. for 7 days     brompheniramine-pseudoephedrine 1-15 mg/5 mL Liqd  Commonly known as:  DIMETAPP  Take by mouth every 6 (six) hours as needed.     BUDESONIDE NASL  budesonide 0.4mg xylitol capsule (n   EMPTY CONTENTS OF 1 CAPSULE INTO IRRIGATION SYSTEM, ADD DISTILLED WATER, SALT PACK, MIX & IRRIGATE. PERFORM 2 TIMES DAILY AS DIRECTED BY ENT     cetirizine 10 MG tablet  Commonly known as:  ZYRTEC  Take 10 mg by mouth once daily.     ergocalciferol 50,000 unit Cap  Commonly known as:  VITAMIN D2  Take 1 capsule (50,000 Units total) by mouth every 7 days.     fluticasone propionate 50 mcg/actuation nasal spray  Commonly known as:  FLONASE  SPRAY 2 SPRAYS INTO EACH NOSTRIL EVERY DAY     lidocaine-prilocaine cream  Commonly known as:  EMLA  APPLY TO AFFECTED AREA TOPICALLY AS NEEDED AS DIRECTED     LIDODERM 5 %  Generic drug:  lidocaine  1 patch a daily as needed for pain; Remove & Discard patch within 12 hours     mometasone 50 mcg/actuation nasal spray  Commonly known as:  NASONEX  2 sprays by Nasal route  daily as needed.     mupirocin 2 % ointment  Commonly known as:  BACTROBAN  APPLY A SMALL AMOUNT TO THE AFFECTED AREA BY TOPICAL ROUTE 2-3 TIMES PER DAY     REFRESH TEARS OPHT  Apply to eye 2 (two) times daily.     SYNTHROID 25 MCG tablet  Generic drug:  levothyroxine  Take 37 mcg by mouth.     VASCULERA 630 mg Tab  Generic drug:  diosmin complex no.1  TAKE ONE TABLET BY MOUTH EVERY DAY     VITAMIN B-6 100 MG Tab  Generic drug:  pyridoxine (vitamin B6)  Take 100 mg by mouth once daily.            Time spent on the discharge of patient: 20 minutes    Dom Ferrer MD  Urology  [facility]   yes

## 2024-12-26 ENCOUNTER — LAB VISIT (OUTPATIENT)
Dept: LAB | Facility: HOSPITAL | Age: 79
End: 2024-12-26
Attending: INTERNAL MEDICINE
Payer: MEDICARE

## 2024-12-26 DIAGNOSIS — E83.110 HEREDITARY HEMOCHROMATOSIS: ICD-10-CM

## 2024-12-26 DIAGNOSIS — R94.6 ABNORMAL RESULTS OF THYROID FUNCTION STUDIES: ICD-10-CM

## 2024-12-26 DIAGNOSIS — D50.0 IRON DEFICIENCY ANEMIA DUE TO CHRONIC BLOOD LOSS: ICD-10-CM

## 2024-12-26 DIAGNOSIS — K55.20 ANGIODYSPLASIA OF CECUM: ICD-10-CM

## 2024-12-26 LAB
ALBUMIN SERPL BCP-MCNC: 4.2 G/DL (ref 3.5–5.2)
ALP SERPL-CCNC: 75 U/L (ref 40–150)
ALT SERPL W/O P-5'-P-CCNC: 16 U/L (ref 10–44)
ANION GAP SERPL CALC-SCNC: 11 MMOL/L (ref 8–16)
AST SERPL-CCNC: 18 U/L (ref 10–40)
BASOPHILS # BLD AUTO: 0.03 K/UL (ref 0–0.2)
BASOPHILS NFR BLD: 0.4 % (ref 0–1.9)
BILIRUB SERPL-MCNC: 0.4 MG/DL (ref 0.1–1)
BUN SERPL-MCNC: 23 MG/DL (ref 8–23)
CALCIUM SERPL-MCNC: 10.3 MG/DL (ref 8.7–10.5)
CHLORIDE SERPL-SCNC: 108 MMOL/L (ref 95–110)
CO2 SERPL-SCNC: 23 MMOL/L (ref 23–29)
CREAT SERPL-MCNC: 1 MG/DL (ref 0.5–1.4)
DIFFERENTIAL METHOD BLD: ABNORMAL
EOSINOPHIL # BLD AUTO: 0.1 K/UL (ref 0–0.5)
EOSINOPHIL NFR BLD: 1.7 % (ref 0–8)
ERYTHROCYTE [DISTWIDTH] IN BLOOD BY AUTOMATED COUNT: 16.8 % (ref 11.5–14.5)
EST. GFR  (NO RACE VARIABLE): 58 ML/MIN/1.73 M^2
GLUCOSE SERPL-MCNC: 102 MG/DL (ref 70–110)
HCT VFR BLD AUTO: 40.1 % (ref 37–48.5)
HGB BLD-MCNC: 12.6 G/DL (ref 12–16)
IMM GRANULOCYTES # BLD AUTO: 0.02 K/UL (ref 0–0.04)
IMM GRANULOCYTES NFR BLD AUTO: 0.3 % (ref 0–0.5)
LYMPHOCYTES # BLD AUTO: 1.4 K/UL (ref 1–4.8)
LYMPHOCYTES NFR BLD: 18.7 % (ref 18–48)
MCH RBC QN AUTO: 28.2 PG (ref 27–31)
MCHC RBC AUTO-ENTMCNC: 31.4 G/DL (ref 32–36)
MCV RBC AUTO: 90 FL (ref 82–98)
MONOCYTES # BLD AUTO: 0.4 K/UL (ref 0.3–1)
MONOCYTES NFR BLD: 5.7 % (ref 4–15)
NEUTROPHILS # BLD AUTO: 5.6 K/UL (ref 1.8–7.7)
NEUTROPHILS NFR BLD: 73.2 % (ref 38–73)
NRBC BLD-RTO: 0 /100 WBC
PLATELET # BLD AUTO: 362 K/UL (ref 150–450)
PMV BLD AUTO: 9.5 FL (ref 9.2–12.9)
POTASSIUM SERPL-SCNC: 3.9 MMOL/L (ref 3.5–5.1)
PROT SERPL-MCNC: 6.9 G/DL (ref 6–8.4)
RBC # BLD AUTO: 4.47 M/UL (ref 4–5.4)
SODIUM SERPL-SCNC: 142 MMOL/L (ref 136–145)
WBC # BLD AUTO: 7.59 K/UL (ref 3.9–12.7)

## 2024-12-26 PROCEDURE — 82728 ASSAY OF FERRITIN: CPT | Performed by: INTERNAL MEDICINE

## 2024-12-26 PROCEDURE — 84466 ASSAY OF TRANSFERRIN: CPT | Performed by: INTERNAL MEDICINE

## 2024-12-26 PROCEDURE — 80053 COMPREHEN METABOLIC PANEL: CPT | Performed by: INTERNAL MEDICINE

## 2024-12-26 PROCEDURE — 36415 COLL VENOUS BLD VENIPUNCTURE: CPT | Performed by: INTERNAL MEDICINE

## 2024-12-26 PROCEDURE — 85025 COMPLETE CBC W/AUTO DIFF WBC: CPT | Performed by: INTERNAL MEDICINE

## 2024-12-26 PROCEDURE — 84443 ASSAY THYROID STIM HORMONE: CPT | Performed by: INTERNAL MEDICINE

## 2024-12-27 LAB
FERRITIN SERPL-MCNC: 19 NG/ML (ref 20–300)
IRON SERPL-MCNC: 28 UG/DL (ref 30–160)
SATURATED IRON: 8 % (ref 20–50)
TOTAL IRON BINDING CAPACITY: 361 UG/DL (ref 250–450)
TRANSFERRIN SERPL-MCNC: 244 MG/DL (ref 200–375)
TSH SERPL DL<=0.005 MIU/L-ACNC: 1.54 UIU/ML (ref 0.4–4)

## 2025-01-06 ENCOUNTER — TELEPHONE (OUTPATIENT)
Dept: OPHTHALMOLOGY | Facility: CLINIC | Age: 80
End: 2025-01-06
Payer: MEDICARE

## 2025-01-06 NOTE — TELEPHONE ENCOUNTER
Called pt back, but pt is at our  making an appointment.    ----- Message from [facility] sent at 1/6/2025  1:15 PM CST -----  .Type: Patient Call Back        Who called:   Patient      What is the request in detail:    Called in concerning a missed call from providers office . Please call back   Can the clinic reply by [facility]?           Would the patient rather a call back or a response via [facility]?      call   Best call back number:  [phone]

## 2025-01-10 ENCOUNTER — OFFICE VISIT (OUTPATIENT)
Dept: OPHTHALMOLOGY | Facility: CLINIC | Age: 80
End: 2025-01-10
Payer: MEDICARE

## 2025-01-10 DIAGNOSIS — Z96.1 PSEUDOPHAKIA: ICD-10-CM

## 2025-01-10 DIAGNOSIS — H35.372 EPIRETINAL MEMBRANE (ERM) OF LEFT EYE: Primary | ICD-10-CM

## 2025-01-10 PROCEDURE — 99999 PR PBB SHADOW E&M-EST. PATIENT-LVL III: CPT | Mod: PBBFAC,,, | Performed by: OPHTHALMOLOGY

## 2025-01-10 PROCEDURE — 99213 OFFICE O/P EST LOW 20 MIN: CPT | Mod: PBBFAC | Performed by: OPHTHALMOLOGY

## 2025-01-10 NOTE — PROGRESS NOTES
===============================  Date today is 1/10/2025  Selam Botello is a 79 y.o. female  Last visit [facility]: :4/26/2022   Last visit eye dept. Visit date not found    Corrected distance visual acuity was 20/30 in the right eye and 20/30 in the left eye.  Tonometry       Tonometry ([facility], 9:40 AM)         Right Left    Pressure 16 13                  Wearing Rx       Wearing Rx         Sphere Cylinder Axis Add    Right +1.00 +1.00 165 +2.50    Left -2.00 +1.50 010 +2.50      Type: PAL                  Manifest Refraction       Manifest Refraction         Sphere Cylinder Axis Dist VA    Right +0.50 +1.00 165 20/20    Left -2.25 +2.00 010 20/25                  Not recorded       Chief Complaint   Patient presents with    Annual Exam     Yearly exam     HPI     Annual Exam            Comments: Yearly exam          Comments    Referred by Dr. Neri    1. S/p ERM tx OS with Dr. Soto ~2017  2.Fhx AMD- mother  3. PCIOL OU with YAG OU           Last edited by Jessica Sandoval on 1/10/2025  8:57 AM.      Problem List Items Addressed This Visit    None  Visit Diagnoses       Epiretinal membrane (ERM) of left eye    -  Primary    Relevant Orders    Posterior Segment OCT Retina-Both eyes (Completed)    Pseudophakia              Instructed to call 24/7 for any worsening of vision, visual distortion or pain.  Check OU independently daily.    Gave my office and personal cell phone number.  ________________  1/10/2025 today  Selam Botello    ERM OS  OCT stable ERM OS, OD ok, good EZ    PCIOL OU post YAG OU  Sharp disc OU 0.3  Can use Ivizia tears for itchy lids  IOP ok  No glaucoma  Macula looks good  No macular degeneration    RTC 1 year  Instructed to call 24/7 for any worsening of vision or symptoms. Check OU daily.   Gave my office and cell phone number.      =============================

## 2025-01-15 ENCOUNTER — OFFICE VISIT (OUTPATIENT)
Dept: HEMATOLOGY/ONCOLOGY | Facility: CLINIC | Age: 80
End: 2025-01-15
Payer: MEDICARE

## 2025-01-15 VITALS
HEIGHT: 62 IN | TEMPERATURE: 97 F | BODY MASS INDEX: 25.28 KG/M2 | OXYGEN SATURATION: 97 % | HEART RATE: 68 BPM | WEIGHT: 137.38 LBS | SYSTOLIC BLOOD PRESSURE: 127 MMHG | RESPIRATION RATE: 18 BRPM | DIASTOLIC BLOOD PRESSURE: 70 MMHG

## 2025-01-15 DIAGNOSIS — K51.00 ULCERATIVE PANCOLITIS WITHOUT COMPLICATION: ICD-10-CM

## 2025-01-15 DIAGNOSIS — Z80.3 FAMILY HISTORY OF BREAST CANCER IN SISTER: ICD-10-CM

## 2025-01-15 DIAGNOSIS — D50.0 IRON DEFICIENCY ANEMIA DUE TO CHRONIC BLOOD LOSS: Primary | ICD-10-CM

## 2025-01-15 DIAGNOSIS — K55.20 ANGIODYSPLASIA OF CECUM: ICD-10-CM

## 2025-01-15 DIAGNOSIS — C67.9 MALIGNANT NEOPLASM OF URINARY BLADDER, UNSPECIFIED SITE: ICD-10-CM

## 2025-01-15 DIAGNOSIS — Z90.13 H/O BILATERAL MASTECTOMY: ICD-10-CM

## 2025-01-15 DIAGNOSIS — E83.110 HEREDITARY HEMOCHROMATOSIS: ICD-10-CM

## 2025-01-15 PROCEDURE — 99213 OFFICE O/P EST LOW 20 MIN: CPT | Mod: PBBFAC | Performed by: INTERNAL MEDICINE

## 2025-01-15 PROCEDURE — 99214 OFFICE O/P EST MOD 30 MIN: CPT | Mod: S$PBB,,, | Performed by: INTERNAL MEDICINE

## 2025-01-15 PROCEDURE — 99999 PR PBB SHADOW E&M-EST. PATIENT-LVL III: CPT | Mod: PBBFAC,,, | Performed by: INTERNAL MEDICINE

## 2025-01-15 NOTE — PROGRESS NOTES
Subjective:       Patient ID: Selam Botello is a 79 y.o. female.    Chief Complaint: Results    HPI:  79-year-old female history of iron overload.  Patient has had recurrent angiodysplasias in cecum.  With continued slow GI blood loss.  The patient has remained in hemodynamically stable hemoglobin range.  With iron deficiency    Past Medical History:   Diagnosis Date    Acid reflux     Allergy     Arthritis     Atrial tachycardia     Bladder cancer     Breast cyst     Cataract     Colon polyp     COVID 5/19/2022    Deep vein thrombosis     Degenerative disc disease     Encounter for blood transfusion     General anesthetics causing adverse effect in therapeutic use     GI bleed     Hematuria, gross     Inflammatory bowel disease     Liver disease     cyst    Malignant neoplasm of lateral wall of urinary bladder 10/31/2019    Osteoporosis     PONV (postoperative nausea and vomiting)     Retina disorder, left     Skin cancer     Thyroid disease     Urinary tract infection      Family History   Problem Relation Name Age of Onset    COPD Mother skin     Cancer Mother skin     COPD Father prostate/skin     Cancer Father prostate/skin     Cancer Sister       Social History     Socioeconomic History    Marital status:    Tobacco Use    Smoking status: Never     Passive exposure: Past    Smokeless tobacco: Never   Substance and Sexual Activity    Alcohol use: No    Drug use: No    Sexual activity: Yes     Partners: Male     Social Drivers of Health     Financial Resource Strain: Low Risk  (9/5/2024)    Overall Financial Resource Strain (CARDIA)     Difficulty of Paying Living Expenses: Not hard at all   Food Insecurity: No Food Insecurity (9/5/2024)    Hunger Vital Sign     Worried About Running Out of Food in the Last Year: Never true     Ran Out of Food in the Last Year: Never true   Transportation Needs: No Transportation Needs (9/5/2024)    PRAPARE - Transportation     Lack of Transportation (Medical): No      Lack of Transportation (Non-Medical): No   Physical Activity: Insufficiently Active (9/5/2024)    Exercise Vital Sign     Days of Exercise per Week: 3 days     Minutes of Exercise per Session: 40 min   Stress: Stress Concern Present (9/5/2024)    [employer] [place] [employer] - Occupational Stress Questionnaire     Feeling of Stress : To some extent   Housing Stability: Low Risk  (9/5/2024)    Housing Stability Vital Sign     Unable to Pay for Housing in the Last Year: No     Homeless in the Last Year: No     Past Surgical History:   Procedure Laterality Date    ADENOIDECTOMY      BLADDER FULGURATION Right 9/12/2018    Procedure: FULGURATION, BLADDER;  Surgeon: Isaias Shah MD;  Location: [facility] OR;  Service: Urology;  Laterality: Right;    BLADDER FULGURATION N/A 4/24/2019    Procedure: FULGURATION, BLADDER;  Surgeon: Isaias Shah MD;  Location: [facility];  Service: Urology;  Laterality: N/A;    BREAST SURGERY Bilateral     CATARACT EXTRACTION      CYSTOSCOPY      CYSTOSCOPY N/A 4/24/2019    Procedure: CYSTOSCOPY;  Surgeon: Isaias Shah MD;  Location: [facility];  Service: Urology;  Laterality: N/A;    CYSTOSCOPY WITH URETEROSCOPY, RETROGRADE PYELOGRAPHY, AND INSERTION OF STENT Left 9/12/2018    Procedure: CYSTOSCOPY, WITH RETROGRADE PYELOGRAM AND URETERAL STENT INSERTION;  Surgeon: Isaias Shah MD;  Location: [facility];  Service: Urology;  Laterality: Left;    EYE SURGERY Left 2017    retina pucker    INSTILLATION OF URINARY BLADDER N/A 9/12/2018    Procedure: INSTILLATION, URINARY BLADDER;  Surgeon: Isaias Shah MD;  Location: [facility];  Service: Urology;  Laterality: N/A;  Chemo agent instillation     JOINT REPLACEMENT Bilateral     hip    JOINT REPLACEMENT Right     knee    MASTECTOMY Bilateral 1991    RETROGRADE PYELOGRAPHY Bilateral 10/31/2019    Procedure: PYELOGRAM, RETROGRADE;  Surgeon: Baldo Gates MD;  Location: [facility];  Service: Urology;  Laterality: Bilateral;     "TONSILLECTOMY      TURBT      TURBT, WITH BLUE LIGHT CYSTOSCOPY AND CYSVIEW Bilateral 10/31/2019    Procedure: TURBT,WITH BLUE LIGHT CYSTOSCOPY AND CYSVIEW;  Surgeon: Baldo Gates MD;  Location: [facility];  Service: Urology;  Laterality: Bilateral;    TYMPANOSTOMY TUBE PLACEMENT      VASCULAR SURGERY Right     popliteal stent    WISDOM TOOTH EXTRACTION         Labs:  Lab Results   Component Value Date    WBC 7.59 12/26/2024    HGB 12.6 12/26/2024    HCT 40.1 12/26/2024    MCV 90 12/26/2024     12/26/2024     BMP  Lab Results   Component Value Date     12/26/2024    K 3.9 12/26/2024     12/26/2024    CO2 23 12/26/2024    BUN 23 12/26/2024    CREATININE 1.0 12/26/2024    CALCIUM 10.3 12/26/2024    ANIONGAP 11 12/26/2024    ESTGFRAFRICA 91 08/16/2022    EGFRNONAA >60 06/08/2022     Lab Results   Component Value Date    ALT 16 12/26/2024    AST 18 12/26/2024    ALKPHOS 75 12/26/2024    BILITOT 0.4 12/26/2024       Lab Results   Component Value Date    IRON 28 (L) 12/26/2024    TIBC 361 12/26/2024    FERRITIN 19 (L) 12/26/2024     No results found for: "LQWLFTAH62"  No results found for: "FOLATE"  Lab Results   Component Value Date    TSH 1.545 12/26/2024         Review of Systems   Constitutional:  Negative for activity change, appetite change, chills, diaphoresis, fatigue, fever and unexpected weight change.   HENT:  Negative for congestion, dental problem, drooling, ear discharge, ear pain, facial swelling, hearing loss, mouth sores, nosebleeds, postnasal drip, rhinorrhea, sinus pressure, sneezing, sore throat, tinnitus, trouble swallowing and voice change.    Eyes:  Negative for photophobia, pain, discharge, redness, itching and visual disturbance.   Respiratory:  Negative for cough, choking, chest tightness, shortness of breath, wheezing and stridor.    Cardiovascular:  Negative for chest pain, palpitations and leg swelling.   Gastrointestinal:  Negative for abdominal distention, abdominal pain, " anal bleeding, blood in stool, constipation, diarrhea, nausea, rectal pain and vomiting.   Endocrine: Negative for cold intolerance, heat intolerance, polydipsia, polyphagia and polyuria.   Genitourinary:  Negative for decreased urine volume, difficulty urinating, dyspareunia, dysuria, enuresis, flank pain, frequency, genital sores, hematuria, menstrual problem, pelvic pain, urgency, vaginal bleeding, vaginal discharge and vaginal pain.   Musculoskeletal:  Negative for arthralgias, back pain, gait problem, joint swelling, myalgias, neck pain and neck stiffness.   Skin:  Negative for color change, pallor and rash.   Allergic/Immunologic: Negative for environmental allergies, food allergies and immunocompromised state.   Neurological:  Negative for dizziness, tremors, seizures, syncope, facial asymmetry, speech difficulty, weakness, light-headedness, numbness and headaches.   Hematological:  Negative for adenopathy. Does not bruise/bleed easily.   Psychiatric/Behavioral:  Negative for agitation, behavioral problems, confusion, decreased concentration, dysphoric mood, hallucinations, self-injury, sleep disturbance and suicidal ideas. The patient is not nervous/anxious and is not hyperactive.        Objective:      Physical Exam  Vitals reviewed.   Constitutional:       General: She is not in acute distress.     Appearance: She is well-developed. She is not diaphoretic.   HENT:      Head: Normocephalic and atraumatic.      Right Ear: External ear normal.      Left Ear: External ear normal.      Nose: Nose normal.      Right Sinus: No maxillary sinus tenderness or frontal sinus tenderness.      Left Sinus: No maxillary sinus tenderness or frontal sinus tenderness.      Mouth/Throat:      Pharynx: No oropharyngeal exudate.   Eyes:      General: Lids are normal. No scleral icterus.        Right eye: No discharge.         Left eye: No discharge.      Conjunctiva/sclera: Conjunctivae normal.      Right eye: Right conjunctiva  is not injected. No hemorrhage.     Left eye: Left conjunctiva is not injected. No hemorrhage.     Pupils: Pupils are equal, round, and reactive to light.   Neck:      Thyroid: No thyromegaly.      Vascular: No JVD.      Trachea: No tracheal deviation.   Cardiovascular:      Rate and Rhythm: Normal rate.   Pulmonary:      Effort: Pulmonary effort is normal. No respiratory distress.      Breath sounds: No stridor.   Chest:      Chest wall: No tenderness.   Abdominal:      General: Bowel sounds are normal. There is no distension.      Palpations: Abdomen is soft. There is no hepatomegaly, splenomegaly or mass.      Tenderness: There is no abdominal tenderness. There is no rebound.   Musculoskeletal:         General: No tenderness. Normal range of motion.      Cervical back: Normal range of motion and neck supple.   Lymphadenopathy:      Cervical: No cervical adenopathy.      Upper Body:      Right upper body: No supraclavicular adenopathy.      Left upper body: No supraclavicular adenopathy.   Skin:     General: Skin is dry.      Findings: No erythema or rash.   Neurological:      Mental Status: She is alert and oriented to person, place, and time.      Cranial Nerves: No cranial nerve deficit.      Coordination: Coordination normal.   Psychiatric:         Behavior: Behavior normal.         Thought Content: Thought content normal.         Judgment: Judgment normal.             Assessment:      1. Iron deficiency anemia due to chronic blood loss    2. Malignant neoplasm of urinary bladder, unspecified site    3. H/O bilateral mastectomy    4. Family history of breast cancer in sister    5. Angiodysplasia of cecum    6. Ulcerative pancolitis without complication    7. Hereditary hemochromatosis           Med Onc Chart Routing      Follow up with physician . Return in 3 months CBC CMP serum iron prior   Follow up with JASON    Infusion scheduling note    Injection scheduling note    Labs    Imaging    Pharmacy appointment     Other referrals                   Plan:     Documented iron deficiency will continue follow with laboratory studies no need for phlebotomy at present.  Reassurance given patient appears to be in a steady state with iron overload but with continued slow GI blood loss with recent upper lower endoscopies last year including video capsule.  At this time follow-up is needed        Adonay Neri Jr, MD FACP

## 2025-01-17 ENCOUNTER — PATIENT MESSAGE (OUTPATIENT)
Dept: OPHTHALMOLOGY | Facility: CLINIC | Age: 80
End: 2025-01-17
Payer: MEDICARE

## 2025-02-14 ENCOUNTER — PATIENT MESSAGE (OUTPATIENT)
Dept: HEMATOLOGY/ONCOLOGY | Facility: CLINIC | Age: 80
End: 2025-02-14
Payer: MEDICARE

## 2025-02-14 ENCOUNTER — LAB VISIT (OUTPATIENT)
Dept: LAB | Facility: HOSPITAL | Age: 80
End: 2025-02-14
Attending: INTERNAL MEDICINE
Payer: MEDICARE

## 2025-02-14 DIAGNOSIS — C67.9 MALIGNANT NEOPLASM OF URINARY BLADDER, UNSPECIFIED SITE: ICD-10-CM

## 2025-02-14 LAB
ALBUMIN SERPL BCP-MCNC: 4.1 G/DL (ref 3.5–5.2)
ALP SERPL-CCNC: 61 U/L (ref 40–150)
ALT SERPL W/O P-5'-P-CCNC: 13 U/L (ref 10–44)
ANION GAP SERPL CALC-SCNC: 10 MMOL/L (ref 8–16)
AST SERPL-CCNC: 16 U/L (ref 10–40)
BASOPHILS # BLD AUTO: 0.04 K/UL (ref 0–0.2)
BASOPHILS NFR BLD: 0.8 % (ref 0–1.9)
BILIRUB SERPL-MCNC: 0.4 MG/DL (ref 0.1–1)
BUN SERPL-MCNC: 16 MG/DL (ref 8–23)
CALCIUM SERPL-MCNC: 10 MG/DL (ref 8.7–10.5)
CHLORIDE SERPL-SCNC: 108 MMOL/L (ref 95–110)
CO2 SERPL-SCNC: 23 MMOL/L (ref 23–29)
CREAT SERPL-MCNC: 0.8 MG/DL (ref 0.5–1.4)
DIFFERENTIAL METHOD BLD: ABNORMAL
EOSINOPHIL # BLD AUTO: 0.1 K/UL (ref 0–0.5)
EOSINOPHIL NFR BLD: 1.6 % (ref 0–8)
ERYTHROCYTE [DISTWIDTH] IN BLOOD BY AUTOMATED COUNT: 15.6 % (ref 11.5–14.5)
EST. GFR  (NO RACE VARIABLE): >60 ML/MIN/1.73 M^2
FERRITIN SERPL-MCNC: 15 NG/ML (ref 20–300)
GLUCOSE SERPL-MCNC: 106 MG/DL (ref 70–110)
HCT VFR BLD AUTO: 39.3 % (ref 37–48.5)
HGB BLD-MCNC: 12.3 G/DL (ref 12–16)
IMM GRANULOCYTES # BLD AUTO: 0.01 K/UL (ref 0–0.04)
IMM GRANULOCYTES NFR BLD AUTO: 0.2 % (ref 0–0.5)
IRON SERPL-MCNC: 33 UG/DL (ref 30–160)
LYMPHOCYTES # BLD AUTO: 1.2 K/UL (ref 1–4.8)
LYMPHOCYTES NFR BLD: 22.6 % (ref 18–48)
MCH RBC QN AUTO: 28.2 PG (ref 27–31)
MCHC RBC AUTO-ENTMCNC: 31.3 G/DL (ref 32–36)
MCV RBC AUTO: 90 FL (ref 82–98)
MONOCYTES # BLD AUTO: 0.4 K/UL (ref 0.3–1)
MONOCYTES NFR BLD: 7.1 % (ref 4–15)
NEUTROPHILS # BLD AUTO: 3.4 K/UL (ref 1.8–7.7)
NEUTROPHILS NFR BLD: 67.7 % (ref 38–73)
NRBC BLD-RTO: 0 /100 WBC
PLATELET # BLD AUTO: 317 K/UL (ref 150–450)
PMV BLD AUTO: 9.5 FL (ref 9.2–12.9)
POTASSIUM SERPL-SCNC: 4.1 MMOL/L (ref 3.5–5.1)
PROT SERPL-MCNC: 6.8 G/DL (ref 6–8.4)
RBC # BLD AUTO: 4.36 M/UL (ref 4–5.4)
SATURATED IRON: 9 % (ref 20–50)
SODIUM SERPL-SCNC: 141 MMOL/L (ref 136–145)
TOTAL IRON BINDING CAPACITY: 364 UG/DL (ref 250–450)
TRANSFERRIN SERPL-MCNC: 246 MG/DL (ref 200–375)
WBC # BLD AUTO: 5.08 K/UL (ref 3.9–12.7)

## 2025-02-14 PROCEDURE — 80053 COMPREHEN METABOLIC PANEL: CPT | Performed by: INTERNAL MEDICINE

## 2025-02-14 PROCEDURE — 85025 COMPLETE CBC W/AUTO DIFF WBC: CPT | Performed by: INTERNAL MEDICINE

## 2025-02-14 PROCEDURE — 83540 ASSAY OF IRON: CPT | Performed by: INTERNAL MEDICINE

## 2025-02-14 PROCEDURE — 82728 ASSAY OF FERRITIN: CPT | Performed by: INTERNAL MEDICINE

## 2025-02-14 PROCEDURE — 36415 COLL VENOUS BLD VENIPUNCTURE: CPT | Performed by: INTERNAL MEDICINE

## 2025-02-15 ENCOUNTER — RESULTS FOLLOW-UP (OUTPATIENT)
Dept: HEMATOLOGY/ONCOLOGY | Facility: CLINIC | Age: 80
End: 2025-02-15

## 2025-02-28 NOTE — TELEPHONE ENCOUNTER
I spoke with patient, who stated she talked to , who advised her that she could stay on the OTC azo standard for her bladder discomfort and dysuria, patient stated she has eliminated coffee, and other bladder irritants and drinking plenty of water, patient stated she will tried to reduce the azo from time to time to see if her bladder feels better to reduce any chance of hurting her kidneys. I advised if the irritation continues we may need to bring her in to see  to rule out IC. Patient agreed.  
3

## 2025-04-08 ENCOUNTER — TELEPHONE (OUTPATIENT)
Dept: OPHTHALMOLOGY | Facility: CLINIC | Age: 80
End: 2025-04-08
Payer: MEDICARE

## 2025-04-08 NOTE — TELEPHONE ENCOUNTER
----- Message from Dulce Maria Lopez sent at 4/8/2025  1:28 PM CDT -----    ----- Message -----  From: Katelyn Trinh  Sent: 4/8/2025  11:46 AM CDT  To: [facility] Cast[facility] Staff    Type:  Patient Returning CallWho Called:Jes Watson Message for Patient:NurseDoes the patient know what this is regarding?:Would the patient rather a call back or a response via [facility]? [facility] Back [facility]:[phone] Information: Need a callback

## 2025-04-08 NOTE — TELEPHONE ENCOUNTER
Called patient she was concerned about glasses that she was getting made in 02/11/2025. I called the optical shop the glasses will be there tomorrow and they will call the patient when to come get the glasses.

## 2025-04-15 ENCOUNTER — LAB VISIT (OUTPATIENT)
Dept: LAB | Facility: HOSPITAL | Age: 80
End: 2025-04-15
Attending: INTERNAL MEDICINE
Payer: MEDICARE

## 2025-04-15 DIAGNOSIS — C67.9 MALIGNANT NEOPLASM OF URINARY BLADDER, UNSPECIFIED SITE: ICD-10-CM

## 2025-04-15 LAB
ABSOLUTE EOSINOPHIL (OHS): 0.23 K/UL
ABSOLUTE MONOCYTE (OHS): 0.58 K/UL (ref 0.3–1)
ABSOLUTE NEUTROPHIL COUNT (OHS): 3.72 K/UL (ref 1.8–7.7)
ALBUMIN SERPL BCP-MCNC: 4.2 G/DL (ref 3.5–5.2)
ALP SERPL-CCNC: 66 UNIT/L (ref 40–150)
ALT SERPL W/O P-5'-P-CCNC: 13 UNIT/L (ref 10–44)
ANION GAP (OHS): 7 MMOL/L (ref 8–16)
AST SERPL-CCNC: 18 UNIT/L (ref 11–45)
BASOPHILS # BLD AUTO: 0.05 K/UL
BASOPHILS NFR BLD AUTO: 0.8 %
BILIRUB SERPL-MCNC: 0.4 MG/DL (ref 0.1–1)
BUN SERPL-MCNC: 17 MG/DL (ref 8–23)
CALCIUM SERPL-MCNC: 9.7 MG/DL (ref 8.7–10.5)
CHLORIDE SERPL-SCNC: 108 MMOL/L (ref 95–110)
CO2 SERPL-SCNC: 26 MMOL/L (ref 23–29)
CREAT SERPL-MCNC: 0.7 MG/DL (ref 0.5–1.4)
ERYTHROCYTE [DISTWIDTH] IN BLOOD BY AUTOMATED COUNT: 15.6 % (ref 11.5–14.5)
GFR SERPLBLD CREATININE-BSD FMLA CKD-EPI: >60 ML/MIN/1.73/M2
GLUCOSE SERPL-MCNC: 98 MG/DL (ref 70–110)
HCT VFR BLD AUTO: 36.8 % (ref 37–48.5)
HGB BLD-MCNC: 12.1 GM/DL (ref 12–16)
IMM GRANULOCYTES # BLD AUTO: 0.01 K/UL (ref 0–0.04)
IMM GRANULOCYTES NFR BLD AUTO: 0.2 % (ref 0–0.5)
LYMPHOCYTES # BLD AUTO: 1.61 K/UL (ref 1–4.8)
MCH RBC QN AUTO: 28.9 PG (ref 27–31)
MCHC RBC AUTO-ENTMCNC: 32.9 G/DL (ref 32–36)
MCV RBC AUTO: 88 FL (ref 82–98)
NUCLEATED RBC (/100WBC) (OHS): 0 /100 WBC
PLATELET # BLD AUTO: 304 K/UL (ref 150–450)
PMV BLD AUTO: 9.3 FL (ref 9.2–12.9)
POTASSIUM SERPL-SCNC: 4.2 MMOL/L (ref 3.5–5.1)
PROT SERPL-MCNC: 7 GM/DL (ref 6–8.4)
RBC # BLD AUTO: 4.18 M/UL (ref 4–5.4)
RELATIVE EOSINOPHIL (OHS): 3.7 %
RELATIVE LYMPHOCYTE (OHS): 26 % (ref 18–48)
RELATIVE MONOCYTE (OHS): 9.4 % (ref 4–15)
RELATIVE NEUTROPHIL (OHS): 59.9 % (ref 38–73)
SODIUM SERPL-SCNC: 141 MMOL/L (ref 136–145)
WBC # BLD AUTO: 6.2 K/UL (ref 3.9–12.7)

## 2025-04-15 PROCEDURE — 82728 ASSAY OF FERRITIN: CPT

## 2025-04-15 PROCEDURE — 85025 COMPLETE CBC W/AUTO DIFF WBC: CPT

## 2025-04-15 PROCEDURE — 84466 ASSAY OF TRANSFERRIN: CPT

## 2025-04-15 PROCEDURE — 36415 COLL VENOUS BLD VENIPUNCTURE: CPT

## 2025-04-15 PROCEDURE — 84155 ASSAY OF PROTEIN SERUM: CPT

## 2025-04-16 ENCOUNTER — PATIENT MESSAGE (OUTPATIENT)
Dept: HEMATOLOGY/ONCOLOGY | Facility: CLINIC | Age: 80
End: 2025-04-16
Payer: MEDICARE

## 2025-04-16 LAB
FERRITIN SERPL-MCNC: 18 NG/ML (ref 20–300)
IRON SATN MFR SERPL: 8 % (ref 20–50)
IRON SERPL-MCNC: 28 UG/DL (ref 30–160)
TIBC SERPL-MCNC: 357 UG/DL (ref 250–450)
TRANSFERRIN SERPL-MCNC: 241 MG/DL (ref 200–375)

## 2025-04-17 ENCOUNTER — RESULTS FOLLOW-UP (OUTPATIENT)
Dept: HEMATOLOGY/ONCOLOGY | Facility: CLINIC | Age: 80
End: 2025-04-17

## 2025-04-22 ENCOUNTER — OFFICE VISIT (OUTPATIENT)
Dept: HEMATOLOGY/ONCOLOGY | Facility: CLINIC | Age: 80
End: 2025-04-22
Payer: MEDICARE

## 2025-04-22 VITALS
TEMPERATURE: 98 F | SYSTOLIC BLOOD PRESSURE: 113 MMHG | OXYGEN SATURATION: 99 % | WEIGHT: 138.25 LBS | BODY MASS INDEX: 25.44 KG/M2 | DIASTOLIC BLOOD PRESSURE: 71 MMHG | HEIGHT: 62 IN | HEART RATE: 57 BPM

## 2025-04-22 DIAGNOSIS — K55.20 ANGIODYSPLASIA OF CECUM: ICD-10-CM

## 2025-04-22 DIAGNOSIS — E03.9 ACQUIRED HYPOTHYROIDISM: ICD-10-CM

## 2025-04-22 DIAGNOSIS — K51.00 ULCERATIVE PANCOLITIS WITHOUT COMPLICATION: ICD-10-CM

## 2025-04-22 DIAGNOSIS — D50.0 IRON DEFICIENCY ANEMIA DUE TO CHRONIC BLOOD LOSS: Primary | ICD-10-CM

## 2025-04-22 DIAGNOSIS — Z80.3 FAMILY HISTORY OF BREAST CANCER IN SISTER: ICD-10-CM

## 2025-04-22 DIAGNOSIS — C67.9 MALIGNANT NEOPLASM OF URINARY BLADDER, UNSPECIFIED SITE: ICD-10-CM

## 2025-04-22 DIAGNOSIS — E83.110 HEREDITARY HEMOCHROMATOSIS: ICD-10-CM

## 2025-04-22 DIAGNOSIS — Z90.13 H/O BILATERAL MASTECTOMY: ICD-10-CM

## 2025-04-22 PROCEDURE — 99214 OFFICE O/P EST MOD 30 MIN: CPT | Mod: S$PBB,,, | Performed by: INTERNAL MEDICINE

## 2025-04-22 PROCEDURE — 99214 OFFICE O/P EST MOD 30 MIN: CPT | Mod: PBBFAC | Performed by: INTERNAL MEDICINE

## 2025-04-22 PROCEDURE — 99999 PR PBB SHADOW E&M-EST. PATIENT-LVL IV: CPT | Mod: PBBFAC,,, | Performed by: INTERNAL MEDICINE

## 2025-04-22 NOTE — PROGRESS NOTES
Subjective:       Patient ID: Selam Botello is a 79 y.o. female.    Chief Complaint: Results and Anemia    HPI:  79-year-old female previous history of iron overload.  Patient has had known history of angiodysplasia of cecum.  Patient returns with repeat CBC and iron status today for review    Past Medical History:   Diagnosis Date    Acid reflux     Allergy     Arthritis     Atrial tachycardia     Bladder cancer     Breast cyst     Cataract     Colon polyp     COVID 5/19/2022    Deep vein thrombosis     Degenerative disc disease     Encounter for blood transfusion     General anesthetics causing adverse effect in therapeutic use     GI bleed     Hematuria, gross     Inflammatory bowel disease     Liver disease     cyst    Malignant neoplasm of lateral wall of urinary bladder 10/31/2019    Osteoporosis     PONV (postoperative nausea and vomiting)     Retina disorder, left     Skin cancer     Thyroid disease     Urinary tract infection      Family History   Problem Relation Name Age of Onset    COPD Mother skin     Cancer Mother skin     COPD Father prostate/skin     Cancer Father prostate/skin     Cancer Sister       Social History[1]  Past Surgical History:   Procedure Laterality Date    ADENOIDECTOMY      BLADDER FULGURATION Right 9/12/2018    Procedure: FULGURATION, BLADDER;  Surgeon: Isaias Shah MD;  Location: [facility] OR;  Service: Urology;  Laterality: Right;    BLADDER FULGURATION N/A 4/24/2019    Procedure: FULGURATION, BLADDER;  Surgeon: Isaias Shah MD;  Location: [facility] OR;  Service: Urology;  Laterality: N/A;    BREAST SURGERY Bilateral     CATARACT EXTRACTION      CYSTOSCOPY      CYSTOSCOPY N/A 4/24/2019    Procedure: CYSTOSCOPY;  Surgeon: Isaias Shah MD;  Location: [facility] OR;  Service: Urology;  Laterality: N/A;    CYSTOSCOPY WITH URETEROSCOPY, RETROGRADE PYELOGRAPHY, AND INSERTION OF STENT Left 9/12/2018    Procedure: CYSTOSCOPY, WITH RETROGRADE PYELOGRAM AND URETERAL STENT INSERTION;   "Surgeon: Isaias Shah MD;  Location: [facility];  Service: Urology;  Laterality: Left;    EYE SURGERY Left 2017    retina pucker    INSTILLATION OF URINARY BLADDER N/A 9/12/2018    Procedure: INSTILLATION, URINARY BLADDER;  Surgeon: Isaias Shah MD;  Location: [facility];  Service: Urology;  Laterality: N/A;  Chemo agent instillation     JOINT REPLACEMENT Bilateral     hip    JOINT REPLACEMENT Right     knee    MASTECTOMY Bilateral 1991    RETROGRADE PYELOGRAPHY Bilateral 10/31/2019    Procedure: PYELOGRAM, RETROGRADE;  Surgeon: Baldo Gates MD;  Location: [facility];  Service: Urology;  Laterality: Bilateral;    TONSILLECTOMY      TURBT      TURBT, WITH BLUE LIGHT CYSTOSCOPY AND CYSVIEW Bilateral 10/31/2019    Procedure: TURBT,WITH BLUE LIGHT CYSTOSCOPY AND CYSVIEW;  Surgeon: Baldo Gates MD;  Location: [facility];  Service: Urology;  Laterality: Bilateral;    TYMPANOSTOMY TUBE PLACEMENT      VASCULAR SURGERY Right     popliteal stent    WISDOM TOOTH EXTRACTION         Labs:  Lab Results   Component Value Date    WBC 6.20 04/15/2025    HGB 12.1 04/15/2025    HCT 36.8 (L) 04/15/2025    MCV 88 04/15/2025     04/15/2025     BMP  Lab Results   Component Value Date     04/15/2025    K 4.2 04/15/2025     04/15/2025    CO2 26 04/15/2025    BUN 17 04/15/2025    CREATININE 0.7 04/15/2025    CALCIUM 9.7 04/15/2025    ANIONGAP 7 (L) 04/15/2025    ESTGFRAFRICA 91 08/16/2022    EGFRNONAA >60 06/08/2022     Lab Results   Component Value Date    ALT 13 04/15/2025    AST 18 04/15/2025    ALKPHOS 66 04/15/2025    BILITOT 0.4 04/15/2025       Lab Results   Component Value Date    IRON 28 (L) 04/15/2025    TIBC 357 04/15/2025    FERRITIN 18.0 (L) 04/15/2025     No results found for: "VVSYXRSO16"  No results found for: "FOLATE"  Lab Results   Component Value Date    TSH 1.545 12/26/2024         Review of Systems   Constitutional:  Positive for fatigue. Negative for activity change, appetite change, chills, " diaphoresis, fever and unexpected weight change.   HENT:  Negative for congestion, dental problem, drooling, ear discharge, ear pain, facial swelling, hearing loss, mouth sores, nosebleeds, postnasal drip, rhinorrhea, sinus pressure, sneezing, sore throat, tinnitus, trouble swallowing and voice change.    Eyes:  Negative for photophobia, pain, discharge, redness, itching and visual disturbance.   Respiratory:  Negative for cough, choking, chest tightness, shortness of breath, wheezing and stridor.    Cardiovascular:  Negative for chest pain, palpitations and leg swelling.   Gastrointestinal:  Negative for abdominal distention, abdominal pain, anal bleeding, blood in stool, constipation, diarrhea, nausea, rectal pain and vomiting.   Endocrine: Negative for cold intolerance, heat intolerance, polydipsia, polyphagia and polyuria.   Genitourinary:  Negative for decreased urine volume, difficulty urinating, dyspareunia, dysuria, enuresis, flank pain, frequency, genital sores, hematuria, menstrual problem, pelvic pain, urgency, vaginal bleeding, vaginal discharge and vaginal pain.   Musculoskeletal:  Negative for arthralgias, back pain, gait problem, joint swelling, myalgias, neck pain and neck stiffness.   Skin:  Negative for color change, pallor and rash.   Allergic/Immunologic: Negative for environmental allergies, food allergies and immunocompromised state.   Neurological:  Positive for weakness. Negative for dizziness, tremors, seizures, syncope, facial asymmetry, speech difficulty, light-headedness, numbness and headaches.   Hematological:  Negative for adenopathy. Does not bruise/bleed easily.   Psychiatric/Behavioral:  Positive for dysphoric mood. Negative for agitation, behavioral problems, confusion, decreased concentration, hallucinations, self-injury, sleep disturbance and suicidal ideas. The patient is nervous/anxious. The patient is not hyperactive.         Concerns over  dementia       Objective:       Physical Exam  Vitals reviewed.   Constitutional:       General: She is not in acute distress.     Appearance: She is well-developed. She is not diaphoretic.   HENT:      Head: Normocephalic and atraumatic.      Right Ear: External ear normal.      Left Ear: External ear normal.      Nose: Nose normal.      Right Sinus: No maxillary sinus tenderness or frontal sinus tenderness.      Left Sinus: No maxillary sinus tenderness or frontal sinus tenderness.      Mouth/Throat:      Pharynx: No oropharyngeal exudate.   Eyes:      General: Lids are normal. No scleral icterus.        Right eye: No discharge.         Left eye: No discharge.      Conjunctiva/sclera: Conjunctivae normal.      Right eye: Right conjunctiva is not injected. No hemorrhage.     Left eye: Left conjunctiva is not injected. No hemorrhage.     Pupils: Pupils are equal, round, and reactive to light.   Neck:      Thyroid: No thyromegaly.      Vascular: No JVD.      Trachea: No tracheal deviation.   Cardiovascular:      Rate and Rhythm: Normal rate.   Pulmonary:      Effort: Pulmonary effort is normal. No respiratory distress.      Breath sounds: No stridor.   Chest:      Chest wall: No tenderness.   Abdominal:      General: Bowel sounds are normal. There is no distension.      Palpations: Abdomen is soft. There is no hepatomegaly, splenomegaly or mass.      Tenderness: There is no abdominal tenderness. There is no rebound.   Musculoskeletal:         General: No tenderness. Normal range of motion.      Cervical back: Normal range of motion and neck supple.   Lymphadenopathy:      Cervical: No cervical adenopathy.      Upper Body:      Right upper body: No supraclavicular adenopathy.      Left upper body: No supraclavicular adenopathy.   Skin:     General: Skin is dry.      Findings: No erythema or rash.   Neurological:      Mental Status: She is alert and oriented to person, place, and time.      Cranial Nerves: No cranial nerve deficit.       Coordination: Coordination normal.   Psychiatric:         Behavior: Behavior normal.         Thought Content: Thought content normal.         Judgment: Judgment normal.             Assessment:      1. Iron deficiency anemia due to chronic blood loss    2. H/O bilateral mastectomy    3. Malignant neoplasm of urinary bladder, unspecified site    4. Family history of breast cancer in sister    5. Angiodysplasia of cecum    6. Hereditary hemochromatosis    7. Ulcerative pancolitis without complication    8. Acquired hypothyroidism           Med Onc Chart Routing      Follow up with physician . Return in 4-6 weeks with CBC serum iron TIBC ferritin and TSH CMP   Follow up with JASON    Infusion scheduling note    Injection scheduling note    Labs    Imaging    Pharmacy appointment    Other referrals                   Plan:     Known history of iron overload now with iron deficiency.  Felt to be secondary to continued bleeding from angiodysplasias.  With self phlebotomy.  At this time will proceed with follow-up 4-6 weeks obviously if to give IV iron if persistent but with iron overload being able to maintain hemoglobin in normal range in near normal iron status discussed situation with patient's         Adonay Neri Jr, MD FACP         [1]   Social History  Socioeconomic History    Marital status:    Tobacco Use    Smoking status: Never     Passive exposure: Past    Smokeless tobacco: Never   Substance and Sexual Activity    Alcohol use: No    Drug use: No    Sexual activity: Yes     Partners: Male     Social Drivers of Health     Financial Resource Strain: Low Risk  (9/5/2024)    Overall Financial Resource Strain (CARDIA)     Difficulty of Paying Living Expenses: Not hard at all   Food Insecurity: No Food Insecurity (9/5/2024)    Hunger Vital Sign     Worried About Running Out of Food in the Last Year: Never true     Ran Out of Food in the Last Year: Never true   Transportation Needs: No Transportation Needs  (9/5/2024)    PRAPARE - Transportation     Lack of Transportation (Medical): No     Lack of Transportation (Non-Medical): No   Physical Activity: Insufficiently Active (9/5/2024)    Exercise Vital Sign     Days of Exercise per Week: 3 days     Minutes of Exercise per Session: 40 min   Stress: Stress Concern Present (9/5/2024)    [employer] [place] [employer] - Occupational Stress Questionnaire     Feeling of Stress : To some extent   Housing Stability: Unknown (9/5/2024)    Housing Stability Vital Sign     Unable to Pay for Housing in the Last Year: No     Homeless in the Last Year: No

## 2025-04-23 ENCOUNTER — TELEPHONE (OUTPATIENT)
Dept: HEMATOLOGY/ONCOLOGY | Facility: CLINIC | Age: 80
End: 2025-04-23
Payer: MEDICARE

## 2025-04-24 ENCOUNTER — OFFICE VISIT (OUTPATIENT)
Dept: INTERNAL MEDICINE | Facility: CLINIC | Age: 80
End: 2025-04-24
Payer: MEDICARE

## 2025-04-24 VITALS
HEART RATE: 64 BPM | TEMPERATURE: 97 F | SYSTOLIC BLOOD PRESSURE: 116 MMHG | OXYGEN SATURATION: 99 % | DIASTOLIC BLOOD PRESSURE: 68 MMHG | RESPIRATION RATE: 16 BRPM | HEIGHT: 62 IN | BODY MASS INDEX: 25.44 KG/M2 | WEIGHT: 138.25 LBS

## 2025-04-24 DIAGNOSIS — Z00.00 WELL ADULT EXAM: Primary | ICD-10-CM

## 2025-04-24 DIAGNOSIS — I83.12 VARICOSE VEINS OF BOTH LOWER EXTREMITIES WITH INFLAMMATION: ICD-10-CM

## 2025-04-24 DIAGNOSIS — Z90.13 H/O BILATERAL MASTECTOMY: ICD-10-CM

## 2025-04-24 DIAGNOSIS — K55.20 ANGIODYSPLASIA OF CECUM: ICD-10-CM

## 2025-04-24 DIAGNOSIS — Z13.220 ENCOUNTER FOR LIPID SCREENING FOR CARDIOVASCULAR DISEASE: ICD-10-CM

## 2025-04-24 DIAGNOSIS — E83.110 HEREDITARY HEMOCHROMATOSIS: ICD-10-CM

## 2025-04-24 DIAGNOSIS — I83.11 VARICOSE VEINS OF BOTH LOWER EXTREMITIES WITH INFLAMMATION: ICD-10-CM

## 2025-04-24 DIAGNOSIS — C67.8 MALIGNANT NEOPLASM OF OVERLAPPING SITES OF BLADDER: ICD-10-CM

## 2025-04-24 DIAGNOSIS — E03.9 ACQUIRED HYPOTHYROIDISM: ICD-10-CM

## 2025-04-24 DIAGNOSIS — D50.0 IRON DEFICIENCY ANEMIA DUE TO CHRONIC BLOOD LOSS: ICD-10-CM

## 2025-04-24 DIAGNOSIS — R73.09 ELEVATED GLUCOSE: ICD-10-CM

## 2025-04-24 DIAGNOSIS — Z88.9 DRUG ALLERGY, MULTIPLE: ICD-10-CM

## 2025-04-24 DIAGNOSIS — C67.2 MALIGNANT NEOPLASM OF LATERAL WALL OF URINARY BLADDER: ICD-10-CM

## 2025-04-24 DIAGNOSIS — K51.00 ULCERATIVE PANCOLITIS WITHOUT COMPLICATION: ICD-10-CM

## 2025-04-24 DIAGNOSIS — I47.19 ATRIAL TACHYCARDIA, PAROXYSMAL: ICD-10-CM

## 2025-04-24 DIAGNOSIS — Z13.6 ENCOUNTER FOR LIPID SCREENING FOR CARDIOVASCULAR DISEASE: ICD-10-CM

## 2025-04-24 DIAGNOSIS — E55.9 VITAMIN D DEFICIENCY: ICD-10-CM

## 2025-04-24 DIAGNOSIS — M81.0 AGE-RELATED OSTEOPOROSIS WITHOUT CURRENT PATHOLOGICAL FRACTURE: ICD-10-CM

## 2025-04-24 PROCEDURE — 99999 PR PBB SHADOW E&M-EST. PATIENT-LVL V: CPT | Mod: PBBFAC,,, | Performed by: PEDIATRICS

## 2025-04-24 PROCEDURE — 99215 OFFICE O/P EST HI 40 MIN: CPT | Mod: PBBFAC | Performed by: PEDIATRICS

## 2025-04-24 NOTE — PROGRESS NOTES
Patient ID: Selam Botello is a 79 y.o. female.    Chief Complaint: Establish Care    History of Present Illness    CHIEF COMPLAINT:  Patient presents today for established care and annual exam    MEDICAL HISTORY:  She has a history of bladder cancer currently followed by urology in [place] and locally here by Dr Avila. She has hereditary hemochromatosis managed by Dr. Neri, with paradoxical iron deficiency anemia due to blood loss from angiodysplastic lesion in cecum and ulcerative pancolitis. She also has a history of atrial tachycardia and hypothyroidism.    ORTHOPEDIC HISTORY:  She has undergone bilateral hip and knee replacements, with a complication of patellar artery injury during first knee surgery. She has left forearm fracture status post ORIF with plate and screws, currently asymptomatic.    SURGICAL HISTORY:  She has undergone preventative double mastectomy due to sister's breast cancer and personal hx FCBD in [place] due to severe breast cysts and ear tube placement.    FAMILY HISTORY:  Her sister  at age 48 from breast cancer. Father had prostate cancer and  of pulmonary fibrosis. Mother had tuberculosis.    MEDICATIONS:  She takes Synthroid 50 mcg daily with poor tolerance to generic formulations. She continues Prolia and vitamin D 50,000 units weekly for osteoporosis management. She takes Zyrtec for allergy management.    ALLERGIES:  She has multiple medication allergies including propofol and Cipro, with current rash following Cipro administration. She recently experienced allergic reaction to hay exposure manifesting with sneezing, nasal symptoms, and dizziness.    LABS:  Ferritin and iron levels are low, which is appropriate given hemochromatosis. Hemoglobin and hematocrit are low normal. Blood chemistry shows normal liver tests, kidney function, and blood sugar.    PMH, PSH, SH, FH reviewed with patient.      ROS:  General: -fever, -chills, +fatigue, -weight gain, -weight  loss  Eyes: -vision changes, -redness, -discharge  ENT: -ear pain, -nasal congestion, -sore throat  Cardiovascular: -chest pain, -palpitations, -lower extremity edema  Respiratory: -cough, -shortness of breath  Gastrointestinal: -abdominal pain, -nausea, -vomiting, -diarrhea, -constipation, -blood in stool  Genitourinary: -dysuria, -hematuria, -frequency  Musculoskeletal: +joint pain, -muscle pain, +limb pain  Skin: +rash, -lesion  Neurological: -headache, +dizziness, -numbness, -tingling  Psychiatric: -anxiety, -depression, -sleep difficulty  Allergic: +frequent sneezing         Exam:  Physical Exam    General: No acute distress. Well-developed. Well-nourished.  Eyes: EOMI. Sclerae anicteric.  HENT: Normocephalic. Atraumatic. Nares patent. Moist oral mucosa.  Cardiovascular: Regular rate. Regular rhythm. No murmurs. No rubs. No gallops. Normal S1, S2.  Respiratory: Normal respiratory effort. Clear to auscultation bilaterally. No rales. No rhonchi. No wheezing.  Abdomen: Soft. Non-tender. Non-distended. Normoactive bowel sounds.  Musculoskeletal: No  obvious deformity.  Extremities: mod non inflamed varicosities with 1+ bilateral lower ext edema..  Neurological: Alert & oriented x3. No slurred speech. Normal gait.  Psychiatric: Normal mood. Normal affect. Good insight. Good judgment.  Skin: Warm. Dry. No rash.         Assessment/Plan:  Well adult exam    Malignant neoplasm of overlapping sites of bladder    Acquired hypothyroidism  -     TSH; Future; Expected date: 04/24/2025  -     TSH; Future; Expected date: 04/24/2025    Age-related osteoporosis without current pathological fracture    Angiodysplasia of cecum    Atrial tachycardia, paroxysmal    Hereditary hemochromatosis    Ulcerative pancolitis without complication    Iron deficiency anemia due to chronic blood loss    Malignant neoplasm of lateral wall of urinary bladder    Unspecified vitamin D deficiency    Varicose veins of both lower extremities with  inflammation    H/O bilateral mastectomy    Elevated glucose  -     Hemoglobin A1C; Future; Expected date: 04/24/2025    Encounter for lipid screening for cardiovascular disease  -     Lipid Panel; Future; Expected date: 04/24/2025    Drug allergy, multiple  -     Ambulatory referral/consult to Allergy; Future; Expected date: 05/01/2025         Assessment & Plan    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  C67.9 Malignant neoplasm of bladder, unspecified  E83.110 Hereditary hemochromatosis  D50.0 Iron deficiency anemia secondary to blood loss (chronic)  K55.20 Angiodysplasia of colon without hemorrhage  K51.00 Ulcerative (chronic) pancolitis without complications  I47.10 Supraventricular tachycardia, unspecified  I35.1 Nonrheumatic aortic (valve) insufficiency  I34.0 Nonrheumatic mitral (valve) insufficiency  E03.9 Hypothyroidism, unspecified  M81.0 Age-related osteoporosis without current pathological fracture  Z90.13 Acquired absence of bilateral breasts and nipples  Z96.643 Presence of artificial hip joint, bilateral  Z96.653 Presence of artificial knee joint, bilateral  Z88.1 Allergy status to other antibiotic agents    IMPRESSION:  - Reviewed complex medical history including bladder cancer, hereditary hemochromatosis, angiodysplastic lesion in cecum, ulcerative pancolitis, hypothyroidism, osteoporosis, atrial tachycardia, arthritis, varicose veins, and mild aortic and mitral valve regurgitation.  - Ferritin and iron levels appropriately low due to hemochromatosis management; hemoglobin and hematocrit low normal; liver, kidney, and glucose tests normal.  - Noted absence of recent cholesterol, thyroid, or A1C checks.  - Considered extensive allergy history and recent allergic reaction to hay exposure.  - Discussed potential benefit of allergist consultation to investigate possible immunological predisposition for multiple allergies.  - Deferred discussion on vaccinations, including shingles vaccine, pending allergy  consultation.    LUNG GRANULOMA:  - Noted the patient's significant family history of pulmonary fibrosis, with father having  from the condition.  - This family history is particularly relevant in relation to the patient's lung granuloma.    BLADDER CANCER:  - Confirmed patient is being followed by urology team in [place] for bladder cancer and receiving chemotherapy.  - Discussed patient's concerns and reassured about the quality of care provided by local urologists.  - Recommend continuing care with local urologist Dr. Avila for convenience.    HEREDITARY HEMOCHROMATOSIS AND IRON DEFICIENCY ANEMIA:  - Reviewed recent labs showing decreased ferritin and iron levels, which are beneficial due to the patient's hemochromatosis.  - Noted patient's history of paradoxical iron deficiency anemia due to blood loss.  - Hemoglobin and hematocrit were low normal.  - Will continue to monitor iron and hemoglobin levels and manage the patient's anemia.    ANGIODYSPLASIA OF COLON:  - Noted patient's history of angiodysplastic lesion in the cecum.  - Continuing to follow the patient for this condition.    ULCERATIVE PANCOLITIS:  - Noted patient's history of ulcerative pancolitis.  - Continuing to follow the patient for this condition.    SUPRAVENTRICULAR TACHYCARDIA:  - Noted patient's history of atrial tachycardia.  - Confirmed that the patient is being followed by Dr. Miller Dale for this condition.    NONRHEUMATIC VALVE INSUFFICIENCY:  - Explained current echocardiogram findings showing mild aortic valve regurgitation and mild to moderate mitral valve regurgitation.  - Noted that left ventricular function and squeeze were good.  - Reassured the patient that mild valve regurgitation is not uncommon at her age.    HYPOTHYROIDISM:  - Patient has hypothyroidism and is taking brand name Synthroid 50 mcg daily, as she does not do well on generic.  - Last thyroid test was in December.  - Ordered thyroid function test with  follow up in 6 months to recheck.    OSTEOPOROSIS:  - Patient is being treated with Prolia for osteoporosis by Dr. Cassandra Mejia, who will manage treatment and bone density scans.  - Confirmed patient is taking vitamin D 50,000 units every 7 days.      PREVENTATIVE MASTECTOMY:  - Acknowledged patient's history of preventative double mastectomy 36 years ago due to family history of breast cancer and presence of cysts.    ARTIFICIAL HIP AND KNEE JOINTS:  - Noted patient has had bilateral hip and knee replacements.  - Dr. Perla performed the hip replacements, taking into account the patient's hemochromatosis.  - Documented complications during first knee replacement, including injury to patellar artery.    ANTIBIOTIC ALLERGIES AND OTHER ALLERGIES:  - Patient reports multiple medication allergies, including rash after taking Cipro for a bladder infection.  - Discussed option of seeing an allergist to investigate potential immunological predisposition for multiple allergies.  - Recommend avoiding known allergens and referred to allergy specialist for evaluation.  - Advised discontinuing all antihistamines (including Zyrtec) for 5-7 days before allergy testing/specialist appointment, but instructed to continue use of Flonase nasal spray as needed.    FOLLOW-UP AND TESTS:  - Ordered fasting cholesterol test and A1C test.  - Patient to contact the office to schedule labs for the following day (fasting for cholesterol test) and to adjust follow-up plan based on cholesterol and glucose test results.          Visit today included increased complexity associated with the care of the episodic problem  addressed and managing the longitudinal care of the patient due to the serious and/or complex managed problem(s) .      Follow up in about 6 months (around 10/24/2025).    This note was generated with the assistance of ambient listening technology. Verbal consent was obtained by the patient and accompanying visitor(s) for the  recording of patient appointment to facilitate this note. I attest to having reviewed and edited the generated note for accuracy, though some syntax or spelling errors may persist. Please contact the author of this note for any clarification.

## 2025-04-25 ENCOUNTER — LAB VISIT (OUTPATIENT)
Dept: LAB | Facility: HOSPITAL | Age: 80
End: 2025-04-25
Attending: PEDIATRICS
Payer: MEDICARE

## 2025-04-25 DIAGNOSIS — R73.09 ELEVATED GLUCOSE: ICD-10-CM

## 2025-04-25 DIAGNOSIS — Z13.220 ENCOUNTER FOR LIPID SCREENING FOR CARDIOVASCULAR DISEASE: ICD-10-CM

## 2025-04-25 DIAGNOSIS — E03.9 ACQUIRED HYPOTHYROIDISM: ICD-10-CM

## 2025-04-25 DIAGNOSIS — Z13.6 ENCOUNTER FOR LIPID SCREENING FOR CARDIOVASCULAR DISEASE: ICD-10-CM

## 2025-04-25 LAB
CHOLEST SERPL-MCNC: 213 MG/DL (ref 120–199)
CHOLEST/HDLC SERPL: 2.4 {RATIO} (ref 2–5)
EAG (OHS): 114 MG/DL (ref 68–131)
HBA1C MFR BLD: 5.6 % (ref 4–5.6)
HDLC SERPL-MCNC: 87 MG/DL (ref 40–75)
HDLC SERPL: 40.8 % (ref 20–50)
LDLC SERPL CALC-MCNC: 112.8 MG/DL (ref 63–159)
NONHDLC SERPL-MCNC: 126 MG/DL
TRIGL SERPL-MCNC: 66 MG/DL (ref 30–150)
TSH SERPL-ACNC: 2.31 UIU/ML (ref 0.4–4)

## 2025-04-25 PROCEDURE — 83036 HEMOGLOBIN GLYCOSYLATED A1C: CPT

## 2025-04-25 PROCEDURE — 80061 LIPID PANEL: CPT

## 2025-04-25 PROCEDURE — 84443 ASSAY THYROID STIM HORMONE: CPT

## 2025-04-25 PROCEDURE — 36415 COLL VENOUS BLD VENIPUNCTURE: CPT

## 2025-04-26 ENCOUNTER — PATIENT MESSAGE (OUTPATIENT)
Dept: INTERNAL MEDICINE | Facility: CLINIC | Age: 80
End: 2025-04-26
Payer: MEDICARE

## 2025-04-28 ENCOUNTER — RESULTS FOLLOW-UP (OUTPATIENT)
Dept: INTERNAL MEDICINE | Facility: CLINIC | Age: 80
End: 2025-04-28
Payer: MEDICARE

## 2025-04-28 NOTE — TRANSFER OF CARE
"Anesthesia Transfer of Care Note    Patient: Selam Botello    Procedure(s) Performed: Procedure(s) (LRB):  TURBT (TRANSURETHRAL RESECTION OF BLADDER TUMOR) (N/A)    Patient location: PACU    Anesthesia Type: general    Transport from OR: Transported from OR on room air with adequate spontaneous ventilation    Post pain: adequate analgesia    Post assessment: no apparent anesthetic complications    Post vital signs: stable    Level of consciousness: awake    Nausea/Vomiting: no nausea/vomiting    Complications: none    Transfer of care protocol was followed      Last vitals:   Visit Vitals  /76 (BP Location: Right arm, Patient Position: Lying)   Pulse 82   Temp 36.4 °C (97.5 °F) (Temporal)   Resp 18   Ht 5' 2.5" (1.588 m)   Wt 61.4 kg (135 lb 5.8 oz)   SpO2 98%   Breastfeeding? No   BMI 24.36 kg/m²     "
Name band;

## 2025-05-01 ENCOUNTER — PATIENT MESSAGE (OUTPATIENT)
Dept: OPHTHALMOLOGY | Facility: CLINIC | Age: 80
End: 2025-05-01
Payer: MEDICARE

## 2025-05-16 ENCOUNTER — RESULTS FOLLOW-UP (OUTPATIENT)
Dept: HEMATOLOGY/ONCOLOGY | Facility: CLINIC | Age: 80
End: 2025-05-16

## 2025-05-16 ENCOUNTER — LAB VISIT (OUTPATIENT)
Dept: LAB | Facility: HOSPITAL | Age: 80
End: 2025-05-16
Attending: INTERNAL MEDICINE
Payer: MEDICARE

## 2025-05-16 DIAGNOSIS — C67.9 MALIGNANT NEOPLASM OF URINARY BLADDER, UNSPECIFIED SITE: ICD-10-CM

## 2025-05-16 LAB
ABSOLUTE EOSINOPHIL (OHS): 0.09 K/UL
ABSOLUTE MONOCYTE (OHS): 0.45 K/UL (ref 0.3–1)
ABSOLUTE NEUTROPHIL COUNT (OHS): 3.56 K/UL (ref 1.8–7.7)
ALBUMIN SERPL BCP-MCNC: 4 G/DL (ref 3.5–5.2)
ALP SERPL-CCNC: 56 UNIT/L (ref 40–150)
ALT SERPL W/O P-5'-P-CCNC: 13 UNIT/L (ref 10–44)
ANION GAP (OHS): 10 MMOL/L (ref 8–16)
AST SERPL-CCNC: 19 UNIT/L (ref 11–45)
BASOPHILS # BLD AUTO: 0.04 K/UL
BASOPHILS NFR BLD AUTO: 0.7 %
BILIRUB SERPL-MCNC: 0.5 MG/DL (ref 0.1–1)
BUN SERPL-MCNC: 16 MG/DL (ref 8–23)
CALCIUM SERPL-MCNC: 9.8 MG/DL (ref 8.7–10.5)
CHLORIDE SERPL-SCNC: 107 MMOL/L (ref 95–110)
CO2 SERPL-SCNC: 23 MMOL/L (ref 23–29)
CREAT SERPL-MCNC: 0.8 MG/DL (ref 0.5–1.4)
ERYTHROCYTE [DISTWIDTH] IN BLOOD BY AUTOMATED COUNT: 15.8 % (ref 11.5–14.5)
FERRITIN SERPL-MCNC: 20 NG/ML (ref 20–300)
GFR SERPLBLD CREATININE-BSD FMLA CKD-EPI: >60 ML/MIN/1.73/M2
GLUCOSE SERPL-MCNC: 96 MG/DL (ref 70–110)
HCT VFR BLD AUTO: 35.8 % (ref 37–48.5)
HGB BLD-MCNC: 11.6 GM/DL (ref 12–16)
IMM GRANULOCYTES # BLD AUTO: 0.01 K/UL (ref 0–0.04)
IMM GRANULOCYTES NFR BLD AUTO: 0.2 % (ref 0–0.5)
IRON SATN MFR SERPL: 14 % (ref 20–50)
IRON SERPL-MCNC: 49 UG/DL (ref 30–160)
LYMPHOCYTES # BLD AUTO: 1.19 K/UL (ref 1–4.8)
MCH RBC QN AUTO: 28.1 PG (ref 27–31)
MCHC RBC AUTO-ENTMCNC: 32.4 G/DL (ref 32–36)
MCV RBC AUTO: 87 FL (ref 82–98)
NUCLEATED RBC (/100WBC) (OHS): 0 /100 WBC
PLATELET # BLD AUTO: 294 K/UL (ref 150–450)
PMV BLD AUTO: 9 FL (ref 9.2–12.9)
POTASSIUM SERPL-SCNC: 4.6 MMOL/L (ref 3.5–5.1)
PROT SERPL-MCNC: 6.7 GM/DL (ref 6–8.4)
RBC # BLD AUTO: 4.13 M/UL (ref 4–5.4)
RELATIVE EOSINOPHIL (OHS): 1.7 %
RELATIVE LYMPHOCYTE (OHS): 22.3 % (ref 18–48)
RELATIVE MONOCYTE (OHS): 8.4 % (ref 4–15)
RELATIVE NEUTROPHIL (OHS): 66.7 % (ref 38–73)
SODIUM SERPL-SCNC: 140 MMOL/L (ref 136–145)
TIBC SERPL-MCNC: 340 UG/DL (ref 250–450)
TRANSFERRIN SERPL-MCNC: 230 MG/DL (ref 200–375)
WBC # BLD AUTO: 5.34 K/UL (ref 3.9–12.7)

## 2025-05-16 PROCEDURE — 83540 ASSAY OF IRON: CPT

## 2025-05-16 PROCEDURE — 82728 ASSAY OF FERRITIN: CPT

## 2025-05-16 PROCEDURE — 80053 COMPREHEN METABOLIC PANEL: CPT

## 2025-05-16 PROCEDURE — 85025 COMPLETE CBC W/AUTO DIFF WBC: CPT

## 2025-05-16 PROCEDURE — 36415 COLL VENOUS BLD VENIPUNCTURE: CPT

## 2025-05-19 ENCOUNTER — PATIENT MESSAGE (OUTPATIENT)
Dept: HEMATOLOGY/ONCOLOGY | Facility: CLINIC | Age: 80
End: 2025-05-19
Payer: MEDICARE

## 2025-05-20 ENCOUNTER — PATIENT MESSAGE (OUTPATIENT)
Dept: HEMATOLOGY/ONCOLOGY | Facility: CLINIC | Age: 80
End: 2025-05-20
Payer: MEDICARE

## 2025-05-22 ENCOUNTER — OFFICE VISIT (OUTPATIENT)
Dept: HEMATOLOGY/ONCOLOGY | Facility: CLINIC | Age: 80
End: 2025-05-22
Payer: MEDICARE

## 2025-05-22 VITALS
WEIGHT: 135.38 LBS | OXYGEN SATURATION: 99 % | BODY MASS INDEX: 24.91 KG/M2 | HEART RATE: 76 BPM | TEMPERATURE: 98 F | RESPIRATION RATE: 20 BRPM | SYSTOLIC BLOOD PRESSURE: 110 MMHG | HEIGHT: 62 IN | DIASTOLIC BLOOD PRESSURE: 74 MMHG

## 2025-05-22 DIAGNOSIS — D50.0 IRON DEFICIENCY ANEMIA DUE TO CHRONIC BLOOD LOSS: Primary | ICD-10-CM

## 2025-05-22 DIAGNOSIS — E83.110 HEREDITARY HEMOCHROMATOSIS: ICD-10-CM

## 2025-05-22 DIAGNOSIS — K55.20 ANGIODYSPLASIA OF CECUM: ICD-10-CM

## 2025-05-22 PROCEDURE — 99213 OFFICE O/P EST LOW 20 MIN: CPT | Mod: PBBFAC | Performed by: INTERNAL MEDICINE

## 2025-05-22 PROCEDURE — 99999 PR PBB SHADOW E&M-EST. PATIENT-LVL III: CPT | Mod: PBBFAC,,, | Performed by: INTERNAL MEDICINE

## 2025-05-22 NOTE — PROGRESS NOTES
Subjective:       Patient ID: Selam Botello is a 79 y.o. female.    Chief Complaint: Results and Anemia    HPI:  79-year-old female with previous history of hereditary hemochromatosis.  Patient has had recurrent episodes of GI blood loss secondary to angiodysplasias patient returns for follow-up has been recently hospitalized severe declining mental status in his currently in a skilled nursing inpatient unit her daughter from [facility] is present with her as well    Past Medical History:   Diagnosis Date    Acid reflux     Allergy     Arthritis     Atrial tachycardia     Bladder cancer     Breast cyst     Cataract     Colon polyp     COVID 5/19/2022    Deep vein thrombosis     Degenerative disc disease     Encounter for blood transfusion     General anesthetics causing adverse effect in therapeutic use     GI bleed     Hematuria, gross     Inflammatory bowel disease     Liver disease     cyst    Malignant neoplasm of lateral wall of urinary bladder 10/31/2019    Osteoporosis     PONV (postoperative nausea and vomiting)     Retina disorder, left     Skin cancer     Thyroid disease     Urinary tract infection      Family History   Problem Relation Name Age of Onset    COPD Mother skin     Cancer Mother skin     COPD Father prostate/skin     Cancer Father prostate/skin     Cancer Sister       Social History[1]  Past Surgical History:   Procedure Laterality Date    ADENOIDECTOMY      BLADDER FULGURATION Right 9/12/2018    Procedure: FULGURATION, BLADDER;  Surgeon: Isaias Shah MD;  Location: [facility] OR;  Service: Urology;  Laterality: Right;    BLADDER FULGURATION N/A 4/24/2019    Procedure: FULGURATION, BLADDER;  Surgeon: Isaias Shah MD;  Location: [facility] OR;  Service: Urology;  Laterality: N/A;    BREAST SURGERY Bilateral     CATARACT EXTRACTION      CYSTOSCOPY      CYSTOSCOPY N/A 4/24/2019    Procedure: CYSTOSCOPY;  Surgeon: Isaias Shah MD;  Location: [facility] OR;  Service: Urology;  Laterality:  "N/A;    CYSTOSCOPY WITH URETEROSCOPY, RETROGRADE PYELOGRAPHY, AND INSERTION OF STENT Left 9/12/2018    Procedure: CYSTOSCOPY, WITH RETROGRADE PYELOGRAM AND URETERAL STENT INSERTION;  Surgeon: Isaias Shah MD;  Location: [facility] OR;  Service: Urology;  Laterality: Left;    EYE SURGERY Left 2017    retina pucker    INSTILLATION OF URINARY BLADDER N/A 9/12/2018    Procedure: INSTILLATION, URINARY BLADDER;  Surgeon: Isaias Shah MD;  Location: [facility] OR;  Service: Urology;  Laterality: N/A;  Chemo agent instillation     JOINT REPLACEMENT Bilateral     hip    JOINT REPLACEMENT Right     knee    MASTECTOMY Bilateral 1991    RETROGRADE PYELOGRAPHY Bilateral 10/31/2019    Procedure: PYELOGRAM, RETROGRADE;  Surgeon: Balod Gates MD;  Location: [facility];  Service: Urology;  Laterality: Bilateral;    TONSILLECTOMY      TURBT      TURBT, WITH BLUE LIGHT CYSTOSCOPY AND CYSVIEW Bilateral 10/31/2019    Procedure: TURBT,WITH BLUE LIGHT CYSTOSCOPY AND CYSVIEW;  Surgeon: Baldo Gates MD;  Location: [facility];  Service: Urology;  Laterality: Bilateral;    TYMPANOSTOMY TUBE PLACEMENT      VASCULAR SURGERY Right     popliteal stent    WISDOM TOOTH EXTRACTION         Labs:  Lab Results   Component Value Date    WBC 5.34 05/16/2025    HGB 11.6 (L) 05/16/2025    HCT 35.8 (L) 05/16/2025    MCV 87 05/16/2025     05/16/2025     BMP  Lab Results   Component Value Date     05/16/2025    K 4.6 05/16/2025     05/16/2025    CO2 23 05/16/2025    BUN 16 05/16/2025    CREATININE 0.8 05/16/2025    CALCIUM 9.8 05/16/2025    ANIONGAP 10 05/16/2025    ESTGFRAFRICA 91 08/16/2022    EGFRNONAA >60 06/08/2022     Lab Results   Component Value Date    ALT 13 05/16/2025    AST 19 05/16/2025    ALKPHOS 56 05/16/2025    BILITOT 0.5 05/16/2025       Lab Results   Component Value Date    IRON 49 05/16/2025    TIBC 340 05/16/2025    FERRITIN 20.0 05/16/2025     No results found for: "DEQMTWDK29"  No results found for: "FOLATE"  Lab " Results   Component Value Date    TSH 2.311 04/25/2025         Review of Systems   Constitutional:  Negative for activity change, appetite change, chills, diaphoresis, fatigue, fever and unexpected weight change.   HENT:  Negative for congestion, dental problem, drooling, ear discharge, ear pain, facial swelling, hearing loss, mouth sores, nosebleeds, postnasal drip, rhinorrhea, sinus pressure, sneezing, sore throat, tinnitus, trouble swallowing and voice change.    Eyes:  Negative for photophobia, pain, discharge, redness, itching and visual disturbance.   Respiratory:  Negative for cough, choking, chest tightness, shortness of breath, wheezing and stridor.    Cardiovascular:  Negative for chest pain, palpitations and leg swelling.   Gastrointestinal:  Negative for abdominal distention, abdominal pain, anal bleeding, blood in stool, constipation, diarrhea, nausea, rectal pain and vomiting.   Endocrine: Negative for cold intolerance, heat intolerance, polydipsia, polyphagia and polyuria.   Genitourinary:  Negative for decreased urine volume, difficulty urinating, dyspareunia, dysuria, enuresis, flank pain, frequency, genital sores, hematuria, menstrual problem, pelvic pain, urgency, vaginal bleeding, vaginal discharge and vaginal pain.   Musculoskeletal:  Negative for arthralgias, back pain, gait problem, joint swelling, myalgias, neck pain and neck stiffness.   Skin:  Negative for color change, pallor and rash.   Allergic/Immunologic: Negative for environmental allergies, food allergies and immunocompromised state.   Neurological:  Negative for dizziness, tremors, seizures, syncope, facial asymmetry, speech difficulty, weakness, light-headedness, numbness and headaches.   Hematological:  Negative for adenopathy. Does not bruise/bleed easily.   Psychiatric/Behavioral:  Negative for agitation, behavioral problems, confusion, decreased concentration, dysphoric mood, hallucinations, self-injury, sleep disturbance and  suicidal ideas. The patient is not nervous/anxious and is not hyperactive.        Objective:      Physical Exam  Vitals reviewed.   Constitutional:       General: She is not in acute distress.     Appearance: She is well-developed. She is not diaphoretic.   HENT:      Head: Normocephalic and atraumatic.      Right Ear: External ear normal.      Left Ear: External ear normal.      Nose: Nose normal.      Right Sinus: No maxillary sinus tenderness or frontal sinus tenderness.      Left Sinus: No maxillary sinus tenderness or frontal sinus tenderness.      Mouth/Throat:      Pharynx: No oropharyngeal exudate.   Eyes:      General: Lids are normal. No scleral icterus.        Right eye: No discharge.         Left eye: No discharge.      Conjunctiva/sclera: Conjunctivae normal.      Right eye: Right conjunctiva is not injected. No hemorrhage.     Left eye: Left conjunctiva is not injected. No hemorrhage.     Pupils: Pupils are equal, round, and reactive to light.   Neck:      Thyroid: No thyromegaly.      Vascular: No JVD.      Trachea: No tracheal deviation.   Cardiovascular:      Rate and Rhythm: Normal rate.   Pulmonary:      Effort: Pulmonary effort is normal. No respiratory distress.      Breath sounds: No stridor.   Chest:      Chest wall: No tenderness.   Abdominal:      General: Bowel sounds are normal. There is no distension.      Palpations: Abdomen is soft. There is no hepatomegaly, splenomegaly or mass.      Tenderness: There is no abdominal tenderness. There is no rebound.   Musculoskeletal:         General: No tenderness. Normal range of motion.      Cervical back: Normal range of motion and neck supple.   Lymphadenopathy:      Cervical: No cervical adenopathy.      Upper Body:      Right upper body: No supraclavicular adenopathy.      Left upper body: No supraclavicular adenopathy.   Skin:     General: Skin is dry.      Findings: No erythema or rash.   Neurological:      Mental Status: She is alert and  oriented to person, place, and time.      Cranial Nerves: No cranial nerve deficit.      Coordination: Coordination normal.   Psychiatric:         Behavior: Behavior normal.         Thought Content: Thought content normal.         Judgment: Judgment normal.             Assessment:      1. Iron deficiency anemia due to chronic blood loss    2. Angiodysplasia of cecum    3. Hereditary hemochromatosis           Med Onc Chart Routing      Follow up with physician . Continue with follow-up on a 4-6 week weight basis with CBC CMP serum iron TIBC ferritin TSH.   Follow up with JASON    Infusion scheduling note    Injection scheduling note    Labs    Imaging    Pharmacy appointment    Other referrals                 Plan:     Labs stable.  Extensive conversation with she and her daughter concerning her 's recent hospitalization in time at skilled nursing inpatient unit in decisions made in terms of health variant follow-up as detailed above        Adonay Neri Jr, MD FACP         [1]   Social History  Socioeconomic History    Marital status:    Tobacco Use    Smoking status: Never     Passive exposure: Past    Smokeless tobacco: Never   Substance and Sexual Activity    Alcohol use: No    Drug use: No    Sexual activity: Yes     Partners: Male     Social Drivers of Health     Financial Resource Strain: Low Risk  (5/16/2025)    Overall Financial Resource Strain (CARDIA)     Difficulty of Paying Living Expenses: Not hard at all   Food Insecurity: No Food Insecurity (5/16/2025)    Hunger Vital Sign     Worried About Running Out of Food in the Last Year: Never true     Ran Out of Food in the Last Year: Never true   Transportation Needs: No Transportation Needs (5/16/2025)    PRAPARE - Transportation     Lack of Transportation (Medical): No     Lack of Transportation (Non-Medical): No   Physical Activity: Insufficiently Active (5/16/2025)    Exercise Vital Sign     Days of Exercise per Week: 3 days     Minutes of  Exercise per Session: 10 min   Stress: Stress Concern Present (5/16/2025)    [employer] - Occupational Stress Questionnaire     Feeling of Stress : Rather much   Housing Stability: Low Risk  (5/16/2025)    Housing Stability Vital Sign     Unable to Pay for Housing in the Last Year: No     Number of Times Moved in the Last Year: 0     Homeless in the Last Year: No

## 2025-06-05 ENCOUNTER — PATIENT MESSAGE (OUTPATIENT)
Dept: HEMATOLOGY/ONCOLOGY | Facility: CLINIC | Age: 80
End: 2025-06-05
Payer: MEDICARE

## 2025-06-13 DIAGNOSIS — R94.6 ABNORMAL RESULTS OF THYROID FUNCTION STUDIES: ICD-10-CM

## 2025-06-13 RX ORDER — LEVOTHYROXINE SODIUM 50 UG/1
50 TABLET ORAL
Qty: 90 TABLET | Refills: 3 | Status: SHIPPED | OUTPATIENT
Start: 2025-06-13

## 2025-06-26 ENCOUNTER — PATIENT MESSAGE (OUTPATIENT)
Dept: INTERNAL MEDICINE | Facility: CLINIC | Age: 80
End: 2025-06-26
Payer: MEDICARE

## 2025-06-26 ENCOUNTER — PATIENT MESSAGE (OUTPATIENT)
Dept: UROLOGY | Facility: CLINIC | Age: 80
End: 2025-06-26
Payer: MEDICARE

## 2025-06-30 ENCOUNTER — TELEPHONE (OUTPATIENT)
Dept: UROLOGY | Facility: CLINIC | Age: 80
End: 2025-06-30
Payer: MEDICARE

## 2025-06-30 NOTE — TELEPHONE ENCOUNTER
.Outgoing call placed to patient, patient verified identifiers, patient wanted to know about doing her urine cytology, she was notified that she can do it on any date she wanted, she agreed to 7/9/25.    Copied from CRM #[id number]. Topic: General Inquiry - Patient Advice  >> Jun 30, 2025  9:32 AM Moira wrote:  Type:  Needs Medical Advice    Who Called: Selam Botello  Would the patient rather a call back or a response via [facility]? call  Best Call Back Number: [phone]  Additional Information: She would like a call from the office about her lab.

## 2025-07-16 ENCOUNTER — PROCEDURE VISIT (OUTPATIENT)
Dept: UROLOGY | Facility: CLINIC | Age: 80
End: 2025-07-16
Payer: MEDICARE

## 2025-07-16 DIAGNOSIS — D49.4 BLADDER NEOPLASM: ICD-10-CM

## 2025-07-16 DIAGNOSIS — R31.29 OTHER MICROSCOPIC HEMATURIA: ICD-10-CM

## 2025-07-16 DIAGNOSIS — Z01.818 PRE-OP EXAM: Primary | ICD-10-CM

## 2025-07-16 DIAGNOSIS — Z85.51 PERSONAL HISTORY OF MALIGNANT NEOPLASM OF BLADDER: ICD-10-CM

## 2025-07-16 LAB
BILIRUBIN, UA POC OHS: NEGATIVE
BLOOD, UA POC OHS: ABNORMAL
CLARITY, UA POC OHS: CLEAR
COLOR, UA POC OHS: YELLOW
GLUCOSE, UA POC OHS: NEGATIVE
KETONES, UA POC OHS: NEGATIVE
LEUKOCYTES, UA POC OHS: NEGATIVE
NITRITE, UA POC OHS: NEGATIVE
PH, UA POC OHS: 6.5
PROTEIN, UA POC OHS: NEGATIVE
SPECIFIC GRAVITY, UA POC OHS: 1.01
UROBILINOGEN, UA POC OHS: 0.2

## 2025-07-16 PROCEDURE — 81003 URINALYSIS AUTO W/O SCOPE: CPT | Mod: PBBFAC | Performed by: UROLOGY

## 2025-07-16 PROCEDURE — 99999PBSHW POCT URINALYSIS(INSTRUMENT): Mod: PBBFAC,,,

## 2025-07-16 PROCEDURE — 52000 CYSTOURETHROSCOPY: CPT | Mod: PBBFAC | Performed by: UROLOGY

## 2025-07-16 RX ORDER — NITROFURANTOIN 25; 75 MG/1; MG/1
100 CAPSULE ORAL 2 TIMES DAILY
Qty: 14 CAPSULE | Refills: 1 | Status: SHIPPED | OUTPATIENT
Start: 2025-07-16

## 2025-07-16 RX ORDER — CLINDAMYCIN PHOSPHATE 600 MG/50ML
600 INJECTION, SOLUTION INTRAVENOUS
OUTPATIENT
Start: 2025-07-16

## 2025-07-16 NOTE — PROCEDURES
Cystoscopy    Date/Time: 7/16/2025 1:00 PM    Performed by: Yomi Avila MD  Authorized by: Yomi Avila MD    Consent Done?:  Yes (Verbal) and Yes (Written)  Timeout: prior to procedure the correct patient, procedure, and site was verified    Prep: patient was prepped and draped in usual sterile fashion    Anesthesia:  Lidocaine jelly  Indications: history bladder cancer    Position:  Dorsal lithotomy  Anesthesia:  Lidocaine jelly  Preparation: Patient was prepped and draped in usual sterile fashion    Scope type:  Flexible cystoscope  Guidewire: guidewire removed intact    Guidewire comment: No guide wire was present or used   patient tolerated the procedure well with no immediate complications  Comments:      The cystoscope was advanced through the urethra into her bladder.  The bladder was inspected in a systematic fashion.  Her bilateral ureteral orifices are orthotopic and patent with clear efflux of urine.  The patient has an approximately 2 cm papillary lesion on the left trigone medial to her left ureteral orifice.  No bladder stone and no foreign body is seen.  A bladder wash urine cytology was obtained.  The cystoscope was removed from her bladder.  The patient tolerated this procedure well.          7-26-24  CT abdomen/pelvis without IV contrast.  See report.  No obstructing kidney stones or hydronephrosis.  Urinary bladder partially obscured from artifact from patient's bilateral hip replacements.      I reviewed the above imaging results.            7-26-24  Cr 0.7.  GFR >60.     7-22-24  Urine culture.  Proteus pan sensitive.  She was treated with cephalexin for 7 days.     I reviewed the above lab results.            Assessment:  - Cystoscopy today on 7-16-25 shows an 2 cm papillary lesion on the left trigone medial to her left ureteral orifice.   - Trace amount of microscopic hematuria today on 7-16-25.  - History of bladder cancer.  Surveillance cystoscopy on 7-19-24 by Dr Gates was  normal.  Per Dr Baldo Gates records she had PUNLMP on 10/31/2019, low grade Ta on 9/12/2018, and low grade Ta 10/16/2017.       Plan:  - I discussed options with the patient after her cystoscopy today and the mutual decision was to proceed to the operating room for a transurethral resection of her bladder tumor, cystoscopy with possible biopsies with fulguration, bilateral retrograde pyelograms with possible needs for bilateral ureteral stents placement, and any other indicated procedures.  Risks and benefits of the surgery were explained to the patient and the patient expressed understanding.  All of her good questions were answered by me to the patient's apparent understanding and apparent satisfaction.  Surgery consent was signed today by the patient.   - Bladder wash urine cytology today.  - I discussed dietary modifications with her today and I recommended she drink mostly water during the day.

## 2025-07-17 ENCOUNTER — PATIENT MESSAGE (OUTPATIENT)
Dept: UROLOGY | Facility: CLINIC | Age: 80
End: 2025-07-17
Payer: MEDICARE

## 2025-07-17 ENCOUNTER — TELEPHONE (OUTPATIENT)
Dept: UROLOGY | Facility: CLINIC | Age: 80
End: 2025-07-17
Payer: MEDICARE

## 2025-07-17 NOTE — TELEPHONE ENCOUNTER
Message sent to MD in regards already.    Copied from CRM #[id number]. Topic: General Inquiry - Return Call  >> Jul 17, 2025 10:01 AM Tara wrote:  Mrs. Botello needs a call back at [phone] in regards to some questions and concerns from her procedure she stated she had on yesterday.

## 2025-07-18 ENCOUNTER — OFFICE VISIT (OUTPATIENT)
Dept: HEMATOLOGY/ONCOLOGY | Facility: CLINIC | Age: 80
End: 2025-07-18
Payer: MEDICARE

## 2025-07-18 VITALS
HEIGHT: 62 IN | TEMPERATURE: 97 F | HEART RATE: 70 BPM | SYSTOLIC BLOOD PRESSURE: 106 MMHG | DIASTOLIC BLOOD PRESSURE: 63 MMHG | BODY MASS INDEX: 24.44 KG/M2 | OXYGEN SATURATION: 99 % | WEIGHT: 132.81 LBS

## 2025-07-18 DIAGNOSIS — C67.1 MALIGNANT NEOPLASM OF DOME OF URINARY BLADDER: Primary | ICD-10-CM

## 2025-07-18 PROCEDURE — 99999 PR PBB SHADOW E&M-EST. PATIENT-LVL IV: CPT | Mod: PBBFAC,,, | Performed by: INTERNAL MEDICINE

## 2025-07-18 PROCEDURE — 99214 OFFICE O/P EST MOD 30 MIN: CPT | Mod: PBBFAC | Performed by: INTERNAL MEDICINE

## 2025-07-18 NOTE — PROGRESS NOTES
Subjective:       Patient ID: Selam Botello is a 79 y.o. female.    Chief Complaint: Results and Bladder Cancer    HPI:  79-year-old female previous history of noninvasive bladder cancer dating back 7 years variant patient returns for follow-up patient was found on recent cystoscopic exam to have a 2 cm mass in his scheduled for cystoscopic biopsy and resection.  ECOG status 0 recent death of lung time     Past Medical History:   Diagnosis Date    Acid reflux     Allergy     Arthritis     Atrial tachycardia     Bladder cancer     Breast cyst     Cataract     Colon polyp     COVID 5/19/2022    Deep vein thrombosis     Degenerative disc disease     Encounter for blood transfusion     General anesthetics causing adverse effect in therapeutic use     GI bleed     Hematuria, gross     Inflammatory bowel disease     Liver disease     cyst    Malignant neoplasm of lateral wall of urinary bladder 10/31/2019    Osteoporosis     PONV (postoperative nausea and vomiting)     Retina disorder, left     Skin cancer     Thyroid disease     Urinary tract infection      Family History   Problem Relation Name Age of Onset    COPD Mother skin     Cancer Mother skin     COPD Father prostate/skin     Cancer Father prostate/skin     Cancer Sister       Social History[1]  Past Surgical History:   Procedure Laterality Date    ADENOIDECTOMY      BLADDER FULGURATION Right 9/12/2018    Procedure: FULGURATION, BLADDER;  Surgeon: Isaias Shah MD;  Location: [facility] OR;  Service: Urology;  Laterality: Right;    BLADDER FULGURATION N/A 4/24/2019    Procedure: FULGURATION, BLADDER;  Surgeon: Isaias Shah MD;  Location: [facility] OR;  Service: Urology;  Laterality: N/A;    BREAST SURGERY Bilateral     CATARACT EXTRACTION      CYSTOSCOPY      CYSTOSCOPY N/A 4/24/2019    Procedure: CYSTOSCOPY;  Surgeon: Isaias Shah MD;  Location: [facility] OR;  Service: Urology;  Laterality: N/A;    CYSTOSCOPY WITH  "URETEROSCOPY, RETROGRADE PYELOGRAPHY, AND INSERTION OF STENT Left 9/12/2018    Procedure: CYSTOSCOPY, WITH RETROGRADE PYELOGRAM AND URETERAL STENT INSERTION;  Surgeon: Isaias Shah MD;  Location: [facility] OR;  Service: Urology;  Laterality: Left;    EYE SURGERY Left 2017    retina pucker    INSTILLATION OF URINARY BLADDER N/A 9/12/2018    Procedure: INSTILLATION, URINARY BLADDER;  Surgeon: Isaias Shah MD;  Location: [facility] OR;  Service: Urology;  Laterality: N/A;  Chemo agent instillation     JOINT REPLACEMENT Bilateral     hip    JOINT REPLACEMENT Right     knee    MASTECTOMY Bilateral 1991    RETROGRADE PYELOGRAPHY Bilateral 10/31/2019    Procedure: PYELOGRAM, RETROGRADE;  Surgeon: Baldo Gates MD;  Location: [facility] OR;  Service: Urology;  Laterality: Bilateral;    TONSILLECTOMY      TURBT      TURBT, WITH BLUE LIGHT CYSTOSCOPY AND CYSVIEW Bilateral 10/31/2019    Procedure: TURBT,WITH BLUE LIGHT CYSTOSCOPY AND CYSVIEW;  Surgeon: Baldo Gates MD;  Location[facility];  Service: Urology;  Laterality: Bilateral;    TYMPANOSTOMY TUBE PLACEMENT      VASCULAR SURGERY Right     popliteal stent    WISDOM TOOTH EXTRACTION         Labs:  Lab Results   Component Value Date    WBC 5.34 05/16/2025    HGB 11.6 (L) 05/16/2025    HCT 35.8 (L) 05/16/2025    MCV 87 05/16/2025     05/16/2025     BMP  Lab Results   Component Value Date     05/16/2025    K 4.6 05/16/2025     05/16/2025    CO2 23 05/16/2025    BUN 16 05/16/2025    CREATININE 0.8 05/16/2025    CALCIUM 9.8 05/16/2025    ANIONGAP 10 05/16/2025    ESTGFRAFRICA 91 08/16/2022    EGFRNONAA >60 06/08/2022     Lab Results   Component Value Date    ALT 13 05/16/2025    AST 19 05/16/2025    ALKPHOS 56 05/16/2025    BILITOT 0.5 05/16/2025       Lab Results   Component Value Date    IRON 49 05/16/2025    TIBC 340 05/16/2025    FERRITIN 20.0 05/16/2025     No results found for: "EYVRJVKQ09"  No results found for: "FOLATE"  Lab Results "   Component Value Date    TSH 2.311 04/25/2025         Review of Systems   Constitutional:  Negative for activity change, appetite change, chills, diaphoresis, fatigue, fever and unexpected weight change.   HENT:  Negative for congestion, dental problem, drooling, ear discharge, ear pain, facial swelling, hearing loss, mouth sores, nosebleeds, postnasal drip, rhinorrhea, sinus pressure, sneezing, sore throat, tinnitus, trouble swallowing and voice change.    Eyes:  Negative for photophobia, pain, discharge, redness, itching and visual disturbance.   Respiratory:  Negative for cough, choking, chest tightness, shortness of breath, wheezing and stridor.    Cardiovascular:  Negative for chest pain, palpitations and leg swelling.   Gastrointestinal:  Negative for abdominal distention, abdominal pain, anal bleeding, blood in stool, constipation, diarrhea, nausea, rectal pain and vomiting.   Endocrine: Negative for cold intolerance, heat intolerance, polydipsia, polyphagia and polyuria.   Genitourinary:  Negative for decreased urine volume, difficulty urinating, dyspareunia, dysuria, enuresis, flank pain, frequency, genital sores, hematuria, menstrual problem, pelvic pain, urgency, vaginal bleeding, vaginal discharge and vaginal pain.   Musculoskeletal:  Negative for arthralgias, back pain, gait problem, joint swelling, myalgias, neck pain and neck stiffness.   Skin:  Negative for color change, pallor and rash.   Allergic/Immunologic: Negative for environmental allergies, food allergies and immunocompromised state.   Neurological:  Negative for dizziness, tremors, seizures, syncope, facial asymmetry, speech difficulty, weakness, light-headedness, numbness and headaches.   Hematological:  Negative for adenopathy. Does not bruise/bleed easily.   Psychiatric/Behavioral:  Negative for agitation, behavioral problems, confusion, decreased concentration, dysphoric mood, hallucinations, self-injury, sleep disturbance and suicidal  ideas. The patient is not nervous/anxious and is not hyperactive.        Objective:      Physical Exam  Vitals reviewed.   Constitutional:       General: She is not in acute distress.     Appearance: She is well-developed. She is not diaphoretic.   HENT:      Head: Normocephalic and atraumatic.      Right Ear: External ear normal.      Left Ear: External ear normal.      Nose: Nose normal.      Right Sinus: No maxillary sinus tenderness or frontal sinus tenderness.      Left Sinus: No maxillary sinus tenderness or frontal sinus tenderness.      Mouth/Throat:      Pharynx: No oropharyngeal exudate.   Eyes:      General: Lids are normal. No scleral icterus.        Right eye: No discharge.         Left eye: No discharge.      Conjunctiva/sclera: Conjunctivae normal.      Right eye: Right conjunctiva is not injected. No hemorrhage.     Left eye: Left conjunctiva is not injected. No hemorrhage.     Pupils: Pupils are equal, round, and reactive to light.   Neck:      Thyroid: No thyromegaly.      Vascular: No JVD.      Trachea: No tracheal deviation.   Cardiovascular:      Rate and Rhythm: Normal rate.   Pulmonary:      Effort: Pulmonary effort is normal. No respiratory distress.      Breath sounds: No stridor.   Chest:      Chest wall: No tenderness.   Abdominal:      General: Bowel sounds are normal. There is no distension.      Palpations: Abdomen is soft. There is no hepatomegaly, splenomegaly or mass.      Tenderness: There is no abdominal tenderness. There is no rebound.   Musculoskeletal:         General: No tenderness. Normal range of motion.      Cervical back: Normal range of motion and neck supple.   Lymphadenopathy:      Cervical: No cervical adenopathy.      Upper Body:      Right upper body: No supraclavicular adenopathy.      Left upper body: No supraclavicular adenopathy.   Skin:     General: Skin is dry.      Findings: No erythema or rash.   Neurological:      Mental Status: She is alert and oriented to  person, place, and time.      Cranial Nerves: No cranial nerve deficit.      Coordination: Coordination normal.   Psychiatric:         Behavior: Behavior normal.         Thought Content: Thought content normal.         Judgment: Judgment normal.           Assessment:      1. Malignant neoplasm of dome of urinary bladder           Med Onc Chart Routing      Follow up with physician . Return to clinic to see me after results of bladder biopsy in 1 month   Follow up with JASON    Infusion scheduling note    Injection scheduling note    Labs   Scheduling:  Preferred lab:  Lab interval:  Continue with standing labs CBC CMP TSH serum iron TIBC ferritin on a monthly basis   Imaging    Pharmacy appointment    Other referrals                 Plan:     Await results of bladder biopsy.  Continue to have monthly CBC CMP serum iron TIBC TSH patient is requested follow-up as detailed previous history of iron overload saturation iron now is normal.  Continue with review        Adonay Neri Jr, MD FACP           [1]  Social History  Socioeconomic History    Marital status:    Tobacco Use    Smoking status: Never     Passive exposure: Past    Smokeless tobacco: Never   Substance and Sexual Activity    Alcohol use: No    Drug use: No    Sexual activity: Yes     Partners: Male     Social Drivers of Health     Financial Resource Strain: Low Risk  (5/16/2025)    Overall Financial Resource Strain (CARDIA)     Difficulty of Paying Living Expenses: Not hard at all   Food Insecurity: No Food Insecurity (5/16/2025)    Hunger Vital Sign     Worried About Running Out of Food in the Last Year: Never true     Ran Out of Food in the Last Year: Never true   Transportation Needs: No Transportation Needs (5/16/2025)    PRAPARE - Transportation     Lack of Transportation (Medical): No     Lack of Transportation (Non-Medical): No   Physical Activity: Insufficiently Active (5/16/2025)    Exercise Vital Sign     Days of Exercise per  Week: 3 days     Minutes of Exercise per Session: 10 min   Stress: Stress Concern Present (5/16/2025)    [employer] - Occupational Stress Questionnaire     Feeling of Stress : Rather much   Housing Stability: Low Risk  (5/16/2025)    Housing Stability Vital Sign     Unable to Pay for Housing in the Last Year: No     Number of Times Moved in the Last Year: 0     Homeless in the Last Year: No

## 2025-07-23 ENCOUNTER — LAB VISIT (OUTPATIENT)
Dept: LAB | Facility: HOSPITAL | Age: 80
End: 2025-07-23
Attending: UROLOGY
Payer: MEDICARE

## 2025-07-23 DIAGNOSIS — K55.20 ANGIODYSPLASIA OF CECUM: ICD-10-CM

## 2025-07-23 DIAGNOSIS — E83.110 HEREDITARY HEMOCHROMATOSIS: ICD-10-CM

## 2025-07-23 DIAGNOSIS — C67.9 MALIGNANT NEOPLASM OF URINARY BLADDER, UNSPECIFIED SITE: ICD-10-CM

## 2025-07-23 DIAGNOSIS — Z90.13 H/O BILATERAL MASTECTOMY: ICD-10-CM

## 2025-07-23 DIAGNOSIS — K51.00 ULCERATIVE PANCOLITIS WITHOUT COMPLICATION: ICD-10-CM

## 2025-07-23 DIAGNOSIS — D50.0 IRON DEFICIENCY ANEMIA DUE TO CHRONIC BLOOD LOSS: ICD-10-CM

## 2025-07-23 DIAGNOSIS — Z01.818 PRE-OP EXAM: ICD-10-CM

## 2025-07-23 DIAGNOSIS — Z80.3 FAMILY HISTORY OF BREAST CANCER IN SISTER: ICD-10-CM

## 2025-07-23 DIAGNOSIS — E03.9 ACQUIRED HYPOTHYROIDISM: ICD-10-CM

## 2025-07-23 LAB
ABSOLUTE EOSINOPHIL (OHS): 0.12 K/UL
ABSOLUTE MONOCYTE (OHS): 0.52 K/UL (ref 0.3–1)
ABSOLUTE NEUTROPHIL COUNT (OHS): 3.4 K/UL (ref 1.8–7.7)
ALBUMIN SERPL BCP-MCNC: 4 G/DL (ref 3.5–5.2)
ALBUMIN SERPL BCP-MCNC: 4.2 G/DL (ref 3.5–5.2)
ALP SERPL-CCNC: 60 UNIT/L (ref 40–150)
ALP SERPL-CCNC: 60 UNIT/L (ref 40–150)
ALT SERPL W/O P-5'-P-CCNC: 13 UNIT/L (ref 10–44)
ALT SERPL W/O P-5'-P-CCNC: 13 UNIT/L (ref 10–44)
ANION GAP (OHS): 6 MMOL/L (ref 8–16)
ANION GAP (OHS): 7 MMOL/L (ref 8–16)
AST SERPL-CCNC: 19 UNIT/L (ref 11–45)
AST SERPL-CCNC: 19 UNIT/L (ref 11–45)
BASOPHILS # BLD AUTO: 0.04 K/UL
BASOPHILS NFR BLD AUTO: 0.7 %
BILIRUB SERPL-MCNC: 0.4 MG/DL (ref 0.1–1)
BILIRUB SERPL-MCNC: 0.4 MG/DL (ref 0.1–1)
BUN SERPL-MCNC: 13 MG/DL (ref 8–23)
BUN SERPL-MCNC: 14 MG/DL (ref 8–23)
CALCIUM SERPL-MCNC: 9.3 MG/DL (ref 8.7–10.5)
CALCIUM SERPL-MCNC: 9.5 MG/DL (ref 8.7–10.5)
CHLORIDE SERPL-SCNC: 108 MMOL/L (ref 95–110)
CHLORIDE SERPL-SCNC: 109 MMOL/L (ref 95–110)
CO2 SERPL-SCNC: 24 MMOL/L (ref 23–29)
CO2 SERPL-SCNC: 24 MMOL/L (ref 23–29)
CREAT SERPL-MCNC: 0.7 MG/DL (ref 0.5–1.4)
CREAT SERPL-MCNC: 0.7 MG/DL (ref 0.5–1.4)
ERYTHROCYTE [DISTWIDTH] IN BLOOD BY AUTOMATED COUNT: 16.1 % (ref 11.5–14.5)
FERRITIN SERPL-MCNC: 23 NG/ML (ref 20–300)
GFR SERPLBLD CREATININE-BSD FMLA CKD-EPI: >60 ML/MIN/1.73/M2
GFR SERPLBLD CREATININE-BSD FMLA CKD-EPI: >60 ML/MIN/1.73/M2
GLUCOSE SERPL-MCNC: 78 MG/DL (ref 70–110)
GLUCOSE SERPL-MCNC: 88 MG/DL (ref 70–110)
HCT VFR BLD AUTO: 36.2 % (ref 37–48.5)
HGB BLD-MCNC: 11.7 GM/DL (ref 12–16)
IMM GRANULOCYTES # BLD AUTO: 0 K/UL (ref 0–0.04)
IMM GRANULOCYTES NFR BLD AUTO: 0 % (ref 0–0.5)
IRON SATN MFR SERPL: 7 % (ref 20–50)
IRON SERPL-MCNC: 23 UG/DL (ref 30–160)
LYMPHOCYTES # BLD AUTO: 1.32 K/UL (ref 1–4.8)
MCH RBC QN AUTO: 28.5 PG (ref 27–31)
MCHC RBC AUTO-ENTMCNC: 32.3 G/DL (ref 32–36)
MCV RBC AUTO: 88 FL (ref 82–98)
NUCLEATED RBC (/100WBC) (OHS): 0 /100 WBC
PLATELET # BLD AUTO: 280 K/UL (ref 150–450)
PMV BLD AUTO: 9.5 FL (ref 9.2–12.9)
POTASSIUM SERPL-SCNC: 4.4 MMOL/L (ref 3.5–5.1)
POTASSIUM SERPL-SCNC: 4.4 MMOL/L (ref 3.5–5.1)
PROT SERPL-MCNC: 6.4 GM/DL (ref 6–8.4)
PROT SERPL-MCNC: 6.5 GM/DL (ref 6–8.4)
RBC # BLD AUTO: 4.1 M/UL (ref 4–5.4)
RELATIVE EOSINOPHIL (OHS): 2.2 %
RELATIVE LYMPHOCYTE (OHS): 24.4 % (ref 18–48)
RELATIVE MONOCYTE (OHS): 9.6 % (ref 4–15)
RELATIVE NEUTROPHIL (OHS): 63.1 % (ref 38–73)
SODIUM SERPL-SCNC: 139 MMOL/L (ref 136–145)
SODIUM SERPL-SCNC: 139 MMOL/L (ref 136–145)
TIBC SERPL-MCNC: 315 UG/DL (ref 250–450)
TRANSFERRIN SERPL-MCNC: 213 MG/DL (ref 200–375)
TSH SERPL-ACNC: 1.82 UIU/ML (ref 0.4–4)
WBC # BLD AUTO: 5.4 K/UL (ref 3.9–12.7)

## 2025-07-23 PROCEDURE — 84443 ASSAY THYROID STIM HORMONE: CPT

## 2025-07-23 PROCEDURE — 82728 ASSAY OF FERRITIN: CPT

## 2025-07-23 PROCEDURE — 82310 ASSAY OF CALCIUM: CPT

## 2025-07-23 PROCEDURE — 80053 COMPREHEN METABOLIC PANEL: CPT | Mod: 91

## 2025-07-23 PROCEDURE — 85025 COMPLETE CBC W/AUTO DIFF WBC: CPT

## 2025-07-23 PROCEDURE — 84466 ASSAY OF TRANSFERRIN: CPT

## 2025-07-23 PROCEDURE — 36415 COLL VENOUS BLD VENIPUNCTURE: CPT

## 2025-07-24 ENCOUNTER — TELEPHONE (OUTPATIENT)
Dept: UROLOGY | Facility: CLINIC | Age: 80
End: 2025-07-24
Payer: MEDICARE

## 2025-07-24 ENCOUNTER — HOSPITAL ENCOUNTER (OUTPATIENT)
Dept: RADIOLOGY | Facility: HOSPITAL | Age: 80
Discharge: HOME | End: 2025-07-24
Attending: UROLOGY
Payer: MEDICARE

## 2025-07-24 ENCOUNTER — ANESTHESIA EVENT (OUTPATIENT)
Dept: SURGERY | Facility: HOSPITAL | Age: 80
End: 2025-07-24
Payer: MEDICARE

## 2025-07-24 ENCOUNTER — PATIENT MESSAGE (OUTPATIENT)
Dept: PREADMISSION TESTING | Facility: HOSPITAL | Age: 80
End: 2025-07-24
Payer: MEDICARE

## 2025-07-24 ENCOUNTER — OFFICE VISIT (OUTPATIENT)
Dept: INTERNAL MEDICINE | Facility: CLINIC | Age: 80
End: 2025-07-24
Payer: MEDICARE

## 2025-07-24 ENCOUNTER — PATIENT MESSAGE (OUTPATIENT)
Dept: UROLOGY | Facility: CLINIC | Age: 80
End: 2025-07-24
Payer: MEDICARE

## 2025-07-24 VITALS
RESPIRATION RATE: 18 BRPM | OXYGEN SATURATION: 97 % | SYSTOLIC BLOOD PRESSURE: 135 MMHG | TEMPERATURE: 96 F | DIASTOLIC BLOOD PRESSURE: 67 MMHG | HEART RATE: 58 BPM

## 2025-07-24 DIAGNOSIS — I65.23 BILATERAL CAROTID ARTERY STENOSIS: ICD-10-CM

## 2025-07-24 DIAGNOSIS — E83.110 HEREDITARY HEMOCHROMATOSIS: ICD-10-CM

## 2025-07-24 DIAGNOSIS — I34.0 NONRHEUMATIC MITRAL (VALVE) INSUFFICIENCY: ICD-10-CM

## 2025-07-24 DIAGNOSIS — I47.19 ATRIAL TACHYCARDIA, PAROXYSMAL: ICD-10-CM

## 2025-07-24 DIAGNOSIS — Z01.818 PRE-OP EXAM: ICD-10-CM

## 2025-07-24 DIAGNOSIS — I87.2 VENOUS INSUFFICIENCY: ICD-10-CM

## 2025-07-24 DIAGNOSIS — C67.1 MALIGNANT NEOPLASM OF DOME OF URINARY BLADDER: Primary | ICD-10-CM

## 2025-07-24 DIAGNOSIS — E03.9 ACQUIRED HYPOTHYROIDISM: ICD-10-CM

## 2025-07-24 DIAGNOSIS — C44.90 SKIN CANCER: ICD-10-CM

## 2025-07-24 PROBLEM — S85.001S: Status: ACTIVE | Noted: 2018-06-18

## 2025-07-24 PROBLEM — T88.59XA ANESTHESIA COMPLICATION: Status: ACTIVE | Noted: 2025-07-24

## 2025-07-24 PROBLEM — I35.1 NONRHEUMATIC AORTIC (VALVE) INSUFFICIENCY: Status: ACTIVE | Noted: 2022-08-08

## 2025-07-24 PROCEDURE — 71046 X-RAY EXAM CHEST 2 VIEWS: CPT | Mod: 26,,, | Performed by: STUDENT IN AN ORGANIZED HEALTH CARE EDUCATION/TRAINING PROGRAM

## 2025-07-24 PROCEDURE — 71046 X-RAY EXAM CHEST 2 VIEWS: CPT | Mod: TC

## 2025-07-24 PROCEDURE — 99214 OFFICE O/P EST MOD 30 MIN: CPT | Mod: PBBFAC,25

## 2025-07-24 PROCEDURE — 99999 PR PBB SHADOW E&M-EST. PATIENT-LVL IV: CPT | Mod: PBBFAC,,,

## 2025-07-24 NOTE — ASSESSMENT & PLAN NOTE
Pt with intolerance to propofol and etomidate   With popofol pt reports delayed emergence, post op cognitive confusion x days   - pt had subsequent knee and hip replacements without propofol and etomidate; pt reports spinal only with one TKA , had GA with THAx1 and TKA x1 .   - pt had successful oral surgery with 2 mg Zofran, 2.5 mg Versed, 90 mg Brevital, 4 mg dexamethasone.  - I have requested anesthesia record from [facility] for review.  This case was performed in February of 2024.

## 2025-07-24 NOTE — ASSESSMENT & PLAN NOTE
Patient presents at the request of Dr. Avila who plans on performing a TURBT on August 5th  Patient with known history of bladder cancer- no issues in last 6 years , treated with intrabladder chemo at the time   Known risk factors for perioperative complications: HLD     Difficulty with intubation is not anticipated.    Cardiac Risk Estimation:  Per Revised Cardiac Risk Index patient 's RCRI score is 1 with a 6.0% risk of a major cardiac event.      1.) Preoperative workup as follows: ECG, hemoglobin, hematocrit, electrolytes, creatinine, glucose, liver function studies, coagulation studies.  2.) Change in medication regimen before surgery:  Hold NSAIDs 7 days preop.  3.) Prophylaxis for cardiac events with perioperative beta-blockers: not indicated.  4.) Invasive hemodynamic monitoring perioperatively: not indicated.  5.) Deep vein thrombosis prophylaxis postoperatively: intermittent pneumatic compression boots and regimen to be chosen by surgical team.  6.) Surveillance for postoperative MI with ECG immediately postoperatively and on postoperati ve days 1 and 2 AND troponin levels 24 hours postoperatively and on day 4 or hospital discharge (whichever comes first): not indicated.  7.) Current medications which may produce withdrawal symptoms if withheld perioperatively: none  8.) Other measures: Patient follows with cardiology on an annual basis, last seen August of 2024.  CTA of the coronaries in 2022 showed known nonobstructive disease in discrete area.

## 2025-07-24 NOTE — PROGRESS NOTES
Preoperative History and Physical                                                                                                  Chief Complaint: Preoperative evaluation     History of Present Illness:      Selam Botello is a 79 y.o. female who presents to the office today for a preoperative consultation at the request of Dr. Avila who plans on performing TURBT on August 5th  Functional Status:      The patient  able to climb a flight of stairs with R knee pain . The patient is able to ambulate  without difficulty. The patient's functional status is not affected by the surgical problem. The patient's functional status is not affected by shortness of breath, chest pain, dyspnea on exertion and fatigue.  Pt up and down throughout the day, works in garden , no CP no SOB with activity   MET score greater than 4    Patient Anesthesia History:      History of Malignant Hyperthermia: no  History of Pseudocholinesterase Deficiency: no  History PONV: no  History of difficult intubation: no  History of delayed emergence: yes  Intolerant to propofol and  etomidate   Family Anesthesia History:      History of Malignant Hyperthermia: no  History of Pseudocholinesterase Deficiency: no     Past Medical History:      Past Medical History:   Diagnosis Date    Acid reflux     Allergy     Arthritis     Atrial tachycardia     Bladder cancer     Breast cyst     Cataract     Colon polyp     COVID 05/19/2022    Deep vein thrombosis     Degenerative disc disease     Encounter for blood transfusion     General anesthetics causing adverse effect in therapeutic use     GI bleed     Hematuria, gross     Hyperlipidemia     Inflammatory bowel disease     Liver disease     cyst    Malignant neoplasm of lateral wall of urinary bladder 10/31/2019    Osteoporosis     PONV (postoperative nausea and vomiting)     Retina disorder, left     Skin cancer     Thyroid disease     Urinary tract infection         Past Surgical History:      Past Surgical  History:   Procedure Laterality Date    ADENOIDECTOMY      BLADDER FULGURATION Right 09/12/2018    Procedure: FULGURATION, BLADDER;  Surgeon: Isaias Shah MD;  Location: [facility];  Service: Urology;  Laterality: Right;    BLADDER FULGURATION N/A 04/24/2019    Procedure: FULGURATION, BLADDER;  Surgeon: Isaias Shah MD;  Location: [facility] OR;  Service: Urology;  Laterality: N/A;    BREAST SURGERY Bilateral     mastectomies prophylactic    CATARACT EXTRACTION      CYSTOSCOPY      CYSTOSCOPY N/A 04/24/2019    Procedure: CYSTOSCOPY;  Surgeon: Isaias Shah MD;  Location: [facility] OR;  Service: Urology;  Laterality: N/A;    CYSTOSCOPY WITH URETEROSCOPY, RETROGRADE PYELOGRAPHY, AND INSERTION OF STENT Left 09/12/2018    Procedure: CYSTOSCOPY, WITH RETROGRADE PYELOGRAM AND URETERAL STENT INSERTION;  Surgeon: Isaias Shah MD;  Location: [facility];  Service: Urology;  Laterality: Left;    EYE SURGERY Left 2017    retina pucker    INSTILLATION OF URINARY BLADDER N/A 09/12/2018    Procedure: INSTILLATION, URINARY BLADDER;  Surgeon: Isaias Shah MD;  Location: [facility];  Service: Urology;  Laterality: N/A;  Chemo agent instillation     JOINT REPLACEMENT Bilateral     hip    JOINT REPLACEMENT Right     knee    MASTECTOMY Bilateral 1991    RETROGRADE PYELOGRAPHY Bilateral 10/31/2019    Procedure: PYELOGRAM, RETROGRADE;  Surgeon: Baldo Gates MD;  Location: [facility];  Service: Urology;  Laterality: Bilateral;    TONSILLECTOMY      TURBT      TURBT, WITH BLUE LIGHT CYSTOSCOPY AND CYSVIEW Bilateral 10/31/2019    Procedure: TURBT,WITH BLUE LIGHT CYSTOSCOPY AND CYSVIEW;  Surgeon: Baldo Gates MD;  Location: [facility];  Service: Urology;  Laterality: Bilateral;    TYMPANOSTOMY TUBE PLACEMENT      VASCULAR SURGERY Right     popliteal stent    WISDOM TOOTH EXTRACTION          Social History:      Social History     Socioeconomic History    Marital status:    Tobacco Use    Smoking status: Never     Passive  exposure: Past    Smokeless tobacco: Never   Substance and Sexual Activity    Alcohol use: No    Drug use: No    Sexual activity: Yes     Partners: Male     Social Drivers of Health     Financial Resource Strain: Low Risk  (5/16/2025)    Overall Financial Resource Strain (CARDIA)     Difficulty of Paying Living Expenses: Not hard at all   Food Insecurity: No Food Insecurity (5/16/2025)    Hunger Vital Sign     Worried About Running Out of Food in the Last Year: Never true     Ran Out of Food in the Last Year: Never true   Transportation Needs: No Transportation Needs (5/16/2025)    PRAPARE - Transportation     Lack of Transportation (Medical): No     Lack of Transportation (Non-Medical): No   Physical Activity: Insufficiently Active (5/16/2025)    Exercise Vital Sign     Days of Exercise per Week: 3 days     Minutes of Exercise per Session: 10 min   Stress: Stress Concern Present (5/16/2025)    [employer] - Occupational Stress Questionnaire     Feeling of Stress : Rather much   Housing Stability: Low Risk  (5/16/2025)    Housing Stability Vital Sign     Unable to Pay for Housing in the Last Year: No     Number of Times Moved in the Last Year: 0     Homeless in the Last Year: No        Family History:      Family History   Problem Relation Name Age of Onset    Cancer Mother skin     Tuberculosis Mother skin     COPD Father prostate/skin     Cancer Father prostate/skin     Cancer Sister         Allergies:      Review of patient's allergies indicates:   Allergen Reactions    Ciprofloxacin Blisters, Dermatitis, Rash and Swelling    Etomidate Other (See Comments), Palpitations and Shortness Of Breath    Latex Rash     Other reaction(s): mouth ulcers    Latex, natural rubber      Mouth ulcers    Nsaids (non-steroidal anti-inflammatory drug) Other (See Comments)     Bleeding of the colon-hospitalized    Other Reaction(s): Other (See Comments)  Bleeding of the colon-hospitalized    Propofol  analogues Other (See Comments)     Cognitive function declines for approx 2 weeks  Loses memory function/ability to recall  Does not wake up easily after     Cefdinir Other (See Comments)    Gabapentin Other (See Comments)     Causes dizziness    Levofloxacin      Other reaction(s): Joint pain    Tramadol Hives and Itching    Cipro [ciprofloxacin hcl] Rash    Epinephrine Palpitations     Arms twitch      Sulfa (sulfonamide antibiotics) Rash       Medications:      Current Outpatient Medications   Medication Sig    CARBOXYMETHYLCELLULOSE SODIUM (REFRESH TEARS OPHT) Apply to eye 2 (two) times daily.     cetirizine (ZYRTEC) 10 MG tablet Take 10 mg by mouth once daily.    cholecalciferol, vitamin D3, 1,250 mcg (50,000 unit) capsule Take 50,000 Units by mouth every 7 days.    denosumab (PROLIA) 60 mg/mL Syrg Inject 1 mL (60 mg total) into the skin every 6 (six) months.    LIDODERM 5 % 1 patch a daily as needed for pain; Remove & Discard patch within 12 hours    metoprolol succinate (TOPROL-XL) 25 MG 24 hr tablet Take 25 mg by mouth every other day. Patient states she has 50mg tablets and takes 1/2 tablet every other day    mupirocin (BACTROBAN) 2 % ointment     SYNTHROID 50 mcg tablet TAKE 1 TABLET BY MOUTH EVERY DAY    nitrofurantoin, macrocrystal-monohydrate, (MACROBID) 100 MG capsule Take 1 capsule (100 mg total) by mouth 2 (two) times a day. (Patient not taking: Reported on 7/24/2025)     Current Facility-Administered Medications   Medication    LIDOcaine HCl 2% urojet     Facility-Administered Medications Ordered in Other Visits   Medication    lactated ringers infusion    lidocaine (PF) 10 mg/ml (1%) injection 10 mg       Vitals:      Vitals:    07/24/25 0844   BP: 135/67   Pulse: (!) 58   Resp: 18   Temp: 96.4 °F (35.8 °C)       Review of Systems:        Review of Systems   Constitutional:  Negative for chills and fever.   HENT:  Negative for congestion, ear discharge, ear pain, hearing loss and tinnitus.    Eyes:   Negative for pain, discharge and redness.        Dry eyes     Respiratory:  Negative for cough, shortness of breath and wheezing.    Cardiovascular:  Positive for leg swelling. Negative for chest pain, palpitations and orthopnea.   Gastrointestinal:  Positive for diarrhea (immodium as needed). Negative for heartburn, nausea and vomiting.   Genitourinary:  Negative for dysuria, frequency and urgency.        Nocturia    Musculoskeletal:  Positive for joint pain and neck pain. Negative for back pain.   Skin:  Negative for itching and rash.   Neurological:  Positive for headaches (with changes in barometric pressure). Negative for dizziness, tingling and seizures.   Endo/Heme/Allergies:  Positive for environmental allergies.   Psychiatric/Behavioral:  Negative for depression.          Physical Exam:      Constitutional: Appears well-developed, well-nourished and in no acute distress.  Patient is oriented to person, place, and time.   Head: Normocephalic and atraumatic. Mucous membranes moist.  Neck: Neck supple no mass.   Cardiovascular: Normal rate and regular rhythm.  S1 S2 appreciated by ascultation.  Pulmonary/Chest: Effort normal and clear to auscultation bilaterally. No respiratory distress.   Abdomen: non-distended.    Neurological: Patient is alert and oriented to person, place and time. Moves all extremities.  Skin: Warm and dry. No lesions.  Extremities: No clubbing, cyanosis or edema. R LE edema non pitting > L     Laboratory data:      Reviewed and noted in plan where applicable. Please see chart for full laboratory data.    @HVOPUESQH83(cpk,cpkmb,troponini,mb)@ @GPYDWGREV60(poctglucose)@     Lab Results   Component Value Date    INR 1.1 02/06/2024    INR 1.0 08/16/2022       Lab Results   Component Value Date    WBC 5.40 07/23/2025    HGB 11.7 (L) 07/23/2025    HCT 36.2 (L) 07/23/2025    MCV 88 07/23/2025     07/23/2025       @RGKPTXFLU93(GLU,NA,K,Cl,CO2,BUN,Creatinine,Calcium,MG)@    Predictors of  intubation difficulty:       Morbid obesity? no   Anatomically abnormal facies? no   Prominent incisors? no   Receding mandible? no   Short, thick neck? no   Neck range of motion: normal    Cardiographics:      EC25 pending   Echocardiogram: 2023    CONCLUSIONS:   1. Normal left ventricular cavity size. Normal left ventricular systolic function. LVEF   55 - 65%. Normal diastolic function.   2. Normal right ventricular size. Normal right ventricular systolic function.   3. Mild to moderate mitral valve regurgitation.   4. Mild aortic valve regurgitation.   Imaging:      Chest x-ray: pending      Assessment and Plan:      Malignant neoplasm of urinary bladder  Patient presents at the request of Dr. vAila who plans on performing a TURBT on   Patient with known history of bladder cancer- no issues in last 6 years , treated with intrabladder chemo at the time   Known risk factors for perioperative complications: HLD     Difficulty with intubation is not anticipated.    Cardiac Risk Estimation:  Per Revised Cardiac Risk Index patient 's RCRI score is 1 with a 6.0% risk of a major cardiac event.      1.) Preoperative workup as follows: ECG, hemoglobin, hematocrit, electrolytes, creatinine, glucose, liver function studies, coagulation studies.  2.) Change in medication regimen before surgery:  Hold NSAIDs 7 days preop.  3.) Prophylaxis for cardiac events with perioperative beta-blockers: not indicated.  4.) Invasive hemodynamic monitoring perioperatively: not indicated.  5.) Deep vein thrombosis prophylaxis postoperatively: intermittent pneumatic compression boots and regimen to be chosen by surgical team.  6.) Surveillance for postoperative MI with ECG immediately postoperatively and on postoperati ve days 1 and 2 AND troponin levels 24 hours postoperatively and on day 4 or hospital discharge (whichever comes first): not indicated.  7.) Current medications which may produce withdrawal symptoms if  withheld perioperatively: none  8.) Other measures: Patient follows with cardiology on an annual basis, last seen August of 2024.  CTA of the coronaries in 2022 showed known nonobstructive disease in discrete area.      Bilateral carotid artery stenosis  Minimal plaque with no obstruction 2021 that was unchanged from 2017 (CVT)     Nonrheumatic mitral (valve) insufficiency  CONCLUSIONS 2023 echo   1. Normal left ventricular cavity size. Normal left ventricular systolic function. LVEF   55 - 65%. Normal diastolic function.   2. Normal right ventricular size. Normal right ventricular systolic function.   3. Mild to moderate mitral valve regurgitation.   4. Mild aortic valve regurgitation.     Atrial tachycardia, paroxysmal  Currently on metoprolol, no longer with palpitations, keep follow up with PCP   Per cards:   Beta-blockers alone proved inadequate for symptomatic relief.    · Propafenone 150 mg p.o. twice daily begun May 2018.  Dr. Rawls (EP)  subsequently increased to 3 times daily  · Subsequent event monitor shows only premature atrial and ventricular contractions.  · Metoprolol succinate 25 mg nightly added for symptomatic benefit June 2018    Anesthesia complication  Pt with intolerance to propofol and etomidate   With popofol pt reports delayed emergence, post op cognitive confusion x days   - pt had subsequent knee and hip replacements without propofol and etomidate; pt reports spinal only with one TKA , had GA with THAx1 and TKA x1 .   - pt had successful oral surgery with 2 mg Zofran, 2.5 mg Versed, 90 mg Brevital, 4 mg dexamethasone.  - I have requested anesthesia record from [facility] for review.  This case was performed in February of 2024.        Hereditary hemochromatosis  Diet controlled   Managed by Dr. Neri heme onc      Acquired hypothyroidism  Recent TSH 1.818  Continue home Synthroid  Keep appropriate follow up    Venous insufficiency  B LE   Uses compression stocking in cooler  weather  Improves overnight with elevation           Electronically signed   GRAHAM Mcdonald PA-C

## 2025-07-24 NOTE — ASSESSMENT & PLAN NOTE
CONCLUSIONS 2023 echo   1. Normal left ventricular cavity size. Normal left ventricular systolic function. LVEF   55 - 65%. Normal diastolic function.   2. Normal right ventricular size. Normal right ventricular systolic function.   3. Mild to moderate mitral valve regurgitation.   4. Mild aortic valve regurgitation.

## 2025-07-24 NOTE — ASSESSMENT & PLAN NOTE
Currently on metoprolol, no longer with palpitations, keep follow up with PCP   Per cards:   Beta-blockers alone proved inadequate for symptomatic relief.    · Propafenone 150 mg p.o. twice daily begun May 2018.  Dr. Rawls (EP)  subsequently increased to 3 times daily  · Subsequent event monitor shows only premature atrial and ventricular contractions.  · Metoprolol succinate 25 mg nightly added for symptomatic benefit June 2018

## 2025-07-24 NOTE — DISCHARGE INSTRUCTIONS
Post-Op Assessment Note      CV Status:  Stable    Mental Status:  Alert and awake    Hydration Status:  Euvolemic    PONV Controlled:  Controlled    Airway Patency:  Patent    Post Op Vitals Reviewed:  Yes              BP     Temp      Pulse     Resp      SpO2 To confirm, your doctor has instructed you: Surgery is scheduled for 8/5/25.   Pre admit office will call the afternoon prior to surgery between 1PM and 3PM with arrival time.    Surgery will be at [facility],  The address is [address]. [place] [address].      IMPORTANT INSTRUCTIONS!    Do not eat or drink after 12 midnight, including water. Do not smoke or use chewing tobacco after 12 midnight!  OK to brush teeth, but no gum, candy, or mints!      *Take only these medicines with a small swallow of water-morning of surgery*     Synthroid         ____ Stop taking all vitamins, herbal supplements, Aspirin, & NSAIDS (Ibuprofen, Advil, Aleve) 7 days prior to surgery, as these can thin your blood.    ____ Weight loss medication, such as Adipex and Phentermine, must be stopped 14 days prior to surgery, no exceptions!    *Diabetic Patients: If you take diabetic or weight loss medication, do NOT take morning of surgery unless instructed by Doctor. Metformin to be stopped 24 hrs prior to surgery. DO NOT take short-acting insulin the day of surgery. Only take HALF of your regular dose of long-acting insulin the night before surgery, unless instructed otherwise. Blood sugars will be checked in pre-op.   ~Ozempic/Mounjaro/Wegovy/Trulicity/Semaglutide injections must be stopped 7 days prior to surgery.     Please notify MD office if you develop an active infection, are prescribed antibiotics by someone other than the surgeon doing your surgery, or visit urgent care/ER.    Bathing Instructions:   The night before surgery and the morning prior to coming to the hospital:    - Shower & rinse your body as usual with anti-bacterial Soap (Dial or Lever 2000)   -Hibiclens (if indicated) use AFTER anti-bacterial soap; 1 packet PM/1 packet in AM on surgical site only   -Do not use hibiclens on your head, face, or genitals.    -Do not wash with anti-bacterial soap after you use the hibiclens.    -Do not shave  surgical site 5-7 days prior to surgery.    -Pubic hair 7 days prior to surgery (OBGYN/Urology only).       Additional Instructions:   __ No makeup, powder, lotions, creams, or body spray to skin   __ No deodorant if you are having: breast procedure, PORT insertion, or shoulder surgery!   __ No nail polish or artificial nails due to risk of infection.             **SURGERY MAY BE CANCELLED AT SURGEON'S DISCRETION IF ARTIFICIAL/NAIL POLISH IS PRESENT!!!**  __ Please remove all piercings and leave all jewelry at home.    **SURGERY WILL BE CANCELLED IF PIERCINGS ARE PRESENT!!!**    __ Dentures, Hearing Aids and Contact Lens need to be removed prior to the start of surgery.    __ Avoid Alcoholic beverages 3 days prior to surgery, as it can thin the blood, unless told otherwise by pre-admit department.  __ Females: may need to give a urine sample the morning of surgery;   **Please ask  for a specimen cup if you need to use the restroom prior to being called into pre-op.**  __ Males: Stop ED medications (Viagra, Cialis) 24 hrs prior to surgery.  __ You must have transportation, and they MUST stay the entire time.   __  Bring photo ID and insurance information to hospital    What to Wear:  Clean, loose-fitting clothing. Please allow for dressings/bandages/surgical equipment/drains.   ~Breast Patients: We recommend a button up shirt so you do not have to lift your arms.   [employer] Link recommended by breast surgeons if you will have drains in place: https://a.co/d/iXEI1qK  ~Shoulder Patients: We recommend a button up shirt. If unavailable, an oversized t-shirt (2 sizes bigger than your normal size) or a stretchy dress will also work.   [employer] Link for hospital type button sleeve t-shirt: https://a.co/d/86BAbRk  ~Knee Patients: We recommend oversized pants/shorts or a dress to accommodate any knee braces in place. Braces will not be removed to get dressed.    [facility] Visitor/Ride Policy:    Only 1 adult allowed (18  or older) to accompany you and MUST STAY through the entire admission length.      -Must have a ride home from a responsible adult that you know and trust.     -Medical Transport, Uber or Lyft can only be used if patient has a responsible adult to   accompany them during ride home.      ~Your ride MUST STAY the entire time until you are discharged~   Please notify the pre-admit department prior to surgery if you use medication transportation so we can verify your arrival/pickup time!   -The patient is responsible for setting up their own transportation!    Discharge Prescriptions:  Your discharge prescriptions will be sent next door to [facility] pharmacy, unless otherwise discussed with your surgeon. Your  will be responsible for calling the pharmacy ([phone]) to begin the prescription filling process. They will receive a text message with instructions once you are in recovery. Please make sure we have your insurance information and you bring a payment method (cash or card) if needed for prescriptions. If you have  insurance, please let the pre-op nurse know, as the pharmacy does not take this insurance.     *If you are running late or have questions the morning of surgery, please call the Pre-OP Department @ [phone].       *Please Call [facility] Pre-Admit Department for surgery instruction questions: (M-F 8AM-4:30PM)  [phone]     Financial Questions:  Sudarshan: [phone]  Hours ~ 5AM-1:30PM  Monday-Friday    Billing Question Numbers:   [phone]  [phone]

## 2025-07-25 ENCOUNTER — TELEPHONE (OUTPATIENT)
Dept: INTERNAL MEDICINE | Facility: CLINIC | Age: 80
End: 2025-07-25
Payer: MEDICARE

## 2025-07-25 NOTE — TELEPHONE ENCOUNTER
Returned phone call to patient.  She was inquiring about planned anesthetic for August 5th.  I discussed with patient that I am awaiting her anesthesia records from [facility].  I also discussed with patient the use of etomidate in the past dating back from 2017 to 2019.  Patient is unsure how she did with her 2019 cystos.

## 2025-07-25 NOTE — PROGRESS NOTES
I called micah CHAVEZ and requested records from patient's anesthetic at [facility] in February of 2024, it followed up this morning after fax request was sent yesterday and left a message to obtain records.  I also sent a fax request for records from [facility].

## 2025-07-28 ENCOUNTER — TELEPHONE (OUTPATIENT)
Dept: HEMATOLOGY/ONCOLOGY | Facility: CLINIC | Age: 80
End: 2025-07-28
Payer: MEDICARE

## 2025-07-28 NOTE — TELEPHONE ENCOUNTER
"Called pt to address recent distress screenings.     Pt is very pleasant. Unfortunately her  recently passed away. She is experiencing the emotions that come along with the death of a loved one; shock, anger, sadness. Normalized and validated her feelings. Her distress, she believes, is also due to finances; he handled them all so she is trying to navigate how to handle the secession and other issues. Furthermore, her bladder cancer has recurred and she has an upcoming surgery. She does have some limited support from her family and her friends are also supportive. She notes "it's just an adjustment." Provided encouragement and will send a [facility] message with SW contact info for future use if needed. She was grateful for the call.        7/18/2025     2:02 PM 7/18/2025    12:55 PM 5/22/2025     2:46 PM 4/22/2025    10:55 AM 1/15/2025    10:16 AM 10/22/2024     9:40 AM 8/28/2024     1:35 PM   DISTRESS SCREENING   Distress Score 6 6 1 8 0 - No Distress 0 - No Distress 0 - No Distress   Practical Concerns  Treatment decisions  None of these None of these  None of these  None of these    Social Concerns  None of these  None of these None of these None of these None of these   Emotional Concerns Worry or anxiety;Grief or loss;Loneliness None of these  None of these None of these Worry or anxiety None of these   Spiritual or Advent Concerns  None of these  None of these None of these None of these None of these   Physical Concerns Sleep;Loss or change of physical abilities None of these Fatigue None of these None of these None of these None of these   Other Problems    family issues   with dementia        Data saved with a previous flowsheet row definition       "

## 2025-07-30 ENCOUNTER — PATIENT MESSAGE (OUTPATIENT)
Dept: UROLOGY | Facility: CLINIC | Age: 80
End: 2025-07-30
Payer: MEDICARE

## 2025-07-30 ENCOUNTER — TELEPHONE (OUTPATIENT)
Dept: UROLOGY | Facility: CLINIC | Age: 80
End: 2025-07-30
Payer: MEDICARE

## 2025-07-30 ENCOUNTER — OFFICE VISIT (OUTPATIENT)
Dept: UROLOGY | Facility: CLINIC | Age: 80
End: 2025-07-30
Payer: MEDICARE

## 2025-07-30 VITALS
BODY MASS INDEX: 24.78 KG/M2 | WEIGHT: 134.69 LBS | HEIGHT: 62 IN | HEART RATE: 69 BPM | SYSTOLIC BLOOD PRESSURE: 132 MMHG | DIASTOLIC BLOOD PRESSURE: 71 MMHG

## 2025-07-30 DIAGNOSIS — D49.4 BLADDER NEOPLASM: Primary | ICD-10-CM

## 2025-07-30 DIAGNOSIS — Z85.51 PERSONAL HISTORY OF MALIGNANT NEOPLASM OF BLADDER: ICD-10-CM

## 2025-07-30 PROCEDURE — 99999 PR PBB SHADOW E&M-EST. PATIENT-LVL III: CPT | Mod: PBBFAC,,, | Performed by: UROLOGY

## 2025-07-30 PROCEDURE — 99213 OFFICE O/P EST LOW 20 MIN: CPT | Mod: PBBFAC | Performed by: UROLOGY

## 2025-07-30 PROCEDURE — 99214 OFFICE O/P EST MOD 30 MIN: CPT | Mod: S$PBB,,, | Performed by: UROLOGY

## 2025-07-30 NOTE — PROGRESS NOTES
Subjective:       Patient ID: Selam Botello is a 79 y.o. female.    Chief Complaint: Follow-up (Discuss Surgery patient also asked about a pre op )      History of Present Illness:     Mr Botello has a history of bladder cancer.  Per Dr Baldo Gates records she had PUNLMP on 10/31/2019, low grade Ta on 9/12/2018, and low grade Ta 10/16/2017.  She underwent a surveillance cystoscopy on 7-19-24 by Dr Gates that was normal.  She underwent a surveillance cystoscopy by me on 7-16-25 that showed an approximately 2 cm papillary lesion on the left trigone medial to her left ureteral orifice.          Past Medical History:   Diagnosis Date    Acid reflux     Allergy     Arthritis     Atrial tachycardia     Bladder cancer     Breast cyst     Cataract     Colon polyp     COVID 05/19/2022    Deep vein thrombosis     Degenerative disc disease     Encounter for blood transfusion     General anesthetics causing adverse effect in therapeutic use     GI bleed     Hematuria, gross     Hyperlipidemia     Inflammatory bowel disease     Liver disease     cyst    Malignant neoplasm of lateral wall of urinary bladder 10/31/2019    Osteoporosis     PONV (postoperative nausea and vomiting)     Retina disorder, left     Skin cancer     Thyroid disease     Urinary tract infection      Family History   Problem Relation Name Age of Onset    Cancer Mother skin     Tuberculosis Mother skin     COPD Father prostate/skin     Cancer Father prostate/skin     Cancer Sister       Social History[1]  Encounter Medications[2]     Review of Systems   Constitutional:  Negative for chills and fever.   Respiratory:  Negative for shortness of breath.    Cardiovascular:  Negative for chest pain.   Gastrointestinal:  Negative for nausea and vomiting.   Genitourinary:  Negative for difficulty urinating, dysuria and hematuria.   Musculoskeletal:  Negative for back pain.   Skin:  Negative for rash.   Neurological:  Negative for dizziness.  "  Psychiatric/Behavioral:  Negative for agitation.        Objective:     /71 (BP Location: Left arm, Patient Position: Sitting)   Pulse 69   Ht 5' 2" (1.575 m)   Wt 61.1 kg (134 lb 11.2 oz)   BMI 24.64 kg/m²     Physical Exam  Constitutional:       General: She is not in acute distress.  Pulmonary:      Effort: Pulmonary effort is normal.   Abdominal:      General: There is no distension.      Palpations: Abdomen is soft.   Neurological:      Mental Status: She is alert and oriented to person, place, and time.         No visits with results within 1 Day(s) from this visit.   Latest known visit with results is:   Lab Visit on 07/23/2025   Component Date Value Ref Range Status    Sodium 07/23/2025 139  136 - 145 mmol/L Final    Potassium 07/23/2025 4.4  3.5 - 5.1 mmol/L Final    Chloride 07/23/2025 109  95 - 110 mmol/L Final    CO2 07/23/2025 24  23 - 29 mmol/L Final    Glucose 07/23/2025 88  70 - 110 mg/dL Final    BUN 07/23/2025 13  8 - 23 mg/dL Final    Creatinine 07/23/2025 0.7  0.5 - 1.4 mg/dL Final    Calcium 07/23/2025 9.5  8.7 - 10.5 mg/dL Final    Protein Total 07/23/2025 6.5  6.0 - 8.4 gm/dL Final    Albumin 07/23/2025 4.0  3.5 - 5.2 g/dL Final    Bilirubin Total 07/23/2025 0.4  0.1 - 1.0 mg/dL Final    For infants and newborns, interpretation of results should be based   on gestational age, weight and in agreement with clinical   observations.    Premature Infant recommended reference ranges:   0-24 hours:  <8.0 mg/dL   24-48 hours: <12.0 mg/dL   3-5 days:    <15.0 mg/dL   6-29 days:   <15.0 mg/dL    ALP 07/23/2025 60  40 - 150 unit/L Final    AST 07/23/2025 19  11 - 45 unit/L Final    ALT 07/23/2025 13  10 - 44 unit/L Final    Anion Gap 07/23/2025 6 (L)  8 - 16 mmol/L Final    eGFR 07/23/2025 >60  >60 mL/min/1.73/m2 Final    Estimated GFR calculated using the CKD-EPI creatinine (2021) equation.    Iron Level 07/23/2025 23 (L)  30 - 160 ug/dL Final    Transferrin 07/23/2025 213  200 - 375 mg/dL " Final    Iron Binding Capacity Total 07/23/2025 315  250 - 450 ug/dL Final    Iron Saturation 07/23/2025 7 (L)  20 - 50 % Final    Ferritin 07/23/2025 23.0  20.0 - 300.0 ng/mL Final    TSH 07/23/2025 1.818  0.400 - 4.000 uIU/mL Final    Sodium 07/23/2025 139  136 - 145 mmol/L Final    Potassium 07/23/2025 4.4  3.5 - 5.1 mmol/L Final    Chloride 07/23/2025 108  95 - 110 mmol/L Final    CO2 07/23/2025 24  23 - 29 mmol/L Final    Glucose 07/23/2025 78  70 - 110 mg/dL Final    BUN 07/23/2025 14  8 - 23 mg/dL Final    Creatinine 07/23/2025 0.7  0.5 - 1.4 mg/dL Final    Calcium 07/23/2025 9.3  8.7 - 10.5 mg/dL Final    Protein Total 07/23/2025 6.4  6.0 - 8.4 gm/dL Final    Albumin 07/23/2025 4.2  3.5 - 5.2 g/dL Final    Bilirubin Total 07/23/2025 0.4  0.1 - 1.0 mg/dL Final    For infants and newborns, interpretation of results should be based   on gestational age, weight and in agreement with clinical   observations.    Premature Infant recommended reference ranges:   0-24 hours:  <8.0 mg/dL   24-48 hours: <12.0 mg/dL   3-5 days:    <15.0 mg/dL   6-29 days:   <15.0 mg/dL    ALP 07/23/2025 60  40 - 150 unit/L Final    AST 07/23/2025 19  11 - 45 unit/L Final    ALT 07/23/2025 13  10 - 44 unit/L Final    Anion Gap 07/23/2025 7 (L)  8 - 16 mmol/L Final    eGFR 07/23/2025 >60  >60 mL/min/1.73/m2 Final    Estimated GFR calculated using the CKD-EPI creatinine (2021) equation.    WBC 07/23/2025 5.40  3.90 - 12.70 K/uL Final    RBC 07/23/2025 4.10  4.00 - 5.40 M/uL Final    HGB 07/23/2025 11.7 (L)  12.0 - 16.0 gm/dL Final    HCT 07/23/2025 36.2 (L)  37.0 - 48.5 % Final    MCV 07/23/2025 88  82 - 98 fL Final    MCH 07/23/2025 28.5  27.0 - 31.0 pg Final    MCHC 07/23/2025 32.3  32.0 - 36.0 g/dL Final    RDW 07/23/2025 16.1 (H)  11.5 - 14.5 % Final    Platelet Count 07/23/2025 280  150 - 450 K/uL Final    MPV 07/23/2025 9.5  9.2 - 12.9 fL Final    Nucleated RBC 07/23/2025 0  <=0 /100 WBC Final    Neut % 07/23/2025 63.1  38 - 73 %  Final    Lymph % 07/23/2025 24.4  18 - 48 % Final    Mono % 07/23/2025 9.6  4 - 15 % Final    Eos % 07/23/2025 2.2  <=8 % Final    Basophil % 07/23/2025 0.7  <=1.9 % Final    Imm Grans % 07/23/2025 0.0  0.0 - 0.5 % Final    Neut # 07/23/2025 3.40  1.8 - 7.7 K/uL Final    Lymph # 07/23/2025 1.32  1 - 4.8 K/uL Final    Mono # 07/23/2025 0.52  0.3 - 1 K/uL Final    Eos # 07/23/2025 0.12  <=0.5 K/uL Final    Baso # 07/23/2025 0.04  <=0.2 K/uL Final    Imm Grans # 07/23/2025 0.00  0.00 - 0.04 K/uL Final        No results found. However, due to the size of the patient record, not all encounters were searched. Please check Results Review for a complete set of results.     Assessment:       1. Bladder neoplasm    2. Personal history of malignant neoplasm of bladder        Plan:             7-26-24  CT abdomen/pelvis without IV contrast.  See report.  No obstructing kidney stones or hydronephrosis.  Urinary bladder partially obscured from artifact from patient's bilateral hip replacements.      I reviewed the above imaging results.            7-23-25  Cr 0.7.  GFR >60.    10-29-24  Urine culture.  No significant growth.     7-22-24  Urine culture.  Proteus pan sensitive.  She was treated with cephalexin for 7 days.    7-16-25  Urine cytology.  Atypical urothelial cells.     I reviewed the above lab results.            Assessment:  - Cystoscopy on 7-16-25 showed an approximately 2 cm papillary lesion on the left trigone medial to her left ureteral orifice.   - Trace amount of microscopic hematuria on 7-16-25.  No MH today on 7-30-25.  - History of bladder cancer.  Surveillance cystoscopy on 7-19-24 by Dr Gates was normal.  Per Dr Baldo Gates records she had PUNLMP on 10/31/2019, low grade Ta on 9/12/2018, and low grade Ta 10/16/2017.       Plan:  - I discussed options again with the patient today and the mutual decision was to proceed to the operating room for a transurethral resection of her bladder tumor, cystoscopy with  possible biopsies with fulguration, bilateral retrograde pyelograms with possible need for bilateral ureteral stents placement, and any other indicated procedures.  Risks and benefits of the surgery were explained to the patient and the patient expressed understanding.  All of her good questions were answered by me to the patient's apparent understanding and apparent satisfaction.    - I discussed dietary modifications with her today and I recommended she drink mostly water during the day.                           [1]   Social History  Socioeconomic History    Marital status:    Tobacco Use    Smoking status: Never     Passive exposure: Past    Smokeless tobacco: Never   Substance and Sexual Activity    Alcohol use: No    Drug use: No    Sexual activity: Yes     Partners: Male     Social Drivers of Health     Financial Resource Strain: Low Risk  (5/16/2025)    Overall Financial Resource Strain (CARDIA)     Difficulty of Paying Living Expenses: Not hard at all   Food Insecurity: No Food Insecurity (5/16/2025)    Hunger Vital Sign     Worried About Running Out of Food in the Last Year: Never true     Ran Out of Food in the Last Year: Never true   Transportation Needs: No Transportation Needs (5/16/2025)    PRAPARE - Transportation     Lack of Transportation (Medical): No     Lack of Transportation (Non-Medical): No   Physical Activity: Insufficiently Active (5/16/2025)    Exercise Vital Sign     Days of Exercise per Week: 3 days     Minutes of Exercise per Session: 10 min   Stress: Stress Concern Present (5/16/2025)    [employer] [place] [employer] - Occupational Stress Questionnaire     Feeling of Stress : Rather much   Housing Stability: Low Risk  (5/16/2025)    Housing Stability Vital Sign     Unable to Pay for Housing in the Last Year: No     Number of Times Moved in the Last Year: 0     Homeless in the Last Year: No   [2]   Outpatient Encounter Medications as of 7/30/2025   Medication Sig  Dispense Refill    CARBOXYMETHYLCELLULOSE SODIUM (REFRESH TEARS OPHT) Apply to eye 2 (two) times daily.       cetirizine (ZYRTEC) 10 MG tablet Take 10 mg by mouth once daily.      cholecalciferol, vitamin D3, 1,250 mcg (50,000 unit) capsule Take 50,000 Units by mouth every 7 days.      denosumab (PROLIA) 60 mg/mL Syrg Inject 1 mL (60 mg total) into the skin every 6 (six) months. 2 mL 0    LIDODERM 5 % 1 patch a daily as needed for pain; Remove & Discard patch within 12 hours 30 patch 6    metoprolol succinate (TOPROL-XL) 25 MG 24 hr tablet Take 25 mg by mouth every other day. Patient states she has 50mg tablets and takes 1/2 tablet every other day      mupirocin (BACTROBAN) 2 % ointment   0    nitrofurantoin, macrocrystal-monohydrate, (MACROBID) 100 MG capsule Take 1 capsule (100 mg total) by mouth 2 (two) times a day. 14 capsule 1    SYNTHROID 50 mcg tablet TAKE 1 TABLET BY MOUTH EVERY DAY 90 tablet 3     Facility-Administered Encounter Medications as of 7/30/2025   Medication Dose Route Frequency Provider Last Rate Last Admin    lactated ringers infusion   Intravenous Continuous Gloria Vences MD   New Bag at 10/31/19 1311    lidocaine (PF) 10 mg/ml (1%) injection 10 mg  1 mL Intradermal Once Gloria Vences MD        LIDOcaine HCl 2% urojet   Urethral 1 time in Clinic/HOD

## 2025-07-30 NOTE — TELEPHONE ENCOUNTER
Outgoing call attempted to contact patient regarding upcoming surgery but no answer, left voice message with contact information letting patient know she can contact us at her earliest convenience and a [facility] message will be sent as well.

## 2025-07-31 ENCOUNTER — TELEPHONE (OUTPATIENT)
Dept: UROLOGY | Facility: CLINIC | Age: 80
End: 2025-07-31
Payer: MEDICARE

## 2025-07-31 ENCOUNTER — TELEPHONE (OUTPATIENT)
Dept: INTERNAL MEDICINE | Facility: CLINIC | Age: 80
End: 2025-07-31
Payer: MEDICARE

## 2025-07-31 NOTE — TELEPHONE ENCOUNTER
I obtained patient's DPI anesthesia record for her wrist procedure, patient had a supraclavicular nerve block and was sedated with fentanyl throughout the procedure.  Per Her operative report for her knee replacement she had a spinal in 2022.  Brevital , which patient has successfully had in the past, is not available at [facility] nor is it available at [facility].  She would likely need special committee approval for this medication to be obtained.  I have communicated with the anesthesiologists at the [place] [facility] (Chase Vuong and Lorna).  Due to patient's reported allergies of etomidate and propofol, we will planned for a spinal anesthetic on this patient.  Patient has been updated on the plan for spinal anesthesia and  the likelihood of a prolonged recovery due to the use of spinal.  The surgeon has also been updated and agrees to proceed under spinal.

## 2025-08-01 ENCOUNTER — TELEPHONE (OUTPATIENT)
Dept: UROLOGY | Facility: CLINIC | Age: 80
End: 2025-08-01
Payer: MEDICARE

## 2025-08-01 NOTE — TELEPHONE ENCOUNTER
".Return call placed to patient, patient verified identifiers, reviewed pre-op instructions with patient as she was able to get them after downloading, also reviewed restricted medications with her. She verbalized understanding and notified that her message was sent to Dr. Avila in regards to her possibly being admitted after her procedure. No further assistance needed at this time.        Star,  Sorjannette I missed your call. I tried to call but the recording said [facility] was not open.  I just tried printing the "Pre Op instructions" and it worked this time.   Can I take the over the counter Zyrtec allergy med? I don't see it on the list.     If you need to call me I will be by my phone until 9 and then after 1.  Thank you,  Selam Botello  "

## 2025-08-04 ENCOUNTER — PATIENT MESSAGE (OUTPATIENT)
Dept: RESPIRATORY THERAPY | Facility: HOSPITAL | Age: 80
End: 2025-08-04
Payer: MEDICARE

## 2025-08-05 ENCOUNTER — HOSPITAL ENCOUNTER (OUTPATIENT)
Dept: RADIOLOGY | Facility: HOSPITAL | Age: 80
Discharge: HOME | End: 2025-08-05
Attending: UROLOGY | Admitting: UROLOGY
Payer: MEDICARE

## 2025-08-05 ENCOUNTER — HOSPITAL ENCOUNTER (OUTPATIENT)
Facility: HOSPITAL | Age: 80
Discharge: HOME | End: 2025-08-06
Attending: UROLOGY | Admitting: UROLOGY
Payer: MEDICARE

## 2025-08-05 ENCOUNTER — ANESTHESIA (OUTPATIENT)
Dept: SURGERY | Facility: HOSPITAL | Age: 80
End: 2025-08-05
Payer: MEDICARE

## 2025-08-05 DIAGNOSIS — D49.4 BLADDER NEOPLASM: ICD-10-CM

## 2025-08-05 DIAGNOSIS — Z01.818 PRE-OP EXAM: ICD-10-CM

## 2025-08-05 PROCEDURE — 71000039 HC RECOVERY, EACH ADD'L HOUR: Performed by: UROLOGY

## 2025-08-05 PROCEDURE — 25000003 PHARM REV CODE 250: Performed by: ANESTHESIOLOGY

## 2025-08-05 PROCEDURE — 71000033 HC RECOVERY, INTIAL HOUR: Performed by: UROLOGY

## 2025-08-05 PROCEDURE — 63600175 PHARM REV CODE 636 W HCPCS: Performed by: ANESTHESIOLOGY

## 2025-08-05 PROCEDURE — 36000706: Performed by: UROLOGY

## 2025-08-05 PROCEDURE — 37000008 HC ANESTHESIA 1ST 15 MINUTES: Performed by: UROLOGY

## 2025-08-05 PROCEDURE — C1769 GUIDE WIRE: HCPCS | Performed by: UROLOGY

## 2025-08-05 PROCEDURE — 63600175 PHARM REV CODE 636 W HCPCS: Performed by: UROLOGY

## 2025-08-05 PROCEDURE — 63600175 PHARM REV CODE 636 W HCPCS: Performed by: NURSE ANESTHETIST, CERTIFIED REGISTERED

## 2025-08-05 PROCEDURE — 37000009 HC ANESTHESIA EA ADD 15 MINS: Performed by: UROLOGY

## 2025-08-05 PROCEDURE — C1758 CATHETER, URETERAL: HCPCS | Performed by: UROLOGY

## 2025-08-05 PROCEDURE — 25500020 PHARM REV CODE 255: Performed by: UROLOGY

## 2025-08-05 PROCEDURE — 27200688 HC TRAY, SPINAL-HYPER/ ISOBARIC: Performed by: ANESTHESIOLOGY

## 2025-08-05 PROCEDURE — 88307 TISSUE EXAM BY PATHOLOGIST: CPT | Mod: TC | Performed by: UROLOGY

## 2025-08-05 PROCEDURE — 36000707: Performed by: UROLOGY

## 2025-08-05 PROCEDURE — 74420 UROGRAPHY RTRGR +-KUB: CPT | Mod: 26,,, | Performed by: UROLOGY

## 2025-08-05 PROCEDURE — 52234 CYSTOSCOPY AND TREATMENT: CPT | Mod: ,,, | Performed by: UROLOGY

## 2025-08-05 PROCEDURE — 25000003 PHARM REV CODE 250: Performed by: UROLOGY

## 2025-08-05 PROCEDURE — 74018 RADEX ABDOMEN 1 VIEW: CPT | Mod: TC

## 2025-08-05 PROCEDURE — 71000016 HC POSTOP RECOV ADDL HR: Performed by: UROLOGY

## 2025-08-05 PROCEDURE — 27201423 OPTIME MED/SURG SUP & DEVICES STERILE SUPPLY: Performed by: UROLOGY

## 2025-08-05 PROCEDURE — 71000015 HC POSTOP RECOV 1ST HR: Performed by: UROLOGY

## 2025-08-05 RX ORDER — SODIUM CHLORIDE 9 MG/ML
INJECTION, SOLUTION INTRAVENOUS CONTINUOUS
Status: DISCONTINUED | OUTPATIENT
Start: 2025-08-05 | End: 2025-08-06 | Stop reason: HOSPADM

## 2025-08-05 RX ORDER — HYDROCODONE BITARTRATE AND ACETAMINOPHEN 5; 325 MG/1; MG/1
1 TABLET ORAL EVERY 6 HOURS PRN
Refills: 0 | Status: DISCONTINUED | OUTPATIENT
Start: 2025-08-05 | End: 2025-08-06 | Stop reason: HOSPADM

## 2025-08-05 RX ORDER — SODIUM CHLORIDE 0.9 % (FLUSH) 0.9 %
10 SYRINGE (ML) INJECTION
Status: DISCONTINUED | OUTPATIENT
Start: 2025-08-05 | End: 2025-08-06 | Stop reason: HOSPADM

## 2025-08-05 RX ORDER — BUPIVACAINE HYDROCHLORIDE 7.5 MG/ML
INJECTION INTRAVENOUS
Status: DISCONTINUED | OUTPATIENT
Start: 2025-08-05 | End: 2025-08-05

## 2025-08-05 RX ORDER — LIDOCAINE HYDROCHLORIDE 20 MG/ML
JELLY TOPICAL
Status: DISCONTINUED | OUTPATIENT
Start: 2025-08-05 | End: 2025-08-05 | Stop reason: HOSPADM

## 2025-08-05 RX ORDER — ONDANSETRON HYDROCHLORIDE 2 MG/ML
INJECTION, SOLUTION INTRAVENOUS
Status: DISCONTINUED | OUTPATIENT
Start: 2025-08-05 | End: 2025-08-05

## 2025-08-05 RX ORDER — ONDANSETRON HYDROCHLORIDE 2 MG/ML
4 INJECTION, SOLUTION INTRAVENOUS ONCE AS NEEDED
Status: COMPLETED | OUTPATIENT
Start: 2025-08-05 | End: 2025-08-05

## 2025-08-05 RX ORDER — ONDANSETRON HYDROCHLORIDE 2 MG/ML
4 INJECTION, SOLUTION INTRAVENOUS EVERY 6 HOURS PRN
Status: DISCONTINUED | OUTPATIENT
Start: 2025-08-05 | End: 2025-08-06 | Stop reason: HOSPADM

## 2025-08-05 RX ORDER — CLINDAMYCIN PHOSPHATE 900 MG/50ML
900 INJECTION, SOLUTION INTRAVENOUS
Status: COMPLETED | OUTPATIENT
Start: 2025-08-05 | End: 2025-08-05

## 2025-08-05 RX ORDER — CLINDAMYCIN PHOSPHATE 600 MG/50ML
600 INJECTION, SOLUTION INTRAVENOUS
Status: DISCONTINUED | OUTPATIENT
Start: 2025-08-05 | End: 2025-08-05

## 2025-08-05 RX ORDER — ACETAMINOPHEN 325 MG/1
650 TABLET ORAL EVERY 8 HOURS PRN
Status: DISCONTINUED | OUTPATIENT
Start: 2025-08-05 | End: 2025-08-06 | Stop reason: HOSPADM

## 2025-08-05 RX ORDER — FENTANYL CITRATE 50 UG/ML
25 INJECTION, SOLUTION INTRAMUSCULAR; INTRAVENOUS EVERY 5 MIN PRN
Status: DISCONTINUED | OUTPATIENT
Start: 2025-08-05 | End: 2025-08-06 | Stop reason: HOSPADM

## 2025-08-05 RX ORDER — ALBUTEROL SULFATE 0.83 MG/ML
2.5 SOLUTION RESPIRATORY (INHALATION) EVERY 4 HOURS PRN
Status: DISCONTINUED | OUTPATIENT
Start: 2025-08-05 | End: 2025-08-06 | Stop reason: HOSPADM

## 2025-08-05 RX ORDER — LIDOCAINE HYDROCHLORIDE 20 MG/ML
JELLY TOPICAL
Status: DISPENSED
Start: 2025-08-05 | End: 2025-08-05

## 2025-08-05 RX ORDER — TALC
6 POWDER (GRAM) TOPICAL NIGHTLY PRN
Status: DISCONTINUED | OUTPATIENT
Start: 2025-08-05 | End: 2025-08-06 | Stop reason: HOSPADM

## 2025-08-05 RX ORDER — ACETAMINOPHEN 325 MG/1
650 TABLET ORAL EVERY 4 HOURS PRN
Status: DISCONTINUED | OUTPATIENT
Start: 2025-08-05 | End: 2025-08-06 | Stop reason: HOSPADM

## 2025-08-05 RX ORDER — HYDROCODONE BITARTRATE AND ACETAMINOPHEN 10; 325 MG/1; MG/1
1 TABLET ORAL EVERY 6 HOURS PRN
Refills: 0 | Status: DISCONTINUED | OUTPATIENT
Start: 2025-08-05 | End: 2025-08-06 | Stop reason: HOSPADM

## 2025-08-05 RX ORDER — CLINDAMYCIN PHOSPHATE 900 MG/50ML
INJECTION, SOLUTION INTRAVENOUS
Status: COMPLETED
Start: 2025-08-05 | End: 2025-08-05

## 2025-08-05 RX ORDER — DEXAMETHASONE SODIUM PHOSPHATE 4 MG/ML
INJECTION, SOLUTION INTRA-ARTICULAR; INTRALESIONAL; INTRAMUSCULAR; INTRAVENOUS; SOFT TISSUE
Status: DISCONTINUED | OUTPATIENT
Start: 2025-08-05 | End: 2025-08-05

## 2025-08-05 RX ORDER — DIPHENHYDRAMINE HYDROCHLORIDE 50 MG/ML
25 INJECTION, SOLUTION INTRAMUSCULAR; INTRAVENOUS EVERY 6 HOURS PRN
Status: DISCONTINUED | OUTPATIENT
Start: 2025-08-05 | End: 2025-08-06 | Stop reason: HOSPADM

## 2025-08-05 RX ORDER — FENTANYL CITRATE 50 UG/ML
INJECTION, SOLUTION INTRAMUSCULAR; INTRAVENOUS
Status: DISCONTINUED | OUTPATIENT
Start: 2025-08-05 | End: 2025-08-05

## 2025-08-05 RX ORDER — LIDOCAINE HYDROCHLORIDE 10 MG/ML
1 INJECTION, SOLUTION EPIDURAL; INFILTRATION; INTRACAUDAL; PERINEURAL ONCE AS NEEDED
Status: DISCONTINUED | OUTPATIENT
Start: 2025-08-05 | End: 2025-08-06 | Stop reason: HOSPADM

## 2025-08-05 RX ADMIN — ONDANSETRON 4 MG: 2 INJECTION INTRAMUSCULAR; INTRAVENOUS at 07:08

## 2025-08-05 RX ADMIN — FENTANYL CITRATE 50 MCG: 50 INJECTION, SOLUTION INTRAMUSCULAR; INTRAVENOUS at 07:08

## 2025-08-05 RX ADMIN — SODIUM CHLORIDE 6.25 MG: 9 INJECTION, SOLUTION INTRAVENOUS at 09:08

## 2025-08-05 RX ADMIN — ONDANSETRON 4 MG: 2 INJECTION INTRAMUSCULAR; INTRAVENOUS at 08:08

## 2025-08-05 RX ADMIN — BUPIVACAINE HYDROCHLORIDE 15 MG: 7.5 INJECTION, SOLUTION INTRASPINAL at 07:08

## 2025-08-05 RX ADMIN — DEXAMETHASONE SODIUM PHOSPHATE 4 MG: 4 INJECTION, SOLUTION INTRA-ARTICULAR; INTRALESIONAL; INTRAMUSCULAR; INTRAVENOUS; SOFT TISSUE at 08:08

## 2025-08-05 RX ADMIN — CLINDAMYCIN PHOSPHATE 900 MG: 900 INJECTION, SOLUTION INTRAVENOUS at 07:08

## 2025-08-05 RX ADMIN — SODIUM CHLORIDE, SODIUM LACTATE, POTASSIUM CHLORIDE, AND CALCIUM CHLORIDE: 600; 310; 30; 20 INJECTION, SOLUTION INTRAVENOUS at 07:08

## 2025-08-05 NOTE — TRANSFER OF CARE
Anesthesia Transfer of Care Note    Patient: Selam Botello    Procedure(s) Performed: Procedure(s) (LRB):  TURBT (TRANSURETHRAL RESECTION OF BLADDER TUMOR), BILATERAL RETROGRADE PYELOGRAM (N/A)    Patient location: PACU    Anesthesia Type: general    Transport from OR: Transported from OR on room air with adequate spontaneous ventilation    Post pain: adequate analgesia    Post assessment: no apparent anesthetic complications    Post vital signs: stable    Level of consciousness: awake    Nausea/Vomiting: no nausea/vomiting    Complications: none    Transfer of care protocol was followed      Last vitals: Visit Vitals  /68 (BP Location: Right arm, Patient Position: Sitting)   Pulse 67   Temp 36.4 °C (97.6 °F) (Temporal)   Resp 18   Wt 59.5 kg (131 lb 1 oz)   SpO2 96%   Breastfeeding No   BMI 23.97 kg/m²

## 2025-08-05 NOTE — ANESTHESIA PROCEDURE NOTES
Spinal    Diagnosis: Bladder tumor  Patient location during procedure: OR    Staffing  Authorizing Provider: Gracie Vuong MD  Performing Provider: Gracie Vuong MD    Staffing  Other anesthesia staff: Tara Boston CRNA  Performed by: Gracie Vuong MD  Authorized by: Gracie Vuong MD    Preanesthetic Checklist  Completed: patient identified, IV checked, site marked, risks and benefits discussed, surgical consent, monitors and equipment checked, pre-op evaluation and timeout performed  Spinal Block  Patient position: sitting  Prep: Betadine  Patient monitoring: heart rate, cardiac monitor, continuous pulse ox, continuous capnometry and frequent blood pressure checks  Approach: midline  Location: L3-4  Injection technique: single shot  CSF Fluid: clear free-flowing CSF  Needle  Needle type: pencil-tip   Needle gauge: 22 G  Needle length: 3.5 in  Additional Documentation: incremental injection, negative aspiration for heme and no paresthesia on injection  Needle localization: anatomical landmarks  Assessment  Patient's tolerance of the procedure: no complaints

## 2025-08-05 NOTE — ANESTHESIA POSTPROCEDURE EVALUATION
Anesthesia Post Evaluation    Patient: Selam Botello    Procedure(s) Performed: Procedure(s) (LRB):  TURBT (TRANSURETHRAL RESECTION OF BLADDER TUMOR), BILATERAL RETROGRADE PYELOGRAM (N/A)    Final Anesthesia Type: general      Patient location during evaluation: PACU  Patient participation: Yes- Able to Participate  Level of consciousness: awake  Post-procedure vital signs: reviewed and stable  Pain management: adequate  Airway patency: patent    PONV status at discharge: No PONV  Anesthetic complications: no      Cardiovascular status: stable  Respiratory status: unassisted  Hydration status: euvolemic  Follow-up not needed.              Vitals Value Taken Time   /59 08/05/25 09:49   Temp 36.3 °C (97.4 °F) 08/05/25 09:45   Pulse 60 08/05/25 09:54   Resp 12 08/05/25 09:54   SpO2 98 % 08/05/25 09:54   Vitals shown include unfiled device data.      Event Time   Out of Recovery 09:40:00         Pain/Ortiz Score: Ortiz Score: 8 (8/5/2025  9:45 AM)

## 2025-08-05 NOTE — ANESTHESIA PREPROCEDURE EVALUATION
08/05/2025  Selam Botello is a 80 y.o., female.  Past Medical History:   Diagnosis Date    Acid reflux     Allergy     Arthritis     Atrial tachycardia     Bladder cancer     Breast cyst     Cataract     Colon polyp     COVID 05/19/2022    Deep vein thrombosis     Degenerative disc disease     Encounter for blood transfusion     General anesthetics causing adverse effect in therapeutic use     GI bleed     Hematuria, gross     Hyperlipidemia     Inflammatory bowel disease     Liver disease     cyst    Malignant neoplasm of lateral wall of urinary bladder 10/31/2019    Osteoporosis     PONV (postoperative nausea and vomiting)     Retina disorder, left     Skin cancer     Thyroid disease     Urinary tract infection      Past Surgical History:   Procedure Laterality Date    ADENOIDECTOMY      BLADDER FULGURATION Right 09/12/2018    Procedure: FULGURATION, BLADDER;  Surgeon: Isaias Shah MD;  Location: [facility] OR;  Service: Urology;  Laterality: Right;    BLADDER FULGURATION N/A 04/24/2019    Procedure: FULGURATION, BLADDER;  Surgeon: Isaias Shah MD;  Location: [facility] OR;  Service: Urology;  Laterality: N/A;    BREAST SURGERY Bilateral     mastectomies prophylactic    CATARACT EXTRACTION      CYSTOSCOPY      CYSTOSCOPY N/A 04/24/2019    Procedure: CYSTOSCOPY;  Surgeon: Isaias Shah MD;  Location: [facility] OR;  Service: Urology;  Laterality: N/A;    CYSTOSCOPY WITH URETEROSCOPY, RETROGRADE PYELOGRAPHY, AND INSERTION OF STENT Left 09/12/2018    Procedure: CYSTOSCOPY, WITH RETROGRADE PYELOGRAM AND URETERAL STENT INSERTION;  Surgeon: Isaias Shah MD;  Location: [facility] OR;  Service: Urology;  Laterality: Left;    EYE SURGERY Left 2017    retina pucker    INSTILLATION OF URINARY BLADDER N/A 09/12/2018    Procedure: INSTILLATION, URINARY BLADDER;  Surgeon: Isaias Shah MD;  Location: [facility] OR;  Service:  Urology;  Laterality: N/A;  Chemo agent instillation     JOINT REPLACEMENT Bilateral     hip    JOINT REPLACEMENT Right     knee    MASTECTOMY Bilateral 1991    RETROGRADE PYELOGRAPHY Bilateral 10/31/2019    Procedure: PYELOGRAM, RETROGRADE;  Surgeon: Baldo Gates MD;  Location: [facility];  Service: Urology;  Laterality: Bilateral;    TONSILLECTOMY      TURBT      TURBT, WITH BLUE LIGHT CYSTOSCOPY AND CYSVIEW Bilateral 10/31/2019    Procedure: TURBT,WITH BLUE LIGHT CYSTOSCOPY AND CYSVIEW;  Surgeon: Baldo Gates MD;  Location: [facility];  Service: Urology;  Laterality: Bilateral;    TYMPANOSTOMY TUBE PLACEMENT      VASCULAR SURGERY Right     popliteal stent    WISDOM TOOTH EXTRACTION         Anesthesia Evaluation    I have reviewed the Patient Summary Reports.     I have reviewed the Nursing Notes.    I have reviewed the Medications.     Review of Systems  Anesthesia Hx:    Multiple allergies, temporary confusion/memory loss after anesthesia.  Does not want propofol or etomidate. History of prior surgery of interest to airway management or planning:  Previous anesthesia: General, Spinal        Denies Family Hx of Anesthesia complications.   Personal Hx of Anesthesia complications, Post-Operative Nausea/Vomiting                    Social:  Non-Smoker       Hematology/Oncology:                   Hematology Comments: H/O DVT    Current/Recent Cancer.            Oncology Comments: Bladder CA.     EENT/Dental:  EENT/Dental Normal           Cardiovascular:  Exercise tolerance: good       Dysrhythmias      PVD hyperlipidemia    Echo with NL EF, mild MR, mild AI.                           Pulmonary:  Pulmonary Normal                       Hepatic/GI:     GERD, well controlled Liver Disease,  UC             Musculoskeletal:  Arthritis          Spine Disorders:             Endocrine:   Hypothyroidism              Physical Exam  General:  Well nourished       Airway/Jaw/Neck:  Airway Findings: Mouth Opening: Normal      Tongue: Normal      General Airway Assessment: Adult      Mallampati: II   TM Distance: Normal, at least 6 cm   Jaw/Neck Findings:       Neck ROM: Normal ROM   Neck Findings:       Eyes/Ears/Nose:  Eyes/Ears/Nose Findings:     Dental:  Dental Findings: In tact      Chest/Lungs:  Chest/Lungs Findings:  Clear to auscultation, Normal Respiratory Rate       Heart/Vascular:  Heart Findings: Rate: Normal  Rhythm: Regular Rhythm  Sounds: Normal  Heart murmur: negative       Vascular Findings: Normal           Abdomen:  Abdomen Findings: Normal      Musculoskeletal:  Musculoskeletal Findings: Normal   Skin:  Skin Findings: Normal      Mental Status:  Mental Status Findings:  Alert and Oriented         Anesthesia Plan  Type of Anesthesia, risks & benefits discussed:  Anesthesia Type:  spinal    Patient's Preference:   Plan Factors:          Intra-op Monitoring Plan: standard ASA monitors  Intra-op Monitoring Plan Comments:   Post Op Pain Control Plan: multimodal analgesia and per primary service following discharge from PACU  Post Op Pain Control Plan Comments:     Induction:   IV  Beta Blocker:  Patient is not currently on a Beta-Blocker (No further documentation required).       Informed Consent: Informed consent signed with the Patient and all parties understand the risks and agree with anesthesia plan.  All questions answered.  Anesthesia consent signed with patient.  ASA Score: 3     Day of Surgery Review of History & Physical:        Anesthesia Plan Notes: Discussed anesthesia options with pt, she opted for SAB.        Ready For Surgery From Anesthesia Perspective.             Physical Exam  General: Well nourished    Airway:  Mallampati: II   Mouth Opening: Normal  TM Distance: Normal, at least 6 cm  Tongue: Normal  Neck ROM: Normal ROM    Dental:  In tact    Chest/Lungs:  Clear to auscultation, Normal Respiratory Rate    Heart:  Rate: Normal  Rhythm: Regular Rhythm  Sounds: Normal        Anesthesia Plan  Type of  Anesthesia, risks & benefits discussed:    Anesthesia Type: spinal  Intra-op Monitoring Plan: standard ASA monitors  Post Op Pain Control Plan: multimodal analgesia and per primary service following discharge from PACU  Induction:  IV  Informed Consent: Informed consent signed with the Patient and all parties understand the risks and agree with anesthesia plan.  All questions answered.   ASA Score: 3  Anesthesia Plan Notes: Discussed anesthesia options with pt, she opted for SAB.    Ready For Surgery From Anesthesia Perspective.     .

## 2025-08-06 VITALS
SYSTOLIC BLOOD PRESSURE: 116 MMHG | RESPIRATION RATE: 18 BRPM | DIASTOLIC BLOOD PRESSURE: 72 MMHG | OXYGEN SATURATION: 99 % | HEART RATE: 68 BPM | BODY MASS INDEX: 23.97 KG/M2 | TEMPERATURE: 98 F | WEIGHT: 131.06 LBS

## 2025-08-06 LAB
ESTROGEN SERPL-MCNC: NORMAL PG/ML
INSULIN SERPL-ACNC: NORMAL U[IU]/ML
LAB AP CLINICAL INFORMATION: NORMAL
LAB AP GROSS DESCRIPTION: NORMAL
LAB AP PERFORMING LOCATION(S): NORMAL
LAB AP REPORT FOOTNOTES: NORMAL

## 2025-08-06 PROCEDURE — 99232 SBSQ HOSP IP/OBS MODERATE 35: CPT | Mod: ,,, | Performed by: UROLOGY

## 2025-08-13 ENCOUNTER — LAB VISIT (OUTPATIENT)
Dept: LAB | Facility: HOSPITAL | Age: 80
End: 2025-08-13
Attending: INTERNAL MEDICINE
Payer: MEDICARE

## 2025-08-13 ENCOUNTER — OFFICE VISIT (OUTPATIENT)
Dept: UROLOGY | Facility: CLINIC | Age: 80
End: 2025-08-13
Payer: MEDICARE

## 2025-08-13 VITALS
DIASTOLIC BLOOD PRESSURE: 73 MMHG | SYSTOLIC BLOOD PRESSURE: 125 MMHG | WEIGHT: 135.13 LBS | HEIGHT: 62 IN | HEART RATE: 62 BPM | BODY MASS INDEX: 24.87 KG/M2

## 2025-08-13 DIAGNOSIS — D50.0 IRON DEFICIENCY ANEMIA DUE TO CHRONIC BLOOD LOSS: ICD-10-CM

## 2025-08-13 DIAGNOSIS — Z85.51 PERSONAL HISTORY OF MALIGNANT NEOPLASM OF BLADDER: ICD-10-CM

## 2025-08-13 DIAGNOSIS — Z90.13 H/O BILATERAL MASTECTOMY: ICD-10-CM

## 2025-08-13 DIAGNOSIS — C67.9 MALIGNANT NEOPLASM OF URINARY BLADDER, UNSPECIFIED SITE: ICD-10-CM

## 2025-08-13 DIAGNOSIS — K51.00 ULCERATIVE PANCOLITIS WITHOUT COMPLICATION: ICD-10-CM

## 2025-08-13 DIAGNOSIS — E83.110 HEREDITARY HEMOCHROMATOSIS: ICD-10-CM

## 2025-08-13 DIAGNOSIS — K55.20 ANGIODYSPLASIA OF CECUM: ICD-10-CM

## 2025-08-13 DIAGNOSIS — C67.0: Primary | ICD-10-CM

## 2025-08-13 DIAGNOSIS — Z80.3 FAMILY HISTORY OF BREAST CANCER IN SISTER: ICD-10-CM

## 2025-08-13 DIAGNOSIS — E03.9 ACQUIRED HYPOTHYROIDISM: ICD-10-CM

## 2025-08-13 DIAGNOSIS — R31.29 OTHER MICROSCOPIC HEMATURIA: ICD-10-CM

## 2025-08-13 LAB
ABSOLUTE EOSINOPHIL (OHS): 0.14 K/UL
ABSOLUTE MONOCYTE (OHS): 0.53 K/UL (ref 0.3–1)
ABSOLUTE NEUTROPHIL COUNT (OHS): 3.73 K/UL (ref 1.8–7.7)
ALBUMIN SERPL BCP-MCNC: 4.2 G/DL (ref 3.5–5.2)
ALP SERPL-CCNC: 67 UNIT/L (ref 40–150)
ALT SERPL W/O P-5'-P-CCNC: 10 UNIT/L (ref 10–44)
ANION GAP (OHS): 9 MMOL/L (ref 8–16)
AST SERPL-CCNC: 27 UNIT/L (ref 11–45)
BASOPHILS # BLD AUTO: 0.03 K/UL
BASOPHILS NFR BLD AUTO: 0.5 %
BILIRUB SERPL-MCNC: 0.6 MG/DL (ref 0.1–1)
BILIRUBIN, UA POC OHS: NEGATIVE
BLOOD, UA POC OHS: ABNORMAL
BUN SERPL-MCNC: 16 MG/DL (ref 8–23)
CALCIUM SERPL-MCNC: 9.3 MG/DL (ref 8.7–10.5)
CHLORIDE SERPL-SCNC: 109 MMOL/L (ref 95–110)
CLARITY, UA POC OHS: CLEAR
CO2 SERPL-SCNC: 23 MMOL/L (ref 23–29)
COLOR, UA POC OHS: YELLOW
CREAT SERPL-MCNC: 0.7 MG/DL (ref 0.5–1.4)
ERYTHROCYTE [DISTWIDTH] IN BLOOD BY AUTOMATED COUNT: 15.8 % (ref 11.5–14.5)
FERRITIN SERPL-MCNC: 25 NG/ML (ref 20–300)
GFR SERPLBLD CREATININE-BSD FMLA CKD-EPI: >60 ML/MIN/1.73/M2
GLUCOSE SERPL-MCNC: 101 MG/DL (ref 70–110)
GLUCOSE, UA POC OHS: NEGATIVE
HCT VFR BLD AUTO: 36.4 % (ref 37–48.5)
HGB BLD-MCNC: 11.8 GM/DL (ref 12–16)
IMM GRANULOCYTES # BLD AUTO: 0.01 K/UL (ref 0–0.04)
IMM GRANULOCYTES NFR BLD AUTO: 0.2 % (ref 0–0.5)
IRON SATN MFR SERPL: 19 % (ref 20–50)
IRON SERPL-MCNC: 67 UG/DL (ref 30–160)
KETONES, UA POC OHS: NEGATIVE
LEUKOCYTES, UA POC OHS: ABNORMAL
LYMPHOCYTES # BLD AUTO: 1.48 K/UL (ref 1–4.8)
MCH RBC QN AUTO: 28.2 PG (ref 27–31)
MCHC RBC AUTO-ENTMCNC: 32.4 G/DL (ref 32–36)
MCV RBC AUTO: 87 FL (ref 82–98)
NITRITE, UA POC OHS: NEGATIVE
NUCLEATED RBC (/100WBC) (OHS): 0 /100 WBC
PH, UA POC OHS: 6.5
PLATELET # BLD AUTO: 306 K/UL (ref 150–450)
PMV BLD AUTO: 9.4 FL (ref 9.2–12.9)
POTASSIUM SERPL-SCNC: 3.7 MMOL/L (ref 3.5–5.1)
PROT SERPL-MCNC: 6.5 GM/DL (ref 6–8.4)
PROTEIN, UA POC OHS: NEGATIVE
RBC # BLD AUTO: 4.19 M/UL (ref 4–5.4)
RELATIVE EOSINOPHIL (OHS): 2.4 %
RELATIVE LYMPHOCYTE (OHS): 25 % (ref 18–48)
RELATIVE MONOCYTE (OHS): 9 % (ref 4–15)
RELATIVE NEUTROPHIL (OHS): 62.9 % (ref 38–73)
SODIUM SERPL-SCNC: 141 MMOL/L (ref 136–145)
SPECIFIC GRAVITY, UA POC OHS: 1.01
TIBC SERPL-MCNC: 352 UG/DL (ref 250–450)
TRANSFERRIN SERPL-MCNC: 238 MG/DL (ref 200–375)
TSH SERPL-ACNC: 1.28 UIU/ML (ref 0.4–4)
UROBILINOGEN, UA POC OHS: 0.2
WBC # BLD AUTO: 5.92 K/UL (ref 3.9–12.7)

## 2025-08-13 PROCEDURE — 36415 COLL VENOUS BLD VENIPUNCTURE: CPT

## 2025-08-13 PROCEDURE — 84443 ASSAY THYROID STIM HORMONE: CPT

## 2025-08-13 PROCEDURE — 99999 PR PBB SHADOW E&M-EST. PATIENT-LVL IV: CPT | Mod: PBBFAC,,, | Performed by: UROLOGY

## 2025-08-13 PROCEDURE — 83540 ASSAY OF IRON: CPT

## 2025-08-13 PROCEDURE — 82040 ASSAY OF SERUM ALBUMIN: CPT

## 2025-08-13 PROCEDURE — 82728 ASSAY OF FERRITIN: CPT

## 2025-08-13 PROCEDURE — 99214 OFFICE O/P EST MOD 30 MIN: CPT | Mod: PBBFAC | Performed by: UROLOGY

## 2025-08-13 PROCEDURE — 85025 COMPLETE CBC W/AUTO DIFF WBC: CPT

## 2025-08-13 PROCEDURE — 81003 URINALYSIS AUTO W/O SCOPE: CPT | Mod: PBBFAC | Performed by: UROLOGY

## 2025-08-13 PROCEDURE — 99999PBSHW POCT URINALYSIS(INSTRUMENT): Mod: PBBFAC,,,

## 2025-08-13 PROCEDURE — 99214 OFFICE O/P EST MOD 30 MIN: CPT | Mod: S$PBB,,, | Performed by: UROLOGY

## 2025-08-20 ENCOUNTER — OFFICE VISIT (OUTPATIENT)
Dept: HEMATOLOGY/ONCOLOGY | Facility: CLINIC | Age: 80
End: 2025-08-20
Payer: MEDICARE

## 2025-08-20 VITALS
BODY MASS INDEX: 23.91 KG/M2 | HEART RATE: 62 BPM | TEMPERATURE: 98 F | DIASTOLIC BLOOD PRESSURE: 72 MMHG | SYSTOLIC BLOOD PRESSURE: 115 MMHG | RESPIRATION RATE: 18 BRPM | HEIGHT: 63 IN | OXYGEN SATURATION: 100 % | WEIGHT: 134.94 LBS

## 2025-08-20 DIAGNOSIS — Z90.13 H/O BILATERAL MASTECTOMY: ICD-10-CM

## 2025-08-20 DIAGNOSIS — K55.20 ANGIODYSPLASIA OF CECUM: ICD-10-CM

## 2025-08-20 DIAGNOSIS — E83.110 HEREDITARY HEMOCHROMATOSIS: ICD-10-CM

## 2025-08-20 DIAGNOSIS — D49.4 BLADDER NEOPLASM: Primary | ICD-10-CM

## 2025-08-20 DIAGNOSIS — D50.0 IRON DEFICIENCY ANEMIA DUE TO CHRONIC BLOOD LOSS: ICD-10-CM

## 2025-08-20 PROCEDURE — 99214 OFFICE O/P EST MOD 30 MIN: CPT | Mod: PBBFAC | Performed by: INTERNAL MEDICINE

## 2025-08-20 PROCEDURE — 99999 PR PBB SHADOW E&M-EST. PATIENT-LVL IV: CPT | Mod: PBBFAC,,, | Performed by: INTERNAL MEDICINE

## (undated) DEVICE — CATH URTRL OPEN END STR TIP 5F

## (undated) DEVICE — SET IRR URLGY 2LINE UNIV SPIKE

## (undated) DEVICE — SEE MEDLINE ITEM 152622

## (undated) DEVICE — KIT TURNOVER

## (undated) DEVICE — LEGGING CLEAR POLY 2/PACK

## (undated) DEVICE — EVACUATOR BLADDER UROVAC ADPT

## (undated) DEVICE — SEE MEDLINE ITEM 146313

## (undated) DEVICE — COVER LIGHT HANDLE 80/CA

## (undated) DEVICE — SCRUB 10% POVIDONE IODINE 4OZ

## (undated) DEVICE — SYR 30CC LUER LOCK

## (undated) DEVICE — CANISTER SUCTION MEDI-VAC 12L

## (undated) DEVICE — SUPPORT ULNA NERVE PROTECTOR

## (undated) DEVICE — UROVIEW 2600/2800

## (undated) DEVICE — EVACUATOR BLADDER ELLIK

## (undated) DEVICE — LOOP CUTTING BPLR 24/26F .30MM

## (undated) DEVICE — GOWN SURG 2XL DISP TIE BACK

## (undated) DEVICE — SEE MEDLINE ITEM 154981

## (undated) DEVICE — TRAY SKIN SCRUB WET PREMIUM

## (undated) DEVICE — SEE MEDLINE ITEM 157185

## (undated) DEVICE — CONTAINER SPECIMEN STRL 4OZ

## (undated) DEVICE — BOWL STERILE LARGE 32OZ

## (undated) DEVICE — SYR 50ML CATH TIP

## (undated) DEVICE — TRAY CYSTO BASIN

## (undated) DEVICE — SOL IRR NACL .9% 3000ML

## (undated) DEVICE — SOL STERILE WATER INJ 1000ML

## (undated) DEVICE — SYR 10CC LUER LOCK

## (undated) DEVICE — GOWN POLY REINF BRTH SLV XL

## (undated) DEVICE — ELECTRODE CUTTING LOOP 24X.3MM

## (undated) DEVICE — Device

## (undated) DEVICE — ELECTRODE LOOP CUTTING BIPOLAR

## (undated) DEVICE — CATH CONE TIP

## (undated) DEVICE — MANIFOLD 4 PORT

## (undated) DEVICE — SOL 9P NACL IRR PIC IL

## (undated) DEVICE — GOWN SMARTGOWN LVL4 X-LONG XL

## (undated) DEVICE — SOL WATER STRL IRR 1000ML

## (undated) DEVICE — CATH 5FR OPEN END URETERAL

## (undated) DEVICE — SEE MEDLINE ITEM 152487

## (undated) DEVICE — SET BASIN 48X48IN 6000ML RING

## (undated) DEVICE — COVER TABLE HVY DTY 60X90IN

## (undated) DEVICE — SEE MEDLINE ITEM 157181

## (undated) DEVICE — GAUZE SPONGE 4X4 12PLY

## (undated) DEVICE — BUTTON OVAL 24FR 12-30DEG

## (undated) DEVICE — GUIDE WIRE MOTION .035 X 150CM

## (undated) DEVICE — GLOVE PROTEXIS HYDROGEL SZ7

## (undated) DEVICE — POSITIONER HEAD DONUT 9IN FOAM

## (undated) DEVICE — BAG URINARY DRN 2000ML

## (undated) DEVICE — SET IRRIGATION WARMING NORMAL

## (undated) DEVICE — SOL IRR WATER STRL 3000 ML

## (undated) DEVICE — PACK CYSTO

## (undated) DEVICE — KIT ASPIRATION TUBING FOR SP-2

## (undated) DEVICE — BAG DRAIN URINARY CONT IRRG

## (undated) DEVICE — COVER CAMERA OPERATING ROOM

## (undated) DEVICE — JELLY LUBRICANT STERILE 4 OZ

## (undated) DEVICE — GOWN SURGICAL X-LARGE

## (undated) DEVICE — DRAPE T CYSTOSCOPY STERILE

## (undated) DEVICE — CATH DVR BL STRP 20FR 10ML

## (undated) DEVICE — TRAY DRY SKIN SCRUB PREP

## (undated) DEVICE — SET Y-TYPE TUR IRRIGATION

## (undated) DEVICE — SKIN MARKER DEVON 160

## (undated) DEVICE — BAG DRAIN ANTI REFLUX 4000ML

## (undated) DEVICE — WIRE GD LUB ANG 3CM .038 150CM

## (undated) DEVICE — TOWEL OR DISP STRL BLUE 4/PK

## (undated) DEVICE — GLOVE BIOGEL SKINSENSE PI 7.0

## (undated) DEVICE — CONTAINER SPECIMEN OR STER 4OZ

## (undated) DEVICE — SOL PVP-I SCRUB 7.5% 4OZ